# Patient Record
Sex: FEMALE | Race: BLACK OR AFRICAN AMERICAN | Employment: OTHER | URBAN - METROPOLITAN AREA
[De-identification: names, ages, dates, MRNs, and addresses within clinical notes are randomized per-mention and may not be internally consistent; named-entity substitution may affect disease eponyms.]

---

## 2017-01-03 ENCOUNTER — OFFICE VISIT (OUTPATIENT)
Dept: FAMILY MEDICINE CLINIC | Age: 57
End: 2017-01-03

## 2017-01-03 VITALS
TEMPERATURE: 96.7 F | OXYGEN SATURATION: 93 % | SYSTOLIC BLOOD PRESSURE: 96 MMHG | WEIGHT: 293 LBS | DIASTOLIC BLOOD PRESSURE: 64 MMHG | HEART RATE: 72 BPM | BODY MASS INDEX: 48.82 KG/M2 | HEIGHT: 65 IN | RESPIRATION RATE: 18 BRPM

## 2017-01-03 DIAGNOSIS — N18.6 KIDNEY DISEASE, CHRONIC, END STAGE ON DIALYSIS (HCC): ICD-10-CM

## 2017-01-03 DIAGNOSIS — N18.6 ESRD (END STAGE RENAL DISEASE) (HCC): Primary | ICD-10-CM

## 2017-01-03 DIAGNOSIS — Z99.2 KIDNEY DISEASE, CHRONIC, END STAGE ON DIALYSIS (HCC): ICD-10-CM

## 2017-01-03 DIAGNOSIS — G89.29 OTHER CHRONIC PAIN: ICD-10-CM

## 2017-01-03 RX ORDER — HYDROCODONE BITARTRATE AND ACETAMINOPHEN 7.5; 325 MG/1; MG/1
2 TABLET ORAL DAILY
Qty: 60 TAB | Refills: 0 | Status: SHIPPED | OUTPATIENT
Start: 2017-01-03 | End: 2017-02-28 | Stop reason: SDUPTHER

## 2017-01-03 RX ORDER — HYDROCODONE BITARTRATE AND ACETAMINOPHEN 7.5; 325 MG/1; MG/1
2 TABLET ORAL DAILY
Qty: 60 TAB | Refills: 0 | Status: SHIPPED | OUTPATIENT
Start: 2017-01-03 | End: 2017-01-03 | Stop reason: SDUPTHER

## 2017-01-03 RX ORDER — ALPHA-D-GALACTOSIDASE 400 UNIT
TABLET ORAL
Refills: 0 | COMMUNITY
Start: 2016-12-15 | End: 2019-05-30

## 2017-01-03 RX ORDER — LANOLIN ALCOHOL/MO/W.PET/CERES
CREAM (GRAM) TOPICAL
Qty: 30 TAB | Refills: 11 | Status: SHIPPED | OUTPATIENT
Start: 2017-01-03 | End: 2018-01-14 | Stop reason: SDUPTHER

## 2017-01-03 NOTE — PATIENT INSTRUCTIONS
TODAY, go to:   Check Out    Please schedule the following appointments:  BP follow up with Dr. Eneida Evans in  4 weeks    _____________________     Today you were seen for:  1. Low blood pressure  - take midodrine 10mg three times a day  - check your blood pressure daily      2. You can take up to THREE 300mg tablets every 8 hours  Remember to bring your pill bottles with norco to the next appointment  Try lidocaine on ankle    Call the home health agency to ask about the status of your prescription. _____________________     Review your health maintenance below. Make plans to return and address anything that is due or will be due soon.    Health Maintenance Due   Topic Date Due    Hepatitis C Test  1960    DTaP/Tdap/Td  (1 - Tdap) 05/10/1981    Breast Cancer Screening  02/19/2016

## 2017-01-03 NOTE — MR AVS SNAPSHOT
Visit Information Date & Time Provider Department Dept. Phone Encounter #  
 1/3/2017  4:45 PM [facility] Marcelle Barrera MD [place] [address] [phone] [id number] Upcoming Health Maintenance Date Due Hepatitis C Screening 1960 DTaP/Tdap/Td series (1 - Tdap) 5/10/1981 BREAST CANCER SCRN MAMMOGRAM 2/19/2016 PAP AKA CERVICAL CYTOLOGY 9/3/2017 COLONOSCOPY 8/18/2021 Pneumococcal 19-64 Highest Risk (3 of 3 - PPSV23) 1/3/2022 Allergies as of 1/3/2017  Review Complete On: 1/3/2017 By: Darian Crowell RN Severity Noted Reaction Type Reactions Iodine High 08/18/2011   Systemic Anaphylaxis Other Medication  09/03/2014   Systemic Other (comments) Metal causes numbness in hands,arms all over Shellfish Containing Products  08/18/2011   Systemic Anaphylaxis Sulfa (Sulfonamide Antibiotics)  03/10/2016    Itching Current Immunizations  Reviewed on 11/15/2016 Name Date Influenza Vaccine 11/19/2013 Influenza Vaccine Jaimie Buerger) 10/1/2015 Influenza Vaccine (Quad) PF 11/15/2016, 2/16/2015  2:30 PM  
 Pneumococcal Conjugate (PCV-13) 2/16/2015  2:31 PM  
  
 Not reviewed this visit You Were Diagnosed With   
  
 Codes Comments ESRD (end stage renal disease) ([facility]    -  Primary ICD-10-CM: N18.6 ICD-9-CM: 585.6 Other chronic pain     ICD-10-CM: G89.29 ICD-9-CM: 338.29 Kidney disease, chronic, end stage on dialysis ([facility]     ICD-10-CM: N18.6, Z99.2 ICD-9-CM: 585.6, V45.11 Vitals BP Pulse Temp Resp Height(growth percentile) Weight(growth percentile) 96/64 (BP 1 Location: Right arm, BP Patient Position: Sitting) 72 96.7 °F (35.9 °C) (Oral) 18 5' 5\" (1.651 m) (!) 351 lb 9.6 oz (159.5 kg) SpO2 BMI OB Status Smoking Status (!) 80% 58.51 kg/m2 Postmenopausal Never Smoker Vitals History BMI and BSA Data Body Mass Index Body Surface Area 58.51 kg/m 2 2.7 m 2 Preferred Pharmacy Pharmacy Name Phone [facility] [address] [phone] Your Updated Medication List  
  
   
This list is accurate as of: 1/3/17  6:16 PM.  Always use your most recent med list.  
  
  
  
  
 * albuterol 2.5 mg /3 mL (0.083 %) nebulizer solution Commonly known as:  PROVENTIL VENTOLIN  
3 mL by Nebulization route every four (4) hours as needed for Wheezing or Shortness of Breath. * albuterol 90 mcg/actuation inhaler Commonly known as:  PROVENTIL HFA, VENTOLIN HFA, PROAIR HFA Take 1-2 Puffs by inhalation every four (4) hours as needed for Wheezing. atorvastatin 10 mg tablet Commonly known as:  LIPITOR  
START WITH 1/2 TABLET DAILY. MAY INCREASE TO 1 TABLET DAILY IF TOLERATED (NO MUSCLE ACHES) buPROPion  mg tablet Commonly known as:  WELLBUTRIN XL  
take 1 tablet by mouth every morning  
  
 clotrimazole 1 % topical cream  
Commonly known as:  LOTRIMIN  
  
 ECOTRIN 325 mg tablet Generic drug:  aspirin delayed-release  
take 1 tablet by mouth once daily  
  
 ergocalciferol 50,000 unit capsule Commonly known as:  ERGOCALCIFEROL  
take 1 capsule by mouth every week  
  
 gabapentin 300 mg capsule Commonly known as:  NEURONTIN Take 1-2 Caps by mouth three (3) times daily as needed. Start with one capsule a day after dialysis. May increase to up to 3 daily for nerve pain. HYDROcodone-acetaminophen 7.5-325 mg per tablet Commonly known as:  Marcelino Bent Take 2 Tabs by mouth daily. Max Daily Amount: 2 Tabs. * HYDROPHOR 42 % Oint Generic drug:  white petrolatum * AQUAPHOR HEALING 41 % ointment Generic drug:  pantothenic ac-min oil-pet,hyd Apply  to affected area as needed for Dry Skin.  
  
 inhalational spacing device 1 Each by Does Not Apply route as needed. letrozole 2.5 mg tablet Commonly known as:  FEMARA  
take 1 tablet by mouth daily  
  
 lidocaine 4 % Gel 1 Inch by Apply Externally route two (2) times daily as needed. To ankle for pain  
  
 magnesium oxide 400 mg tablet Commonly known as:  MAG-OX  
take 1 tablet by mouth once daily  
  
 midodrine 5 mg tablet Commonly known as:  Genetta Sheer Take 10 mg by mouth three (3) times daily (with meals). mineral oil-hydrophil petrolat ointment Commonly known as:  AQUAPHOR Apply  to affected area as needed for Dry Skin or Itching. * miscellaneous medical supply Misc Please dispense 1 single point bariatric cane * miscellaneous medical supply Misc Please dispense 1 bariatric wheelchair  
  
 montelukast 10 mg tablet Commonly known as:  SINGULAIR Take 1 Tab by mouth daily. omeprazole 20 mg capsule Commonly known as:  PRILOSEC  
take 1 capsule by mouth twice a day RENVELA 800 mg Tab tab Generic drug:  sevelamer carbonate Take 800 mg by mouth three (3) times daily (with meals). SENSIPAR 30 mg tablet Generic drug:  cinacalcet Take 30 mg by mouth daily. * Notice: This list has 6 medication(s) that are the same as other medications prescribed for you. Read the directions carefully, and ask your doctor or other care provider to review them with you. Prescriptions Printed Refills HYDROcodone-acetaminophen (NORCO) 7.5-325 mg per tablet 0 Sig: Take 2 Tabs by mouth daily. Max Daily Amount: 2 Tabs. Class: Print Route: Oral  
  
Prescriptions Sent to Pharmacy Refills  
 magnesium oxide (MAG-OX) 400 mg tablet 11 Sig: take 1 tablet by mouth once daily Class: Normal  
 Pharmacy: [facility] [id number] [address] [place] [address],[address] #: [phone] Patient Instructions TODAY, go to: 
 Check Out Please schedule the following appointments: 
BP follow up with Dr. Tammie Douglas in  4 weeks 
 
_____________________ Today you were seen for: 
1. Low blood pressure - take midodrine 10mg three times a day - check your blood pressure daily 2. You can take up to THREE 300mg tablets every 8 hours Remember to bring your pill bottles with norco to the next appointment Try lidocaine on ankle Call the home health agency to ask about the status of your prescription. _____________________ Review your health maintenance below. Make plans to return and address anything that is due or will be due soon. Health Maintenance Due Topic Date Due  
 Hepatitis C Test  1960  
 DTaP/Tdap/Td  (1 - Tdap) 05/10/1981  Breast Cancer Screening  02/19/2016 Introducing [facility]! Evelin Ko introduces [employer] patient portal. Now you can access parts of your medical record, email your doctor's office, and request medication refills online. 1. In your internet browser, go to [email or web address] 2. Click on the First Time User? Click Here link in the Sign In box. You will see the New Member Sign Up page. 3. Enter your [employer] Access Code exactly as it appears below. You will not need to use this code after youve completed the sign-up process. If you do not sign up before the expiration date, you must request a new code. · [employer] Access Code: [email or web address] Expires: 2/13/2017 12:58 PM 
 
4. Enter the last four digits of your Social Security Number (xxxx) and Date of Birth (mm/dd/yyyy) as indicated and click Submit. You will be taken to the next sign-up page. 5. Create a [employer] ID. This will be your [employer] login ID and cannot be changed, so think of one that is secure and easy to remember. 6. Create a [employer] password. You can change your password at any time. 7. Enter your Password Reset Question and Answer. This can be used at a later time if you forget your password. 8. Enter your e-mail address. You will receive e-mail notification when new information is available in [address]. 9. Click Sign Up. You can now view and download portions of your medical record. 10. Click the Download Summary menu link to download a portable copy of your medical information. If you have questions, please visit the Frequently Asked Questions section of the [employer] website. Remember, [employer] is NOT to be used for urgent needs. For medical emergencies, dial 911. Now available from your iPhone and Android! Please provide this summary of care documentation to your next provider. Your primary care clinician is listed as Michelle Thomas. Randolph Gentile. If you have any questions after today's visit, please call [phone].

## 2017-01-03 NOTE — PROGRESS NOTES
[address] Visit   Patient ID:   Vance Lemon is a 64 y.o. female. Assessment/Plan:    Marty Stiles was seen today for medication refill and hospital follow up. Diagnoses and all orders for this visit:    ESRD (end stage renal disease) ([facility]  Advised her to take midrodrine 10mg TID as written on script, she is currently only taking BID. She is to notify and update her dialysis center tomorrow as well. Mg also refilled  -     magnesium oxide (MAG-OX) 400 mg tablet; take 1 tablet by mouth once daily    Other chronic pain  - HYDROcodone-acetaminophen (NORCO) 7.5-325 mg per tablet; Take 2 Tabs by mouth daily. Max Daily Amount: 2 Tabs. -     HYDROcodone-acetaminophen (NORCO) 7.5-325 mg per tablet; Take 2 Tabs by mouth daily. Max Daily Amount: 2 Tabs. Pain is adequately controlled with current plan     · Pain is due to:  ¨ ankle fracture, hardware and sequela  ¨ LBP with sciatica  ¨ Hand pain, possibly CTS  · Patient's plan currently includes:   ¨ norco 7.5/325mg  ¨ Gabapentin dose increased today, pt may titrate  ¨  lidocaine cream for ankle   · norco efills given for 2 months, dates: .1/3/2017 and 2/2/2017     · Functional improvements that justify chronic opioid medications: yes  · In the past patient has tried: PT, NSAIDs     · Treatment agreement was signed by pt and myself on: 1/3/2017   · Opioid Risk Tool? Date 11/15/2016 Low Risk  ·  appropriate and last checked on: 11/15/2016   · LABS: CANNOT MAKE URINE 2/2 ESRD    Of note, hard to check pulse ox today. The recorded 80 is spurious 2/2 poor tracing on pulse ox. She was in no distress and other vitals were acceptable. Attempted with multiple machines over a long time and still unable to get consistent tracing despite NAD and nl palpable radial pulse. Recommend hand warmers or checking on ears when equipment allows    Counselled pt on:  Patient health concerns.   Pt to call her [facility] group to check on status of equipment request    Patient was offered a choice/choices in the treatment plan today. Patient expresses understanding of the plan and agrees with recommendations. Patient Instructions     TODAY, go to:   Check Out    Please schedule the following appointments:  BP follow up with Dr. Leon Luevano in  4 weeks    _____________________     Today you were seen for:  1. Low blood pressure  - take midodrine 10mg three times a day  - check your blood pressure daily      2. You can take up to THREE 300mg tablets every 8 hours  Remember to bring your pill bottles with norco to the next appointment  Try lidocaine on ankle    Call the home health agency to ask about the status of your prescription. _____________________     Review your health maintenance below. Make plans to return and address anything that is due or will be due soon. Health Maintenance Due   Topic Date Due    Hepatitis C Test  1960    DTaP/Tdap/Td  (1 - Tdap) 05/10/1981    Breast Cancer Screening  02/19/2016         ? Subjective:   HPI:  Leora Jain is a 64 y.o. female being seen for:   Chief Complaint   Patient presents with   [facility] Follow Up     [facility] for nosebleed and BP-69/40. Nose bleed  X 2 at house  Checked BP and it was [address] and they recommended ED visit  Bernardino Malave to [facility] ED on 12/24. Gave some fluid  Has been low around dialysis  Bleed twice same night, about 10 min the first time then shorter. Stopped with ice on nose  Has not recurred    Chronic pain  · Tried lidocaine for graft, with some help, may use twice a day  · Gabapentin still helping hands, 300mg better than 100mg. Taking two in AM and may take one later in day  · C/b on sciatica pain at night.       Screening and Prevention Due:  Health Maintenance Due   Topic Date Due    Hepatitis C Screening  1960    DTaP/Tdap/Td series (1 - Tdap) 05/10/1981    BREAST CANCER SCRN MAMMOGRAM  02/19/2016        Review of Systems  Otherwise, per HPI  Active Problem List:  Patient Active Problem List   Diagnosis Code    Heart failure ([facility] I50.9    Asthma J45.909    Kidney disease, chronic, end stage on dialysis ([facility] N18.6, Z99.2    GERD (gastroesophageal reflux disease) K21.9    Unspecified sleep apnea G47.30    Depression F32.9    Vitamin D deficiency E55.9    Chronic ankle pain M25.579, G89.29    Obesity hypoventilation syndrome (HCC) E66.2    DCIS (ductal carcinoma in situ) of breast D05.10     ? Objective:     Visit Vitals    BP 96/64 (BP 1 Location: Right arm, BP Patient Position: Sitting)    Pulse 72    Temp 96.7 °F (35.9 °C) (Oral)    Resp 18    Ht 5' 5\" (1.651 m)    Wt (!) 351 lb 9.6 oz (159.5 kg)    SpO2 93%    BMI 58.51 kg/m2     No flowsheet data found. Physical Exam   Constitutional: She appears well-developed and well-nourished. Non-toxic appearance. She does not have a sickly appearance. She does not appear ill. No distress. Cardiovascular:   Pulses:       Radial pulses are 2+ on the right side, and 2+ on the left side. Nl rate and regular by palpation   Pulmonary/Chest: Effort normal. No respiratory distress. Neurological: She is alert. Psychiatric: She has a normal mood and affect. Her behavior is normal.     Allergies   Allergen Reactions    Iodine Anaphylaxis    Other Medication Other (comments)     Metal causes numbness in hands,arms all over    Shellfish Containing Products Anaphylaxis    Sulfa (Sulfonamide Antibiotics) Itching     Prior to Admission medications    Medication Sig Start Date End Date Taking? Authorizing Provider   ECOTRIN 325 mg tablet take 1 tablet by mouth once daily 12/15/16  Yes Historical Provider   magnesium oxide (MAG-OX) 400 mg tablet take 1 tablet by mouth once daily 1/3/17  Yes Erika Stands. Tammie Douglas MD   HYDROcodone-acetaminophen [facility]) 7.5-325 mg per tablet Take 2 Tabs by mouth daily. Max Daily Amount: 2 Tabs. 1/3/17  Yes Buster Stands.  Tammie Douglas MD   lidocaine 4 % gel 1 Inch by Apply Externally route two (2) times daily as needed. To ankle for pain 12/12/16  Yes Rubin Betters. Eneida Evans MD   albuterol (PROVENTIL VENTOLIN) 2.5 mg /3 mL (0.083 %) nebulizer solution 3 mL by Nebulization route every four (4) hours as needed for Wheezing or Shortness of Breath. 11/15/16  Yes Rubin Betters. Eneida Evans MD   ergocalciferol (ERGOCALCIFEROL) 50,000 unit capsule take 1 capsule by mouth every week 11/15/16  Yes Rubin Betters. Eneida Evans MD   inhalational spacing device 1 Each by Does Not Apply route as needed. 11/15/16  Yes Rubin Betters. Eneida Evans MD   albuterol (PROVENTIL HFA, VENTOLIN HFA, PROAIR HFA) 90 mcg/actuation inhaler Take 1-2 Puffs by inhalation every four (4) hours as needed for Wheezing. 11/15/16  Yes Rubin Betters. Eneida Evans MD   gabapentin (NEURONTIN) 300 mg capsule Take 1-2 Caps by mouth three (3) times daily as needed. Start with one capsule a day after dialysis. May increase to up to 3 daily for nerve pain. 11/15/16  Yes Rubin Betters. Eneida Evans MD   mineral oil-hydrophil petrolat (AQUAPHOR) ointment Apply  to affected area as needed for Dry Skin or Itching. 8/23/16  Yes Supriya Sands MD   midodrine (PROAMITINE) 5 mg tablet Take 10 mg by mouth three (3) times daily (with meals). 6/29/16  Yes Historical Provider   atorvastatin (LIPITOR) 10 mg tablet START WITH 1/2 TABLET DAILY. MAY INCREASE TO 1 TABLET DAILY IF TOLERATED (NO MUSCLE ACHES) 4/4/16  Yes Supriya Sands MD   SENSIPAR 30 mg tablet Take 30 mg by mouth daily. 2/12/16  Yes Historical Provider   RENVELA 800 mg tab tab Take 800 mg by mouth three (3) times daily (with meals).  2/10/16  Yes Historical Provider   omeprazole (PRILOSEC) 20 mg capsule take 1 capsule by mouth twice a day 3/6/16  Yes Supriya Sands MD   buPROPion XL (WELLBUTRIN XL) 150 mg tablet take 1 tablet by mouth every morning 3/6/16  Yes Supriya Sands MD   letrozole [facility]) 2.5 mg tablet take 1 tablet by mouth daily 2/29/16  Yes Supriya Sands MD   montelukast (SINGULAIR) 10 mg tablet Take 1 Tab by mouth daily. 1/12/16  Yes Femi Cancer, NP   miscellaneous medical supply misc Please dispense 1 single point bariatric cane 11/23/16   Samson Cartwright. Hood Pang MD   miscellaneous medical supply misc Please dispense 1 bariatric wheelchair 11/23/16   Samson Cartwright. Hood Pang MD   AQUAPHOR HEALING 41 % ointment Apply  to affected area as needed for Dry Skin.  8/23/16   Jesusita Doyle MD   clotrimazole (LOTRIMIN) 1 % topical cream  2/1/16   Historical Provider   HYDROPHOR 42 % oint  1/13/16   Historical Provider

## 2017-01-03 NOTE — PROGRESS NOTES
Chief Complaint   Patient presents with   [address] Follow up 12/24/16 to [facility]. She had a nose bleed and BP was low. 1. Have you been to the ER, urgent care clinic since your last visit? Hospitalized since your last visit? Yes When: 12/24/16 Where: [address] [place] [address] Reason for visit: Low BP and nosebleed. 2. Have you seen or consulted any other health care providers outside of the [address] [place] [address] since your last visit? Include any pap smears or colon screening. No     I have reviewed Health Maintenance with the patient and updated. Advance Care Planning information reviewed and given to the patient at a previous visit. The patient was counseled on the dangers of tobacco use, and Patient is a non smoker. Reviewed strategies to maximize success, including Continue not to smoke.

## 2017-02-01 DIAGNOSIS — G56.03 BILATERAL CARPAL TUNNEL SYNDROME: ICD-10-CM

## 2017-02-01 DIAGNOSIS — M54.31 RIGHT SIDED SCIATICA: ICD-10-CM

## 2017-02-01 RX ORDER — GABAPENTIN 300 MG/1
300-600 CAPSULE ORAL
Qty: 180 CAP | Refills: 3 | Status: SHIPPED | OUTPATIENT
Start: 2017-02-01 | End: 2017-05-25 | Stop reason: SDUPTHER

## 2017-02-28 ENCOUNTER — OFFICE VISIT (OUTPATIENT)
Dept: FAMILY MEDICINE CLINIC | Age: 57
End: 2017-02-28

## 2017-02-28 VITALS
OXYGEN SATURATION: 95 % | DIASTOLIC BLOOD PRESSURE: 73 MMHG | SYSTOLIC BLOOD PRESSURE: 115 MMHG | WEIGHT: 293 LBS | HEIGHT: 65 IN | TEMPERATURE: 98.9 F | HEART RATE: 92 BPM | BODY MASS INDEX: 48.82 KG/M2

## 2017-02-28 DIAGNOSIS — G89.29 OTHER CHRONIC PAIN: ICD-10-CM

## 2017-02-28 DIAGNOSIS — J01.90 ACUTE SINUSITIS, RECURRENCE NOT SPECIFIED, UNSPECIFIED LOCATION: ICD-10-CM

## 2017-02-28 DIAGNOSIS — J45.40 MODERATE PERSISTENT ASTHMA WITHOUT COMPLICATION: Primary | ICD-10-CM

## 2017-02-28 DIAGNOSIS — I95.9 HYPOTENSION, UNSPECIFIED HYPOTENSION TYPE: ICD-10-CM

## 2017-02-28 RX ORDER — SEVELAMER CARBONATE 800 MG/1
TABLET, FILM COATED ORAL
Refills: 0 | COMMUNITY
Start: 2017-01-26 | End: 2017-04-06

## 2017-02-28 RX ORDER — HYDROCODONE BITARTRATE AND ACETAMINOPHEN 7.5; 325 MG/1; MG/1
1 TABLET ORAL
Qty: 60 TAB | Refills: 0 | Status: SHIPPED | OUTPATIENT
Start: 2017-03-30 | End: 2017-05-25 | Stop reason: SDUPTHER

## 2017-02-28 RX ORDER — PREDNISONE 20 MG/1
60 TABLET ORAL DAILY
Qty: 15 TAB | Refills: 0 | Status: SHIPPED | OUTPATIENT
Start: 2017-02-28 | End: 2017-03-05

## 2017-02-28 RX ORDER — HYDROCODONE BITARTRATE AND ACETAMINOPHEN 7.5; 325 MG/1; MG/1
1 TABLET ORAL
Qty: 60 TAB | Refills: 0 | Status: SHIPPED | OUTPATIENT
Start: 2017-02-28 | End: 2017-05-25 | Stop reason: SDUPTHER

## 2017-02-28 RX ORDER — AMOXICILLIN AND CLAVULANATE POTASSIUM 875; 125 MG/1; MG/1
1 TABLET, FILM COATED ORAL EVERY 12 HOURS
Qty: 20 TAB | Refills: 0 | Status: SHIPPED | OUTPATIENT
Start: 2017-02-28 | End: 2017-03-10

## 2017-02-28 RX ORDER — HYDROCODONE BITARTRATE AND ACETAMINOPHEN 7.5; 325 MG/1; MG/1
1 TABLET ORAL
Qty: 60 TAB | Refills: 0 | Status: SHIPPED | OUTPATIENT
Start: 2017-04-29 | End: 2017-05-25 | Stop reason: SDUPTHER

## 2017-02-28 RX ORDER — IPRATROPIUM BROMIDE AND ALBUTEROL SULFATE 2.5; .5 MG/3ML; MG/3ML
3 SOLUTION RESPIRATORY (INHALATION) EVERY 6 HOURS
Qty: 168 ML | Refills: 1
Start: 2017-02-28 | End: 2017-04-06

## 2017-02-28 RX ORDER — SEVELAMER CARBONATE 2400 MG/1
POWDER, FOR SUSPENSION ORAL
Refills: 0 | COMMUNITY
Start: 2017-02-02 | End: 2017-04-06 | Stop reason: SDUPTHER

## 2017-02-28 NOTE — MR AVS SNAPSHOT
Visit Information Date & Time Provider Department Dept. Phone Encounter #  
 2/28/2017 12:00 PM Jaminwoody Marin MD [facility] [phone] [id number] Your Appointments 4/6/2017  9:00 AM  
Complete Physical with Pap with Rickey Fischer. [facility] ([address]) Appt Note: Complete Physical with PAP (40 min.) - lp  
 [address] 
[address] [facility] [address]  
[phone]  
  
   
 [address] [facility] [address] [facility] Upcoming Health Maintenance Date Due Hepatitis C Screening 1960 DTaP/Tdap/Td series (1 - Tdap) 5/10/1981 BREAST CANCER SCRN MAMMOGRAM 2/19/2016 PAP AKA CERVICAL CYTOLOGY 9/3/2017 COLONOSCOPY 8/18/2021 Pneumococcal 19-64 Highest Risk (3 of 3 - PPSV23) 1/3/2022 Allergies as of 2/28/2017  Review Complete On: 2/28/2017 By: Rickey Fischer. Maico Marin MD  
  
 Severity Noted Reaction Type Reactions Iodine High 08/18/2011   Systemic Anaphylaxis Other Medication  09/03/2014   Systemic Other (comments) Metal causes numbness in hands,arms all over Shellfish Containing Products  08/18/2011   Systemic Anaphylaxis Sulfa (Sulfonamide Antibiotics)  03/10/2016    Itching Current Immunizations  Reviewed on 11/15/2016 Name Date Influenza Vaccine 11/19/2013 Influenza Vaccine Corie Lanius) 10/1/2015 Influenza Vaccine (Quad) PF 11/15/2016, 2/16/2015  2:30 PM  
 Pneumococcal Conjugate (PCV-13) 2/16/2015  2:31 PM  
  
 Not reviewed this visit You Were Diagnosed With   
  
 Codes Comments Moderate persistent asthma without complication    -  Primary ICD-10-CM: J45.40 ICD-9-CM: 493.90 Acute sinusitis, recurrence not specified, unspecified location     ICD-10-CM: J01.90 ICD-9-CM: 461.9 Other chronic pain     ICD-10-CM: G89.29 ICD-9-CM: 338.29 Hypotension, unspecified hypotension type     ICD-10-CM: I95.9 ICD-9-CM: 042. 9 Vitals  BP  
  
  
  
  
  
 115/73 (BP 1 Location: Right arm, BP Patient Position: Sitting) BMI and BSA Data Body Mass Index Body Surface Area  
 58.36 kg/m 2 2.7 m 2 Preferred Pharmacy Pharmacy Name Phone [facility] [phone] [phone] Your Updated Medication List  
  
   
This list is accurate as of: 2/28/17  1:30 PM.  Always use your most recent med list.  
  
  
  
  
 * albuterol 2.5 mg /3 mL (0.083 %) nebulizer solution Commonly known as:  PROVENTIL VENTOLIN  
3 mL by Nebulization route every four (4) hours as needed for Wheezing or Shortness of Breath. * albuterol 90 mcg/actuation inhaler Commonly known as:  PROVENTIL HFA, VENTOLIN HFA, PROAIR HFA Take 1-2 Puffs by inhalation every four (4) hours as needed for Wheezing. albuterol-ipratropium 2.5 mg-0.5 mg/3 ml Nebu Commonly known as:  DUO-NEB  
3 mL by Nebulization route every six (6) hours. Until you follow up  
  
 amoxicillin-clavulanate 875-125 mg per tablet Commonly known as:  AUGMENTIN Take 1 Tab by mouth every twelve (12) hours for 10 days. AQUAPHOR HEALING 41 % ointment Generic drug:  pantothenic ac-min oil-pet,hyd Apply  to affected area as needed for Dry Skin. atorvastatin 10 mg tablet Commonly known as:  LIPITOR  
START WITH 1/2 TABLET DAILY. MAY INCREASE TO 1 TABLET DAILY IF TOLERATED (NO MUSCLE ACHES) buPROPion  mg tablet Commonly known as:  WELLBUTRIN XL  
take 1 tablet by mouth every morning  
  
 clotrimazole 1 % topical cream  
Commonly known as:  LOTRIMIN  
  
 ECOTRIN 325 mg tablet Generic drug:  aspirin delayed-release  
take 1 tablet by mouth once daily  
  
 ergocalciferol 50,000 unit capsule Commonly known as:  ERGOCALCIFEROL  
take 1 capsule by mouth every week  
  
 gabapentin 300 mg capsule Commonly known as:  NEURONTIN Take 1-2 Caps by mouth three (3) times daily as needed. * HYDROcodone-acetaminophen 7.5-325 mg per tablet Commonly known as:  Castillo Tori Take 1 Tab by mouth two (2) times daily as needed for Pain. Max Daily Amount: 2 Tabs. * HYDROcodone-acetaminophen 7.5-325 mg per tablet Commonly known as:  Castillo Tori Take 1 Tab by mouth two (2) times daily as needed for Pain. Max Daily Amount: 2 Tabs. Start taking on:  3/30/2017  
  
 * HYDROcodone-acetaminophen 7.5-325 mg per tablet Commonly known as:  Castillo Tori Take 1 Tab by mouth two (2) times daily as needed for Pain. Max Daily Amount: 2 Tabs. Start taking on:  4/29/2017  
  
 inhalational spacing device 1 Each by Does Not Apply route as needed. letrozole 2.5 mg tablet Commonly known as:  FEMARA  
take 1 tablet by mouth daily  
  
 lidocaine 4 % Gel 1 Inch by Apply Externally route two (2) times daily as needed. To ankle for pain  
  
 magnesium oxide 400 mg tablet Commonly known as:  MAG-OX  
take 1 tablet by mouth once daily  
  
 midodrine 5 mg tablet Commonly known as:  Simone Beecham Take 10 mg by mouth three (3) times daily (with meals). mineral oil-hydrophil petrolat ointment Commonly known as:  AQUAPHOR Apply  to affected area as needed for Dry Skin or Itching. * miscellaneous medical supply Misc Please dispense 1 single point bariatric cane * miscellaneous medical supply Misc Please dispense 1 bariatric wheelchair  
  
 montelukast 10 mg tablet Commonly known as:  SINGULAIR  
take 1 tablet by mouth once daily  
  
 omeprazole 20 mg capsule Commonly known as:  PRILOSEC  
take 1 capsule by mouth twice a day  
  
 predniSONE 20 mg tablet Commonly known as:  Shade Ok Take 3 Tabs by mouth daily for 5 days. * RENVELA 800 mg Tab tab Generic drug:  sevelamer carbonate * RENVELA 2.4 gram Pwpk oral powder Generic drug:  sevelamer carbonate SENSIPAR 30 mg tablet Generic drug:  cinacalcet Take 30 mg by mouth daily. * Notice: This list has 9 medication(s) that are the same as other medications prescribed for you. Read the directions carefully, and ask your doctor or other care provider to review them with you. Prescriptions Printed Refills HYDROcodone-acetaminophen (NORCO) 7.5-325 mg per tablet 0 Sig: Take 1 Tab by mouth two (2) times daily as needed for Pain. Max Daily Amount: 2 Tabs. Class: Print Route: Oral  
 HYDROcodone-acetaminophen (NORCO) 7.5-325 mg per tablet 0 Starting on: 3/30/2017 Sig: Take 1 Tab by mouth two (2) times daily as needed for Pain. Max Daily Amount: 2 Tabs. Class: Print Route: Oral  
 HYDROcodone-acetaminophen (NORCO) 7.5-325 mg per tablet 0 Starting on: 4/29/2017 Sig: Take 1 Tab by mouth two (2) times daily as needed for Pain. Max Daily Amount: 2 Tabs. Class: Print Route: Oral  
  
Prescriptions Sent to Pharmacy Refills  
 amoxicillin-clavulanate (AUGMENTIN) 875-125 mg per tablet 0 Sig: Take 1 Tab by mouth every twelve (12) hours for 10 days. Class: Normal  
 Pharmacy: [id number] [address] [place] [address] Ph #: [phone] Route: Oral  
 predniSONE (DELTASONE) 20 mg tablet 0 Sig: Take 3 Tabs by mouth daily for 5 days. Class: Normal  
 Pharmacy: [id number] [address] [place] [address] [address] Ph #: [phone] Route: Oral  
  
We Performed the Following REFERRAL TO CARDIOLOGY [JEN66 Custom] Comments:  
 Please evaluate patient for hypotension pre dialysis request  
  
Referral Information Referral ID Referred By Referred To [id number] Samantha Easley MD   
   [place] [address] [address] Phone: [phone] Fax: [phone] Visits Status Start Date End Date 1 New Request 2/28/17 2/28/18  If your referral has a status of pending review or denied, additional information will be sent to support the outcome of this decision. Patient Instructions TODAY, please go to: CHECK OUT Please schedule the following appointments at [facility] OUT: 
· Asthma and sinus follow up with Dr. Stoney Levy or any provider here on March 7th 
_____________________ Today's Plan: · Take augmentin · Take prednisone · Use breathing treatment four times a day · May use albuterol in between · We will get your [facility] records · If worsening, return for evaluation here or at [facility]. · See cardiology 
 
_____________________ Review your health maintenance below. Make plans to return and address anything that is due or will be due soon. Health Maintenance Due Topic Date Due  
 Hepatitis C Test  1960  
 DTaP/Tdap/Td  (1 - Tdap) 05/10/1981  Breast Cancer Screening  02/19/2016 Introducing [facility]! Risa Acevedo introduces AccessPay patient portal. Now you can access parts of your medical record, email your doctor's office, and request medication refills online. 1. In your internet browser, go to [email or web address] 2. Click on the First Time User? Click Here link in the Sign In box. You will see the New Member Sign Up page. 3. Enter your AccessPay Access Code exactly as it appears below. You will not need to use this code after youve completed the sign-up process. If you do not sign up before the expiration date, you must request a new code. · AccessPay Access Code: [email or web address] Expires: 5/29/2017  1:21 PM 
 
4. Enter the last four digits of your Social Security Number (xxxx) and Date of Birth (mm/dd/yyyy) as indicated and click Submit. You will be taken to the next sign-up page. 5. Create a MCE-[employer] Developmentt ID. This will be your MCE[employer] login ID and cannot be changed, so think of one that is secure and easy to remember. 6. Create a [employer] password. You can change your password at any time. 7. Enter your Password Reset Question and Answer. This can be used at a later time if you forget your password. 8. Enter your e-mail address. You will receive e-mail notification when new information is available in [address] 9. Click Sign Up. You can now view and download portions of your medical record. 10. Click the Download Summary menu link to download a portable copy of your medical information. If you have questions, please visit the Frequently Asked Questions section of the Cubikal website. Remember, Cubikal is NOT to be used for urgent needs. For medical emergencies, dial 911. Now available from your iPhone and Android! Please provide this summary of care documentation to your next provider. Your primary care clinician is listed as [facility]. J Luis Ewing. If you have any questions after today's visit, please call [phone].

## 2017-02-28 NOTE — PATIENT INSTRUCTIONS
TODAY, please go to:   CHECK OUT     Please schedule the following appointments at [facility] OUT:  · Asthma and sinus follow up with Dr. Gauri Marcus or any provider here on March 7th  _____________________     Today's Plan:  · Take augmentin  · Take prednisone  · Use breathing treatment four times a day  · May use albuterol in between    · We will get your [facility] records    · If worsening, return for evaluation here or at [facility]. · See cardiology    _____________________     Review your health maintenance below. Make plans to return and address anything that is due or will be due soon.    Health Maintenance Due   Topic Date Due    Hepatitis C Test  1960    DTaP/Tdap/Td  (1 - Tdap) 05/10/1981    Breast Cancer Screening  02/19/2016

## 2017-02-28 NOTE — PROGRESS NOTES
Chief Complaint   Patient presents with    Medication Evaluation     refill on pain meds per pt    Cough     x 2-3 weeks

## 2017-02-28 NOTE — PROGRESS NOTES
[address] Visit   Patient ID:   Johanny Palomo is a 64 y.o. female. Assessment/Plan:    Erika Carr was seen today for medication evaluation and cough. Diagnoses and all orders for this visit:    Moderate persistent asthma without complication  Acceptable improvement with duoneb X2 today. Completed third in office as well. Manage with steroid, scheduled duoneb, prn albuterol. tx sinusitis as below. -     predniSONE (DELTASONE) 20 mg tablet; Take 3 Tabs by mouth daily for 5 days. -     albuterol-ipratropium (DUO-NEB) 2.5 mg-0.5 mg/3 ml nebu; 3 mL by Nebulization route every six (6) hours. Until you follow up  -     ALBUTEROL, INHAL. WYS.()    Acute sinusitis, recurrence not specified, unspecified location  -     amoxicillin-clavulanate (AUGMENTIN) 875-125 mg per tablet; Take 1 Tab by mouth every twelve (12) hours for 10 days. Other chronic pain  Pill count done today. Treatment agree UTD.  reviewed. D/t need to address asthma flare did not alter medications, will continue with regimen an reevaluate on follow up.  -     HYDROcodone-acetaminophen (NORCO) 7.5-325 mg per tablet; Take 1 Tab by mouth two (2) times daily as needed for Pain. Max Daily Amount: 2 Tabs. -     HYDROcodone-acetaminophen (NORCO) 7.5-325 mg per tablet; Take 1 Tab by mouth two (2) times daily as needed for Pain. Max Daily Amount: 2 Tabs. -     HYDROcodone-acetaminophen (NORCO) 7.5-325 mg per tablet; Take 1 Tab by mouth two (2) times daily as needed for Pain. Max Daily Amount: 2 Tabs. Hypotension, unspecified hypotension type  Referral per pt request of dialysis. -     REFERRAL TO CARDIOLOGY      Counselled pt on:  Patient health concerns. Patient was offered a choice/choices in the treatment plan today.   Patient expresses understanding of the plan and agrees with recommendations    Patient Instructions     TODAY, please go to:   CHECK OUT     Please schedule the following appointments at [facility] OUT:  · Asthma and sinus follow up with Dr. Ralph Guzmán or any provider here on March 7th  _____________________     Today's Plan:  · Take augmentin  · Take prednisone  · Use breathing treatment four times a day  · May use albuterol in between    · We will get your [facility] records    · If worsening, return for evaluation here or at [facility]. · See cardiology    _____________________     Review your health maintenance below. Make plans to return and address anything that is due or will be due soon. Health Maintenance Due   Topic Date Due    Hepatitis C Test  1960    DTaP/Tdap/Td  (1 - Tdap) 05/10/1981    Breast Cancer Screening  02/19/2016         ? Subjective:   HPI:  Shani Wayne is a 64 y.o. female being seen for:   Chief Complaint   Patient presents with    Medication Evaluation     refill on pain meds per pt    Cough     x 2-3 weeks     Chronic pain  Sciatica irritating at night, numbness in thighs, improves with position change  Pain in ankle continues  Due for refill today  Pill count today  Brings two empty bottles of norco with correct dates and prescriber name. cough  · Started 2-3 weeks ago  · Wheeze, mucous  · Strangled in sleep  · Has been sleeping with bipap  · No ST  · Ears are stopped up  · Some nasal drainage  · Had a nose bleed on 2/25  · Some sinus pressure  · There is sinus congestion  · Sometimes SOB, has O2 in car  · Now has chest and side pain from cough  · Using albuterol twice a day with some relief  · And taking singulair     · Dialysis wants her to see cardiology 2/2 hypotension during dialysis    · Was in [facility] for 3-4 days 2/2 diarrhea X 1-2 weeks and reports having blood in stool. Had colonoscopy. From pt description ?mesenteic ischemia. No blood since then.        Review of Systems  Otherwise, per HPI  Active Problem List:  Patient Active Problem List   Diagnosis Code    [facility] I50.9    Asthma J45.909    Kidney disease, chronic, end stage on dialysis ([facility] N18.6, Z99.2    GERD (gastroesophageal reflux disease) K21.9    Unspecified sleep apnea G47.30    Depression F32.9    Vitamin D deficiency E55.9    Chronic ankle pain M25.579, G89.29    Obesity hypoventilation syndrome (HCC) E66.2    DCIS (ductal carcinoma in situ) of breast D05.10     Objective:     Visit Vitals    /73 (BP 1 Location: Right arm, BP Patient Position: Sitting)    Pulse 92    Temp 98.9 °F (37.2 °C) (Oral)    Ht 5' 5.01\" (1.651 m)    Wt (!) 350 lb 12.8 oz (159.1 kg)    SpO2 95%    BMI 58.36 kg/m2     Wt Readings from Last 3 Encounters:   02/28/17 (!) 350 lb 12.8 oz (159.1 kg)   01/03/17 (!) 351 lb 9.6 oz (159.5 kg)   11/15/16 (!) 351 lb 9.6 oz (159.5 kg)     No flowsheet data found. Physical Exam   Constitutional: She appears well-developed and well-nourished. Non-toxic appearance. Overweight female in wheelchair   HENT:   Right Ear: Tympanic membrane and ear canal normal.   Left Ear: Tympanic membrane and ear canal normal.   Nose: Right sinus exhibits maxillary sinus tenderness and frontal sinus tenderness. Left sinus exhibits maxillary sinus tenderness and frontal sinus tenderness. Mouth/Throat: Uvula is midline. No oropharyngeal exudate or posterior oropharyngeal edema. Cardiovascular: Normal rate, regular rhythm and normal heart sounds. Pulmonary/Chest: No accessory muscle usage. She is in respiratory distress. She has wheezes. She has no rhonchi. She has no rales. On initial evaluation, wheezing throughout. Tight airways  Coughing when talking    After 2 duonebs, improved breath sounds though still wheezing in all fields. Able to talk in full sentences comfortably   Neurological: She is alert. Psychiatric: She has a normal mood and affect.  Her behavior is normal.     Allergies   Allergen Reactions    Iodine Anaphylaxis    Other Medication Other (comments)     Metal causes numbness in hands,arms all over    Shellfish Containing Products Anaphylaxis    Sulfa (Sulfonamide Antibiotics) Itching     Prior to Admission medications    Medication Sig Start Date End Date Taking? Authorizing Provider   RENVELA 2.4 gram pwpk oral powder  2/2/17  Yes Historical Provider   amoxicillin-clavulanate (AUGMENTIN) 875-125 mg per tablet Take 1 Tab by mouth every twelve (12) hours for 10 days. 2/28/17 3/10/17 Yes Annie Sparks MD   predniSONE (DELTASONE) 20 mg tablet Take 3 Tabs by mouth daily for 5 days. 2/28/17 3/5/17 Yes Annie Sparks MD   albuterol-ipratropium (DUO-NEB) 2.5 mg-0.5 mg/3 ml nebu 3 mL by Nebulization route every six (6) hours. Until you follow up 2/28/17  Yes Ania Figures. Jayden Sparks MD   HYDROcodone-acetaminophen [facility]) 7.5-325 mg per tablet Take 1 Tab by mouth two (2) times daily as needed for Pain. Max Daily Amount: 2 Tabs. 2/28/17  Yes Annie Sparks MD   HYDROcodone-acetaminophen [facility]) 7.5-325 mg per tablet Take 1 Tab by mouth two (2) times daily as needed for Pain. Max Daily Amount: 2 Tabs. 3/30/17  Yes Ania Figures. Jayden Sparks MD   HYDROcodone-acetaminophen [facility]) 7.5-325 mg per tablet Take 1 Tab by mouth two (2) times daily as needed for Pain. Max Daily Amount: 2 Tabs. 4/29/17  Yes Annie Sparks MD   gabapentin (NEURONTIN) 300 mg capsule Take 1-2 Caps by mouth three (3) times daily as needed. 2/1/17  Yes Ania Figures. Jayden Sparks MD   montelukast (SINGULAIR) 10 mg tablet take 1 tablet by mouth once daily 1/31/17  Yes Ania Figures. Jayden Sparks MD   ECOTRIN 325 mg tablet take 1 tablet by mouth once daily 12/15/16  Yes Historical Provider   magnesium oxide (MAG-OX) 400 mg tablet take 1 tablet by mouth once daily 1/3/17  Yes [place] Figures. Jayden Sparks MD   lidocaine 4 % gel 1 Inch by Apply Externally route two (2) times daily as needed. To ankle for pain 12/12/16  Yes Ania Figures. Jayden Sparks MD   albuterol (PROVENTIL VENTOLIN) 2.5 mg /3 mL (0.083 %) nebulizer solution 3 mL by Nebulization route every four (4) hours as needed for Wheezing or Shortness of Breath. 11/15/16  Yes Ania Figures.  Jayden Sparks MD   ergocalciferol (ERGOCALCIFEROL) 50,000 unit capsule take 1 capsule by mouth every week 11/15/16  Yes Melquiades Lilly. Raimundo Mann MD   mineral oil-hydrophil petrolat (AQUAPHOR) ointment Apply  to affected area as needed for Dry Skin or Itching. 8/23/16  Yes Minerva Kaye MD   AQUAPHOR HEALING 41 % ointment Apply  to affected area as needed for Dry Skin. 8/23/16  Yes Minerva Kaye MD   midodrine (PROAMITINE) 5 mg tablet Take 10 mg by mouth three (3) times daily (with meals). 6/29/16  Yes Historical Provider   atorvastatin (LIPITOR) 10 mg tablet START WITH 1/2 TABLET DAILY. MAY INCREASE TO 1 TABLET DAILY IF TOLERATED (NO MUSCLE ACHES) 4/4/16  Yes Minerva Kaye MD   SENSIPAR 30 mg tablet Take 30 mg by mouth daily. 2/12/16  Yes Historical Provider   clotrimazole (LOTRIMIN) 1 % topical cream  2/1/16  Yes Historical Provider   omeprazole (PRILOSEC) 20 mg capsule take 1 capsule by mouth twice a day 3/6/16  Yes Minerva Kaye MD   buPROPion XL (WELLBUTRIN XL) 150 mg tablet take 1 tablet by mouth every morning 3/6/16  Yes Minerva Kaye MD   letrozole [facility]) 2.5 mg tablet take 1 tablet by mouth daily 2/29/16  Yes Minerva Kaye MD   RENVELA 800 mg tab tab  1/26/17   Historical Provider   miscellaneous medical supply misc Please dispense 1 single point bariatric cane 11/23/16   Melquiades Lilly. Raimundo Mann MD   miscellaneous medical supply misc Please dispense 1 bariatric wheelchair 11/23/16   Melquiades Lilly. Raimundo Mann MD   inhalational spacing device 1 Each by Does Not Apply route as needed. 11/15/16   Melquiades Lilly. Raimundo Mann MD   albuterol (PROVENTIL HFA, VENTOLIN HFA, PROAIR HFA) 90 mcg/actuation inhaler Take 1-2 Puffs by inhalation every four (4) hours as needed for Wheezing. 11/15/16   Melquiades Lilly.  Raimundo Mann MD

## 2017-03-07 ENCOUNTER — OFFICE VISIT (OUTPATIENT)
Dept: FAMILY MEDICINE CLINIC | Age: 57
End: 2017-03-07

## 2017-03-07 VITALS
BODY MASS INDEX: 48.82 KG/M2 | DIASTOLIC BLOOD PRESSURE: 37 MMHG | WEIGHT: 293 LBS | TEMPERATURE: 97 F | RESPIRATION RATE: 20 BRPM | HEIGHT: 65 IN | SYSTOLIC BLOOD PRESSURE: 95 MMHG | HEART RATE: 70 BPM | OXYGEN SATURATION: 96 %

## 2017-03-07 DIAGNOSIS — Z12.39 BREAST CANCER SCREENING: ICD-10-CM

## 2017-03-07 DIAGNOSIS — Z11.59 NEED FOR HEPATITIS C SCREENING TEST: ICD-10-CM

## 2017-03-07 DIAGNOSIS — J01.90 ACUTE SINUSITIS, RECURRENCE NOT SPECIFIED, UNSPECIFIED LOCATION: ICD-10-CM

## 2017-03-07 DIAGNOSIS — L85.3 DRY SKIN: ICD-10-CM

## 2017-03-07 DIAGNOSIS — J45.909 UNCOMPLICATED ASTHMA, UNSPECIFIED ASTHMA SEVERITY: Primary | ICD-10-CM

## 2017-03-07 RX ORDER — PETROLATUM 42 G/100G
OINTMENT TOPICAL AS NEEDED
Qty: 454 G | Refills: 4 | Status: SHIPPED | OUTPATIENT
Start: 2017-03-07 | End: 2017-11-14 | Stop reason: SDUPTHER

## 2017-03-07 NOTE — PROGRESS NOTES
Chief Complaint   Patient presents with    Breathing Problem     follow up regarding her breathing whether it has got better or not          1. Have you been to the ER, urgent care clinic since your last visit? Hospitalized since your last visit? No     2. Have you seen or consulted any other health care providers outside of the [address] [place] [address] since your last visit? Include any pap smears or colon screening. No     The patient was counseled on the dangers of tobacco use, and was advised never to start. Reviewed strategies to maximize success, including never to start. Health Maintenance Due   Topic Date Due    Hepatitis C Screening Discussed with patient today and advised to follow up.    1960    DTaP/Tdap/Td series (1 - Tdap) Discussed with patient today and advised to follow up. Stated that she is willing to receive. 05/10/1981    BREAST CANCER SCRN MAMMOGRAM Discussed with patient today and advised to follow up.    02/19/2016     ACP is not on file, advised to return.

## 2017-03-07 NOTE — PROGRESS NOTES
[address] Visit   Patient ID:   Ponce William is a 64 y.o. female. Assessment/Plan:    Joann Ramirez was seen today for breathing problem. Diagnoses and all orders for this visit:    Uncomplicated asthma, unspecified asthma severity  Acute sinusitis, recurrence not specified, unspecified location  Much improved today. Continue augmentin and prn albuterol. Lungs are clear again. RTC prn. Breast cancer screening  Ordered today  -     [facility] MAMMO BI SCREENING INCL CAD; Future    Need for hepatitis C screening test  Will get today  -     HEPATITIS C AB    Dry skin  Prn dry skin   -     mineral oil-hydrophil petrolat (AQUAPHOR) ointment; Apply  to affected area as needed for Dry Skin or Itching.  reviewed and appropriate 3/7/2017  Counselled pt on:  Patient health concernsplan as outlined in patient instructions and above. Patient was offered a choice/choices in the treatment plan today. Patient expresses understanding of the plan and agrees with recommendations. .   Patient Instructions     TODAY, please go to:   CHECK OUT    LAB    Please schedule the following appointments at CHECK OUT:  · Chronic pain follow up with Dr. Jeaneth Harrison in 2 and 1/2 months    _____________________     Today's Plan:  · Finish antibiotic. Continue to use albuterol when needed  · Return if symptoms worsening  · Have hep C lab today  · Call to schedule your mammogram if you do not receive a call  · Tetanus shot today  _____________________     Review your health maintenance below. Make plans to return and address anything that is due or will be due soon.    Health Maintenance Due   Topic Date Due    Hepatitis C Test  1960    DTaP/Tdap/Td  (1 - Tdap) 05/10/1981    Breast Cancer Screening  02/19/2016         Subjective:   HPI:  Ponce William is a 64 y.o. female being seen for:   Chief Complaint   Patient presents with    Breathing Problem     follow up regarding her breathing whether it has got better or not, also stated that she needs information for her to order her wheelchair and cane. Asthma and sinus follow up  · Breathing and cough improved  · Still some sinus tenderness, improved though  · Still has some augmentin   · Done with prednisone, finished about 3/4  · Using albuterol twice per day    Screening and Prevention Due:  Health Maintenance Due   Topic Date Due    Hepatitis C Screening  1960    DTaP/Tdap/Td series (1 - Tdap) 05/10/1981    BREAST CANCER SCRN MAMMOGRAM  02/19/2016   agreeable to all of the above today     Review of Systems  Otherwise, per HPI  Active Problem List:  Patient Active Problem List   Diagnosis Code    Heart failure ([facility] I50.9    Asthma J45.909    Kidney disease, chronic, end stage on dialysis ([facility] N18.6, Z99.2    GERD (gastroesophageal reflux disease) K21.9    Unspecified sleep apnea G47.30    Depression F32.9    Vitamin D deficiency E55.9    Chronic ankle pain M25.579, G89.29    Obesity hypoventilation syndrome (HCC) E66.2    DCIS (ductal carcinoma in situ) of breast D05.10     ? Objective:     Visit Vitals    BP (!) 95/37 (BP 1 Location: Right arm, BP Patient Position: Sitting)    Pulse 70    Temp 97 °F (36.1 °C) (Oral)    Resp 20    Ht 5' 5\" (1.651 m)    Wt 345 lb (156.5 kg)    SpO2 96%    BMI 57.41 kg/m2     Wt Readings from Last 3 Encounters:   03/07/17 345 lb (156.5 kg)   02/28/17 (!) 350 lb 12.8 oz (159.1 kg)   01/03/17 (!) 351 lb 9.6 oz (159.5 kg)     Physical Exam   Constitutional: She appears well-developed and well-nourished. No distress. Eyes: Conjunctivae are normal.   Cardiovascular: Normal rate, regular rhythm and normal heart sounds. Pulmonary/Chest: Effort normal and breath sounds normal. No respiratory distress. She has no wheezes. She has no rales. Neurological: She is alert. Skin: She is not diaphoretic. Psychiatric: She has a normal mood and affect.  Her behavior is normal.     Allergies   Allergen Reactions    Iodine Anaphylaxis    Other Medication Other (comments)     Metal causes numbness in hands,arms all over    Shellfish Containing Products Anaphylaxis    Sulfa (Sulfonamide Antibiotics) Itching     Prior to Admission medications    Medication Sig Start Date End Date Taking? Authorizing Provider   mineral oil-hydrophil petrolat (AQUAPHOR) ointment Apply  to affected area as needed for Dry Skin or Itching. 3/7/17  Yes Annie Sparks MD   RENVELA 2.4 gram pwpk oral powder  2/2/17  Yes Historical Provider   albuterol-ipratropium (DUO-NEB) 2.5 mg-0.5 mg/3 ml nebu 3 mL by Nebulization route every six (6) hours. Until you follow up 2/28/17  Yes Ania Figures. Jayden Sparks MD   HYDROcodone-acetaminophen [facility]) 7.5-325 mg per tablet Take 1 Tab by mouth two (2) times daily as needed for Pain. Max Daily Amount: 2 Tabs. 2/28/17  Yes Annie Sparks MD   HYDROcodone-acetaminophen [facility]) 7.5-325 mg per tablet Take 1 Tab by mouth two (2) times daily as needed for Pain. Max Daily Amount: 2 Tabs. 3/30/17  Yes Ania Figures. Jayden Sparks MD   HYDROcodone-acetaminophen [facility]) 7.5-325 mg per tablet Take 1 Tab by mouth two (2) times daily as needed for Pain. Max Daily Amount: 2 Tabs. 4/29/17  Yes Annie Sparks MD   gabapentin (NEURONTIN) 300 mg capsule Take 1-2 Caps by mouth three (3) times daily as needed. 2/1/17  Yes Ania Figures. Jayden Sparks MD   montelukast (SINGULAIR) 10 mg tablet take 1 tablet by mouth once daily 1/31/17  Yes Ania Figures. Jayden Sparks MD   ECOTRIN 325 mg tablet take 1 tablet by mouth once daily 12/15/16  Yes Historical Provider   magnesium oxide (MAG-OX) 400 mg tablet take 1 tablet by mouth once daily 1/3/17  Yes Ania Figures. Jayden Sparks MD   lidocaine 4 % gel 1 Inch by Apply Externally route two (2) times daily as needed. To ankle for pain 12/12/16  Yes Ania Figures. Jayden Sparks MD   miscellaneous medical supply misc Please dispense 1 single point bariatric cane 11/23/16  Yes Ania Figures.  Jayden Sparks MD   miscellaneous medical supply misc Please dispense 1 bariatric wheelchair 11/23/16  Yes Nonda Fearing. Yoly Degroot MD   albuterol (PROVENTIL VENTOLIN) 2.5 mg /3 mL (0.083 %) nebulizer solution 3 mL by Nebulization route every four (4) hours as needed for Wheezing or Shortness of Breath. 11/15/16  Yes Nonda Fearing. Yoly Degroot MD   ergocalciferol (ERGOCALCIFEROL) 50,000 unit capsule take 1 capsule by mouth every week 11/15/16  Yes Nonda Fearing. Yoly Degroot MD   inhalational spacing device 1 Each by Does Not Apply route as needed. 11/15/16  Yes Nonda Fearing. Yoly Degroot MD   albuterol (PROVENTIL HFA, VENTOLIN HFA, PROAIR HFA) 90 mcg/actuation inhaler Take 1-2 Puffs by inhalation every four (4) hours as needed for Wheezing. 11/15/16  Yes Nonda Fearing. Yoly Degroot MD   AQUAPHOR HEALING 41 % ointment Apply  to affected area as needed for Dry Skin. 8/23/16  Yes Mis Agrawal MD   midodrine (PROAMITINE) 5 mg tablet Take 10 mg by mouth three (3) times daily (with meals). 6/29/16  Yes Historical Provider   atorvastatin (LIPITOR) 10 mg tablet START WITH 1/2 TABLET DAILY. MAY INCREASE TO 1 TABLET DAILY IF TOLERATED (NO MUSCLE ACHES) 4/4/16  Yes Mis Agrawal MD   SENSIPAR 30 mg tablet Take 30 mg by mouth daily. 2/12/16  Yes Historical Provider   clotrimazole (LOTRIMIN) 1 % topical cream  2/1/16  Yes Historical Provider   omeprazole (PRILOSEC) 20 mg capsule take 1 capsule by mouth twice a day 3/6/16  Yes Mis Agrawal MD   buPROPion XL (WELLBUTRIN XL) 150 mg tablet take 1 tablet by mouth every morning 3/6/16  Yes Mis Agrawal MD   letrozole [facility]) 2.5 mg tablet take 1 tablet by mouth daily 2/29/16  Yes Mis Agrawal MD   RENVELA 800 mg tab tab  1/26/17   Historical Provider   amoxicillin-clavulanate (AUGMENTIN) 875-125 mg per tablet Take 1 Tab by mouth every twelve (12) hours for 10 days. 2/28/17 3/10/17  Nonda Fearing.  Yoly Degroot MD

## 2017-03-07 NOTE — MR AVS SNAPSHOT
Visit Information Date & Time Provider Department Dept. Phone Encounter #  
 3/7/2017 10:00 AM Editha Guadeloupe Lavinia Kehr, MD [facility] [phone] [id number] Your Appointments 4/6/2017  9:00 AM  
Complete Physical with Pap with Kingjj Bashir. Lavinia Kehr, [address] ([facility]) Appt Note: Complete Physical with PAP (40 min.) - lp  
 [address] 
[address] [facility] [address]  
[phone]  
  
   
 [address] [facility] [address] [place] [address] Upcoming Health Maintenance Date Due Hepatitis C Screening 1960 DTaP/Tdap/Td series (1 - Tdap) 5/10/1981 BREAST CANCER SCRN MAMMOGRAM 2/19/2016 PAP AKA CERVICAL CYTOLOGY 9/3/2017 COLONOSCOPY 8/18/2021 Pneumococcal 19-64 Highest Risk (3 of 3 - PPSV23) 1/3/2022 Allergies as of 3/7/2017  Review Complete On: 3/7/2017 By: Yue Velazquez LPN Severity Noted Reaction Type Reactions Iodine High 08/18/2011   Systemic Anaphylaxis Other Medication  09/03/2014   Systemic Other (comments) Metal causes numbness in hands,arms all over Shellfish Containing Products  08/18/2011   Systemic Anaphylaxis Sulfa (Sulfonamide Antibiotics)  03/10/2016    Itching Current Immunizations  Reviewed on 11/15/2016 Name Date Influenza Vaccine 11/19/2013 Influenza Vaccine Lyly Bible) 10/1/2015 Influenza Vaccine (Quad) PF 11/15/2016, 2/16/2015  2:30 PM  
 Pneumococcal Conjugate (PCV-13) 2/16/2015  2:31 PM  
  
 Not reviewed this visit You Were Diagnosed With   
  
 Codes Comments Breast cancer screening    -  Primary ICD-10-CM: Z12.39 
ICD-9-CM: V76.10 Dry skin     ICD-10-CM: L85.3 ICD-9-CM: 701.1 Need for hepatitis C screening test     ICD-10-CM: Z11.59 
ICD-9-CM: V73.89 Vitals BP Pulse Temp Resp Height(growth percentile) Weight(growth percentile)  (!) 95/37 (BP 1 Location: Right arm, BP Patient Position: Sitting) 70 97 °F (36.1 °C) (Oral) 20 5' 5\" (1.651 m) 345 lb (156.5 kg) SpO2 BMI OB Status Smoking Status 96% 57.41 kg/m2 Postmenopausal Never Smoker BMI and BSA Data Body Mass Index Body Surface Area  
 57.41 kg/m 2 2.68 m 2 Preferred Pharmacy Pharmacy Name Phone [phone] [phone] Your Updated Medication List  
  
   
This list is accurate as of: 3/7/17 12:00 PM.  Always use your most recent med list.  
  
  
  
  
 * albuterol 2.5 mg /3 mL (0.083 %) nebulizer solution Commonly known as:  PROVENTIL VENTOLIN  
3 mL by Nebulization route every four (4) hours as needed for Wheezing or Shortness of Breath. * albuterol 90 mcg/actuation inhaler Commonly known as:  PROVENTIL HFA, VENTOLIN HFA, PROAIR HFA Take 1-2 Puffs by inhalation every four (4) hours as needed for Wheezing. albuterol-ipratropium 2.5 mg-0.5 mg/3 ml Nebu Commonly known as:  DUO-NEB  
3 mL by Nebulization route every six (6) hours. Until you follow up  
  
 amoxicillin-clavulanate 875-125 mg per tablet Commonly known as:  AUGMENTIN Take 1 Tab by mouth every twelve (12) hours for 10 days. AQUAPHOR HEALING 41 % ointment Generic drug:  pantothenic ac-min oil-pet,hyd Apply  to affected area as needed for Dry Skin. atorvastatin 10 mg tablet Commonly known as:  LIPITOR  
START WITH 1/2 TABLET DAILY. MAY INCREASE TO 1 TABLET DAILY IF TOLERATED (NO MUSCLE ACHES) buPROPion  mg tablet Commonly known as:  WELLBUTRIN XL  
take 1 tablet by mouth every morning  
  
 clotrimazole 1 % topical cream  
Commonly known as:  LOTRIMIN  
  
 ECOTRIN 325 mg tablet Generic drug:  aspirin delayed-release  
take 1 tablet by mouth once daily  
  
 ergocalciferol 50,000 unit capsule Commonly known as:  ERGOCALCIFEROL  
take 1 capsule by mouth every week  
  
 gabapentin 300 mg capsule Commonly known as:  NEURONTIN  
 Take 1-2 Caps by mouth three (3) times daily as needed. * HYDROcodone-acetaminophen 7.5-325 mg per tablet Commonly known as:  Glendy Oyster Take 1 Tab by mouth two (2) times daily as needed for Pain. Max Daily Amount: 2 Tabs. * HYDROcodone-acetaminophen 7.5-325 mg per tablet Commonly known as:  Glendy Oyster Take 1 Tab by mouth two (2) times daily as needed for Pain. Max Daily Amount: 2 Tabs. Start taking on:  3/30/2017  
  
 * HYDROcodone-acetaminophen 7.5-325 mg per tablet Commonly known as:  Glendy Oyster Take 1 Tab by mouth two (2) times daily as needed for Pain. Max Daily Amount: 2 Tabs. Start taking on:  4/29/2017  
  
 inhalational spacing device 1 Each by Does Not Apply route as needed. letrozole 2.5 mg tablet Commonly known as:  FEMARA  
take 1 tablet by mouth daily  
  
 lidocaine 4 % Gel 1 Inch by Apply Externally route two (2) times daily as needed. To ankle for pain  
  
 magnesium oxide 400 mg tablet Commonly known as:  MAG-OX  
take 1 tablet by mouth once daily  
  
 midodrine 5 mg tablet Commonly known as:  Mandy Maxon Take 10 mg by mouth three (3) times daily (with meals). mineral oil-hydrophil petrolat ointment Commonly known as:  AQUAPHOR Apply  to affected area as needed for Dry Skin or Itching. * miscellaneous medical supply Misc Please dispense 1 single point bariatric cane * miscellaneous medical supply Misc Please dispense 1 bariatric wheelchair  
  
 montelukast 10 mg tablet Commonly known as:  SINGULAIR  
take 1 tablet by mouth once daily  
  
 omeprazole 20 mg capsule Commonly known as:  PRILOSEC  
take 1 capsule by mouth twice a day * RENVELA 800 mg Tab tab Generic drug:  sevelamer carbonate * RENVELA 2.4 gram Pwpk oral powder Generic drug:  sevelamer carbonate SENSIPAR 30 mg tablet Generic drug:  cinacalcet Take 30 mg by mouth daily. * Notice:   This list has 9 medication(s) that are the same as other medications prescribed for you. Read the directions carefully, and ask your doctor or other care provider to review them with you. Prescriptions Sent to Pharmacy Refills  
 mineral oil-hydrophil petrolat (AQUAPHOR) ointment 4 Sig: Apply  to affected area as needed for Dry Skin or Itching. Class: Normal  
 Pharmacy: [id number] [address] Ph #: [phone] Route: Topical  
  
We Performed the Following HEPATITIS C AB [65945 CPT(R)] To-Do List   
 03/07/2017 Imaging:  [facility] MAMMO BI SCREENING INCL CAD Patient Instructions TODAY, please go to: CHECK OUT  
 LAB Please schedule the following appointments at [facility] OUT: 
· Chronic pain follow up with Dr. Era Partida in 2 and 1/2 months 
 
_____________________ Today's Plan: · Finish antibiotic. Continue to use albuterol when needed · Return if symptoms worsening · Have hep C lab today · Call to schedule your mammogram if you do not receive a call · Tetanus shot today 
_____________________ Review your health maintenance below. Make plans to return and address anything that is due or will be due soon. Health Maintenance Due Topic Date Due  
 Hepatitis C Test  1960  
 DTaP/Tdap/Td  (1 - Tdap) 05/10/1981  Breast Cancer Screening  02/19/2016 Introducing [facility]! Matilda Rojas introduces [employer] patient portal. Now you can access parts of your medical record, email your doctor's office, and request medication refills online. 1. In your internet browser, go to [email or web address] 2. Click on the First Time User? Click Here link in the Sign In box. You will see the New Member Sign Up page. 3. Enter your [employer] Access Code exactly as it appears below. You will not need to use this code after youve completed the sign-up process. If you do not sign up before the expiration date, you must request a new code. · [employer] Access Code: [email or web address] Expires: 5/29/2017  1:21 PM 
 
4. Enter the last four digits of your Social Security Number (xxxx) and Date of Birth (mm/dd/yyyy) as indicated and click Submit. You will be taken to the next sign-up page. 5. Create a [employer] ID. This will be your [employer] login ID and cannot be changed, so think of one that is secure and easy to remember. 6. Create a [employer] password. You can change your password at any time. 7. Enter your Password Reset Question and Answer. This can be used at a later time if you forget your password. 8. Enter your e-mail address. You will receive e-mail notification when new information is available in [address] 9. Click Sign Up. You can now view and download portions of your medical record. 10. Click the Download Summary menu link to download a portable copy of your medical information. If you have questions, please visit the Frequently Asked Questions section of the [employer] website. Remember, [employer] is NOT to be used for urgent needs. For medical emergencies, dial 911. Now available from your iPhone and Android! Please provide this summary of care documentation to your next provider. Your primary care clinician is listed as Niraj Quinn. Sarah Guillen. If you have any questions after today's visit, please call [phone].

## 2017-03-07 NOTE — PATIENT INSTRUCTIONS
TODAY, please go to:   CHECK OUT    LAB    Please schedule the following appointments at CHECK OUT:  · Chronic pain follow up with Dr. Jayden Sparks in 2 and 1/2 months    _____________________     SLJMO'A Plan:  · Finish antibiotic. Continue to use albuterol when needed  · Return if symptoms worsening  · Have hep C lab today  · Call to schedule your mammogram if you do not receive a call  · Tetanus shot today  _____________________     Review your health maintenance below. Make plans to return and address anything that is due or will be due soon.    Health Maintenance Due   Topic Date Due    Hepatitis C Test  1960    DTaP/Tdap/Td  (1 - Tdap) 05/10/1981    Breast Cancer Screening  02/19/2016

## 2017-03-08 LAB — HCV AB S/CO SERPL IA: 0.1 S/CO RATIO (ref 0–0.9)

## 2017-03-17 RX ORDER — LETROZOLE 2.5 MG/1
TABLET, FILM COATED ORAL
Qty: 30 TAB | Refills: 0 | Status: SHIPPED | OUTPATIENT
Start: 2017-03-17 | End: 2018-01-12 | Stop reason: SDUPTHER

## 2017-04-06 ENCOUNTER — OFFICE VISIT (OUTPATIENT)
Dept: FAMILY MEDICINE CLINIC | Age: 57
End: 2017-04-06

## 2017-04-06 VITALS
DIASTOLIC BLOOD PRESSURE: 68 MMHG | SYSTOLIC BLOOD PRESSURE: 100 MMHG | WEIGHT: 293 LBS | OXYGEN SATURATION: 96 % | BODY MASS INDEX: 48.82 KG/M2 | RESPIRATION RATE: 18 BRPM | HEART RATE: 79 BPM | HEIGHT: 65 IN | TEMPERATURE: 98 F

## 2017-04-06 DIAGNOSIS — N18.6 KIDNEY DISEASE, CHRONIC, END STAGE ON DIALYSIS (HCC): ICD-10-CM

## 2017-04-06 DIAGNOSIS — E55.9 VITAMIN D DEFICIENCY: ICD-10-CM

## 2017-04-06 DIAGNOSIS — B96.89 BACTERIAL VAGINOSIS: ICD-10-CM

## 2017-04-06 DIAGNOSIS — N76.0 BACTERIAL VAGINOSIS: ICD-10-CM

## 2017-04-06 DIAGNOSIS — M54.40 LOW BACK PAIN WITH SCIATICA, SCIATICA LATERALITY UNSPECIFIED, UNSPECIFIED BACK PAIN LATERALITY, UNSPECIFIED CHRONICITY: ICD-10-CM

## 2017-04-06 DIAGNOSIS — D05.11 DUCTAL CARCINOMA IN SITU (DCIS) OF RIGHT BREAST: ICD-10-CM

## 2017-04-06 DIAGNOSIS — G89.29 CHRONIC PAIN OF RIGHT ANKLE: ICD-10-CM

## 2017-04-06 DIAGNOSIS — Z99.2 KIDNEY DISEASE, CHRONIC, END STAGE ON DIALYSIS (HCC): ICD-10-CM

## 2017-04-06 DIAGNOSIS — N18.5 CKD (CHRONIC KIDNEY DISEASE), STAGE 5: ICD-10-CM

## 2017-04-06 DIAGNOSIS — Z01.419 WELL WOMAN EXAM WITH ROUTINE GYNECOLOGICAL EXAM: Primary | ICD-10-CM

## 2017-04-06 DIAGNOSIS — N89.8 VAGINAL ODOR: ICD-10-CM

## 2017-04-06 DIAGNOSIS — M25.571 CHRONIC PAIN OF RIGHT ANKLE: ICD-10-CM

## 2017-04-06 LAB — CREATININE, EXTERNAL: 9.18

## 2017-04-06 RX ORDER — SEVELAMER CARBONATE 2400 MG/1
2.4 POWDER, FOR SUSPENSION ORAL
Qty: 90 PACKET | Refills: 11 | Status: SHIPPED | OUTPATIENT
Start: 2017-04-06 | End: 2017-12-28 | Stop reason: SDUPTHER

## 2017-04-06 RX ORDER — ATORVASTATIN CALCIUM 10 MG/1
10 TABLET, FILM COATED ORAL
COMMUNITY
End: 2017-05-03 | Stop reason: SDUPTHER

## 2017-04-06 NOTE — ACP (ADVANCE CARE PLANNING)
Advance Care Planning (ACP) Provider Conversation Snapshot    Date of ACP Conversation: 04/06/17  Persons included in Conversation:  son's boyfriend  Length of ACP Conversation in minutes:  <16 minutes (Non-Billable)    Authorized Decision Maker (if patient is incapable of making informed decisions): Renata Potts, son  This person is:   son, says she used to have AD. not sure if she can find any more          For Patients with Decision Making Capacity:   Values/Goals: Exploration of values, goals, and preferences. Recommended further discussion with son. Conversation Outcomes / Follow-Up Plan:   Recommended completion of Advance Directive form after review of ACP materials and conversation with prospective healthcare agent   Segun Whalen was referred to [employer] today and given information packet.

## 2017-04-06 NOTE — PROGRESS NOTES
Chief Complaint   Patient presents with    Complete Physical     with PAP stated by patient    Labs     fasting for labs         1. Have you been to the ER, urgent care clinic since your last visit? Hospitalized since your last visit? no    2. Have you seen or consulted any other health care providers outside of the [address] [place] [address] since your last visit? Include any pap smears or colon screening.   No      Mammogram scheduled for 04-

## 2017-04-06 NOTE — PROGRESS NOTES
[address] Visit   Patient ID:   Noris Haddad is a 64 y.o. female. Assessment/Plan:    Gaston Maravilla was seen today for complete physical and labs. Diagnoses and all orders for this visit:    Well woman exam with routine gynecological exam  #Healthcare maintenance/ Preventive:  - discussed diet and exercise  - recommended dental and vision screenings  - discussed STD risks and screening   - screened for domestic violence: neg  - screened for alcohol abuse: neg  - screened for tobacco use: neg  - smoking cessation counseling: na  - hm reviewed and updated as ordered. #Vaccines:  - defer tdap given h/o allergies    Advance Care planning  Noris Haddad was referred to [employer] today and given information packet. Chronic pain of right ankle  Low back pain with sciatica, sciatica laterality unspecified, unspecified back pain laterality, unspecified chronicity  CKD (chronic kidney disease), stage 5  Supplies reordered. Gave letter and paper Rx to pat to take to DME supplier.   -     miscellaneous medical supply misc; Please dispense 1 single point bariatric cane  -     miscellaneous medical supply misc; Please dispense 1 bariatric wheelchair    Vitamin D deficiency  Check lab    Ductal carcinoma in situ (DCIS) of right breast  Not being followed. Taking femara. Referred to onc for review of plan. Nl breast exam today  -     REFERRAL TO ONCOLOGY    Kidney disease, chronic, end stage on dialysis [facility])  Labs ordered. meds refilled. On dialysis. -     RENVELA 2.4 gram pwpk oral powder; Take 1 Packet by mouth three (3) times daily (with meals). -     CBC W/O DIFF  -     METABOLIC PANEL, COMPREHENSIVE  -     LIPID PANEL  -     HEMOGLOBIN A1C WITH EAG    Vaginal odor  Exam wnl. F/u swab  -     NUSWAB VAGINITIS PLUS    Counselled pt on:  Patient health concerns, plan as outlined in patient instructions and above.   Patient was offered a choice/choices in the treatment plan today. Patient expresses understanding of the plan and agrees with recommendations. Patient Instructions     TODAY, please go to:   CHECK OUT     Please schedule the following appointments at [facility] OUT:  · Lab visit, fasting, in the coming 1-2 weeks   · Back pain, med refill  and lab follow up with Dr. Dandre De Los Santos before medication runs out. Remember to bring bottles even if empty  · Honoring Choices appointment with the nurse navigator    _____________________     Today's Plan:  · We will review labs at your next appointment for medication refill  · Can discuss sciatica more then  · See Oncology about DCIS  · I'll let you know if you have any vaginal infection    Hepatitis C test is negative.    _____________________     Review your health maintenance below. Make plans to return and address anything that is due or will be due soon. Health Maintenance Due   Topic Date Due    DTaP/Tdap/Td  (1 - Tdap) 05/10/1981    Breast Cancer Screening  02/19/2016         Subjective:   HPI:  Rl Prasad is a 64 y.o. female being seen for:   Chief Complaint   Patient presents with    Complete Physical     with PAP stated by patient    Labs     fasting for labs       Establish Care  Past medical history, surgical history, social history, family history, medications, allergies reviewed and updated. See below for more detail      Using spray powder and wash for vaginal odor. Health Maintenance  · Home: lives alone  · Diet: \"i'm eating too much food, I'm fat\" would like to get more protein in diet  · Exercise: says she could do seating exercises, would like to increase. Would like to do water exercises  · Dental screening: needs to go, sometimes brushes twice a day  · Vision screening: wears glasses, UTD  · Domestic violence: denies, feels safe   reports that she has never smoked. She has never used smokeless tobacco. She reports that she drinks alcohol. She reports that she does not use illicit drugs.     OB Hx/Menstrual Hx/Sexualhx:  · Concerns: not active, would like to be. OB History     No data available        · LMP: No LMP recorded. Patient is postmenopausal.  History   Sexual Activity    Sexual activity: Not Currently       Screening  · If >50 CRC screening: had another colonoscopy recently at [facility] due to   · Aware that not due for pap today but request pelvic exam  · A1C:    No results found for: HBA1C, HGBE8, VZN5SAFZ, GZS8GUBC, AEZ4LTRH  · Lipids:  Lab Results   Component Value Date/Time    Cholesterol, total 195 10/01/2015 12:18 PM    HDL Cholesterol 69 10/01/2015 12:18 PM    LDL, calculated 111 10/01/2015 12:18 PM    VLDL, calculated 15 10/01/2015 12:18 PM    Triglyceride 73 10/01/2015 12:18 PM     · Depression:   No flowsheet data found. · Hepatitis C:   Lab Results   Component Value Date/Time    Hep C Virus Ab 0.1 03/07/2017 12:23 PM     =  Screening and Prevention Due:  Health Maintenance Due   Topic Date Due    DTaP/Tdap/Td series (1 - Tdap) 05/10/1981    BREAST CANCER SCRN MAMMOGRAM  02/19/2016      Review of Systems   Sciatica on right leg  Otherwise, per HPI  Active Problem List:  Patient Active Problem List   Diagnosis Code    Heart failure ([facility] I50.9    Asthma J45.909    Kidney disease, chronic, end stage on dialysis ([facility] N18.6, Z99.2    GERD (gastroesophageal reflux disease) K21.9    Unspecified sleep apnea G47.30    Depression F32.9    Vitamin D deficiency E55.9    Chronic ankle pain M25.579, G89.29    Obesity hypoventilation syndrome (HCC) E66.2    DCIS (ductal carcinoma in situ) of breast D05.10     ?   Objective:     Visit Vitals    /68 (BP 1 Location: Right arm, BP Patient Position: Sitting)    Pulse 79    Temp 98 °F (36.7 °C) (Oral)    Resp 18    Ht 5' 5\" (1.651 m)    Wt (!) 353 lb 12.8 oz (160.5 kg)    SpO2 96%    BMI 58.88 kg/m2     Wt Readings from Last 3 Encounters:   04/06/17 (!) 353 lb 12.8 oz (160.5 kg)   03/07/17 345 lb (156.5 kg)   02/28/17 (!) 350 lb 12.8 oz (159.1 kg)     Physical Exam   Constitutional: She appears well-developed and well-nourished. No distress. HENT:   Mouth/Throat: Oropharynx is clear and moist. No oropharyngeal exudate. Eyes: Conjunctivae and EOM are normal. Pupils are equal, round, and reactive to light. Right eye exhibits no discharge. Left eye exhibits no discharge. No scleral icterus. Cardiovascular: Normal rate, regular rhythm and normal heart sounds. Exam reveals no gallop and no friction rub. No murmur heard. Pulmonary/Chest: Effort normal and breath sounds normal. No respiratory distress. She has no wheezes. She has no rales. Right breast exhibits no inverted nipple, no mass, no nipple discharge, no skin change and no tenderness. Left breast exhibits no inverted nipple, no mass, no nipple discharge, no skin change and no tenderness. Abdominal: Soft. Bowel sounds are normal. She exhibits no distension. There is no tenderness. There is no rebound and no guarding. Bulge in mid stomach when sitting up c/w hernia vs diastasis recti. Nontender nl overlying skin    Genitourinary: There is no rash on the right labia. There is no rash on the left labia. Uterus is not tender. Cervix exhibits no motion tenderness, no discharge and no friability. Right adnexum displays no mass, no tenderness and no fullness. Left adnexum displays no mass, no tenderness and no fullness. Vaginal discharge (homogenous white, may be c/w physiologic d/c) found. Musculoskeletal: She exhibits edema. Neurological: She is alert. She has normal strength. No sensory deficit. GCS eye subscore is 4. GCS verbal subscore is 5. GCS motor subscore is 6. Reflex Scores:       Bicep reflexes are 2+ on the right side and 2+ on the left side. Patellar reflexes are 2+ on the right side and 2+ on the left side. Skin: Skin is dry. Psychiatric: She has a normal mood and affect.  Her behavior is normal.     Allergies   Allergen Reactions    Iodine Anaphylaxis    Other Medication Other (comments)     Metal causes numbness in hands,arms all over    Shellfish Containing Products Anaphylaxis    Sulfa (Sulfonamide Antibiotics) Itching     Prior to Admission medications    Medication Sig Start Date End Date Taking? Authorizing Provider   atorvastatin (LIPITOR) 10 mg tablet Take 10 mg by mouth nightly. Yes Historical Provider   RENVELA 2.4 gram pwpk oral powder Take 1 Packet by mouth three (3) times daily (with meals). 4/6/17  Yes Gisella Fish MD   miscellaneous medical supply misc Please dispense 1 single point bariatric cane 4/6/17  Yes Rachael Eduardo. Sahra Fish MD   miscellaneous medical supply misc Please dispense 1 bariatric wheelchair 4/6/17  Yes Rachael Eduardo. Sahra Fish MD   [facility]) 2.5 mg tablet take 1 tablet by mouth daily 3/17/17  Yes Leonor Bonilla,    mineral oil-hydrophil petrolat (AQUAPHOR) ointment Apply  to affected area as needed for Dry Skin or Itching. 3/7/17  Yes Gisella Fish MD   HYDROcodone-acetaminophen [facility]) 7.5-325 mg per tablet Take 1 Tab by mouth two (2) times daily as needed for Pain. Max Daily Amount: 2 Tabs. 2/28/17  Yes Gisella Fish MD   HYDROcodone-acetaminophen [facility]) 7.5-325 mg per tablet Take 1 Tab by mouth two (2) times daily as needed for Pain. Max Daily Amount: 2 Tabs. 3/30/17  Yes Rachael Eduardo. Sahra Fish MD   HYDROcodone-acetaminophen [facility]) 7.5-325 mg per tablet Take 1 Tab by mouth two (2) times daily as needed for Pain. Max Daily Amount: 2 Tabs. 4/29/17  Yes Gisella Fish MD   gabapentin (NEURONTIN) 300 mg capsule Take 1-2 Caps by mouth three (3) times daily as needed. 2/1/17  Yes Rachael Eduardo. Sahra Fish MD   montelukast (SINGULAIR) 10 mg tablet take 1 tablet by mouth once daily 1/31/17  Yes Rachael Eduardo. Sahra Fish MD   ECOTRIN 325 mg tablet take 1 tablet by mouth once daily 12/15/16  Yes Historical Provider   magnesium oxide (MAG-OX) 400 mg tablet take 1 tablet by mouth once daily 1/3/17  Yes Rachael Eduardo.  Sahra Fish MD   lidocaine 4 % gel 1 Inch by Apply Externally route two (2) times daily as needed. To ankle for pain 12/12/16  Yes Milton Dillon. Dakota Urbano MD   albuterol (PROVENTIL VENTOLIN) 2.5 mg /3 mL (0.083 %) nebulizer solution 3 mL by Nebulization route every four (4) hours as needed for Wheezing or Shortness of Breath. 11/15/16  Yes Milton Dillon. Dakota Urbano MD   ergocalciferol (ERGOCALCIFEROL) 50,000 unit capsule take 1 capsule by mouth every week 11/15/16  Yes Milton Dillon. Dakota Urbano MD   albuterol (PROVENTIL HFA, VENTOLIN HFA, PROAIR HFA) 90 mcg/actuation inhaler Take 1-2 Puffs by inhalation every four (4) hours as needed for Wheezing. 11/15/16  Yes Milton Dillon. Dakota Urbano MD   AQUAPHOR HEALING 41 % ointment Apply  to affected area as needed for Dry Skin. 8/23/16  Yes Ramesh Foley MD   midodrine (PROAMITINE) 5 mg tablet Take 10 mg by mouth three (3) times daily (with meals). 6/29/16  Yes Historical Provider   SENSIPAR 30 mg tablet Take 30 mg by mouth daily. 2/12/16  Yes Historical Provider   omeprazole (PRILOSEC) 20 mg capsule take 1 capsule by mouth twice a day 3/6/16  Yes Ramesh Foley MD   buPROPion XL (WELLBUTRIN XL) 150 mg tablet take 1 tablet by mouth every morning 3/6/16  Yes Ramesh Foley MD   inhalational spacing device 1 Each by Does Not Apply route as needed. 11/15/16   Milton Dillon.  Dakota Urbano MD

## 2017-04-06 NOTE — PATIENT INSTRUCTIONS
TODAY, please go to:   CHECK OUT     Please schedule the following appointments at [facility] OUT:  · Lab visit, fasting, in the coming 1-2 weeks   · Back pain, med refill  and lab follow up with Dr. Luisito Carpio before medication runs out. Remember to bring bottles even if empty  · Honoring Choices appointment with the nurse navigator    _____________________     Today's Plan:  · We will review labs at your next appointment for medication refill  · Can discuss sciatica more then  · See Oncology about DCIS  · I'll let you know if you have any vaginal infection    Hepatitis C test is negative.    _____________________     Review your health maintenance below. Make plans to return and address anything that is due or will be due soon.    Health Maintenance Due   Topic Date Due    DTaP/Tdap/Td  (1 - Tdap) 05/10/1981    Breast Cancer Screening  02/19/2016

## 2017-04-06 NOTE — MR AVS SNAPSHOT
Visit Information Date & Time Provider Department Dept. Phone Encounter #  
 4/6/2017  9:00 AM [id number] [facility] Adriana Abdi MD [facility] [phone] [id number] Your Appointments 5/25/2017 11:00 AM  
ROUTINE CARE with [address] ([facility]) Appt Note: 2 month follow up for chronic pain [address] [address]  
[phone]  
  
   
 [address] [facility] [address] [place] [address] Upcoming Health Maintenance Date Due DTaP/Tdap/Td series (1 - Tdap) 5/10/1981 BREAST CANCER SCRN MAMMOGRAM 2/19/2016 PAP AKA CERVICAL CYTOLOGY 9/3/2017 COLONOSCOPY 8/18/2021 Pneumococcal 19-64 Highest Risk (3 of 3 - PPSV23) 1/3/2022 Allergies as of 4/6/2017  Review Complete On: 4/6/2017 By: Natali Hamilton. Adriana Abdi MD  
  
 Severity Noted Reaction Type Reactions Iodine High 08/18/2011   Systemic Anaphylaxis Other Medication  09/03/2014   Systemic Other (comments) Metal causes numbness in hands,arms all over Shellfish Containing Products  08/18/2011   Systemic Anaphylaxis Sulfa (Sulfonamide Antibiotics)  03/10/2016    Itching Current Immunizations  Reviewed on 11/15/2016 Name Date Influenza Vaccine 11/19/2013 Influenza Vaccine Maribell Ree) 10/1/2015 Influenza Vaccine (Quad) PF 11/15/2016, 2/16/2015  2:30 PM  
 Pneumococcal Conjugate (PCV-13) 2/16/2015  2:31 PM  
  
 Not reviewed this visit You Were Diagnosed With   
  
 Codes Comments Well woman exam with routine gynecological exam    -  Primary ICD-10-CM: U66.343 ICD-9-CM: V72.31 Chronic pain of right ankle     ICD-10-CM: M25.571, G89.29 ICD-9-CM: 719.47, 338.29 Low back pain with sciatica, sciatica laterality unspecified, unspecified back pain laterality, unspecified chronicity     ICD-10-CM: M54.40 ICD-9-CM: 724.3 CKD (chronic kidney disease), stage 5     ICD-10-CM: N18.5 ICD-9-CM: 585.5 Vitamin D deficiency     ICD-10-CM: E55.9 ICD-9-CM: 268.9 Ductal carcinoma in situ (DCIS) of right breast     ICD-10-CM: D05.11 ICD-9-CM: 233.0 Kidney disease, chronic, end stage on dialysis ([facility]     ICD-10-CM: N18.6, Z99.2 ICD-9-CM: 585.6, V45.11 Vaginal odor     ICD-10-CM: N89.8 ICD-9-CM: 625.8 Vitals BP Pulse Temp Resp Height(growth percentile) Weight(growth percentile) 100/68 (BP 1 Location: Right arm, BP Patient Position: Sitting) 79 98 °F (36.7 °C) (Oral) 18 5' 5\" (1.651 m) (!) 353 lb 12.8 oz (160.5 kg) SpO2 BMI OB Status Smoking Status 96% 58.88 kg/m2 Postmenopausal Never Smoker Vitals History BMI and BSA Data Body Mass Index Body Surface Area  
 58.88 kg/m 2 2.71 m 2 Preferred Pharmacy Pharmacy Name Phone [facility] [phone] Your Updated Medication List  
  
   
This list is accurate as of: 4/6/17 11:04 AM.  Always use your most recent med list.  
  
  
  
  
 * albuterol 2.5 mg /3 mL (0.083 %) nebulizer solution Commonly known as:  PROVENTIL VENTOLIN  
3 mL by Nebulization route every four (4) hours as needed for Wheezing or Shortness of Breath. * albuterol 90 mcg/actuation inhaler Commonly known as:  PROVENTIL HFA, VENTOLIN HFA, PROAIR HFA Take 1-2 Puffs by inhalation every four (4) hours as needed for Wheezing. AQUAPHOR HEALING 41 % ointment Generic drug:  pantothenic ac-min oil-pet,hyd Apply  to affected area as needed for Dry Skin. buPROPion  mg tablet Commonly known as:  WELLBUTRIN XL  
take 1 tablet by mouth every morning ECOTRIN 325 mg tablet Generic drug:  aspirin delayed-release  
take 1 tablet by mouth once daily  
  
 ergocalciferol 50,000 unit capsule Commonly known as:  ERGOCALCIFEROL  
take 1 capsule by mouth every week  
  
 gabapentin 300 mg capsule Commonly known as:  NEURONTIN  
 Take 1-2 Caps by mouth three (3) times daily as needed. * HYDROcodone-acetaminophen 7.5-325 mg per tablet Commonly known as:  Maci Cruise Take 1 Tab by mouth two (2) times daily as needed for Pain. Max Daily Amount: 2 Tabs. * HYDROcodone-acetaminophen 7.5-325 mg per tablet Commonly known as:  Maci Cruise Take 1 Tab by mouth two (2) times daily as needed for Pain. Max Daily Amount: 2 Tabs. * HYDROcodone-acetaminophen 7.5-325 mg per tablet Commonly known as:  Maci Cruise Take 1 Tab by mouth two (2) times daily as needed for Pain. Max Daily Amount: 2 Tabs. Start taking on:  4/29/2017  
  
 inhalational spacing device 1 Each by Does Not Apply route as needed. letrozole 2.5 mg tablet Commonly known as:  FEMARA  
take 1 tablet by mouth daily  
  
 lidocaine 4 % Gel 1 Inch by Apply Externally route two (2) times daily as needed. To ankle for pain LIPITOR 10 mg tablet Generic drug:  atorvastatin Take 10 mg by mouth nightly.  
  
 magnesium oxide 400 mg tablet Commonly known as:  MAG-OX  
take 1 tablet by mouth once daily  
  
 midodrine 5 mg tablet Commonly known as:  Roland Dienes Take 10 mg by mouth three (3) times daily (with meals). mineral oil-hydrophil petrolat ointment Commonly known as:  AQUAPHOR Apply  to affected area as needed for Dry Skin or Itching. * miscellaneous medical supply Misc Please dispense 1 single point bariatric cane * miscellaneous medical supply Misc Please dispense 1 bariatric wheelchair  
  
 montelukast 10 mg tablet Commonly known as:  SINGULAIR  
take 1 tablet by mouth once daily  
  
 omeprazole 20 mg capsule Commonly known as:  PRILOSEC  
take 1 capsule by mouth twice a day RENVELA 2.4 gram Pwpk oral powder Generic drug:  sevelamer carbonate Take 1 Packet by mouth three (3) times daily (with meals). SENSIPAR 30 mg tablet Generic drug:  cinacalcet Take 30 mg by mouth daily. * Notice: This list has 7 medication(s) that are the same as other medications prescribed for you. Read the directions carefully, and ask your doctor or other care provider to review them with you. Prescriptions Sent to Pharmacy Refills  
 miscellaneous medical supply misc 0 Sig: Please dispense 1 single point bariatric cane Class: Normal  
 Pharmacy: [id number], [place] [facility] Ph #: [phone]  
 miscellaneous medical supply misc 1 Sig: Please dispense 1 bariatric wheelchair Class: Normal  
 Pharmacy: [id number] [address], [facility]  Ph #: [phone] RENVELA 2.4 gram pwpk oral powder 11 Sig: Take 1 Packet by mouth three (3) times daily (with meals). Class: Normal  
 Pharmacy: [id number] [address] [place] [address],[address] [place] [facility] Ph #: [phone] Route: Oral  
  
We Performed the Following CBC W/O DIFF [13687 CPT(R)] HEMOGLOBIN A1C WITH EAG [99837 CPT(R)] LIPID PANEL [51308 CPT(R)] METABOLIC PANEL, COMPREHENSIVE [41065 CPT(R)] [id number] Custom] REFERRAL TO ONCOLOGY [LCF32 Custom] Comments:  
 Please evaluate patient for DCIS. To-Do List   
 04/11/2017 11:00 AM  
  Appointment with [facility] 6 at [address] [place] [address] ([phone]) Shower or bathe using soap and water. Do not use deodorant, powder, perfumes, or lotion the day of your exam.  If your prior mammograms were not performed at [facility] please bring films with you or forward prior images 2 days before your procedure. Check in at registration 15min before your appointment time unless you were instructed to do otherwise. A script is not necessary, but if you have one, please bring it on the day of the mammogram or have it faxed to the department.   [facility] [phone] [facility]  [phone] [facility] [phone] NIYA  [phone] [facility] [phone] [facility] [phone] [facility] Referral Information Referral ID Referred By Referred To  
  
 [id number] Estephania Perez MD   
   [address] [facility] [address] Candy Gonzalez Phone: [phone] Fax: [phone] Visits Status Start Date End Date 1 New Request 4/6/17 4/6/18 If your referral has a status of pending review or denied, additional information will be sent to support the outcome of this decision. Patient Instructions TODAY, please go to: CHECK OUT Please schedule the following appointments at [facility] OUT: 
· Lab visit, fasting, in the coming 1-2 weeks · Back pain, med refill  and lab follow up with Dr. Candice Etienne before medication runs out. Remember to bring bottles even if empty · Honoring Choices appointment with the nurse navigator 
 
_____________________ Today's Plan: · We will review labs at your next appointment for medication refill · Can discuss sciatica more then · See Oncology about DCIS 
· I'll let you know if you have any vaginal infection Hepatitis C test is negative. 
 
_____________________ Review your health maintenance below. Make plans to return and address anything that is due or will be due soon. Health Maintenance Due Topic Date Due  
 DTaP/Tdap/Td  (1 - Tdap) 05/10/1981  Breast Cancer Screening  02/19/2016 Introducing [facility]! Alfred Nuñez introduces [email or web address] patient portal. Now you can access parts of your medical record, email your doctor's office, and request medication refills online. 1. In your internet browser, go to https://[employer]. [employer] - ASO/[employer] 2. Click on the First Time User? Click Here link in the Sign In box. You will see the New Member Sign Up page. 3. Enter your [email or web address] Access Code exactly as it appears below. You will not need to use this code after youve completed the sign-up process.  If you do not sign up before the expiration date, you must request a new code. · BigEvidence Access Code: [email or web address] Expires: 5/29/2017  2:21 PM 
 
4. Enter the last four digits of your Social Security Number (xxxx) and Date of Birth (mm/dd/yyyy) as indicated and click Submit. You will be taken to the next sign-up page. 5. Create a BigEvidence ID. This will be your BigEvidence login ID and cannot be changed, so think of one that is secure and easy to remember. 6. Create a [employer]Evidence password. You can change your password at any time. 7. Enter your Password Reset Question and Answer. This can be used at a later time if you forget your password. 8. Enter your e-mail address. You will receive e-mail notification when new information is available in [address] 9. Click Sign Up. You can now view and download portions of your medical record. 10. Click the Download Summary menu link to download a portable copy of your medical information. If you have questions, please visit the Frequently Asked Questions section of the [employer] website. Remember, BigEvidence is NOT to be used for urgent needs. For medical emergencies, dial 911. Now available from your iPhone and Android! Please provide this summary of care documentation to your next provider. Your primary care clinician is listed as Parviz Segura. If you have any questions after today's visit, please call [phone].

## 2017-04-10 LAB
A VAGINAE DNA VAG QL NAA+PROBE: ABNORMAL SCORE
BVAB2 DNA VAG QL NAA+PROBE: ABNORMAL SCORE
C ALBICANS DNA VAG QL NAA+PROBE: NEGATIVE
C GLABRATA DNA VAG QL NAA+PROBE: NEGATIVE
C TRACH RRNA SPEC QL NAA+PROBE: NEGATIVE
INTERPRETATION: NORMAL
Lab: NORMAL
MEGA1 DNA VAG QL NAA+PROBE: ABNORMAL SCORE
N GONORRHOEA RRNA SPEC QL NAA+PROBE: NEGATIVE
T VAGINALIS RRNA SPEC QL NAA+PROBE: NEGATIVE

## 2017-04-11 RX ORDER — METRONIDAZOLE 500 MG/1
500 TABLET ORAL 2 TIMES DAILY
Qty: 14 TAB | Refills: 0 | Status: SHIPPED | OUTPATIENT
Start: 2017-04-11 | End: 2017-04-18

## 2017-04-11 NOTE — PROGRESS NOTES
Please notify patient that vaginal swab shows bacterial vaginosis. This requires treatment with an antibiotic, called metronidazole. I am sending this to her pharmacy. She should complete all of it. Do not drink alcohol with this medication. Return if symptoms worsen or so not improve. Also test is negative for yeast infection, trichomonas, chlamydia, and gonorrhea.

## 2017-04-11 NOTE — PROGRESS NOTES
Spoke with patient, ID verified X 2. Informed patient of nuswab results and recommendations per Dr. Bc Stone. Patient verbalized understanding.

## 2017-04-18 ENCOUNTER — HOSPITAL ENCOUNTER (OUTPATIENT)
Dept: MAMMOGRAPHY | Age: 57
Discharge: HOME | End: 2017-04-18
Attending: FAMILY MEDICINE
Payer: MEDICAID

## 2017-04-18 DIAGNOSIS — Z12.39 BREAST CANCER SCREENING: ICD-10-CM

## 2017-04-18 DIAGNOSIS — Z85.3 HISTORY OF BREAST CANCER: ICD-10-CM

## 2017-04-18 PROCEDURE — 77066 DX MAMMO INCL CAD BI: CPT

## 2017-05-04 RX ORDER — ATORVASTATIN CALCIUM 10 MG/1
10 TABLET, FILM COATED ORAL
Qty: 30 TAB | Refills: 1 | Status: SHIPPED | OUTPATIENT
Start: 2017-05-04 | End: 2017-06-30 | Stop reason: SDUPTHER

## 2017-05-10 ENCOUNTER — PATIENT OUTREACH (OUTPATIENT)
Dept: FAMILY MEDICINE CLINIC | Age: 57
End: 2017-05-10

## 2017-05-10 NOTE — Clinical Note
Not sure pt really understood ACP 7 why folder given. Maybe you can discuss further @ f/u. Given pt's conditions would definitely benefit from ACP.

## 2017-05-10 NOTE — ACP (ADVANCE CARE PLANNING)
NN/Facilitator Note    - Received message from [facility] \"This patient needs an [facility] appointment, but on another day other than Wednesday. She has dialysisi on that day. I told patient, someone will call her to let her know. Steven Radhashariagueda you\". 12:25 PM- pt outreach (call). Pt ID verified with 2 identifiers, name and . NN/Facilitator introduction. Reason for call stated. - Pt reported has dialysis every --. Usually schedules appts on /Thurs.    - Inquired which day would be preferable fo [facility] appt. Pt responded \"I already have an appt there on 17\". Per EMR review pt's appt is scheduled for PCP f/u @ 11:00 AM. Inquired if pt drives self or someone transports to appt. Pt reported \"my son takes me\". Inquired if pt & son would be willing to have [facility] appt on same date after PCP f/u. Pt asked Shama Landeros is this about again\". Discussed ACP. Inquired if pt had received/reviewed [employer]. \"I did but haven't looked at it\". Encouraged pt review materials. Again inquired if pt & son could meet with a Facilitator on 17 after PCP f/u. Pt responded \"no I don't want to do this now\". Recommended pt discuss with PCP at f/u and contact office to schedule appt when ready to pursue ACP.

## 2017-05-25 ENCOUNTER — OFFICE VISIT (OUTPATIENT)
Dept: FAMILY MEDICINE CLINIC | Age: 57
End: 2017-05-25

## 2017-05-25 VITALS
TEMPERATURE: 97.7 F | RESPIRATION RATE: 20 BRPM | HEART RATE: 80 BPM | WEIGHT: 293 LBS | SYSTOLIC BLOOD PRESSURE: 92 MMHG | BODY MASS INDEX: 48.82 KG/M2 | HEIGHT: 65 IN | OXYGEN SATURATION: 90 % | DIASTOLIC BLOOD PRESSURE: 46 MMHG

## 2017-05-25 DIAGNOSIS — G89.29 CHRONIC PAIN OF RIGHT ANKLE: Primary | ICD-10-CM

## 2017-05-25 DIAGNOSIS — G56.03 BILATERAL CARPAL TUNNEL SYNDROME: ICD-10-CM

## 2017-05-25 DIAGNOSIS — M25.571 CHRONIC PAIN OF RIGHT ANKLE: Primary | ICD-10-CM

## 2017-05-25 DIAGNOSIS — E66.9 OBESITY, UNSPECIFIED OBESITY SEVERITY, UNSPECIFIED OBESITY TYPE: ICD-10-CM

## 2017-05-25 DIAGNOSIS — G89.29 OTHER CHRONIC PAIN: ICD-10-CM

## 2017-05-25 DIAGNOSIS — M54.31 RIGHT SIDED SCIATICA: ICD-10-CM

## 2017-05-25 RX ORDER — HYDROCODONE BITARTRATE AND ACETAMINOPHEN 7.5; 325 MG/1; MG/1
1 TABLET ORAL
Qty: 60 TAB | Refills: 0 | Status: SHIPPED | OUTPATIENT
Start: 2017-05-25 | End: 2017-08-31 | Stop reason: SDUPTHER

## 2017-05-25 RX ORDER — GABAPENTIN 300 MG/1
600-900 CAPSULE ORAL
Qty: 270 CAP | Refills: 11 | Status: SHIPPED | OUTPATIENT
Start: 2017-05-25 | End: 2018-05-30 | Stop reason: SDUPTHER

## 2017-05-25 RX ORDER — HYDROCODONE BITARTRATE AND ACETAMINOPHEN 7.5; 325 MG/1; MG/1
1 TABLET ORAL
Qty: 60 TAB | Refills: 0 | Status: SHIPPED | OUTPATIENT
Start: 2017-06-24 | End: 2017-08-31 | Stop reason: SDUPTHER

## 2017-05-25 RX ORDER — HYDROCODONE BITARTRATE AND ACETAMINOPHEN 7.5; 325 MG/1; MG/1
1 TABLET ORAL
Qty: 60 TAB | Refills: 0 | Status: SHIPPED | OUTPATIENT
Start: 2017-07-24 | End: 2017-08-31 | Stop reason: SDUPTHER

## 2017-05-25 NOTE — MR AVS SNAPSHOT
Visit Information Date & Time Provider Department Dept. Phone Encounter #  
 5/25/2017 11:00 AM [id number] Bj Beltran MD [facility] 74 [phone] [id number] Upcoming Health Maintenance Date Due DTaP/Tdap/Td series (1 - Tdap) 5/10/1981 PAP AKA CERVICAL CYTOLOGY 9/3/2017 INFLUENZA AGE 9 TO ADULT 8/1/2017 BREAST CANCER SCRN MAMMOGRAM 4/18/2019 COLONOSCOPY 8/18/2021 Pneumococcal 19-64 Highest Risk (3 of 3 - PPSV23) 1/3/2022 Allergies as of 5/25/2017  Review Complete On: 5/25/2017 By: Lev Amado LPN Severity Noted Reaction Type Reactions Iodine High 08/18/2011   Systemic Anaphylaxis Other Medication  09/03/2014   Systemic Other (comments) Metal causes numbness in hands,arms all over Shellfish Containing Products  08/18/2011   Systemic Anaphylaxis Sulfa (Sulfonamide Antibiotics)  03/10/2016    Itching Current Immunizations  Reviewed on 11/15/2016 Name Date Influenza Vaccine 11/19/2013 Influenza Vaccine Celia Garay) 10/1/2015 Influenza Vaccine (Quad) PF 11/15/2016, 2/16/2015  2:30 PM  
 Pneumococcal Conjugate (PCV-13) 2/16/2015  2:31 PM  
  
 Not reviewed this visit You Were Diagnosed With   
  
 Codes Comments Chronic pain of right ankle    -  Primary ICD-10-CM: M25.571, G89.29 ICD-9-CM: 719.47, 338.29 Right sided sciatica     ICD-10-CM: M54.31 
ICD-9-CM: 724.3 Bilateral carpal tunnel syndrome     ICD-10-CM: G56.03 
ICD-9-CM: 354.0 Other chronic pain     ICD-10-CM: G89.29 ICD-9-CM: 338.29 Vitals BP Pulse Temp Resp Height(growth percentile) Weight(growth percentile) 92/46 (BP 1 Location: Right arm, BP Patient Position: Sitting) 80 97.7 °F (36.5 °C) (Oral) 20 5' 5\" (1.651 m) (!) 359 lb (162.8 kg) SpO2 BMI OB Status Smoking Status 90% 59.74 kg/m2 Postmenopausal Never Smoker Vitals History BMI and BSA Data Body Mass Index Body Surface Area  59.74 kg/m 2 2.73 m 2  
  
  
 Preferred Pharmacy Pharmacy Name Phone [facility] [phone] Your Updated Medication List  
  
   
This list is accurate as of: 5/25/17 12:14 PM.  Always use your most recent med list.  
  
  
  
  
 * albuterol 2.5 mg /3 mL (0.083 %) nebulizer solution Commonly known as:  PROVENTIL VENTOLIN  
3 mL by Nebulization route every four (4) hours as needed for Wheezing or Shortness of Breath. * albuterol 90 mcg/actuation inhaler Commonly known as:  PROVENTIL HFA, VENTOLIN HFA, PROAIR HFA Take 1-2 Puffs by inhalation every four (4) hours as needed for Wheezing. AQUAPHOR HEALING 41 % ointment Generic drug:  pantothenic ac-min oil-pet,hyd Apply  to affected area as needed for Dry Skin. atorvastatin 10 mg tablet Commonly known as:  LIPITOR Take 1 Tab by mouth nightly. buPROPion  mg tablet Commonly known as:  WELLBUTRIN XL  
take 1 tablet by mouth every morning ECOTRIN 325 mg tablet Generic drug:  aspirin delayed-release  
take 1 tablet by mouth once daily  
  
 ergocalciferol 50,000 unit capsule Commonly known as:  ERGOCALCIFEROL  
take 1 capsule by mouth every week  
  
 gabapentin 300 mg capsule Commonly known as:  NEURONTIN Take 2-3 Caps by mouth three (3) times daily as needed. * HYDROcodone-acetaminophen 7.5-325 mg per tablet Commonly known as:  [place] Hurt Take 1 Tab by mouth two (2) times daily as needed for Pain. Max Daily Amount: 2 Tabs. * HYDROcodone-acetaminophen 7.5-325 mg per tablet Commonly known as:  [place] Hurt Take 1 Tab by mouth two (2) times daily as needed for Pain. Max Daily Amount: 2 Tabs. Start taking on:  6/24/2017  
  
 * HYDROcodone-acetaminophen 7.5-325 mg per tablet Commonly known as:  [place] Hurt Take 1 Tab by mouth two (2) times daily as needed for Pain. Max Daily Amount: 2 Tabs. Start taking on:  7/24/2017  
  
 inhalational spacing device 1 Each by Does Not Apply route as needed. letrozole 2.5 mg tablet Commonly known as:  FEMARA  
take 1 tablet by mouth daily  
  
 lidocaine 4 % Gel 1 Inch by Apply Externally route two (2) times daily as needed. To ankle for pain  
  
 magnesium oxide 400 mg tablet Commonly known as:  MAG-OX  
take 1 tablet by mouth once daily  
  
 midodrine 5 mg tablet Commonly known as:  Geena Jackson Take 10 mg by mouth three (3) times daily (with meals). mineral oil-hydrophil petrolat ointment Commonly known as:  AQUAPHOR Apply  to affected area as needed for Dry Skin or Itching. * miscellaneous medical supply Misc Please dispense 1 single point bariatric cane * miscellaneous medical supply Misc Please dispense 1 bariatric wheelchair  
  
 montelukast 10 mg tablet Commonly known as:  SINGULAIR  
take 1 tablet by mouth once daily  
  
 omeprazole 20 mg capsule Commonly known as:  PRILOSEC  
take 1 capsule by mouth twice a day RENVELA 2.4 gram Pwpk oral powder Generic drug:  sevelamer carbonate Take 1 Packet by mouth three (3) times daily (with meals). SENSIPAR 30 mg tablet Generic drug:  cinacalcet Take 30 mg by mouth daily. * Notice: This list has 7 medication(s) that are the same as other medications prescribed for you. Read the directions carefully, and ask your doctor or other care provider to review them with you. Prescriptions Printed Refills HYDROcodone-acetaminophen (NORCO) 7.5-325 mg per tablet 0 Sig: Take 1 Tab by mouth two (2) times daily as needed for Pain. Max Daily Amount: 2 Tabs. Class: Print Route: Oral  
 HYDROcodone-acetaminophen (NORCO) 7.5-325 mg per tablet 0 Starting on: 6/24/2017 Sig: Take 1 Tab by mouth two (2) times daily as needed for Pain. Max Daily Amount: 2 Tabs. Class: Print Route: Oral  
 HYDROcodone-acetaminophen (NORCO) 7.5-325 mg per tablet 0 Starting on: 7/24/2017 Sig: Take 1 Tab by mouth two (2) times daily as needed for Pain. Max Daily Amount: 2 Tabs. Class: Print Route: Oral  
  
Prescriptions Sent to Pharmacy Refills  
 gabapentin (NEURONTIN) 300 mg capsule 11 Sig: Take 2-3 Caps by mouth three (3) times daily as needed. Class: Normal  
 Pharmacy: [id number] [address] [facility] [address]  #: [phone] Route: Oral  
  
We Performed the Following [address] [place] [address] Comments:  
 PT/OT: Please evaluate patient for home safety eval. Please make recs for wheelchair accesibility. To-Do List   
 05/25/2017 Imaging:  XR ANKLE RT MIN 3 V   
  
 04/19/2018 9:15 AM  
  Appointment with [facility] MARY 5 at [address] [place] [address] ([phone]) Shower or bathe using soap and water. Do not use deodorant, powder, perfumes, or lotion the day of your exam.  If your prior mammograms were not performed at [facility] 6 please bring films with you or forward prior images 2 days before your procedure. If patient is not a callback diagnostic, the patient must have an order/script from the physician for the diagnostic. Please bring it on the day of the mammogram or have it faxed to the department. [facility]  [phone] [facility] [place]  [phone]  [phone] [facility] [address] [facility] [phone] [facility] [phone] Referral Information Referral ID Referred By Referred To  
  
 [id number] Haleigh Dozier Not Available Visits Status Start Date End Date 1 New Request 5/25/17 5/25/18 If your referral has a status of pending review or denied, additional information will be sent to support the outcome of this decision. Patient Instructions TODAY, please go to: CHECK OUT Please schedule the following appointments at [facility] OUT: 
Chronic pain follow up in 3 months 
_____________________ Today's Plan: 
· Increase gabapentin as directed · Pap in September 
 
[email or web address]-[email or web address]. pdf 
_____________________ Review your health maintenance below. Make plans to return and address anything that is due or will be due soon. Health Maintenance Due Topic Date Due  
 DTaP/Tdap/Td  (1 - Tdap) 05/10/1981  Cervical Cancer Screening  09/03/2017  
 
 
 
_____________________ Are you stressed out? Overwhelmed? In pain? Feeling disconnected? Consider MINDFULNESS. .. 
[email or web address] 
_____________________ If you need to get your labs done at [facility], visit this site to find a convenient location: [facility]. Introducing [facility]! Lynette Dawkins introduces Sqrrl patient portal. Now you can access parts of your medical record, email your doctor's office, and request medication refills online. 1. In your internet browser, go to [email or web address] 2. Click on the First Time User? Click Here link in the Sign In box. You will see the New Member Sign Up page. 3. Enter your Sqrrl Access Code exactly as it appears below. You will not need to use this code after youve completed the sign-up process. If you do not sign up before the expiration date, you must request a new code. · Sqrrl Access Code: [email or web address] Expires: 5/29/2017  2:21 PM 
 
4. Enter the last four digits of your Social Security Number (xxxx) and Date of Birth (mm/dd/yyyy) as indicated and click Submit. You will be taken to the next sign-up page. 5. Create a Poly[employer] Venturest ID. This will be your Sqrrl login ID and cannot be changed, so think of one that is secure and easy to remember. 6. Create a Sqrrl password. You can change your password at any time. 7. Enter your Password Reset Question and Answer. This can be used at a later time if you forget your password. 8. Enter your e-mail address. You will receive e-mail notification when new information is available in [address] 9. Click Sign Up. You can now view and download portions of your medical record. 10. Click the Download Summary menu link to download a portable copy of your medical information. If you have questions, please visit the Frequently Asked Questions section of the GLOBALDRUM website. Remember, GLOBALDRUM is NOT to be used for urgent needs. For medical emergencies, dial 911. Now available from your iPhone and Android! Please provide this summary of care documentation to your next provider. Your primary care clinician is listed as Natali Hamilton. Adriaan Abdi. If you have any questions after today's visit, please call [phone].

## 2017-05-25 NOTE — PROGRESS NOTES
Chief Complaint   Patient presents with    Pain (Chronic)     follow up     Medication Problem     Stated that people at dialysis said that her Wellbutrin should be increased because they feel as if she has been stressing alot more. PILL COUNT  Today's Date: 2017  Medication name: Hydrocodone  Pill strength: 7.5-325 mg  Date prescription filled: 2017  # of pills prescribed:60  Last dose of medication taken, per patient: 17  # of pills remainin  Counted in front of patient. Medication returned to patient. PILL COUNT  Today's Date: 2017  Medication name: Hydrocodone  Pill strength: 7.5-325 mg  Date prescription filled: 2017  # of pills prescribed: 60  Last dose of medication taken, per patient: 2017  # of pills remainin  Counted in front of patient. Medication returned to patient. PILL COUNT  Today's Date: 2017  Medication name: Hydrocodone  Pill strength: 7.5-325 mg   Date prescription filled: 2017  # of pills prescribed: 60  Last dose of medication taken, per patient: 2017  # of pills remainin  Counted in front of patient. Medication returned to patient. 1. Have you been to the ER, urgent care clinic since your last visit? Hospitalized since your last visit? No    2. Have you seen or consulted any other health care providers outside of the [address] [place] [address] since your last visit? Include any pap smears or colon screening. No     The patient was counseled on the dangers of tobacco use, and was advised never to start. Reviewed strategies to maximize success, including never to start. Health Maintenance Due   Topic Date Due    DTaP/Tdap/Td series (1 - Tdap) Discussed with patient today and advised to follow up.    05/10/1981    PAP AKA CERVICAL CYTOLOGY Discussed with patient today and advised to follow up.    2017       ACP is not on file, advised to return.

## 2017-05-25 NOTE — PATIENT INSTRUCTIONS
TODAY, please go to:   CHECK OUT     Please schedule the following appointments at [facility] OUT:  Chronic pain follow up in 3 months  _____________________     [id number] Plan:  · Increase gabapentin as directed  · Pap in September    [email or web address] pdf  _____________________     Review your health maintenance below. Make plans to return and address anything that is due or will be due soon. Health Maintenance Due   Topic Date Due    DTaP/Tdap/Td  (1 - Tdap) 05/10/1981    Cervical Cancer Screening  09/03/2017         _____________________     Are you stressed out? Overwhelmed? In pain? Feeling disconnected? Consider MINDFULNESS. ..  [email or web address]  _____________________     If you need to get your labs done at [facility], visit this site to find a convenient location: [email or web address]

## 2017-05-25 NOTE — PROGRESS NOTES
[address] Visit   Patient ID:   Horacio Amato is a 62 y.o. female. Assessment/Plan:    Muna Peguero was seen today for pain (chronic) and medication problem. Diagnoses and all orders for this visit:    Chronic pain of right ankle  XR to eval hardware  -     XR ANKLE RT MIN 3 V; Future  -     REFERRAL TO HOME HEALTH    Right sided sciatica  Increase gabapentin. If not improved consider savella   -     gabapentin (NEURONTIN) 300 mg capsule; Take 2-3 Caps by mouth three (3) times daily as needed. Bilateral carpal tunnel syndrome  Increase gabapentin. If not improved consider savella   -     gabapentin (NEURONTIN) 300 mg capsule; Take 2-3 Caps by mouth three (3) times daily as needed. Other chronic pain  Pain is reasonably controlled with current plan    · Pain is due to --see HPI  · Patient's plan currently includes: norco, lidocaine, gabapentin, PT  · refills given for 3 months  · Functional improvements that justify chronic opioid medications: yes  · Pill count done today. · Treatment agree UTD. ·  reviewed. Needs to provide urine tox or serux tox before next refill  -     HYDROcodone-acetaminophen (NORCO) 7.5-325 mg per tablet; Take 1 Tab by mouth two (2) times daily as needed for Pain. Max Daily Amount: 2 Tabs. -     HYDROcodone-acetaminophen (NORCO) 7.5-325 mg per tablet; Take 1 Tab by mouth two (2) times daily as needed for Pain. Max Daily Amount: 2 Tabs. -     HYDROcodone-acetaminophen (NORCO) 7.5-325 mg per tablet; Take 1 Tab by mouth two (2) times daily as needed for Pain. Max Daily Amount: 2 Tabs. Obesity, unspecified obesity severity, unspecified obesity type  Recommend lifestyle modification. Given info on cooking at home using limited budget    Other orders  -     Cancel: REFERRAL TO PHYSICAL THERAPY         reviewed and appropriate 5/25/2017    Counselled pt on:  Patient health concerns, plan as outlined in patient instructions and above.   Patient was offered a choice/choices in the treatment plan today. Patient expresses understanding of the plan and agrees with recommendations. Patient Instructions       TODAY, please go to:   CHECK OUT     Please schedule the following appointments at [facility] OUT:  Chronic pain follow up in 3 months  _____________________     Today's Plan:  · Increase gabapentin as directed  · Pap in September    [email or web address]  _____________________     Review your health maintenance below. Make plans to return and address anything that is due or will be due soon. Health Maintenance Due   Topic Date Due    DTaP/Tdap/Td  (1 - Tdap) 05/10/1981    Cervical Cancer Screening  09/03/2017         _____________________     Are you stressed out? Overwhelmed? In pain? Feeling disconnected? Consider MINDFULNESS. ..  [email or web address]  _____________________     If you need to get your labs done at [address] [place] [address], visit this site to find a convenient location: [email or web address]        Subjective:   HPI:  Brionna Yo is a 62 y.o. female being seen for:   Chief Complaint   Patient presents with    Pain (Chronic)     follow up     Medication Problem     Stated that people at dialysis said that her Wellbutrin should be increased because they feel as if she has been stressing alot more. Stress/Weight  ·  pt notes more stressed  · Crying, easily upset. · More family problems  · Her health problems  · No SI  · More stressed than down. · She feels like improving weight would decrease her level of stress. · States she needs to change her eating habits. Needs to work on portion control. · Planning to walk more with a friend. · Worried about cost related to healthier foods.      Lab review  · Labs reviewed in detail     Chronic pain  - Feels like ankle and back are not well controlled  · Right ankle- h/o of fracture then surgery with hardware  ¨ Started in [address] has sharp pain intermittent  ¨ Concerned that hardware has shifted, would like to have XR  ¨ Lidocaine gel helps intermittently. · Paresthesia and pain in hands  ¨ Gabapentin helps  · Back, into leg   ¨ Lower back  ¨ Radiates down both legs  ¨ At least 2015  ¨ Gabapentin helps  ¨ Heat helps      Medications that patient is currently taking:   · Gabapentin 600-900mg sTID  · norco 7.5-325mg, two tabs once a day. Takes scheduled     In the past recalls trying  · Tylenol  · Excedrin, advil  · Does not recall cymbalta     Side effects? No n/v, ab pain, constipation, itching, confused     Physical function: Is this medication improving pt's function? How? Allows her to do more daily household tasks such as laundry       Screening and Prevention Due:  Health Maintenance Due   Topic Date Due    DTaP/Tdap/Td series (1 - Tdap) 05/10/1981    PAP AKA CERVICAL CYTOLOGY  09/03/2017       Past Surgical History:   Procedure Laterality Date    ABDOMEN SURGERY PROC UNLISTED  2009    choleystectomy    HX BREAST BIOPSY Right 4/7/14    DCIS    HX COLONOSCOPY  8/18/11    Dr. Gary Puckett HX GYN      tuabl ligation    HX ORTHOPAEDIC  1989    r ankle and leg plate and screw    HX OTHER SURGICAL  3/10/16    Graft in left arm     HX VASCULAR ACCESS      DIALYSIS CATHETER    VASCULAR SURGERY PROCEDURE UNLIST      4 surgeries on left upper arm grafts stopping up. 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   Objective:     Visit Vitals  BP 92/46 (BP 1 Location: Right arm, BP Patient Position: Sitting)    Pulse 80    Temp 97.7 °F (36.5 °C) (Oral)    Resp 20    Ht 5' 5\" (1.651 m)    Wt (!) 359 lb (162.8 kg)    SpO2 90%    BMI 59.74 kg/m2     Wt Readings from Last 3 Encounters:   05/25/17 (!) 359 lb (162.8 kg)   04/06/17 (!) 353 lb 12.8 oz (160.5 kg)   03/07/17 345 lb (156.5 kg)     No flowsheet data found. Physical Exam   Constitutional: She appears well-developed and well-nourished. No distress. In new wheelchair   Pulmonary/Chest: Effort normal. No respiratory distress. Musculoskeletal:   Well healed surgical scar on right ankle  No deformity to right 5th toe. Nl coloration. Neurological: She is alert. Psychiatric: She has a normal mood and affect. Her behavior is normal.     Allergies   Allergen Reactions    Iodine Anaphylaxis    Other Medication Other (comments)     Metal causes numbness in hands,arms all over    Shellfish Containing Products Anaphylaxis    Sulfa (Sulfonamide Antibiotics) Itching     Prior to Admission medications    Medication Sig Start Date End Date Taking? Authorizing Provider   gabapentin (NEURONTIN) 300 mg capsule Take 2-3 Caps by mouth three (3) times daily as needed. 5/25/17  Yes Vivi Garcia. Luisito Carpio MD   HYDROcodone-acetaminophen [facility]) 7.5-325 mg per tablet Take 1 Tab by mouth two (2) times daily as needed for Pain. Max Daily Amount: 2 Tabs. 5/25/17  Yes Vivi Garcia. Luisito Carpio MD   HYDROcodone-acetaminophen [facility]) 7.5-325 mg per tablet Take 1 Tab by mouth two (2) times daily as needed for Pain. Max Daily Amount: 2 Tabs. 6/24/17  Yes Cesar Carpio MD   HYDROcodone-acetaminophen [facility]) 7.5-325 mg per tablet Take 1 Tab by mouth two (2) times daily as needed for Pain. Max Daily Amount: 2 Tabs. 7/24/17  Yes Cesar Carpio MD   atorvastatin (LIPITOR) 10 mg tablet Take 1 Tab by mouth nightly. 5/4/17  Yes [place]  MD [place] 2.4 gram pwpk oral powder Take 1 Packet by mouth three (3) times daily (with meals). 4/6/17  Yes Jade Brown MD   [facility] Please dispense 1 single point bariatric cane 4/6/17  Yes Calin Horns. Debra Brown MD   [facility] Please dispense 1 bariatric wheelchair 4/6/17  Yes Calin Horns. Debra Brown MD   [facility]) 2.5 mg tablet take 1 tablet by mouth daily 3/17/17  Yes Leonor Bonilla,    mineral oil-hydrophil petrolat (AQUAPHOR) ointment Apply  to affected area as needed for Dry Skin or Itching. 3/7/17  Yes Jade Brown MD   montelukast (SINGULAIR) 10 mg tablet take 1 tablet by mouth once daily 1/31/17  Yes Calin Horns. Debra Brown MD   ECOTRIN 325 mg tablet take 1 tablet by mouth once daily 12/15/16  Yes Historical Provider   magnesium oxide (MAG-OX) 400 mg tablet take 1 tablet by mouth once daily 1/3/17  Yes Calin Horns. Debra Brown MD   lidocaine 4 % gel 1 Inch by Apply Externally route two (2) times daily as needed. To ankle for pain 12/12/16  Yes Calin Horns. Debra Brown MD   albuterol (PROVENTIL VENTOLIN) 2.5 mg /3 mL (0.083 %) nebulizer solution 3 mL by Nebulization route every four (4) hours as needed for Wheezing or Shortness of Breath. 11/15/16  Yes Calin Horns. Debra Brown MD   ergocalciferol (ERGOCALCIFEROL) 50,000 unit capsule take 1 capsule by mouth every week 11/15/16  Yes Calin Horns. Debra Brown MD   inhalational spacing device 1 Each by Does Not Apply route as needed. 11/15/16  Yes Calin Horns. Debra Brown MD   albuterol (PROVENTIL HFA, VENTOLIN HFA, PROAIR HFA) 90 mcg/actuation inhaler Take 1-2 Puffs by inhalation every four (4) hours as needed for Wheezing. 11/15/16  Yes Calin Horns. Debra Brown MD   AQUAPHOR HEALING 41 % ointment Apply  to affected area as needed for Dry Skin. 8/23/16  Yes Malka Cruz MD   midodrine (PROAMITINE) 5 mg tablet Take 10 mg by mouth three (3) times daily (with meals). 6/29/16  Yes Historical Provider   SENSIPAR 30 mg tablet Take 30 mg by mouth daily.  2/12/16  Yes Historical Provider   omeprazole (PRILOSEC) 20 mg capsule take 1 capsule by mouth twice a day 3/6/16  Yes Lawrence Morrison MD   buPROPion XL (WELLBUTRIN XL) 150 mg tablet take 1 tablet by mouth every morning 3/6/16  Yes Lawrence Morrison MD

## 2017-07-18 RX ORDER — BUPROPION HYDROCHLORIDE 150 MG/1
TABLET ORAL
Qty: 90 TAB | Refills: 3 | Status: SHIPPED | OUTPATIENT
Start: 2017-07-18 | End: 2018-07-30 | Stop reason: SDUPTHER

## 2017-08-31 ENCOUNTER — OFFICE VISIT (OUTPATIENT)
Dept: FAMILY MEDICINE CLINIC | Age: 57
End: 2017-08-31

## 2017-08-31 VITALS
SYSTOLIC BLOOD PRESSURE: 99 MMHG | TEMPERATURE: 98.3 F | HEART RATE: 71 BPM | BODY MASS INDEX: 48.82 KG/M2 | HEIGHT: 65 IN | WEIGHT: 293 LBS | RESPIRATION RATE: 20 BRPM | OXYGEN SATURATION: 92 % | DIASTOLIC BLOOD PRESSURE: 55 MMHG

## 2017-08-31 DIAGNOSIS — Z99.2 KIDNEY DISEASE, CHRONIC, END STAGE ON DIALYSIS (HCC): ICD-10-CM

## 2017-08-31 DIAGNOSIS — N18.6 KIDNEY DISEASE, CHRONIC, END STAGE ON DIALYSIS (HCC): ICD-10-CM

## 2017-08-31 DIAGNOSIS — G89.29 OTHER CHRONIC PAIN: Primary | ICD-10-CM

## 2017-08-31 RX ORDER — HYDROCODONE BITARTRATE AND ACETAMINOPHEN 7.5; 325 MG/1; MG/1
1 TABLET ORAL
Qty: 60 TAB | Refills: 0 | Status: SHIPPED | OUTPATIENT
Start: 2017-08-31 | End: 2017-10-24 | Stop reason: SDUPTHER

## 2017-08-31 RX ORDER — LIDOCAINE 40 MG/G
CREAM TOPICAL
COMMUNITY
Start: 2017-06-05 | End: 2019-10-10 | Stop reason: ALTCHOICE

## 2017-08-31 RX ORDER — HYDROCODONE BITARTRATE AND ACETAMINOPHEN 7.5; 325 MG/1; MG/1
1 TABLET ORAL
Qty: 60 TAB | Refills: 0 | Status: SHIPPED | OUTPATIENT
Start: 2017-10-30 | End: 2017-10-24 | Stop reason: SDUPTHER

## 2017-08-31 RX ORDER — MIDODRINE HYDROCHLORIDE 10 MG/1
10 TABLET ORAL EVERY EVENING
COMMUNITY
Start: 2017-06-09 | End: 2019-09-09

## 2017-08-31 RX ORDER — HYDROCODONE BITARTRATE AND ACETAMINOPHEN 7.5; 325 MG/1; MG/1
1 TABLET ORAL
Qty: 60 TAB | Refills: 0 | Status: SHIPPED | OUTPATIENT
Start: 2017-09-30 | End: 2017-10-24 | Stop reason: SDUPTHER

## 2017-08-31 NOTE — PATIENT INSTRUCTIONS
Desire Cast TODAY, please go to:   CHECK OUT    LAB   Release of records- labs from nephrology in 2 months     Please schedule the following appointments at [address] [place] [address]:  · Medication follow up and DM review in 3 months     Today's Plan:  - Flu season is coming! Remember to get your flu vaccine! Desire Cast Remember to bring your pill bottles and remaining pills for any controlled substances to your appointments with Dr. Joselito stephen. If you do not bring the bottle, you may not have your medication refilled. This is the clinic policy. Narcotics: Potential side effects of narcotic medication include but are not limited to suppression of respiratory drive, constipation, sleep apnea, and endocrine dysfunction, light-headedness, sedation, GI upset, euphoria, dysphoria, constipation, urinary retention, hepatotoxicity, impacts operating certain machinery or engaging in certain activities or employment, and potential to develop tolerance, dependence, addiction and death. MED/day between 100mg and 120 mg has 9 fold increases in overdose risk with 12% being fatal.  At this dose you will be given Naloxone which is a medication that can be used in preventing overdose deaths. This medication is to be used as directed. Increasing dose of opioid medications may not improve function and response to medical treatment due to possible induced abnormal pain sensitivity including hyperalgesia and allodynia.

## 2017-08-31 NOTE — MR AVS SNAPSHOT
Visit Information Date & Time Provider Department Dept. Phone Encounter #  
 8/31/2017 12:00 PM Kya Juarez. Prince Pedro MD [place] [facility] [phone] [id number] Upcoming Health Maintenance Date Due DTaP/Tdap/Td series (1 - Tdap) 5/10/1981 INFLUENZA AGE 9 TO ADULT 8/1/2017 PAP AKA CERVICAL CYTOLOGY 9/3/2017 BREAST CANCER SCRN MAMMOGRAM 4/18/2019 COLONOSCOPY 8/18/2021 Pneumococcal 19-64 Highest Risk (3 of 3 - PPSV23) 1/3/2022 Allergies as of 8/31/2017  Review Complete On: 8/31/2017 By: Oneil Cutler LPN Severity Noted Reaction Type Reactions Iodine High 08/18/2011   Systemic Anaphylaxis Other Medication  09/03/2014   Systemic Other (comments) Metal causes numbness in hands,arms all over Shellfish Containing Products  08/18/2011   Systemic Anaphylaxis Sulfa (Sulfonamide Antibiotics)  03/10/2016    Itching Current Immunizations  Reviewed on 11/15/2016 Name Date Influenza Vaccine 11/19/2013 Influenza Vaccine Gricelda Jazmín) 10/1/2015 Influenza Vaccine (Quad) PF 11/15/2016, 2/16/2015  2:30 PM  
 Pneumococcal Conjugate (PCV-13) 2/16/2015  2:31 PM  
  
 Not reviewed this visit You Were Diagnosed With   
  
 Codes Comments Other chronic pain     ICD-10-CM: G89.29 ICD-9-CM: 338.29 Vitals BP Pulse Temp Resp Height(growth percentile) Weight(growth percentile) 99/55 (BP 1 Location: Right arm, BP Patient Position: Sitting) 71 98.3 °F (36.8 °C) (Oral) 20 5' 5\" (1.651 m) (!) 359 lb (162.8 kg) SpO2 BMI OB Status Smoking Status 92% 59.74 kg/m2 Postmenopausal Never Smoker BMI and BSA Data Body Mass Index Body Surface Area 59.74 kg/m 2 2.73 m 2 Preferred Pharmacy Pharmacy Name Phone [phone] [phone] Your Updated Medication List  
  
   
This list is accurate as of: 8/31/17  1:15 PM.  Always use your most recent med list.  
  
  
  
  
 * albuterol 2.5 mg /3 mL (0.083 %) nebulizer solution Commonly known as:  PROVENTIL VENTOLIN  
3 mL by Nebulization route every four (4) hours as needed for Wheezing or Shortness of Breath. * albuterol 90 mcg/actuation inhaler Commonly known as:  PROVENTIL HFA, VENTOLIN HFA, PROAIR HFA Take 1-2 Puffs by inhalation every four (4) hours as needed for Wheezing. AQUAPHOR HEALING 41 % ointment Generic drug:  pantothenic ac-min oil-pet,hyd Apply  to affected area as needed for Dry Skin. atorvastatin 10 mg tablet Commonly known as:  LIPITOR  
take 1 tablet by mouth NIGHTLY. buPROPion  mg tablet Commonly known as:  WELLBUTRIN XL  
take 1 tablet by mouth every morning ECOTRIN 325 mg tablet Generic drug:  aspirin delayed-release  
take 1 tablet by mouth once daily  
  
 ergocalciferol 50,000 unit capsule Commonly known as:  ERGOCALCIFEROL  
take 1 capsule by mouth every week  
  
 gabapentin 300 mg capsule Commonly known as:  NEURONTIN Take 2-3 Caps by mouth three (3) times daily as needed. * HYDROcodone-acetaminophen 7.5-325 mg per tablet Commonly known as:  [place] Take 1 Tab by mouth two (2) times daily as needed for Pain. Max Daily Amount: 2 Tabs. * HYDROcodone-acetaminophen 7.5-325 mg per tablet Commonly known as:  [place] Take 1 Tab by mouth two (2) times daily as needed for Pain. Max Daily Amount: 2 Tabs. Start taking on:  9/30/2017  
  
 * HYDROcodone-acetaminophen 7.5-325 mg per tablet Commonly known as:  [place] Take 1 Tab by mouth two (2) times daily as needed for Pain. Max Daily Amount: 2 Tabs. Start taking on:  10/30/2017  
  
 inhalational spacing device 1 Each by Does Not Apply route as needed. letrozole 2.5 mg tablet Commonly known as:  FEMARA  
take 1 tablet by mouth daily * lidocaine 4 % Gel 1 Inch by Apply Externally route two (2) times daily as needed. To ankle for pain * lidocaine 4 % topical cream  
Commonly known as:  XYLOCAINE  
  
 magnesium oxide 400 mg tablet Commonly known as:  MAG-OX  
take 1 tablet by mouth once daily * midodrine 5 mg tablet Commonly known as:  Leon Dose Take 10 mg by mouth three (3) times daily (with meals). * midodrine 10 mg tablet Commonly known as:  PROAMITINE  
  
 mineral oil-hydrophil petrolat ointment Commonly known as:  AQUAPHOR Apply  to affected area as needed for Dry Skin or Itching. * miscellaneous medical supply Misc Please dispense 1 single point bariatric cane * miscellaneous medical supply Misc Please dispense 1 bariatric wheelchair  
  
 montelukast 10 mg tablet Commonly known as:  SINGULAIR  
take 1 tablet by mouth once daily  
  
 omeprazole 20 mg capsule Commonly known as:  PRILOSEC  
take 1 capsule by mouth twice a day [place]ENV[place]A 2.4 gram Pwpk oral powder Generic drug:  sevelamer carbonate Take 1 Packet by mouth three (3) times daily (with meals). SENSIPAR 30 mg tablet Generic drug:  cinacalcet Take 30 mg by mouth daily. * Notice: This list has 11 medication(s) that are the same as other medications prescribed for you. Read the directions carefully, and ask your doctor or other care provider to review them with you. Prescriptions Printed Refills HYDROcodone-acetaminophen (NORCO) 7.5-325 mg per tablet 0 Sig: Take 1 Tab by mouth two (2) times daily as needed for Pain. Max Daily Amount: 2 Tabs. Class: Print Route: Oral  
 HYDROcodone-acetaminophen (NORCO) 7.5-325 mg per tablet 0 Starting on: 9/30/2017 Sig: Take 1 Tab by mouth two (2) times daily as needed for Pain. Max Daily Amount: 2 Tabs. Class: Print Route: Oral  
 HYDROcodone-acetaminophen (NORCO) 7.5-325 mg per tablet 0 Starting on: 10/30/2017 Sig: Take 1 Tab by mouth two (2) times daily as needed for Pain. Max Daily Amount: 2 Tabs. Class: Print  Route: Oral  
 To-Do List   
 04/19/2018 9:15 AM  
  Appointment with [facility] MARY 5 at [address] ([phone]) Shower or bathe using soap and water. Do not use deodorant, powder, perfumes, or lotion the day of your exam.  If your prior mammograms were not performed at [facility] 6 please bring films with you or forward prior images 2 days before your procedure. If patient is not a callback diagnostic, the patient must have an order/script from the physician for the diagnostic. Please bring it on the day of the mammogram or have it faxed to the department. [facility]  [phone] [facility]  [phone] [facility] [phone] [facility] [address] [facility] [phone] [facility] [phone] Patient Instructions Chay Valdez TODAY, please go to: CHECK OUT  
 LAB Release of records- labs from nephrology in 2 months Please schedule the following appointments at CHECK OUT: 
· Medication follow up and DM review in 3 months Today's Plan: - Flu season is coming! Remember to get your flu vaccine! Chay Valdez Remember to bring your pill bottles and remaining pills for any controlled substances to your appointments with Dr. Chloe Raya. If you do not bring the bottle, you may not have your medication refilled. This is the clinic policy. Narcotics: Potential side effects of narcotic medication include but are not limited to suppression of respiratory drive, constipation, sleep apnea, and endocrine dysfunction, light-headedness, sedation, GI upset, euphoria, dysphoria, constipation, urinary retention, hepatotoxicity, impacts operating certain machinery or engaging in certain activities or employment, and potential to develop tolerance, dependence, addiction and death. MED/day between 100mg and 120 mg has 9 fold increases in overdose risk with 12% being fatal.  At this dose you will be given Naloxone which is a medication that can be used in preventing overdose deaths.   This medication is to be used as directed. Increasing dose of opioid medications may not improve function and response to medical treatment due to possible induced abnormal pain sensitivity including hyperalgesia and allodynia. Introducing [facility]! [facility] introduces [employer]. patient portal. Now you can access parts of your medical record, email your doctor's office, and request medication refills online. 1. In your internet browser, go to [email or web address] 2. Click on the First Time User? Click Here link in the Sign In box. You will see the New Member Sign Up page. 3. Enter your Imagi[employer]. Access Code exactly as it appears below. You will not need to use this code after youve completed the sign-up process. If you do not sign up before the expiration date, you must request a new code. · Imagi[employer] Access Code: [email or web address] Expires: 11/29/2017  1:11 PM 
 
4. Enter the last four digits of your Social Security Number (xxxx) and Date of Birth (mm/dd/yyyy) as indicated and click Submit. You will be taken to the next sign-up page. 5. Create a Imagiin. ID. This will be your Imagi[employer]. login ID and cannot be changed, so think of one that is secure and easy to remember. 6. Create a Imagi[employer]. password. You can change your password at any time. 7. Enter your Password Reset Question and Answer. This can be used at a later time if you forget your password. 8. Enter your e-mail address. You will receive e-mail notification when new information is available in [address]. 9. Click Sign Up. You can now view and download portions of your medical record. 10. Click the Download Summary menu link to download a portable copy of your medical information. If you have questions, please visit the Frequently Asked Questions section of the Imagi[employer]. website. Remember, Imagi[employer]. is NOT to be used for urgent needs. For medical emergencies, dial 911. Now available from your iPhone and Android! Please provide this summary of care documentation to your next provider. Your primary care clinician is listed as Malka Jacinto. If you have any questions after today's visit, please call [phone].

## 2017-08-31 NOTE — PROGRESS NOTES
Chief Complaint   Patient presents with    Diabetes     follow up     Pain (Chronic)     follow up     Medication Refill     [facility]  Nurse Note for Controlled Substance Refill  Chief Complaint   Patient presents with    Diabetes     follow up     Pain (Chronic)     follow up     Medication Refill      Patient requests refill of: Hydrocodone     Visit Vitals    BP 99/55 (BP 1 Location: Right arm, BP Patient Position: Sitting)    Pulse 71    Temp 98.3 °F (36.8 °C) (Oral)    Resp 20    Ht 5' 5\" (1.651 m)    Wt (!) 359 lb (162.8 kg)    SpO2 92%    BMI 59.74 kg/m2       · Diagnosis: Chronic pain   · Last drug screen date: Not available    · Urine provided today: Will need to be provided   · Treatment agreement/Informed Consent date: 2017  ·  reviewed by provider: 2017   · MME per day:   · Provider for today's encounter : Dr. Hayde Dumont     · Zeke Samaniego Date: 2017  Medication name: Hydrocodone   Pill strength: 7.5-325 mg   How medication is supposed to be taken: Take 1 tablet by mouth twice a day if needed for pain max daily amount: 2 tablets   How medication is being taken: BID   Date prescription filled: 2017  Prescribing Provider: Dr. Hayde Dumont   # of pills prescribed: 60  # of pills remainin   # pills taking per day: 2   Last dose of medication taken, per patient: 2017  Pain scale and location of pain: 7, back   Counted in front of patient. Bottle with contents returned to patient. [facility]  reviewed by provider. Discussed pill count with provider. Per provider, refill medication granted. Follow up as advised. Pt was made aware of provider's decision, and to return in 3 months for an office visit, if one is not already scheduled at this time. Controlled Substance Refill sheet signed by patient and provider.   Provided with naloxone prescription, if taking benzodiazepine, prior overdose, substance abuse, or >= 120 MME per day.  Continuation of treatment with controlled substance is justified by patient's functional improvements. Patient will benefit from continued prescribing. Future Appointments  Date Time Provider Vincenzo Licea   4/19/2018 9:15 AM [facility]             1. Have you been to the ER, urgent care clinic since your last visit? Hospitalized since your last visit? No     2. Have you seen or consulted any other health care providers outside of the [address] [place] [address] since your last visit? Include any pap smears or colon screening. No     The patient was counseled on the dangers of tobacco use, and was advised never to start. Reviewed strategies to maximize success, including never to start. Health Maintenance Due   Topic Date Due    DTaP/Tdap/Td series (1 - Tdap) Discussed with patient today and advised to follow up.    05/10/1981    INFLUENZA AGE 9 TO ADULT Discussed with patient today and advised to follow up.    08/01/2017    PAP AKA CERVICAL CYTOLOGY Discussed with patient today and advised to follow up.    09/03/2017     ACP is not on file, advised to return.

## 2017-08-31 NOTE — PROGRESS NOTES
.. [address] [place] [address] Visit   08/31/2017  Patient ID: Petrona Mcdonald is a 62 y.o. female. Assessment/Plan:    Diagnoses and all orders for this visit:    1. Other chronic pain  Pain is reasonably controlled with current plan     · Pain is in- right ankle, hands, back into legs  · Patient's plan currently includes: norco, lidocaine, gabapentin, PT  · refills given for 3 months  · Functional improvements that justify chronic opioid medications: yes  · Pill count done today. · Treatment agreement UTD. ·  reviewed. · Next, consider savella   -     HYDROcodone-acetaminophen (NORCO) 7.5-325 mg per tablet; Take 1 Tab by mouth two (2) times daily as needed for Pain. Max Daily Amount: 2 Tabs. -     HYDROcodone-acetaminophen (NORCO) 7.5-325 mg per tablet; Take 1 Tab by mouth two (2) times daily as needed for Pain. Max Daily Amount: 2 Tabs. -     HYDROcodone-acetaminophen (NORCO) 7.5-325 mg per tablet; Take 1 Tab by mouth two (2) times daily as needed for Pain. Max Daily Amount: 2 Tabs. -     13-DRUG SCREEN W/CONFIRM, WHOLE BLOOD    2. Kidney disease, chronic, end stage on dialysis [facility])  mx by renal      Counselled pt on Patient health concerns and plans. Patient was offered a choice/choices in the treatment plan today. Reviewed return precautions as appropriate. Patient expresses understanding of the plan and agrees with recommendations. See patient instructions for more. Next visit: review labs    Patient Instructions   . TODAY, please go to:   CHECK OUT    LAB   Release of records- labs from nephrology in 2 months     Please schedule the following appointments at [address] [place] [address]:  · Medication follow up and DM review in 3 months     Today's Plan:  - Flu season is coming! Remember to get your flu vaccine! Annamary Ripa Annamary Ripa Remember to bring your pill bottles and remaining pills for any controlled substances to your appointments with Dr. Dereck Dodson.  If you do not bring the bottle, you may not have your medication refilled. This is the clinic policy. Narcotics: Potential side effects of narcotic medication include but are not limited to suppression of respiratory drive, constipation, sleep apnea, and endocrine dysfunction, light-headedness, sedation, GI upset, euphoria, dysphoria, constipation, urinary retention, hepatotoxicity, impacts operating certain machinery or engaging in certain activities or employment, and potential to develop tolerance, dependence, addiction and death. MED/day between 100mg and 120 mg has 9 fold increases in overdose risk with 12% being fatal.  At this dose you will be given Naloxone which is a medication that can be used in preventing overdose deaths. This medication is to be used as directed. Increasing dose of opioid medications may not improve function and response to medical treatment due to possible induced abnormal pain sensitivity including hyperalgesia and allodynia. Subjective:   HPI:  Matias Grant is a 62 y.o. female being seen for:   Chief Complaint   Patient presents with    Diabetes     follow up     Pain (Chronic)     follow up     Medication Refill     Prior labs appear not done. Pt says lab could not get her blood so she had the labs done at dialysis    Chronic pain medication working well for patient with no complaints  Denies other medication change  She missed a day of dialysis this week, so she went today. Review of Systems  Otherwise as noted in HPI  ?   Objective:     Visit Vitals    BP 99/55 (BP 1 Location: Right arm, BP Patient Position: Sitting)    Pulse 71    Temp 98.3 °F (36.8 °C) (Oral)    Resp 20    Ht 5' 5\" (1.651 m)    Wt (!) 359 lb (162.8 kg)    SpO2 92%    BMI 59.74 kg/m2     Wt Readings from Last 3 Encounters:   08/31/17 (!) 359 lb (162.8 kg)   05/25/17 (!) 359 lb (162.8 kg)   04/06/17 (!) 353 lb 12.8 oz (160.5 kg)     BP Readings from Last 3 Encounters:   08/31/17 99/55   05/25/17 92/46   04/06/17 100/68     No flowsheet data found. Physical Exam   Constitutional: She appears well-developed and well-nourished. No distress. In wheelchair   Pulmonary/Chest: Effort normal. No respiratory distress. Neurological: She is alert. Psychiatric: She has a normal mood and affect. Her behavior is normal.       Allergies   Allergen Reactions    Iodine Anaphylaxis    Other Medication Other (comments)     Metal causes numbness in hands,arms all over    Shellfish Containing Products Anaphylaxis    Sulfa (Sulfonamide Antibiotics) Itching     Prior to Admission medications    Medication Sig Start Date End Date Taking? Authorizing Provider   midodrine (PROAMITINE) 10 mg tablet  6/9/17  Yes Historical Provider   HYDROcodone-acetaminophen (NORCO) 7.5-325 mg per tablet Take 1 Tab by mouth two (2) times daily as needed for Pain. Max Daily Amount: 2 Tabs. 8/31/17  Yes Maria Esther Fontanez MD   HYDROcodone-acetaminophen [facility]) 7.5-325 mg per tablet Take 1 Tab by mouth two (2) times daily as needed for Pain. Max Daily Amount: 2 Tabs. 9/30/17  Yes Skip [place]. Mauri Fontanez MD   HYDROcodone-acetaminophen [facility]) 7.5-325 mg per tablet Take 1 Tab by mouth two (2) times daily as needed for Pain. Max Daily Amount: 2 Tabs. 10/30/17  Yes Skip [place]. Mauri Fontanez MD   buPROPion XL (WELLBUTRIN XL) 150 mg tablet take 1 tablet by mouth every morning 7/18/17  Yes Skip [place]. Mauri Fontanez MD   atorvastatin (LIPITOR) 10 mg tablet take 1 tablet by mouth NIGHTLY. 7/5/17  Yes Skip [place]. Mauri Fontanez MD   gabapentin (NEURONTIN) 300 mg capsule Take 2-3 Caps by mouth three (3) times daily as needed. 5/25/17  Yes Skip [place]. Mauri Fontanez MD   RENVELA 2.4 gram pwpk oral powder Take 1 Packet by mouth three (3) times daily (with meals). 4/6/17  Yes Maria Esther Fontanez MD   miscellaneous medical supply misc Please dispense 1 single point bariatric cane 4/6/17  Yes Skip [place]. Mauri Fontanez MD   miscellaneous medical supply misc Please dispense 1 bariatric wheelchair 4/6/17  Yes Skip Joselin.  Mauri Fontanez MD   letrozole [facility]) 2.5 mg tablet take 1 tablet by mouth daily 3/17/17  Yes Leonor Bonilla DO   montelukast (SINGULAIR) 10 mg tablet take 1 tablet by mouth once daily 1/31/17  Yes Berle Nares. Azalea Pak MD   ECOTRIN 325 mg tablet take 1 tablet by mouth once daily 12/15/16  Yes Historical Provider   magnesium oxide (MAG-OX) 400 mg tablet take 1 tablet by mouth once daily 1/3/17  Yes Berle Nares. Azalea Pak MD   lidocaine 4 % gel 1 Inch by Apply Externally route two (2) times daily as needed. To ankle for pain 12/12/16  Yes Berle Nares. Azalea Pak MD   albuterol (PROVENTIL VENTOLIN) 2.5 mg /3 mL (0.083 %) nebulizer solution 3 mL by Nebulization route every four (4) hours as needed for Wheezing or Shortness of Breath. 11/15/16  Yes Berle Nares. Azalea Pak MD   ergocalciferol (ERGOCALCIFEROL) 50,000 unit capsule take 1 capsule by mouth every week 11/15/16  Yes Berle Nares. Azalea Pak MD   inhalational spacing device 1 Each by Does Not Apply route as needed. 11/15/16  Yes Berle Nares. Azalea Pak MD   albuterol (PROVENTIL HFA, VENTOLIN HFA, PROAIR HFA) 90 mcg/actuation inhaler Take 1-2 Puffs by inhalation every four (4) hours as needed for Wheezing. 11/15/16  Yes Berle Nares. Azalea Pak MD   AQUAPHOR HEALING 41 % ointment Apply  to affected area as needed for Dry Skin. 8/23/16  Yes Soila Fang MD   SENSIPAR 30 mg tablet Take 30 mg by mouth daily. 2/12/16  Yes Historical Provider   omeprazole (PRILOSEC) 20 mg capsule take 1 capsule by mouth twice a day 3/6/16  Yes Soila Fang MD   lidocaine (XYLOCAINE) 4 % topical cream  6/5/17   Historical Provider   mineral oil-hydrophil petrolat (AQUAPHOR) ointment Apply  to affected area as needed for Dry Skin or Itching. 3/7/17   Viviana Pak MD   midodrine (PROAMITINE) 5 mg tablet Take 10 mg by mouth three (3) times daily (with meals).  6/29/16   Historical Provider     Patient Active Problem List   Diagnosis Code    Heart failure ([facility] I50.9    Asthma J45.909    Kidney disease, chronic, end stage on dialysis ([facility] N18.6, Z99.2    GERD (gastroesophageal reflux disease) K21.9    Unspecified sleep apnea G47.30    Depression F32.9    Vitamin D deficiency E55.9    Chronic ankle pain M25.579, G89.29    Obesity hypoventilation syndrome (HCC) E66.2    DCIS (ductal carcinoma in situ) of breast D05.10

## 2017-09-28 RX ORDER — OMEPRAZOLE 20 MG/1
CAPSULE, DELAYED RELEASE ORAL
Qty: 60 CAP | Refills: 11 | Status: SHIPPED | OUTPATIENT
Start: 2017-09-28 | End: 2018-10-07 | Stop reason: SDUPTHER

## 2017-10-03 ENCOUNTER — TELEPHONE (OUTPATIENT)
Dept: FAMILY MEDICINE CLINIC | Age: 57
End: 2017-10-03

## 2017-10-03 NOTE — TELEPHONE ENCOUNTER
Spoke with patient using 2 identifiers. Patient was informed at pharmacy her insurance would not pay for prescription. Patient has had order filled successfully in the past.  Patient encouraged to phone her insurance company to find out reason for declined coverage and if unable to get satisfaction to return call to writer for further assistance.   Patient verbalized understanding

## 2017-10-03 NOTE — TELEPHONE ENCOUNTER
Patient requesting a return call. Says she is having a hard time getting her refill of her norco from the pharmacy. Her contact # is [phone].

## 2017-10-24 ENCOUNTER — OFFICE VISIT (OUTPATIENT)
Dept: FAMILY MEDICINE CLINIC | Age: 57
End: 2017-10-24

## 2017-10-24 ENCOUNTER — TELEPHONE (OUTPATIENT)
Dept: FAMILY MEDICINE CLINIC | Age: 57
End: 2017-10-24

## 2017-10-24 VITALS
SYSTOLIC BLOOD PRESSURE: 99 MMHG | TEMPERATURE: 97 F | HEART RATE: 77 BPM | HEIGHT: 65 IN | DIASTOLIC BLOOD PRESSURE: 47 MMHG | OXYGEN SATURATION: 95 % | RESPIRATION RATE: 18 BRPM

## 2017-10-24 DIAGNOSIS — G89.29 OTHER CHRONIC PAIN: Primary | ICD-10-CM

## 2017-10-24 DIAGNOSIS — G89.29 OTHER CHRONIC PAIN: ICD-10-CM

## 2017-10-24 RX ORDER — HYDROCODONE BITARTRATE AND ACETAMINOPHEN 7.5; 325 MG/1; MG/1
1 TABLET ORAL
Qty: 14 TAB | Refills: 0 | Status: SHIPPED | OUTPATIENT
Start: 2017-10-31 | End: 2017-11-14 | Stop reason: SDUPTHER

## 2017-10-24 RX ORDER — HYDROCODONE BITARTRATE AND ACETAMINOPHEN 7.5; 325 MG/1; MG/1
1 TABLET ORAL
Qty: 14 TAB | Refills: 0 | Status: SHIPPED | OUTPATIENT
Start: 2017-11-07 | End: 2017-11-14 | Stop reason: SDUPTHER

## 2017-10-24 RX ORDER — HYDROCODONE BITARTRATE AND ACETAMINOPHEN 7.5; 325 MG/1; MG/1
1 TABLET ORAL
Qty: 14 TAB | Refills: 0 | Status: SHIPPED | OUTPATIENT
Start: 2017-10-24 | End: 2017-11-14 | Stop reason: SDUPTHER

## 2017-10-24 NOTE — PROGRESS NOTES
.. [address] [place] [address] Visit   10/24/2017  Patient ID: Harlan Klinefelter is a 62 y.o. female. Assessment/Plan:    Diagnoses and all orders for this visit:    1. Other chronic pain      · Pain is in- right ankle, hands, back into legs  · Patient's plan currently includes: norco, lidocaine, gabapentin, PT  · refills given for 3 weeks, pending PA  · Functional improvements that justify chronic opioid medications: yes  · Pill count done today. · Treatment agreement UTD. ·  reviewed. ·  consider savella     -     13-DRUG SCREEN W/CONFIRM, WHOLE BLOOD; Future  -     HYDROcodone-acetaminophen (NORCO) 7.5-325 mg per tablet; Take 1 Tab by mouth two (2) times daily as needed for Pain. Max Daily Amount: 2 Tabs. -     HYDROcodone-acetaminophen (NORCO) 7.5-325 mg per tablet; Take 1 Tab by mouth two (2) times daily as needed for Pain. Max Daily Amount: 2 Tabs. -     HYDROcodone-acetaminophen (NORCO) 7.5-325 mg per tablet; Take 1 Tab by mouth two (2) times daily as needed for Pain. Max Daily Amount: 2 Tabs. Counselled pt on Patient health concerns and plans. Patient was offered a choice/choices in the treatment plan today. Reviewed return precautions as appropriate. Patient expresses understanding of the plan and agrees with recommendations. See patient instructions for more. Patient Instructions   TODAY, please go to:   CHECK OUT        Please schedule the following appointments at CHECK OUT:  Pain medication refill with Dr. Salina Burroughs within 3 weeks    Today's Plan:    - to lab [facility] for blood sample. Subjective:   HPI:  Harlan Klinefelter is a 62 y.o. female being seen for:   Chief Complaint   Patient presents with    Medication Evaluation    Diabetes       · Insurance is requiring ? 14 tablets at a time, but ultimately needs an authorization for one month at a time. · Last seen on 8/31. Given three Rx for #60 each.  Filled the first. Only able to receive partial fill of the 2nd (receivied 14 of the 60 tablets). Has not yet filled the 3rd, too soon. Per pt she had to forfeit the remainder of the 2nd script. · Last time could not get blood here  · At dialysis they could not do blood work either  · Reports normal glucose      Review of Systems  Otherwise as noted in HPI  ? Objective:     Visit Vitals    BP 99/47    Pulse 77    Temp 97 °F (36.1 °C) (Oral)    Resp 18    Ht 5' 5\" (1.651 m)    SpO2 95%     Wt Readings from Last 3 Encounters:   08/31/17 (!) 359 lb (162.8 kg)   05/25/17 (!) 359 lb (162.8 kg)   04/06/17 (!) 353 lb 12.8 oz (160.5 kg)     BP Readings from Last 3 Encounters:   10/24/17 99/47   08/31/17 99/55   05/25/17 92/46     No flowsheet data found. Physical Exam   Constitutional: She appears well-developed and well-nourished. No distress. Obese female in wheelchair   Pulmonary/Chest: Effort normal. No respiratory distress. Neurological: She is alert. Psychiatric: She has a normal mood and affect. Her behavior is normal.       Allergies   Allergen Reactions    Iodine Anaphylaxis    Other Medication Other (comments)     Metal causes numbness in hands,arms all over    Shellfish Containing Products Anaphylaxis    Sulfa (Sulfonamide Antibiotics) Itching     Prior to Admission medications    Medication Sig Start Date End Date Taking? Authorizing Provider   HYDROcodone-acetaminophen (NORCO) 7.5-325 mg per tablet Take 1 Tab by mouth two (2) times daily as needed for Pain. Max Daily Amount: 2 Tabs. 10/24/17  Yes Mahendra Evans MD   HYDROcodone-acetaminophen [facility]) 7.5-325 mg per tablet Take 1 Tab by mouth two (2) times daily as needed for Pain. Max Daily Amount: 2 Tabs. 10/31/17  Yes Rubin Michaels. Eneida Evans MD   HYDROcodone-acetaminophen [facility]) 7.5-325 mg per tablet Take 1 Tab by mouth two (2) times daily as needed for Pain. Max Daily Amount: 2 Tabs. 11/7/17  Yes Mahendra Evans MD   omeprazole (PRILOSEC) 20 mg capsule take 1 capsule by mouth twice a day 9/28/17  Yes Amanda Mcdonald Latrice Regan MD   lidocaine (XYLOCAINE) 4 % topical cream  6/5/17  Yes Historical Provider   midodrine (PROAMITINE) 10 mg tablet  6/9/17  Yes Historical Provider   buPROPion XL (WELLBUTRIN XL) 150 mg tablet take 1 tablet by mouth every morning 7/18/17  Yes Bogdan Stain. Triston Mccurdy MD   atorvastatin (LIPITOR) 10 mg tablet take 1 tablet by mouth NIGHTLY. 7/5/17  Yes Bogdan Stain. Triston Mccurdy MD   gabapentin (NEURONTIN) 300 mg capsule Take 2-3 Caps by mouth three (3) times daily as needed. 5/25/17  Yes Bogdan Stain. Triston Mccurdy MD   RENVELA 2.4 gram pwpk oral powder Take 1 Packet by mouth three (3) times daily (with meals). 4/6/17  Yes Elissa Mccurdy MD   [facility] Please dispense 1 single point bariatric cane 4/6/17  Yes Bogdan Stain. Triston Mccurdy MD   [facility] Please dispense 1 bariatric wheelchair 4/6/17  Yes Bogdan Stain. Triston Mccurdy MD   letrozole [facility]) 2.5 mg tablet take 1 tablet by mouth daily 3/17/17  Yes Leonor Bonilla,    mineral oil-hydrophil petrolat (AQUAPHOR) ointment Apply  to affected area as needed for Dry Skin or Itching. 3/7/17  Yes Elissa Mccurdy MD   montelukast (SINGULAIR) 10 mg tablet take 1 tablet by mouth once daily 1/31/17  Yes Bogdan Stain. Triston Mccurdy MD   ECOTRIN 325 mg tablet take 1 tablet by mouth once daily 12/15/16  Yes Historical Provider   magnesium oxide (MAG-OX) 400 mg tablet take 1 tablet by mouth once daily 1/3/17  Yes Bogdan Stain. Triston Mccurdy MD   lidocaine 4 % gel 1 Inch by Apply Externally route two (2) times daily as needed. To ankle for pain 12/12/16  Yes Bogdan Stain. Triston Mccurdy MD   albuterol (PROVENTIL VENTOLIN) 2.5 mg /3 mL (0.083 %) nebulizer solution 3 mL by Nebulization route every four (4) hours as needed for Wheezing or Shortness of Breath. 11/15/16  Yes Bogdan Stain. Triston Mccurdy MD   ergocalciferol (ERGOCALCIFEROL) 50,000 unit capsule take 1 capsule by mouth every week 11/15/16  Yes Bogdan Stain.  Triston Mccurdy MD   inhalational spacing device 1 Each by Does Not Apply route as needed. 11/15/16  Yes Nonda Fearing. Yoly Degroot MD   albuterol (PROVENTIL HFA, VENTOLIN HFA, PROAIR HFA) 90 mcg/actuation inhaler Take 1-2 Puffs by inhalation every four (4) hours as needed for Wheezing. 11/15/16  Yes Nonda Fearing. Yoly Degroot MD   AQUAPHOR HEALING 41 % ointment Apply  to affected area as needed for Dry Skin. 8/23/16  Yes Mis Agrawal MD   midodrine (PROAMITINE) 5 mg tablet Take 10 mg by mouth three (3) times daily (with meals). 6/29/16  Yes Historical Provider   SENSIPAR 30 mg tablet Take 30 mg by mouth daily.  2/12/16  Yes Historical Provider     Patient Active Problem List   Diagnosis Code    Heart failure ([facility] I50.9    Asthma J45.909    [facility], chronic, [facility] N18.6, Z99.2    GERD (gastroesophageal reflux disease) K21.9    Unspecified sleep apnea G47.30    Depression F32.9    Vitamin D deficiency E55.9    Chronic ankle pain M25.579, G89.29    Obesity hypoventilation syndrome (HCC) E66.2    DCIS (ductal carcinoma in situ) of breast D05.10

## 2017-10-24 NOTE — PROGRESS NOTES
Chief Complaint   Patient presents with    Medication Evaluation    Diabetes     1. Have you been to the ER, urgent care clinic since your last visit? Hospitalized since your last visit? No    2. Have you seen or consulted any other health care providers outside of the [address] [place] [address] since your last visit? Include any pap smears or colon screening. No      [employer]  Nurse Note for Controlled Substance Refill  Chief Complaint   Patient presents with    Medication Evaluation    Diabetes      Patient requests refill of: Rafa Damian    Visit Vitals    BP 99/47    Pulse 77    Temp 97 °F (36.1 °C) (Oral)    Resp 18    Ht 5' 5\" (1.651 m)    SpO2 95%       · Diagnosis: pain  · Last drug screen date: 17  · Urine provided today: none  · Treatment agreement/Informed Consent date: 17  ·  reviewed by provider: 10/24/2017   · MME per day: 15.0  · Provider for today's encounter :     · PILL COUNT  Today's Date: 10/24/2017  Medication name: hydrocodone-acetaminoph  Pill strength: 7.5-325mg  How medication is supposed to be taken: oral  How medication is being taken: oral  Date prescription filled: 10/06/17  Prescribing Provider: Ciara Henao  # of pills prescribed: 14  # of pills remainin   # pills taking per day: twice a day  Last dose of medication taken, per patient: 10/16/17  Pain scale and location of pain: generalize pain/6  Counted in front of patient. Bottle with contents returned to patient. [facility]  reviewed by provider. Discussed pill count with provider. Per provider, refill medication granted. Follow up as advised. Pt was made aware of provider's decision, and to return in 3 months for an office visit, if one is not already scheduled at this time. Controlled Substance Refill sheet signed by patient and provider. Provided with naloxone prescription, if taking benzodiazepine, prior overdose, substance abuse, or >= 120 MME per day.   Continuation of treatment with controlled substance is justified by patient's functional improvements. Patient will benefit from continued prescribing. [employer]  Nurse Note for Controlled Substance Refill  Chief Complaint   Patient presents with    Medication Evaluation    Diabetes      Patient requests refill of: norco    Visit Vitals    BP 99/47    Pulse 77    Temp 97 °F (36.1 °C) (Oral)    Resp 18    Ht 5' 5\" (1.651 m)    SpO2 95%       · Diagnosis: pain  · Last drug screen date: 17  · Urine provided today: none  · Treatment agreement/Informed Consent date: 17  ·  reviewed by provider: 10/24/2017   · MME per day: 15.0  · Provider for today's encounter :     · PILL COUNT  Today's Date: 10/24/2017  Medication name: HYDROCODONE-ACETAMINOPH  Pill strength: 7.5-325MG  How medication is supposed to be taken: ORAL  How medication is being taken: MOUTH  Date prescription filled: 17  Prescribing Provider: Ciara Henao  # of pills prescribed: 60  # of pills remainin   # pills taking per day: 2  Last dose of medication taken, per patient: 10/16/17  Pain scale and location of pain: generalize pain/6  Counted in front of patient. Bottle with contents returned to patient. [facility]  reviewed by provider. Discussed pill count with provider. Per provider, refill medication granted. Follow up as advised. Pt was made aware of provider's decision, and to return in 3 months for an office visit, if one is not already scheduled at this time. Controlled Substance Refill sheet signed by patient and provider. Provided with naloxone prescription, if taking benzodiazepine, prior overdose, substance abuse, or >= 120 MME per day. Continuation of treatment with controlled substance is justified by patient's functional improvements. Patient will benefit from continued prescribing.

## 2017-10-24 NOTE — PATIENT INSTRUCTIONS
TODAY, please go to:   CHECK OUT        Please schedule the following appointments at CHECK OUT:  Pain medication refill with Dr. Ky Lemon within 3 weeks    Today's Plan:    - to lab [facility] for blood sample.

## 2017-10-24 NOTE — TELEPHONE ENCOUNTER
I initiated a prior auth for med Norco 7.5/325mg due to qty limit to [facility] through covermymeds. I couldn't fax the prior auth through covermymeds since a signature is required. I faxed the prior auth to [facility] practice to obtain a signature from Dr. Forrest Wilburn.  Once signed, the office is to fax the forms back to me to complete the prior auth.

## 2017-10-24 NOTE — MR AVS SNAPSHOT
Visit Information Date & Time Provider Department Dept. Phone Encounter #  
 10/24/2017  2:00 PM [address] [facility] [phone] [id number] Your Appointments 12/28/2017 11:00 AM  
ROUTINE CARE with [address] Brenda Solomon 28 [facility] Appt Note: 3 mth f/u for med f/u dm review [address] [facility] [place] [address]  
[phone]  
  
   
 [address] [facility] [address] [facility] Upcoming Health Maintenance Date Due DTaP/Tdap/Td series (1 - Tdap) 5/10/1981 PAP AKA CERVICAL CYTOLOGY 9/3/2017 BREAST CANCER SCRN MAMMOGRAM 4/18/2019 COLONOSCOPY 8/18/2021 Pneumococcal 19-64 Highest Risk (3 of 3 - PPSV23) 1/3/2022 Allergies as of 10/24/2017  Review Complete On: 10/24/2017 By: Andi Olszewski Severity Noted Reaction Type Reactions Iodine High 08/18/2011   Systemic Anaphylaxis Other Medication  09/03/2014   Systemic Other (comments) Metal causes numbness in hands,arms all over Shellfish Containing Products  08/18/2011   Systemic Anaphylaxis Sulfa (Sulfonamide Antibiotics)  03/10/2016    Itching Current Immunizations  Reviewed on 11/15/2016 Name Date Influenza Vaccine 11/19/2013 Influenza Vaccine Dany Leech) 10/1/2015 Influenza Vaccine (Quad) PF 11/15/2016, 2/16/2015  2:30 PM  
 Pneumococcal Conjugate (PCV-13) 2/16/2015  2:31 PM  
  
 Not reviewed this visit You Were Diagnosed With   
  
 Codes Comments Other chronic pain    -  Primary ICD-10-CM: G89.29 ICD-9-CM: 338.29 Vitals BP Pulse Temp Resp Height(growth percentile) SpO2  
 99/47 77 97 °F (36.1 °C) (Oral) 18 5' 5\" (1.651 m) 95% OB Status Smoking Status Postmenopausal Never Smoker Vitals History Preferred Pharmacy Pharmacy Name Phone [phone] Your Updated Medication List  
  
   
This list is accurate as of: 10/24/17  3:24 PM.  Always use your most recent med list.  
  
  
  
  
 * albuterol 2.5 mg /3 mL (0.083 %) nebulizer solution Commonly known as:  PROVENTIL VENTOLIN  
3 mL by Nebulization route every four (4) hours as needed for Wheezing or Shortness of Breath. * albuterol 90 mcg/actuation inhaler Commonly known as:  PROVENTIL HFA, VENTOLIN HFA, PROAIR HFA Take 1-2 Puffs by inhalation every four (4) hours as needed for Wheezing. AQUAPHOR HEALING 41 % ointment Generic drug:  pantothenic ac-min oil-pet,hyd Apply  to affected area as needed for Dry Skin. atorvastatin 10 mg tablet Commonly known as:  LIPITOR  
take 1 tablet by mouth NIGHTLY. buPROPion  mg tablet Commonly known as:  WELLBUTRIN XL  
take 1 tablet by mouth every morning ECOTRIN 325 mg tablet Generic drug:  aspirin delayed-release  
take 1 tablet by mouth once daily  
  
 ergocalciferol 50,000 unit capsule Commonly known as:  ERGOCALCIFEROL  
take 1 capsule by mouth every week  
  
 gabapentin 300 mg capsule Commonly known as:  NEURONTIN Take 2-3 Caps by mouth three (3) times daily as needed. * HYDROcodone-acetaminophen 7.5-325 mg per tablet Commonly known as:  Nelma Alexa Take 1 Tab by mouth two (2) times daily as needed for Pain. Max Daily Amount: 2 Tabs. * HYDROcodone-acetaminophen 7.5-325 mg per tablet Commonly known as:  Nelma Alexa Take 1 Tab by mouth two (2) times daily as needed for Pain. Max Daily Amount: 2 Tabs. Start taking on:  10/31/2017  
  
 * HYDROcodone-acetaminophen 7.5-325 mg per tablet Commonly known as:  Nelma Alexa Take 1 Tab by mouth two (2) times daily as needed for Pain. Max Daily Amount: 2 Tabs. Start taking on:  11/7/2017  
  
 inhalational spacing device 1 Each by Does Not Apply route as needed. letrozole 2.5 mg tablet Commonly known as:  FEMARA  
take 1 tablet by mouth daily * lidocaine 4 % Gel 1 Inch by Apply Externally route two (2) times daily as needed. To ankle for pain * lidocaine 4 % topical cream  
Commonly known as:  XYLOCAINE  
  
 magnesium oxide 400 mg tablet Commonly known as:  MAG-OX  
take 1 tablet by mouth once daily * midodrine 5 mg tablet Commonly known as:  Keila Pin Take 10 mg by mouth three (3) times daily (with meals). * midodrine 10 mg tablet Commonly known as:  PROAMITINE  
  
 mineral oil-hydrophil petrolat ointment Commonly known as:  AQUAPHOR Apply  to affected area as needed for Dry Skin or Itching. * miscellaneous medical supply Misc Please dispense 1 single point bariatric cane * miscellaneous medical supply Misc Please dispense 1 bariatric wheelchair  
  
 montelukast 10 mg tablet Commonly known as:  SINGULAIR  
take 1 tablet by mouth once daily  
  
 omeprazole 20 mg capsule Commonly known as:  PRILOSEC  
take 1 capsule by mouth twice a day RENVELA 2.4 gram Pwpk oral powder Generic drug:  sevelamer carbonate Take 1 Packet by mouth three (3) times daily (with meals). SENSIPAR 30 mg tablet Generic drug:  cinacalcet Take 30 mg by mouth daily. * Notice: This list has 11 medication(s) that are the same as other medications prescribed for you. Read the directions carefully, and ask your doctor or other care provider to review them with you. Prescriptions Printed Refills HYDROcodone-acetaminophen (NORCO) 7.5-325 mg per tablet 0 Sig: Take 1 Tab by mouth two (2) times daily as needed for Pain. Max Daily Amount: 2 Tabs. Class: Print Route: Oral  
 HYDROcodone-acetaminophen (NORCO) 7.5-325 mg per tablet 0 Starting on: 10/31/2017 Sig: Take 1 Tab by mouth two (2) times daily as needed for Pain. Max Daily Amount: 2 Tabs. Class: Print Route: Oral  
 HYDROcodone-acetaminophen (NORCO) 7.5-325 mg per tablet 0 Starting on: 11/7/2017 Sig: Take 1 Tab by mouth two (2) times daily as needed for Pain. Max Daily Amount: 2 Tabs. Class: Print Route: Oral  
  
To-Do List   
 10/24/2017 Lab:  13-DRUG SCREEN W/CONFIRM, WHOLE BLOOD   
  
 04/19/2018 9:15 AM  
  Appointment with [facility] MARY 5 at [address] [place] [address] ([phone]) Shower or bathe using soap and water. Do not use deodorant, powder, perfumes, or lotion the day of your exam.  If your prior mammograms were not performed at [facility] 6 please bring films with you or forward prior images 2 days before your procedure. If patient is not a callback diagnostic, the patient must have an order/script from the physician for the diagnostic. Please bring it on the day of the mammogram or have it faxed to the department. [facility]  [phone] [address]  [phone] [facility] [phone] [place] [address] [facility] [phone] [facility] [phone] Patient Instructions TODAY, please go to: CHECK OUT Please schedule the following appointments at [facility] OUT: 
Pain medication refill with Dr. Marcelle Barrera within 3 weeks Today's Plan: 
 
- to lab [facility] for blood sample. Introducing [facility] SERVICES! Fransisco Verduzco introduces [employer] patient portal. Now you can access parts of your medical record, email your doctor's office, and request medication refills online. 1. In your internet browser, go to [email or web address] 2. Click on the First Time User? Click Here link in the Sign In box. You will see the New Member Sign Up page. 3. Enter your [employer] Access Code exactly as it appears below. You will not need to use this code after youve completed the sign-up process. If you do not sign up before the expiration date, you must request a new code. · [employer] Access Code: [email or web address] Expires: 11/29/2017  1:11 PM 
 
4.  Enter the last four digits of your Social Security Number (xxxx) and Date of Birth (mm/dd/yyyy) as indicated and click Submit. You will be taken to the next sign-up page. 5. Create a Via6 ID. This will be your [employer] login ID and cannot be changed, so think of one that is secure and easy to remember. 6. Create a Via6 password. You can change your password at any time. 7. Enter your Password Reset Question and Answer. This can be used at a later time if you forget your password. 8. Enter your e-mail address. You will receive e-mail notification when new information is available in [address] 9. Click Sign Up. You can now view and download portions of your medical record. 10. Click the Download Summary menu link to download a portable copy of your medical information. If you have questions, please visit the Frequently Asked Questions section of the [employer] website. Remember, [employer] is NOT to be used for urgent needs. For medical emergencies, dial 911. Now available from your iPhone and Android! Please provide this summary of care documentation to your next provider. Your primary care clinician is listed as Evaristo Fuentes. If you have any questions after today's visit, please call [phone].

## 2017-11-14 ENCOUNTER — OFFICE VISIT (OUTPATIENT)
Dept: FAMILY MEDICINE CLINIC | Age: 57
End: 2017-11-14

## 2017-11-14 VITALS
TEMPERATURE: 96.1 F | HEIGHT: 65 IN | HEART RATE: 70 BPM | RESPIRATION RATE: 20 BRPM | BODY MASS INDEX: 48.82 KG/M2 | WEIGHT: 293 LBS | SYSTOLIC BLOOD PRESSURE: 108 MMHG | DIASTOLIC BLOOD PRESSURE: 53 MMHG | OXYGEN SATURATION: 92 %

## 2017-11-14 DIAGNOSIS — N18.6 KIDNEY DISEASE, CHRONIC, END STAGE ON DIALYSIS (HCC): ICD-10-CM

## 2017-11-14 DIAGNOSIS — L85.3 DRY SKIN: ICD-10-CM

## 2017-11-14 DIAGNOSIS — G89.29 OTHER CHRONIC PAIN: Primary | ICD-10-CM

## 2017-11-14 DIAGNOSIS — Z99.2 KIDNEY DISEASE, CHRONIC, END STAGE ON DIALYSIS (HCC): ICD-10-CM

## 2017-11-14 RX ORDER — PETROLATUM 42 G/100G
OINTMENT TOPICAL AS NEEDED
Qty: 452 G | Refills: 11 | Status: SHIPPED | OUTPATIENT
Start: 2017-11-14 | End: 2019-10-10 | Stop reason: SDUPTHER

## 2017-11-14 RX ORDER — HYDROCODONE BITARTRATE AND ACETAMINOPHEN 7.5; 325 MG/1; MG/1
1 TABLET ORAL
Qty: 14 TAB | Refills: 0 | Status: SHIPPED | OUTPATIENT
Start: 2017-12-05 | End: 2017-12-28 | Stop reason: SDUPTHER

## 2017-11-14 RX ORDER — HYDROCODONE BITARTRATE AND ACETAMINOPHEN 7.5; 325 MG/1; MG/1
1 TABLET ORAL
Qty: 14 TAB | Refills: 0 | Status: SHIPPED | OUTPATIENT
Start: 2017-11-28 | End: 2017-11-14 | Stop reason: SDUPTHER

## 2017-11-14 RX ORDER — HYDROCODONE BITARTRATE AND ACETAMINOPHEN 7.5; 325 MG/1; MG/1
1 TABLET ORAL
Qty: 14 TAB | Refills: 0 | Status: SHIPPED | OUTPATIENT
Start: 2018-01-02 | End: 2017-11-14

## 2017-11-14 RX ORDER — HYDROCODONE BITARTRATE AND ACETAMINOPHEN 7.5; 325 MG/1; MG/1
1 TABLET ORAL
Qty: 14 TAB | Refills: 0 | Status: SHIPPED | OUTPATIENT
Start: 2018-01-02 | End: 2017-12-28 | Stop reason: SDUPTHER

## 2017-11-14 RX ORDER — HYDROCODONE BITARTRATE AND ACETAMINOPHEN 7.5; 325 MG/1; MG/1
1 TABLET ORAL
Qty: 14 TAB | Refills: 0 | Status: SHIPPED | OUTPATIENT
Start: 2017-12-12 | End: 2017-12-28 | Stop reason: SDUPTHER

## 2017-11-14 RX ORDER — HYDROCODONE BITARTRATE AND ACETAMINOPHEN 7.5; 325 MG/1; MG/1
1 TABLET ORAL
Qty: 14 TAB | Refills: 0 | Status: SHIPPED | OUTPATIENT
Start: 2017-12-19 | End: 2017-11-14

## 2017-11-14 RX ORDER — HYDROCODONE BITARTRATE AND ACETAMINOPHEN 7.5; 325 MG/1; MG/1
1 TABLET ORAL
Qty: 14 TAB | Refills: 0 | Status: SHIPPED | OUTPATIENT
Start: 2017-12-12 | End: 2017-11-14 | Stop reason: SDUPTHER

## 2017-11-14 RX ORDER — HYDROCODONE BITARTRATE AND ACETAMINOPHEN 7.5; 325 MG/1; MG/1
1 TABLET ORAL
Qty: 14 TAB | Refills: 0 | Status: SHIPPED | OUTPATIENT
Start: 2017-11-14 | End: 2017-11-14 | Stop reason: SDUPTHER

## 2017-11-14 RX ORDER — HYDROCODONE BITARTRATE AND ACETAMINOPHEN 7.5; 325 MG/1; MG/1
1 TABLET ORAL
Qty: 14 TAB | Refills: 0 | Status: SHIPPED | OUTPATIENT
Start: 2017-11-14 | End: 2017-12-28 | Stop reason: SDUPTHER

## 2017-11-14 RX ORDER — HYDROCODONE BITARTRATE AND ACETAMINOPHEN 7.5; 325 MG/1; MG/1
1 TABLET ORAL
Qty: 14 TAB | Refills: 0 | Status: SHIPPED | OUTPATIENT
Start: 2017-11-21 | End: 2017-12-28 | Stop reason: SDUPTHER

## 2017-11-14 RX ORDER — HYDROCODONE BITARTRATE AND ACETAMINOPHEN 7.5; 325 MG/1; MG/1
1 TABLET ORAL
Qty: 14 TAB | Refills: 0 | Status: SHIPPED | OUTPATIENT
Start: 2017-12-19 | End: 2017-12-28 | Stop reason: SDUPTHER

## 2017-11-14 RX ORDER — HYDROCODONE BITARTRATE AND ACETAMINOPHEN 7.5; 325 MG/1; MG/1
1 TABLET ORAL
Qty: 14 TAB | Refills: 0 | Status: SHIPPED | OUTPATIENT
Start: 2017-11-21 | End: 2017-11-14 | Stop reason: SDUPTHER

## 2017-11-14 RX ORDER — HYDROCODONE BITARTRATE AND ACETAMINOPHEN 7.5; 325 MG/1; MG/1
1 TABLET ORAL
Qty: 14 TAB | Refills: 0 | Status: SHIPPED | OUTPATIENT
Start: 2017-12-26 | End: 2017-11-14

## 2017-11-14 RX ORDER — HYDROCODONE BITARTRATE AND ACETAMINOPHEN 7.5; 325 MG/1; MG/1
1 TABLET ORAL
Qty: 14 TAB | Refills: 0 | Status: SHIPPED | OUTPATIENT
Start: 2017-12-05 | End: 2017-11-14 | Stop reason: SDUPTHER

## 2017-11-14 RX ORDER — HYDROCODONE BITARTRATE AND ACETAMINOPHEN 7.5; 325 MG/1; MG/1
1 TABLET ORAL
Qty: 14 TAB | Refills: 0 | Status: SHIPPED | OUTPATIENT
Start: 2017-11-28 | End: 2017-12-28 | Stop reason: SDUPTHER

## 2017-11-14 RX ORDER — HYDROCODONE BITARTRATE AND ACETAMINOPHEN 7.5; 325 MG/1; MG/1
1 TABLET ORAL
Qty: 14 TAB | Refills: 0 | Status: SHIPPED | OUTPATIENT
Start: 2017-12-26 | End: 2017-12-28 | Stop reason: SDUPTHER

## 2017-11-14 NOTE — PATIENT INSTRUCTIONS
Archana Zarate TODAY, please go to:   CHECK OUT        Please schedule the following appointments at CHECK OUT:  Pain medication refill with Dr. Jayden Sparks within 7-8 weeks    Today's Plan:    - to lab [facility] for blood sample.

## 2017-11-14 NOTE — PROGRESS NOTES
.. [address] [place] [address] Visit   11/14/2017  Patient ID: Lea Cosme is a 62 y.o. female. Assessment/Plan:    Diagnoses and all orders for this visit:    1. Other chronic pain    nursing retrieved PA 11/14/2017. Will complete this week (appx 11/22/2017). · Pain is in- right ankle, hands, back into legs  · Patient's plan currently includes: norco, lidocaine, gabapentin, PT  · refills given for 8 weeks pending PA  · Functional improvements that justify chronic opioid medications: yes  · Pill count done today. · Treatment agreement UTD. ·  reviewed 11/14/2017  ·  consider savella       -     HYDROcodone-acetaminophen (NORCO) 7.5-325 mg per tablet; Take 1 Tab by mouth two (2) times daily as needed for Pain. Max Daily Amount: 2 Tabs. -     HYDROcodone-acetaminophen (NORCO) 7.5-325 mg per tablet; Take 1 Tab by mouth two (2) times daily as needed for Pain. Max Daily Amount: 2 Tabs. -     HYDROcodone-acetaminophen (NORCO) 7.5-325 mg per tablet; Take 1 Tab by mouth two (2) times daily as needed for Pain. Max Daily Amount: 2 Tabs. -     HYDROcodone-acetaminophen (NORCO) 7.5-325 mg per tablet; Take 1 Tab by mouth two (2) times daily as needed for Pain. Max Daily Amount: 2 Tabs. -     HYDROcodone-acetaminophen (NORCO) 7.5-325 mg per tablet; Take 1 Tab by mouth two (2) times daily as needed for Pain. Max Daily Amount: 2 Tabs. -     HYDROcodone-acetaminophen (NORCO) 7.5-325 mg per tablet; Take 1 Tab by mouth two (2) times daily as needed for Pain. Max Daily Amount: 2 Tabs. -     HYDROcodone-acetaminophen (NORCO) 7.5-325 mg per tablet; Take 1 Tab by mouth two (2) times daily as needed for Pain. Max Daily Amount: 2 Tabs. -     HYDROcodone-acetaminophen (NORCO) 7.5-325 mg per tablet; Take 1 Tab by mouth two (2) times daily as needed for Pain. Max Daily Amount: 2 Tabs. 2. Dry skin  -     mineral oil-hydrophil petrolat (AQUAPHOR) ointment;  Apply  to affected area as needed for Dry Skin or Itching. 3. BMI 60.0-69.9, adult [facility])  Will discuss if MSWL is an option. In the interim recommended lifestyle management    4. Kidney disease, chronic, end stage on dialysis [facility])  Continue ESRD      Counselled pt on Patient health concerns and plans. Patient was offered a choice/choices in the treatment plan today. Reviewed return precautions as appropriate. Patient expresses understanding of the plan and agrees with recommendations. See patient instructions for more. Patient Instructions   . TODAY, please go to:   CHECK OUT        Please schedule the following appointments at CHECK OUT:  Pain medication refill with Dr. Akhil Steele within 7-8 weeks    Today's Plan:    - to lab gay[facility] for blood sample. Subjective:   HPI:  Leander Estrella is a 62 y.o. female being seen for:   Chief Complaint   Patient presents with    Medication Refill       obesity  ·  would like to loose weight  · Frustrated by limitations from ESRD  · Asks casually about bariatric surgery and liposuction. Chronic pain  ·  denies sfx from [employer]. Managed constipation with certain food. No itching   · She has not heard from insurance company either. Screening and Prevention Due:  Health Maintenance Due   Topic Date Due    DTaP/Tdap/Td series (1 - Tdap) 05/10/1981    PAP AKA CERVICAL CYTOLOGY  09/03/2017        Review of Systems  Otherwise as noted in HPI  ? Objective:     Visit Vitals    /53 (BP 1 Location: Right arm, BP Patient Position: Sitting)    Pulse 70    Temp 96.1 °F (35.6 °C) (Oral)    Resp 20    Ht 5' 5\" (1.651 m)    Wt (!) 370 lb (167.8 kg)    SpO2 92%    BMI 61.57 kg/m2     Wt Readings from Last 3 Encounters:   11/14/17 (!) 370 lb (167.8 kg)   08/31/17 (!) 359 lb (162.8 kg)   05/25/17 (!) 359 lb (162.8 kg)     BP Readings from Last 3 Encounters:   11/14/17 108/53   10/24/17 99/47   08/31/17 99/55     No flowsheet data found.     Physical Exam   Constitutional: She appears well-developed and well-nourished. No distress. Obese female in wheelchair   Pulmonary/Chest: Effort normal. No respiratory distress. Neurological: She is alert. Psychiatric: She has a normal mood and affect. Her behavior is normal.       Allergies   Allergen Reactions    Iodine Anaphylaxis    Other Medication Other (comments)     Metal causes numbness in hands,arms all over    Shellfish Containing Products Anaphylaxis    Sulfa (Sulfonamide Antibiotics) Itching     Prior to Admission medications    Medication Sig Start Date End Date Taking? Authorizing Provider   mineral oil-hydrophil petrolat (AQUAPHOR) ointment Apply  to affected area as needed for Dry Skin or Itching. 11/14/17  Yes Editha Guadeloupe Lavinia Kehr, MD   HYDROcodone-acetaminophen [facility]) 7.5-325 mg per tablet Take 1 Tab by mouth two (2) times daily as needed for Pain. Max Daily Amount: 2 Tabs. 12/19/17  Yes Editha Guadeloupe Lavinia Kehr, MD   HYDROcodone-acetaminophen [facility]) 7.5-325 mg per tablet Take 1 Tab by mouth two (2) times daily as needed for Pain. Max Daily Amount: 2 Tabs. 11/14/17  Yes Editha Guadeloupe Lavinia Kehr, MD   HYDROcodone-acetaminophen [facility]) 7.5-325 mg per tablet Take 1 Tab by mouth two (2) times daily as needed for Pain. Max Daily Amount: 2 Tabs. 11/21/17  Yes Hamida Bashir. Lavinia Kehr, MD   HYDROcodone-acetaminophen [facility]) 7.5-325 mg per tablet Take 1 Tab by mouth two (2) times daily as needed for Pain. Max Daily Amount: 2 Tabs. 11/28/17  Yes Shelagh Salon. Lavinia Kehr, MD   HYDROcodone-acetaminophen [facility]) 7.5-325 mg per tablet Take 1 Tab by mouth two (2) times daily as needed for Pain. Max Daily Amount: 2 Tabs. 12/26/17  Yes Editha Guadeloupe Lavinia Kehr, MD   HYDROcodone-acetaminophen [facility]) 7.5-325 mg per tablet Take 1 Tab by mouth two (2) times daily as needed for Pain. Max Daily Amount: 2 Tabs. 1/2/18  Yes Hamida Bashir. Lavinia Kehr, MD   HYDROcodone-acetaminophen [facility]) 7.5-325 mg per tablet Take 1 Tab by mouth two (2) times daily as needed for Pain. Max Daily Amount: 2 Tabs. 12/5/17  Yes Hamida Bashir. Graham Oconnell MD   HYDROcodone-acetaminophen [facility]) 7.5-325 mg per tablet Take 1 Tab by mouth two (2) times daily as needed for Pain. Max Daily Amount: 2 Tabs. 12/12/17  Yes Ayesha Oconnell MD   omeprazole (PRILOSEC) 20 mg capsule take 1 capsule by mouth twice a day 9/28/17  Yes Halle Sins. Graham Oconnell MD   lidocaine (XYLOCAINE) 4 % topical cream  6/5/17  Yes Historical Provider   midodrine (PROAMITINE) 10 mg tablet  6/9/17  Yes Historical Provider   buPROPion XL (WELLBUTRIN XL) 150 mg tablet take 1 tablet by mouth every morning 7/18/17  Yes Halle Sins. Graham Oconnell MD   atorvastatin (LIPITOR) 10 mg tablet take 1 tablet by mouth NIGHTLY. 7/5/17  Yes Halle Sins. Graham Oconnell MD   gabapentin (NEURONTIN) 300 mg capsule Take 2-3 Caps by mouth three (3) times daily as needed. 5/25/17  Yes Halle Sins. Graham Oconnell MD   RENVELA 2.4 gram pwpk oral powder Take 1 Packet by mouth three (3) times daily (with meals). 4/6/17  Yes Ayesha Oconnell MD   miscellaneous medical supply misc Please dispense 1 single point bariatric cane 4/6/17  Yes Halle Herrmann. Graham Oconnell MD   miscellaneous medical supply misc Please dispense 1 bariatric wheelchair 4/6/17  Yes Halle Sins. Graham Oconnell MD   letrozole [facility]) 2.5 mg tablet take 1 tablet by mouth daily 3/17/17  Yes Leonor Bonilla DO   montelukast (SINGULAIR) 10 mg tablet take 1 tablet by mouth once daily 1/31/17  Yes Halle Sins. Graham Oconnell MD   ECOTRIN 325 mg tablet take 1 tablet by mouth once daily 12/15/16  Yes Historical Provider   magnesium oxide (MAG-OX) 400 mg tablet take 1 tablet by mouth once daily 1/3/17  Yes Halle Sins. Graham Oconnell MD   lidocaine 4 % gel 1 Inch by Apply Externally route two (2) times daily as needed. To ankle for pain 12/12/16  Yes Halleglenna Herrmann. Graham Oconnell MD   albuterol (PROVENTIL VENTOLIN) 2.5 mg /3 mL (0.083 %) nebulizer solution 3 mL by Nebulization route every four (4) hours as needed for Wheezing or Shortness of Breath. 11/15/16  Yes Halle Herrmann.  Graham Oconnell MD   ergocalciferol (ERGOCALCIFEROL) 50,000 unit capsule take 1 capsule by mouth every week 11/15/16  Yes Angela Wilburn MD   inhalational spacing device 1 Each by Does Not Apply route as needed. 11/15/16  Yes Angela Wilburn MD   albuterol (PROVENTIL HFA, VENTOLIN HFA, PROAIR HFA) 90 mcg/actuation inhaler Take 1-2 Puffs by inhalation every four (4) hours as needed for Wheezing. 11/15/16  Yes Angela Wilburn MD   AQUAPHOR HEALING 41 % ointment Apply  to affected area as needed for Dry Skin. 8/23/16  Yes Lucy Owens MD   midodrine (PROAMITINE) 5 mg tablet Take 10 mg by mouth three (3) times daily (with meals). 6/29/16  Yes Historical Provider   SENSIPAR 30 mg tablet Take 30 mg by mouth daily.  2/12/16  Yes Historical Provider     Patient Active Problem List   Diagnosis Code    Heart failure ([facility] I50.9    Asthma J45.909    Kidney disease, chronic, end stage on dialysis ([facility] N18.6, Z99.2    GERD (gastroesophageal reflux disease) K21.9    Unspecified sleep apnea G47.30    Depression F32.9    Vitamin D deficiency E55.9    Chronic ankle pain M25.579, G89.29    Obesity hypoventilation syndrome (HCC) E66.2    DCIS (ductal carcinoma in situ) of breast D05.10

## 2017-11-14 NOTE — PROGRESS NOTES
Chief Complaint   Patient presents with    Medication Refill       [facility]  Nurse Note for Controlled Substance Refill  Chief Complaint   Patient presents with    Medication Refill      Patient requests refill of: Hydrocodone     There were no vitals taken for this visit. · Diagnosis: Chronic pain   · Last drug screen date: 2017  · Urine provided today: N/A  · Treatment agreement/Informed Consent date: 2017  ·  reviewed by provider: 2017   · MME per day:   · Provider for today's encounter : Luci Gutierrez     · PILL COUNT  Today's Date: 2017  Medication name: Hydrocodone   Pill strength: 7.5-325 mg   How medication is supposed to be taken: Take 1 tablet by mouth twice a day if needed for pain max daily dose of 2   How medication is being taken: PRN   Date prescription filled: 10/24/2017  Prescribing Provider: Dr. Dennis Sheehan   # of pills prescribed: 14  # of pills remainin   # pills taking per day: PRN   Last dose of medication taken, per patient: 2017  Pain scale and location of pain: 7, generalized   Counted in front of patient. Bottle with contents returned to patient. [facility]  reviewed by provider. Discussed pill count with provider. Per provider, refill medication granted. Follow up as advised. Pt was made aware of provider's decision, and to return as directed for an office visit, if one is not already scheduled at this time. Controlled Substance Refill sheet signed by patient and provider. Provided with naloxone prescription, if taking benzodiazepine, prior overdose, substance abuse, or >= 120 MME per day. Continuation of treatment with controlled substance is justified by patient's functional improvements. Patient will benefit from continued prescribing. Future Appointments  Date Time Provider Vincenzo Licea   2017 11:00 AM Binh Riggins MD [facility] [address]   2018 9:15 AM [address]  [facility] 1. Have you been to the ER, urgent care clinic since your last visit? Hospitalized since your last visit? No     2. Have you seen or consulted any other health care providers outside of the [address] [place] [address] since your last visit? Include any pap smears or colon screening. No     The patient was counseled on the dangers of tobacco use, and was advised never to start. Reviewed strategies to maximize success, including never to start. Health Maintenance Due   Topic Date Due    DTaP/Tdap/Td series (1 - Tdap) Discussed with patient today and advised to follow up.    05/10/1981    PAP AKA CERVICAL CYTOLOGY Discussed with patient today and advised to follow up.    09/03/2017       ACP is not on file, advised to return.

## 2017-11-14 NOTE — MR AVS SNAPSHOT
Visit Information Date & Time Provider Department Dept. Phone Encounter #  
 11/14/2017 10:40 AM Lilo Posadas. Rylie Granados MD [facility] 74 [phone] [id number] Your Appointments 12/28/2017 11:00 AM  
ROUTINE CARE with Myrtle Velasquez [facility] ([address]) Appt Note: 3 mth f/u for med f/u dm review [facility] [address]  
[phone]  
  
   
 [address] [facility] [address] [facility] [address] [place] [address] Upcoming Health Maintenance Date Due DTaP/Tdap/Td series (1 - Tdap) 5/10/1981 PAP AKA CERVICAL CYTOLOGY 9/3/2017 BREAST CANCER SCRN MAMMOGRAM 4/18/2019 COLONOSCOPY 8/18/2021 Pneumococcal 19-64 Highest Risk (3 of 3 - PPSV23) 1/3/2022 Allergies as of 11/14/2017  Review Complete On: 11/14/2017 By: Yocasta Garcia LPN Severity Noted Reaction Type Reactions Iodine High 08/18/2011   Systemic Anaphylaxis Other Medication  09/03/2014   Systemic Other (comments) Metal causes numbness in hands,arms all over Shellfish Containing Products  08/18/2011   Systemic Anaphylaxis Sulfa (Sulfonamide Antibiotics)  03/10/2016    Itching Current Immunizations  Reviewed on 11/15/2016 Name Date Influenza Vaccine 11/19/2013 Influenza Vaccine Manuelita Forge) 10/1/2015 Influenza Vaccine (Quad) PF 11/15/2016, 2/16/2015  2:30 PM  
 Pneumococcal Conjugate (PCV-13) 2/16/2015  2:31 PM  
  
 Not reviewed this visit You Were Diagnosed With   
  
 Codes Comments Other chronic pain     ICD-10-CM: G89.29 ICD-9-CM: 338.29 Dry skin     ICD-10-CM: L85.3 ICD-9-CM: 701.1 Vitals BP Pulse Temp Resp Height(growth percentile) Weight(growth percentile) 108/53 (BP 1 Location: Right arm, BP Patient Position: Sitting) 70 96.1 °F (35.6 °C) (Oral) 20 5' 5\" (1.651 m) (!) 370 lb (167.8 kg) SpO2 BMI OB Status Smoking Status 92% 61.57 kg/m2 Postmenopausal Never Smoker BMI and BSA Data Body Mass Index Body Surface Area 61.57 kg/m 2 2.77 m 2 Preferred Pharmacy Pharmacy Name Phone [facility], [phone] [phone] Your Updated Medication List  
  
   
This list is accurate as of: 11/14/17 11:49 AM.  Always use your most recent med list.  
  
  
  
  
 * albuterol 2.5 mg /3 mL (0.083 %) nebulizer solution Commonly known as:  PROVENTIL VENTOLIN  
3 mL by Nebulization route every four (4) hours as needed for Wheezing or Shortness of Breath. * albuterol 90 mcg/actuation inhaler Commonly known as:  PROVENTIL HFA, VENTOLIN HFA, PROAIR HFA Take 1-2 Puffs by inhalation every four (4) hours as needed for Wheezing. AQUAPHOR HEALING 41 % ointment Generic drug:  pantothenic ac-min oil-pet,hyd Apply  to affected area as needed for Dry Skin. atorvastatin 10 mg tablet Commonly known as:  LIPITOR  
take 1 tablet by mouth NIGHTLY. buPROPion  mg tablet Commonly known as:  WELLBUTRIN XL  
take 1 tablet by mouth every morning ECOTRIN 325 mg tablet Generic drug:  aspirin delayed-release  
take 1 tablet by mouth once daily  
  
 ergocalciferol 50,000 unit capsule Commonly known as:  ERGOCALCIFEROL  
take 1 capsule by mouth every week  
  
 gabapentin 300 mg capsule Commonly known as:  NEURONTIN Take 2-3 Caps by mouth three (3) times daily as needed. * HYDROcodone-acetaminophen 7.5-325 mg per tablet Commonly known as:  Figueroa Minor Take 1 Tab by mouth two (2) times daily as needed for Pain. Max Daily Amount: 2 Tabs. * HYDROcodone-acetaminophen 7.5-325 mg per tablet Commonly known as:  Figueroa Minor Take 1 Tab by mouth two (2) times daily as needed for Pain. Max Daily Amount: 2 Tabs. Start taking on:  11/21/2017  
  
 * HYDROcodone-acetaminophen 7.5-325 mg per tablet Commonly known as:  Figueroa Minor  
 Take 1 Tab by mouth two (2) times daily as needed for Pain. Max Daily Amount: 2 Tabs. Start taking on:  11/28/2017  
  
 * HYDROcodone-acetaminophen 7.5-325 mg per tablet Commonly known as:  Rolinda Doles Take 1 Tab by mouth two (2) times daily as needed for Pain. Max Daily Amount: 2 Tabs. Start taking on:  12/5/2017  
  
 * HYDROcodone-acetaminophen 7.5-325 mg per tablet Commonly known as:  Rolinda Doles Take 1 Tab by mouth two (2) times daily as needed for Pain. Max Daily Amount: 2 Tabs. Start taking on:  12/12/2017  
  
 * HYDROcodone-acetaminophen 7.5-325 mg per tablet Commonly known as:  Rolinda Doles Take 1 Tab by mouth two (2) times daily as needed for Pain. Max Daily Amount: 2 Tabs. Start taking on:  12/19/2017  
  
 * HYDROcodone-acetaminophen 7.5-325 mg per tablet Commonly known as:  Rolinda Doles Take 1 Tab by mouth two (2) times daily as needed for Pain. Max Daily Amount: 2 Tabs. Start taking on:  12/26/2017  
  
 * HYDROcodone-acetaminophen 7.5-325 mg per tablet Commonly known as:  Rolinda Doles Take 1 Tab by mouth two (2) times daily as needed for Pain. Max Daily Amount: 2 Tabs. Start taking on:  1/2/2018  
  
 inhalational spacing device 1 Each by Does Not Apply route as needed. letrozole 2.5 mg tablet Commonly known as:  FEMARA  
take 1 tablet by mouth daily * lidocaine 4 % Gel 1 Inch by Apply Externally route two (2) times daily as needed. To ankle for pain * lidocaine 4 % topical cream  
Commonly known as:  XYLOCAINE  
  
 magnesium oxide 400 mg tablet Commonly known as:  MAG-OX  
take 1 tablet by mouth once daily * midodrine 5 mg tablet Commonly known as:  Griselda Habermann Take 10 mg by mouth three (3) times daily (with meals). * midodrine 10 mg tablet Commonly known as:  PROAMITINE  
  
 mineral oil-hydrophil petrolat ointment Commonly known as:  AQUAPHOR Apply  to affected area as needed for Dry Skin or Itching. * miscellaneous medical supply Misc Please dispense 1 single point bariatric cane * miscellaneous medical supply Misc Please dispense 1 bariatric wheelchair  
  
 montelukast 10 mg tablet Commonly known as:  SINGULAIR  
take 1 tablet by mouth once daily  
  
 omeprazole 20 mg capsule Commonly known as:  PRILOSEC  
take 1 capsule by mouth twice a day RENVELA 2.4 gram Pwpk oral powder Generic drug:  sevelamer carbonate Take 1 Packet by mouth three (3) times daily (with meals). SENSIPAR 30 mg tablet Generic drug:  cinacalcet Take 30 mg by mouth daily. * Notice: This list has 16 medication(s) that are the same as other medications prescribed for you. Read the directions carefully, and ask your doctor or other care provider to review them with you. Prescriptions Printed Refills HYDROcodone-acetaminophen (NORCO) 7.5-325 mg per tablet 0 Starting on: 12/19/2017 Sig: Take 1 Tab by mouth two (2) times daily as needed for Pain. Max Daily Amount: 2 Tabs. Class: Print Route: Oral  
 HYDROcodone-acetaminophen (NORCO) 7.5-325 mg per tablet 0 Sig: Take 1 Tab by mouth two (2) times daily as needed for Pain. Max Daily Amount: 2 Tabs. Class: Print Route: Oral  
 HYDROcodone-acetaminophen (NORCO) 7.5-325 mg per tablet 0 Starting on: 11/21/2017 Sig: Take 1 Tab by mouth two (2) times daily as needed for Pain. Max Daily Amount: 2 Tabs. Class: Print Route: Oral  
 HYDROcodone-acetaminophen (NORCO) 7.5-325 mg per tablet 0 Starting on: 11/28/2017 Sig: Take 1 Tab by mouth two (2) times daily as needed for Pain. Max Daily Amount: 2 Tabs. Class: Print Route: Oral  
 HYDROcodone-acetaminophen (NORCO) 7.5-325 mg per tablet 0 Starting on: 12/26/2017 Sig: Take 1 Tab by mouth two (2) times daily as needed for Pain. Max Daily Amount: 2 Tabs. Class: Print Route: Oral  
 HYDROcodone-acetaminophen (NORCO) 7.5-325 mg per tablet 0 Starting on: 1/2/2018 Sig: Take 1 Tab by mouth two (2) times daily as needed for Pain. Max Daily Amount: 2 Tabs. Class: Print Route: Oral  
 HYDROcodone-acetaminophen (NORCO) 7.5-325 mg per tablet 0 Starting on: 12/5/2017 Sig: Take 1 Tab by mouth two (2) times daily as needed for Pain. Max Daily Amount: 2 Tabs. Class: Print Route: Oral  
 HYDROcodone-acetaminophen (NORCO) 7.5-325 mg per tablet 0 Starting on: 12/12/2017 Sig: Take 1 Tab by mouth two (2) times daily as needed for Pain. Max Daily Amount: 2 Tabs. Class: Print Route: Oral  
  
Prescriptions Sent to Pharmacy Refills  
 mineral oil-hydrophil petrolat (AQUAPHOR) ointment 11 Sig: Apply  to affected area as needed for Dry Skin or Itching. Class: Normal  
 Pharmacy: [id number] [place] [address],[address], [address] #: [phone] Route: Topical  
  
To-Do List   
 04/19/2018 9:15 AM  
  Appointment with [facility] MARY 5 at [address] [place] [address] ([phone]) Shower or bathe using soap and water. Do not use deodorant, powder, perfumes, or lotion the day of your exam.  If your prior mammograms were not performed at [facility] please bring films with you or forward prior images 2 days before your procedure. If patient is not a callback diagnostic, the patient must have an order/script from the physician for the diagnostic. Please bring it on the day of the mammogram or have it faxed to the department. [facility]  [phone] [facility] [address] [place]  [phone] [facility] [phone] [facility] [address] [facility] [phone] [facility] [phone] Patient Instructions Melissa Cruz TODAY, please go to: CHECK OUT Please schedule the following appointments at [facility] OUT: 
Pain medication refill with Dr. Michael Vela within 7-8 weeks Today's Plan: 
 
- to lab gayle for blood sample. Introducing [facility] SERVICES!    
 Mauro Morales introduces [employer] patient portal. Now you can access parts of your medical record, email your doctor's office, and request medication refills online. 1. In your internet browser, go to [email or web address] 2. Click on the First Time User? Click Here link in the Sign In box. You will see the New Member Sign Up page. 3. Enter your [employer] Access Code exactly as it appears below. You will not need to use this code after youve completed the sign-up process. If you do not sign up before the expiration date, you must request a new code. · [employer] Access Code: [email or web address] Expires: 11/29/2017 12:11 PM 
 
4. Enter the last four digits of your Social Security Number (xxxx) and Date of Birth (mm/dd/yyyy) as indicated and click Submit. You will be taken to the next sign-up page. 5. Create a Med[employer] ID. This will be your [employer] login ID and cannot be changed, so think of one that is secure and easy to remember. 6. Create a [employer] password. You can change your password at any time. 7. Enter your Password Reset Question and Answer. This can be used at a later time if you forget your password. 8. Enter your e-mail address. You will receive e-mail notification when new information is available in [address] 9. Click Sign Up. You can now view and download portions of your medical record. 10. Click the Download Summary menu link to download a portable copy of your medical information. If you have questions, please visit the Frequently Asked Questions section of the [employer] website. Remember, [employer] is NOT to be used for urgent needs. For medical emergencies, dial 911. Now available from your iPhone and Android! Please provide this summary of care documentation to your next provider. Your primary care clinician is listed as Percy Portillo. If you have any questions after today's visit, please call [phone].

## 2017-11-24 DIAGNOSIS — E55.9 VITAMIN D DEFICIENCY: ICD-10-CM

## 2017-11-24 NOTE — TELEPHONE ENCOUNTER
PCP: Adryan Liang. Price Figueroa MD    Last appt: 11/14/2017  Future Appointments  Date Time Provider Vincenzo Licea   12/28/2017 11:00 AM [address] Price Figueroa MD [facility] BRIAN [place]   4/19/2018 9:15 AM [address] DAMARIS'PAULINO NGUYEN       Requested Prescriptions     Pending Prescriptions Disp Refills    VITAMIN D2 50,000 unit capsule [Pharmacy Med Name: VIT D2 1.25 MG (50,000 UNIT)] 12 Cap 3     Sig: take 1 capsule by mouth every week       Request for a 30 or 90 day supply?  30    Pharmacy: Pharmacy listed in chart     Other Comments:

## 2017-11-27 RX ORDER — ERGOCALCIFEROL 1.25 MG/1
CAPSULE ORAL
Qty: 12 CAP | Refills: 3 | OUTPATIENT
Start: 2017-11-27

## 2017-11-27 RX ORDER — MELATONIN
2000 DAILY
Qty: 90 TAB | Refills: 11 | Status: SHIPPED | OUTPATIENT
Start: 2017-11-27 | End: 2019-10-10 | Stop reason: SDUPTHER

## 2017-12-16 LAB
AMPHETAMINES BLD QL SCN: NEGATIVE NG/ML
BARBITURATES SERPLBLD QL: NEGATIVE UG/ML
BENZODIAZ BLD QL: NEGATIVE NG/ML
CANNABINOIDS BLD QL SCN: NEGATIVE NG/ML
COCAINE+BZE SERPLBLD QL SCN: NEGATIVE NG/ML
FENTANYL BLD QL SCN: NEGATIVE NG/ML
MEPERIDINE, DR296L: NEGATIVE NG/ML
METHADONE BLD QL SCN: NEGATIVE NG/ML
OPIATES BLD QL SCN: NEGATIVE NG/ML
OXYCODONES, 738315: NEGATIVE NG/ML
PCP BLD QL SCN: NEGATIVE NG/ML
PROPOXYPH BLD QL SCN: NEGATIVE NG/ML
TRAMADOL, DR297L: NEGATIVE NG/ML

## 2017-12-28 ENCOUNTER — OFFICE VISIT (OUTPATIENT)
Dept: FAMILY MEDICINE CLINIC | Age: 57
End: 2017-12-28

## 2017-12-28 VITALS
BODY MASS INDEX: 48.82 KG/M2 | HEIGHT: 65 IN | SYSTOLIC BLOOD PRESSURE: 100 MMHG | WEIGHT: 293 LBS | OXYGEN SATURATION: 97 % | HEART RATE: 69 BPM | RESPIRATION RATE: 20 BRPM | TEMPERATURE: 96.6 F | DIASTOLIC BLOOD PRESSURE: 57 MMHG

## 2017-12-28 DIAGNOSIS — Z99.2 KIDNEY DISEASE, CHRONIC, END STAGE ON DIALYSIS (HCC): ICD-10-CM

## 2017-12-28 DIAGNOSIS — L85.3 DRY SKIN: ICD-10-CM

## 2017-12-28 DIAGNOSIS — G89.29 OTHER CHRONIC PAIN: Primary | ICD-10-CM

## 2017-12-28 DIAGNOSIS — N18.6 KIDNEY DISEASE, CHRONIC, END STAGE ON DIALYSIS (HCC): ICD-10-CM

## 2017-12-28 PROBLEM — F33.9 RECURRENT DEPRESSION (HCC): Status: ACTIVE | Noted: 2017-12-28

## 2017-12-28 RX ORDER — SEVELAMER CARBONATE 2400 MG/1
2.4 POWDER, FOR SUSPENSION ORAL
Qty: 90 PACKET | Refills: 3 | Status: SHIPPED | OUTPATIENT
Start: 2017-12-28 | End: 2019-02-07 | Stop reason: SDUPTHER

## 2017-12-28 RX ORDER — PETROLATUM 42 G/100G
OINTMENT TOPICAL AS NEEDED
Qty: 452 G | Refills: 11 | Status: CANCELLED | OUTPATIENT
Start: 2017-12-28

## 2017-12-28 RX ORDER — HYDROCODONE BITARTRATE AND ACETAMINOPHEN 7.5; 325 MG/1; MG/1
1 TABLET ORAL
Qty: 60 TAB | Refills: 0 | Status: SHIPPED | OUTPATIENT
Start: 2018-01-04 | End: 2018-05-03 | Stop reason: SDUPTHER

## 2017-12-28 RX ORDER — HYDROCODONE BITARTRATE AND ACETAMINOPHEN 7.5; 325 MG/1; MG/1
1 TABLET ORAL
Qty: 60 TAB | Refills: 0 | Status: SHIPPED | OUTPATIENT
Start: 2018-02-03 | End: 2018-03-29 | Stop reason: SDUPTHER

## 2017-12-28 RX ORDER — HYDROCODONE BITARTRATE AND ACETAMINOPHEN 7.5; 325 MG/1; MG/1
1 TABLET ORAL
Qty: 60 TAB | Refills: 0 | Status: SHIPPED | OUTPATIENT
Start: 2018-03-05 | End: 2018-05-03 | Stop reason: SDUPTHER

## 2017-12-28 RX ORDER — CINACALCET HYDROCHLORIDE 60 MG/1
TABLET, COATED ORAL
Refills: 0 | COMMUNITY
Start: 2017-12-19 | End: 2018-10-11 | Stop reason: SDUPTHER

## 2017-12-28 NOTE — PROGRESS NOTES
Chief Complaint   Patient presents with    Medication Refill     [facility]  Nurse Note for Controlled Substance Refill  Chief Complaint   Patient presents with    Medication Refill      Patient requests refill of: Hydrocodone     Visit Vitals    /57 (BP 1 Location: Left arm, BP Patient Position: Sitting)    Pulse 69    Temp 96.6 °F (35.9 °C) (Oral)    Resp 20    Ht 5' 5\" (1.651 m)    Wt (!) 372 lb (168.7 kg)    SpO2 97%    BMI 61.9 kg/m2       · Diagnosis: Chronic pain   · Last drug screen date: 2017  · Urine provided today: N/A   · Treatment agreement/Informed Consent date: 2017  ·  reviewed by provider: 2017   · MME per day:   · Provider for today's encounter : Dr. Chin Limon     · Radha Seals Date: 2017  Medication name: Hydrocodone   Pill strength: 7.5-325 mg   How medication is supposed to be taken: Take 1 tab po bid if needed for pain MAX daily amount: 2 tabs   How medication is being taken: as directed   Date prescription filled: 2018  Prescribing Provider: Dr. Chin Limon   # of pills prescribed: 14  # of pills remainin   # pills taking per day: takes as directed   Last dose of medication taken, per patient: 2017  Pain scale and location of pain: 8, ankle   Counted in front of patient. Bottle with contents returned to patient. [facility]  reviewed by provider. Discussed pill count with provider. Per provider, refill medication granted. Follow up as advised. Pt was made aware of provider's decision, and to return AS DIRECTED for an office visit, if one is not already scheduled at this time. Controlled Substance Refill sheet signed by patient and provider. Provided with naloxone prescription, if taking benzodiazepine, prior overdose, substance abuse, or >= 120 MME per day. Continuation of treatment with controlled substance is justified by patient's functional improvements.  Patient will benefit from continued prescribing. Diagnoses and all orders for this visit:    1. Dry skin  -     mineral oil-hydrophil petrolat (AQUAPHOR) ointment; Apply  to affected area as needed for Dry Skin or Itching. 2. Kidney disease, chronic, end stage on dialysis (HCC)  -     RENVELA 2.4 gram pwpk oral powder; Take 1 Packet by mouth three (3) times daily (with meals). Future Appointments  Date Time Provider Vincenzo Licea   4/19/2018 9:15 AM [address]             1. Have you been to the ER, urgent care clinic since your last visit? Hospitalized since your last visit? No     2. Have you seen or consulted any other health care providers outside of the [address] [place] [address] since your last visit? Include any pap smears or colon screening. No     The patient was counseled on the dangers of tobacco use, and was advised never to start. Reviewed strategies to maximize success, including never to start. Health Maintenance Due   Topic Date Due    DTaP/Tdap/Td series (1 - Tdap) Discussed with patient today and advised to follow up.    05/10/1981    PAP AKA CERVICAL CYTOLOGY Discussed with patient today and advised to follow up.    09/03/2017     ACP is not on file, advised to return.

## 2017-12-28 NOTE — PROGRESS NOTES
Char Means [address] [place] [address] Visit   12/28/2017  Patient ID: Shani Wayne is a 62 y.o. female. Assessment/Plan:    Diagnoses and all orders for this visit:    1. Other chronic pain  -     HYDROcodone-acetaminophen (NORCO) 7.5-325 mg per tablet; Take 1 Tab by mouth two (2) times daily as needed for Pain. Max Daily Amount: 2 Tabs. -     HYDROcodone-acetaminophen (NORCO) 7.5-325 mg per tablet; Take 1 Tab by mouth two (2) times daily as needed for Pain. Max Daily Amount: 2 Tabs. -     HYDROcodone-acetaminophen (NORCO) 7.5-325 mg per tablet; Take 1 Tab by mouth two (2) times daily as needed for Pain. Max Daily Amount: 2 Tabs. · Pain is in- right ankle, hands, back into legs  · Patient's plan currently includes: norco, lidocaine, gabapentin, PT  · refills given for3 months  · Functional improvements that justify chronic opioid medications: yes  · Pill count done today. · Treatment agreement UTD. ·  reviewed 11/14/2017       2. Dry skin  Continue emollient, no refill needed    3. Kidney disease, chronic, end stage on dialysis (HCC)  -     RENVELA 2.4 gram pwpk oral powder; Take 1 Packet by mouth three (3) times daily (with meals). Advised to f/u with insurance company re question    Counselled pt on Patient health concerns and plans. Patient was offered a choice/choices in the treatment plan today. Reviewed return precautions as appropriate. Patient expresses understanding of the plan and agrees with recommendations. See patient instructions for more. There are no Patient Instructions on file for this visit. Subjective:   HPI:  Shani Wayne is a 62 y.o. female being seen for:   Chief Complaint   Patient presents with    Medication Refill       · Justice López is her facilitator for public partnership- caretaker. She evaluates her home q 3 months. She has done paperwork to make sure pt is approved for health care assistance. Her son helps with some of those.     · Has filled 5 of 8 Ky Lemon MD   HYDROcodone-acetaminophen [facility]) 7.5-325 mg per tablet Take 1 Tab by mouth two (2) times daily as needed for Pain. Max Daily Amount: 2 Tabs. 12/19/17  Yes Isacc Lemon MD   HYDROcodone-acetaminophen [facility]) 7.5-325 mg per tablet Take 1 Tab by mouth two (2) times daily as needed for Pain. Max Daily Amount: 2 Tabs. 11/14/17  Yes Isacc Lemon MD   HYDROcodone-acetaminophen [facility]) 7.5-325 mg per tablet Take 1 Tab by mouth two (2) times daily as needed for Pain. Max Daily Amount: 2 Tabs. 11/21/17  Yes Poonam Lemon MD   HYDROcodone-acetaminophen [facility]) 7.5-325 mg per tablet Take 1 Tab by mouth two (2) times daily as needed for Pain. Max Daily Amount: 2 Tabs. 11/28/17  Yes Poonam Lemon MD   HYDROcodone-acetaminophen [facility]) 7.5-325 mg per tablet Take 1 Tab by mouth two (2) times daily as needed for Pain. Max Daily Amount: 2 Tabs. 12/26/17  Yes Isacc Lemon MD   HYDROcodone-acetaminophen [facility]) 7.5-325 mg per tablet Take 1 Tab by mouth two (2) times daily as needed for Pain. Max Daily Amount: 2 Tabs. 1/2/18  Yes Poonam Lemon MD   HYDROcodone-acetaminophen [facility]) 7.5-325 mg per tablet Take 1 Tab by mouth two (2) times daily as needed for Pain. Max Daily Amount: 2 Tabs. 12/5/17  Yes Poonam Lemon MD   HYDROcodone-acetaminophen [facility]) 7.5-325 mg per tablet Take 1 Tab by mouth two (2) times daily as needed for Pain. Max Daily Amount: 2 Tabs. 12/12/17  Yes Isacc Lemon MD   omeprazole (PRILOSEC) 20 mg capsule take 1 capsule by mouth twice a day 9/28/17  Yes Poonam Tavarez. Ky Lemon MD   midodrine (PROAMITINE) 10 mg tablet  6/9/17  Yes Historical Provider   buPROPion XL (WELLBUTRIN XL) 150 mg tablet take 1 tablet by mouth every morning 7/18/17  Yes Poonam Tavarez. Ky Lemon MD   atorvastatin (LIPITOR) 10 mg tablet take 1 tablet by mouth NIGHTLY. 7/5/17  Yes Poonam Tavarez. Ky Lemon MD   gabapentin (NEURONTIN) 300 mg capsule Take 2-3 Caps by mouth three (3) times daily as needed.  5/25/17  Yes MD DIPESH De AndaVELA 2.4 gram pwpk oral powder Take 1 Packet by mouth three (3) times daily (with meals). 4/6/17  Yes Tiny Levy MD   miscellaneous medical supply misc Please dispense 1 single point bariatric cane 4/6/17  Yes Jojo Levy MD   miscellaneous medical supply misc Please dispense 1 bariatric wheelchair 4/6/17  Yes Jojo Levy MD   letrozole [facility]) 2.5 mg tablet take 1 tablet by mouth daily 3/17/17  Yes Leonor Bonilla DO   montelukast (SINGULAIR) 10 mg tablet take 1 tablet by mouth once daily 1/31/17  Yes Jojo Levy MD   ECOTRIN 325 mg tablet take 1 tablet by mouth once daily 12/15/16  Yes Historical Provider   magnesium oxide (MAG-OX) 400 mg tablet take 1 tablet by mouth once daily 1/3/17  Yes Jojo Levy MD   lidocaine 4 % gel 1 Inch by Apply Externally route two (2) times daily as needed. To ankle for pain 12/12/16  Yes Jojo Levy MD   albuterol (PROVENTIL VENTOLIN) 2.5 mg /3 mL (0.083 %) nebulizer solution 3 mL by Nebulization route every four (4) hours as needed for Wheezing or Shortness of Breath. 11/15/16  Yes Jojo Levy MD   inhalational spacing device 1 Each by Does Not Apply route as needed. 11/15/16  Yes Jojo Levy MD   albuterol (PROVENTIL HFA, VENTOLIN HFA, PROAIR HFA) 90 mcg/actuation inhaler Take 1-2 Puffs by inhalation every four (4) hours as needed for Wheezing. 11/15/16  Yes Jojo Levy MD   AQUAPHOR HEALING 41 % ointment Apply  to affected area as needed for Dry Skin. 8/23/16  Yes Filemon Landon MD   lidocaine (XYLOCAINE) 4 % topical cream  6/5/17   Historical Provider   midodrine (PROAMITINE) 5 mg tablet Take 10 mg by mouth three (3) times daily (with meals). 6/29/16   Historical Provider   SENSIPAR 30 mg tablet Take 30 mg by mouth daily.  2/12/16   Historical Provider     Patient Active Problem List   Diagnosis Code    [facility] I50.9    Asthma J45.909    Kidney disease, chronic, end stage on dialysis ([facility] N18.6, Z99.2    GERD (gastroesophageal reflux disease) K21.9    Unspecified sleep apnea G47.30    Depression F32.9    Vitamin D deficiency E55.9    Chronic ankle pain M25.579, G89.29    Obesity hypoventilation syndrome (HCC) E66.2    DCIS (ductal carcinoma in situ) of breast D05.10    Recurrent depression (HCC) F33.9

## 2017-12-28 NOTE — PATIENT INSTRUCTIONS
.. TODAY, please go to:   CHECK OUT     If you received a referral, Show the        Please schedule the following appointments at [facility] OUT:  Medication refill with Dr. Oleksandr Galindo in March 2018    Today's Plan:  Medications refilled for the next 3 months

## 2018-01-14 DIAGNOSIS — N18.6 ESRD (END STAGE RENAL DISEASE) (HCC): ICD-10-CM

## 2018-01-16 NOTE — TELEPHONE ENCOUNTER
PCP: Bev Spencer. Elspeth Saint, MD    Last appt: 12/28/2017  Future Appointments  Date Time Provider Vincenzo Licea   3/29/2018 11:00 AM Dewane Longest Elspeth Saint, MD [facility] [address]   4/19/2018 9:15 AM [facility] [address]  LASHONDA NGUYEN       Requested Prescriptions     Pending Prescriptions Disp Refills    letrozole (FEMARA) 2.5 mg tablet [Pharmacy Med Name: LETROZOLE 2.5 MG TABLET] 30 Tab 0     Sig: take 1 tablet by mouth once daily     Lab Results   Component Value Date/Time    Sodium 144 10/01/2015 12:18 PM    Potassium 5.0 10/01/2015 12:18 PM    Chloride 103 10/01/2015 12:18 PM    CO2 25 10/01/2015 12:18 PM    Glucose 79 10/01/2015 12:18 PM    BUN 38 10/01/2015 12:18 PM    Creatinine 3.94 10/01/2015 12:18 PM    BUN/Creatinine ratio 10 10/01/2015 12:18 PM    GFR est AA 14 10/01/2015 12:18 PM    GFR est non-AA 12 10/01/2015 12:18 PM    Calcium 8.8 10/01/2015 12:18 PM     No results found for: HBA1C, HGBE8, BZE8DGYR, EUC3YJAR  Lab Results   Component Value Date/Time    Cholesterol, total 195 10/01/2015 12:18 PM    HDL Cholesterol 69 10/01/2015 12:18 PM    LDL, calculated 111 10/01/2015 12:18 PM    VLDL, calculated 15 10/01/2015 12:18 PM    Triglyceride 73 10/01/2015 12:18 PM     Lab Results   Component Value Date/Time    TSH 2.390 10/01/2015 12:18 PM

## 2018-01-17 RX ORDER — LANOLIN ALCOHOL/MO/W.PET/CERES
CREAM (GRAM) TOPICAL
Qty: 30 TAB | Refills: 11 | Status: SHIPPED | OUTPATIENT
Start: 2018-01-17 | End: 2019-02-07 | Stop reason: SDUPTHER

## 2018-01-19 RX ORDER — LETROZOLE 2.5 MG/1
TABLET, FILM COATED ORAL
Qty: 30 TAB | Refills: 5 | Status: SHIPPED | OUTPATIENT
Start: 2018-01-19 | End: 2021-06-03

## 2018-01-30 DIAGNOSIS — J45.40 MODERATE PERSISTENT ASTHMA WITHOUT COMPLICATION: ICD-10-CM

## 2018-02-02 NOTE — TELEPHONE ENCOUNTER
PCP: Surekha Oshea. Hilda Siegel MD    Last appt: 12/28/2017  Future Appointments  Date Time Provider Vincenzo Licea   3/29/2018 11:00 AM Stacey Anne-Marie Siegel MD [facility]   4/19/2018 9:15 AM [facility] PSYCHIATRIC [facility] [address]  LASHONDA NGUYEN       Requested Prescriptions     Pending Prescriptions Disp Refills    montelukast (SINGULAIR) 10 mg tablet [Pharmacy Med Name: MONTELUKAST SOD 10 MG TABLET] 30 Tab 11     Sig: take 1 tablet by mouth daily     Lab Results   Component Value Date/Time    Sodium 144 10/01/2015 12:18 PM    Potassium 5.0 10/01/2015 12:18 PM    Chloride 103 10/01/2015 12:18 PM    CO2 25 10/01/2015 12:18 PM    Glucose 79 10/01/2015 12:18 PM    BUN 38 10/01/2015 12:18 PM    Creatinine 3.94 10/01/2015 12:18 PM    BUN/Creatinine ratio 10 10/01/2015 12:18 PM    GFR est AA 14 10/01/2015 12:18 PM    GFR est non-AA 12 10/01/2015 12:18 PM    Calcium 8.8 10/01/2015 12:18 PM     No results found for: HBA1C, HGBE8, RJL3QGJV, WVK3AJGX  Lab Results   Component Value Date/Time    Cholesterol, total 195 10/01/2015 12:18 PM    HDL Cholesterol 69 10/01/2015 12:18 PM    LDL, calculated 111 10/01/2015 12:18 PM    VLDL, calculated 15 10/01/2015 12:18 PM    Triglyceride 73 10/01/2015 12:18 PM     Lab Results   Component Value Date/Time    TSH 2.390 10/01/2015 12:18 PM

## 2018-02-03 RX ORDER — MONTELUKAST SODIUM 10 MG/1
TABLET ORAL
Qty: 30 TAB | Refills: 11 | Status: SHIPPED | OUTPATIENT
Start: 2018-02-03 | End: 2019-04-09 | Stop reason: SDUPTHER

## 2018-03-14 ENCOUNTER — TELEPHONE (OUTPATIENT)
Dept: FAMILY MEDICINE CLINIC | Age: 58
End: 2018-03-14

## 2018-03-15 NOTE — TELEPHONE ENCOUNTER
Spoke with Ada Drew from [employer] to see what clarification was needed. Ada Drew stated that the RX for Renvela 2.4 grams requires a PA. She was advised that a PA would be completed at my earliest convenience. She verbalized understanding.

## 2018-03-29 ENCOUNTER — HOME HEALTH ADMISSION (OUTPATIENT)
Dept: HOME HEALTH SERVICES | Facility: HOME HEALTH | Age: 58
End: 2018-03-29
Payer: MEDICAID

## 2018-03-29 ENCOUNTER — OFFICE VISIT (OUTPATIENT)
Dept: FAMILY MEDICINE CLINIC | Age: 58
End: 2018-03-29

## 2018-03-29 VITALS
HEIGHT: 65 IN | OXYGEN SATURATION: 95 % | HEART RATE: 76 BPM | RESPIRATION RATE: 18 BRPM | BODY MASS INDEX: 48.82 KG/M2 | WEIGHT: 293 LBS | DIASTOLIC BLOOD PRESSURE: 43 MMHG | SYSTOLIC BLOOD PRESSURE: 91 MMHG | TEMPERATURE: 97.8 F

## 2018-03-29 DIAGNOSIS — Z79.891 LONG TERM PRESCRIPTION OPIATE USE: ICD-10-CM

## 2018-03-29 DIAGNOSIS — G89.29 CHRONIC PAIN OF RIGHT ANKLE: ICD-10-CM

## 2018-03-29 DIAGNOSIS — M25.571 CHRONIC PAIN OF RIGHT ANKLE: ICD-10-CM

## 2018-03-29 DIAGNOSIS — R53.81 PHYSICAL DECONDITIONING: ICD-10-CM

## 2018-03-29 DIAGNOSIS — G89.29 OTHER CHRONIC PAIN: Primary | ICD-10-CM

## 2018-03-29 DIAGNOSIS — M79.674 PAIN OF TOE OF RIGHT FOOT: ICD-10-CM

## 2018-03-29 RX ORDER — HYDROCODONE BITARTRATE AND ACETAMINOPHEN 7.5; 325 MG/1; MG/1
1 TABLET ORAL
Qty: 60 TAB | Refills: 0 | Status: SHIPPED | OUTPATIENT
Start: 2018-03-29 | End: 2018-07-06 | Stop reason: SDUPTHER

## 2018-03-29 NOTE — MR AVS SNAPSHOT
Rosalio Diop 
 
 
 [address] 
[address] [facility] [address] 
[phone] Patient: Rene Oconnell MRN: [id number] Visit Information Date & Time Provider Department Dept. Phone Encounter #  
 3/29/2018 11:00 AM [facility] Fani Wolfe MD [facility] [phone] [id number] Upcoming Health Maintenance Date Due DTaP/Tdap/Td series (1 - Tdap) 5/10/1981 PAP AKA CERVICAL CYTOLOGY 9/3/2017 BREAST CANCER SCRN MAMMOGRAM 4/18/2019 COLONOSCOPY 8/18/2021 Pneumococcal 19-64 Highest Risk (3 of 3 - PPSV23) 1/3/2022 Allergies as of 3/29/2018  Review Complete On: 3/29/2018 By: Riri Kaminski LPN Severity Noted Reaction Type Reactions Iodine High 08/18/2011   Systemic Anaphylaxis Other Medication  09/03/2014   Systemic Other (comments) Metal causes numbness in hands,arms all over Shellfish Containing Products  08/18/2011   Systemic Anaphylaxis Sulfa (Sulfonamide Antibiotics)  03/10/2016    Itching Current Immunizations  Reviewed on 11/15/2016 Name Date Influenza Vaccine 11/19/2013 Influenza Vaccine Judye Binet) 10/1/2015 Influenza Vaccine (Quad) PF 11/15/2016, 2/16/2015  2:30 PM  
 Pneumococcal Conjugate (PCV-13) 2/16/2015  2:31 PM  
  
 Not reviewed this visit You Were Diagnosed With   
  
 Codes Comments Other chronic pain    -  Primary ICD-10-CM: G89.29 ICD-9-CM: 338.29 Pain of toe of right foot     ICD-10-CM: Y56.208 ICD-9-CM: 729.5 Chronic pain of right ankle     ICD-10-CM: M25.571, G89.29 ICD-9-CM: 719.47, 338.29 Long term prescription opiate use     ICD-10-CM: I59.196 ICD-9-CM: V58.69 Physical deconditioning     ICD-10-CM: R53.81 ICD-9-CM: 799.3 Vitals BP Pulse Temp Resp Height(growth percentile) Weight(growth percentile) 91/43 (BP 1 Location: Left arm, BP Patient Position: Sitting) 76 97.8 °F (36.6 °C) (Oral) 18 5' 5\" (1.651 m) (!) 370 lb (167.8 kg) SpO2 BMI OB Status Smoking Status 95% 61.57 kg/m2 Postmenopausal Never Smoker BMI and BSA Data Body Mass Index Body Surface Area 61.57 kg/m 2 2.77 m 2 Preferred Pharmacy Pharmacy Name [facility] [phone] Your Updated Medication List  
  
   
This list is accurate as of 3/29/18 12:12 PM.  Always use your most recent med list.  
  
  
  
  
 * albuterol 2.5 mg /3 mL (0.083 %) nebulizer solution Commonly known as:  PROVENTIL VENTOLIN  
3 mL by Nebulization route every four (4) hours as needed for Wheezing or Shortness of Breath. * albuterol 90 mcg/actuation inhaler Commonly known as:  PROVENTIL HFA, VENTOLIN HFA, PROAIR HFA Take 1-2 Puffs by inhalation every four (4) hours as needed for Wheezing. AQUAPHOR HEALING 41 % ointment Generic drug:  pantothenic ac-min oil-pet,hyd Apply  to affected area as needed for Dry Skin. atorvastatin 10 mg tablet Commonly known as:  LIPITOR  
take 1 tablet by mouth NIGHTLY. buPROPion  mg tablet Commonly known as:  WELLBUTRIN XL  
take 1 tablet by mouth every morning  
  
 cholecalciferol 1,000 unit tablet Commonly known as:  VITAMIN D3 Take 2 Tabs by mouth daily. ECOTRIN 325 mg tablet Generic drug:  aspirin delayed-release  
take 1 tablet by mouth once daily  
  
 gabapentin 300 mg capsule Commonly known as:  NEURONTIN Take 2-3 Caps by mouth three (3) times daily as needed. * HYDROcodone-acetaminophen 7.5-325 mg per tablet Commonly known as:  Sally Pinto Take 1 Tab by mouth two (2) times daily as needed for Pain. Max Daily Amount: 2 Tabs. * HYDROcodone-acetaminophen 7.5-325 mg per tablet Commonly known as:  Sally Pinto Take 1 Tab by mouth two (2) times daily as needed for Pain. Max Daily Amount: 2 Tabs. * HYDROcodone-acetaminophen 7.5-325 mg per tablet Commonly known as:  Sally Pinto  
 Take 1 Tab by mouth two (2) times daily as needed for Pain. Max Daily Amount: 2 Tabs. inhalational spacing device 1 Each by Does Not Apply route as needed. letrozole 2.5 mg tablet Commonly known as:  FEMARA  
take 1 tablet by mouth once daily * lidocaine 4 % Gel 1 Inch by Apply Externally route two (2) times daily as needed. To ankle for pain * lidocaine 4 % topical cream  
Commonly known as:  XYLOCAINE  
  
 magnesium oxide 400 mg tablet Commonly known as:  MAG-OX  
take 1 tablet by mouth daily * midodrine 5 mg tablet Commonly known as:  [place] Goodie Take 10 mg by mouth three (3) times daily (with meals). * midodrine 10 mg tablet Commonly known as:  PROAMITINE  
  
 mineral oil-hydrophil petrolat ointment Commonly known as:  AQUAPHOR Apply  to affected area as needed for Dry Skin or Itching. * miscellaneous medical supply Misc Please dispense 1 single point bariatric cane * miscellaneous medical supply Misc Please dispense 1 bariatric wheelchair  
  
 montelukast 10 mg tablet Commonly known as:  SINGULAIR  
take 1 tablet by mouth daily  
  
 omeprazole 20 mg capsule Commonly known as:  PRILOSEC  
take 1 capsule by mouth twice a day RENVELA 2.4 gram Pwpk oral powder Generic drug:  sevelamer carbonate Take 1 Packet by mouth three (3) times daily (with meals). * SENSIPAR 30 mg tablet Generic drug:  cinacalcet Take 30 mg by mouth daily. * SENSIPAR 60 mg Tab Generic drug:  cinacalcet  
take 1 tablet by mouth once daily * Notice: This list has 13 medication(s) that are the same as other medications prescribed for you. Read the directions carefully, and ask your doctor or other care provider to review them with you. We Performed the Following HYDROCODONE CONFIRMATION [[id number] Custom] [address] Comments: Gait training, use of 4 point cane, low back pain, chronic ankle pain.  PT/OT eval  
 REFERRAL TO PODIATRY [REF90 Custom] Comments:  
 Toe pain, hammer toe To-Do List   
 04/19/2018 9:15 AM  
  Appointment with [facility] MARY 5 at [address] [place] [address] ([phone]) Shower or bathe using soap and water. Do not use deodorant, powder, perfumes, or lotion the day of your exam.  If your prior mammograms were not performed at [facility] 6 please bring films with you or forward prior images 2 days before your procedure. If patient is not a callback diagnostic, the patient must have an order/script from the physician for the diagnostic. Please bring it on the day of the mammogram or have it faxed to the department. [facility]  [phone] [address]  [phone] [facility] [phone] [facility] [address] [facility] [phone] [facility] [phone] Referral Information Referral ID Referred By Referred To  
  
 [id number] Ami HOLLEY Jachin [address]   
   [address]   
   [place], [address] Phone: [phone] Fax: [phone] Visits Status Start Date End Date 1 New Request 3/29/18 3/29/19 If your referral has a status of pending review or denied, additional information will be sent to support the outcome of this decision. Referral ID Referred By Referred To  
 [id number] LEYDI 225 [facility].  
   [address] [place], [address] Visits Status Start Date End Date 1 New Request 3/29/18 3/29/19 If your referral has a status of pending review or denied, additional information will be sent to support the outcome of this decision. Patient Instructions Yuan Ron TODAY, please go to: CHECK OUT If you received a referral, Show the  LAB Please schedule the following appointments at [facility] OUT: 
Chronic pain follow up with Dr. Rolly Giang in 1 month Today's Plan: 
- Start PT 
 - have blood test today 
- use medications for constipation 
- have Xray of ankle before our next appointment (walk in) 
- your blood in December was not appropriate. We will see each other monthly with more frequently blood tests now. - See podiatry - No alcohol For constipation Try the following medications in the order outlined. Step 1) miralax (MUSH) 1 capful daily. Can increase to two capfuls a day If no improvement, add... Step 2) colace/docusate (MUSH) 200 mg once a day If no improvement, add... Step 3) senna (PUSH) (senokot) 8.5mg tablets. Take 1-2 every night. GOAL: one soft bowel movement daily Introducing [facility]! Peyman Jeff introduces boarding pass patient portal. Now you can access parts of your medical record, email your doctor's office, and request medication refills online. 1. In your internet browser, go to [email or web address] 2. Click on the First Time User? Click Here link in the Sign In box. You will see the New Member Sign Up page. 3. Enter your boarding pass Access Code exactly as it appears below. You will not need to use this code after youve completed the sign-up process. If you do not sign up before the expiration date, you must request a new code. · boarding pass Access Code: [email or web address] Expires: 6/27/2018 12:12 PM 
 
4. Enter the last four digits of your Social Security Number (xxxx) and Date of Birth (mm/dd/yyyy) as indicated and click Submit. You will be taken to the next sign-up page. 5. Create a boarding pass ID. This will be your boarding pass login ID and cannot be changed, so think of one that is secure and easy to remember. 6. Create a boarding pass password. You can change your password at any time. 7. Enter your Password Reset Question and Answer. This can be used at a later time if you forget your password. 8. Enter your e-mail address. You will receive e-mail notification when new information is available in [address] 9. Click Sign Up. You can now view and download portions of your medical record. 10. Click the Download Summary menu link to download a portable copy of your medical information. If you have questions, please visit the Frequently Asked Questions section of the [employer] website. Remember, [employer] is NOT to be used for urgent needs. For medical emergencies, dial 911. Now available from your iPhone and Android! Please provide this summary of care documentation to your next provider. Your primary care clinician is listed as Nolen Hamman. If you have any questions after today's visit, please call [phone].

## 2018-03-29 NOTE — PROGRESS NOTES
Chief Complaint   Patient presents with    Toe Pain     right pinky toe has been hurting along with a burning sensation.  Medication Refill     1. Have you been to the ER, urgent care clinic since your last visit? Hospitalized since your last visit? No       2. Have you seen or consulted any other health care providers outside of the [address] [place] [address] since your last visit? Include any pap smears or colon screening. No     The patient was counseled on the dangers of tobacco use, and was advised never to start . Reviewed strategies to maximize success, including never to start. Radha Ross  Identified pt with two pt identifiers(name and ). Chief Complaint   Patient presents with    Toe Pain     right pinky toe has been hurting along with a burning sensation.  Medication Refill       1. Have you been to the ER, urgent care clinic since your last visit? Hospitalized since your last visit? NO    2. Have you seen or consulted any other health care providers outside of the [address] [place] [address] since your last visit? Include any pap smears or colon screening. NO    Today's provider has been notified of reason for visit, vitals and flowsheets obtained on patients. Patient received paperwork for advance directive during previous visit but has not completed at this time     Reviewed record In preparation for visit, huddled with provider and have obtained necessary documentation      Health Maintenance Due   Topic    DTaP/Tdap/Td series (1 - Tdap) Discussed with patient today and advised to follow up.  PAP AKA CERVICAL CYTOLOGY Discussed with patient today and advised to follow up.           Wt Readings from Last 3 Encounters:   17 (!) 372 lb (168.7 kg)   17 (!) 370 lb (167.8 kg)   17 (!) 359 lb (162.8 kg)     Temp Readings from Last 3 Encounters:   17 96.6 °F (35.9 °C) (Oral)   17 96.1 °F (35.6 °C) (Oral)   10/24/17 97 °F (36.1 °C) (Oral)     BP Readings from Last 3 Encounters:   17 100/57   17 108/53   10/24/17 99/47     Pulse Readings from Last 3 Encounters:   17 69   17 70   10/24/17 77     There were no vitals filed for this visit. Learning Assessment:  :     Learning Assessment 9/3/2014 2014   PRIMARY LEARNER Patient Patient   BARRIERS PRIMARY LEARNER - NONE   CO-LEARNER CAREGIVER - No   PRIMARY LANGUAGE ENGLISH ENGLISH   LEARNER PREFERENCE PRIMARY READING LISTENING     - READING     - DEMONSTRATION   ANSWERED BY patient Aminata Burgess   RELATIONSHIP SELF SELF       Depression Screening:  :     No flowsheet data found. Fall Risk Assessment:  :     No flowsheet data found. Abuse Screening:  :     No flowsheet data found. ADL Screening:  :     No flowsheet data found. ACP is not on file, advised to return. Medication reconciliation up to date and corrected with patient at this time. [employer]  Nurse Note for Controlled Substance Refill  Chief Complaint   Patient presents with    Toe Pain     right pinky toe has been hurting along with a burning sensation.  Medication Refill      Patient requests refill of: Norco     There were no vitals taken for this visit.     · Diagnosis: Chronic pain   · Last drug screen date: 2017  · Urine provided today: No, blood  · Treatment agreement/Informed Consent date: 2017  ·  reviewed by provider: 3/29/2018   · MME per day: 45  · Provider for today's encounter : Dr. Yen Paris     · [facility] Date: 3/29/2018  Medication name: Paul Gaines   Pill strength: 7.5-325 mg   How medication is supposed to be taken: Take 1 tablet PO BID if needed for pain max daily amount 2 tablets   How medication is being taken: Takes as needed   Date prescription filled: 2018  Prescribing Provider: Dr. Yen Paris   # of pills prescribed: 60  # of pills remainin   # pills taking per day: Takes as directed   Last dose of medication taken, per patient: 03/29/2018  Pain scale and location of pain: 8, toe and ankle   Counted in front of patient. Bottle with contents returned to patient. [facility]  reviewed by provider. Discussed pill count with provider. Per provider, refill medication granted. Follow up as advised. Pt was made aware of provider's decision, and to return AS DIRECTED  for an office visit, if one is not already scheduled at this time. Controlled Substance Refill sheet signed by patient and provider. Provided with naloxone prescription, if taking benzodiazepine, prior overdose, substance abuse, or >= 120 MME per day. Continuation of treatment with controlled substance is justified by patient's functional improvements. Patient will benefit from continued prescribing. Future Appointments  Date Time Provider Vincenzo Licea   4/19/2018 9:15 AM [facility] PSYCHIATRIC [place] MARY 5 [facility]

## 2018-03-29 NOTE — PATIENT INSTRUCTIONS
Hira Camacho TODAY, please go to:   CHECK OUT     If you received a referral, Show the    LAB    Please schedule the following appointments at CHECK OUT:  Chronic pain follow up with Dr. Christie Song in 1 month    Today's Plan:  - Start PT  - have blood test today  - use medications for constipation  - have Xray of ankle before our next appointment (walk in)  - your blood in December was not appropriate. We will see each other monthly with more frequently blood tests now. - See podiatry  - No alcohol      For constipation  Try the following medications in the order outlined. Step 1) miralax (MUSH) 1 capful daily. Can increase to two capfuls a day    If no improvement, add... Step 2) colace/docusate (MUSH) 200 mg once a day    If no improvement, add... Step 3) senna (PUSH) (senokot) 8.5mg tablets. Take 1-2 every night.      GOAL: one soft bowel movement daily

## 2018-03-29 NOTE — PROGRESS NOTES
[address] [place] [address] Visit   03/29/2018  Patient ID: Deborah Benson is a 62 y.o. female. Assessment/Plan:    Diagnoses and all orders for this visit:    1. Other chronic pain  -     REFERRAL TO HOME HEALTH  -     13-DRUG SCREEN W/CONFIRM, WHOLE BLOOD  -     HYDROcodone-acetaminophen (NORCO) 7.5-325 mg per tablet; Take 1 Tab by mouth two (2) times daily as needed for Pain. Max Daily Amount: 2 Tabs. -     HYDROCODONE CONFIRMATION  -     HYDROCODONE AND METABOLITE    2. Pain of toe of right foot  -     REFERRAL TO PODIATRY    3. Chronic pain of right ankle  -     REFERRAL TO HOME HEALTH    4. Long term prescription opiate use  -     13-DRUG SCREEN W/CONFIRM, WHOLE BLOOD  -     HYDROCODONE CONFIRMATION  -     HYDROCODONE AND METABOLITE    5. Physical deconditioning  -     REFERRAL TO HOME HEALTH      Pain is adequately controlled with current plan  · Pain is located in and due to:  · right ankle, hands, back into legs  · toe  · Patient's plan currently includes:  · norco, lidocaine, gabapentin, restart PT  · MME/day: 45  · Referral to podiatry  · Functional improvements that justify chronic opioid medications: housework, limited ambulation   · Psychiatric effects when pain is poorly controlled for this patient: ---      · Treatment agreement on file: yes  ·  appropriate and last checked on: 03/29/2018  · Urine Drug Screen: inappropriately negative in December, repeat today complete more frequently only given 1 month of medication  · Counseled against drinking alcohol  · Aberrant behaviors: see above  · Pill count is consistent with her prescription: yes  · Concomitant use of a benzodiazepine: no      Counselled pt on Patient health concerns and plans. Patient was offered a choice/choices in the treatment plan today. Reviewed return precautions as appropriate. Patient expresses understanding of the plan and agrees with recommendations. See patient instructions for more.       Patient Instructions Leela Varela TODAY, please go to:   [facility]     If you received a referral, Show the    LAB    Please schedule the following appointments at [facility]:  Chronic pain follow up with Dr. Fani Wolfe in 1 month    Today's Plan:  - Start PT  - have blood test today  - use medications for constipation  - have Xray of ankle before our next appointment (walk in)  - your blood in December was not appropriate. We will see each other monthly with more frequently blood tests now. - See podiatry  - No alcohol      For constipation  Try the following medications in the order outlined. Step 1) miralax (MUSH) 1 capful daily. Can increase to two capfuls a day    If no improvement, add... Step 2) colace/docusate (MUSH) 200 mg once a day    If no improvement, add... Step 3) senna (PUSH) (senokot) 8.5mg tablets. Take 1-2 every night. GOAL: one soft bowel movement daily      Subjective:   HPI:  Rene Oconnell is a 62 y.o. female being seen for:   Chief Complaint   Patient presents with    Toe Pain     right pinky toe has been hurting along with a burning sensation.  Medication Refill       · Dec Utox was unexpectedly negative for opiates  · Pain in right 5th digit, describes hammer toe abnormality, pain on top of toe  · Pain in ankle, desires XR, ordered in 5/2017, not yet one  · Needs to learn how to walk with 4 pronged cane  · Tired of going to dialysis   · Taking gabapentin- helps with paresthesia in hands and feet. Works well for this. · Taking norco 7.5-325- helps with back pain, ankle pain, toe pain, headaches  · Last dose this AM (HD on MWR)  · Not in PT right now, agreeable to trying again. · Still using lidocaine for arm, knee, ankle prn  · Back pain  ¨ Lower back  ¨ Radiates down both legs  ¨ At least 2015  ¨ Gabapentin helps  ¨ Heat helps  ¨ norco helps  ¨ With norco able to move around more. Able to move around more when at home. Not able to do walk much when out.  Last week was able to mop floor in her wheelchair. ¨ some constipation, saw blood when wiping, once, while constipation. Did not recur. ¨ Some itching. aquaphor helps a lot  ¨ No nausea, vomiting, abdominal pain    · Discussed  abnl screen    ADL Assessment 3/29/2018   Feeding yourself No Help Needed   Getting from bed to chair No Help Needed   Getting dressed No Help Needed   Bathing or showering No Help Needed   Walk across the room (includes cane/walker) Help Needed   Using the telphone No Help Needed   Taking your medications No Help Needed   Preparing meals No Help Needed   Managing money (expenses/bills) No Help Needed   Moderately strenuous housework (laundry) Help Needed   Shopping for personal items (toiletries/medicines) Help Needed   Shopping for groceries Help Needed   Driving Help Needed   Climbing a flight of stairs Help Needed   Getting to places beyond walking distances Help Needed                Review of Systems  Otherwise as noted in HPI  ? Objective:     Visit Vitals    BP 91/43 (BP 1 Location: Left arm, BP Patient Position: Sitting)    Pulse 76    Temp 97.8 °F (36.6 °C) (Oral)    Resp 18    Ht 5' 5\" (1.651 m)    Wt (!) 370 lb (167.8 kg)    SpO2 95%    BMI 61.57 kg/m2     . BP Readings from Last 3 Encounters:   03/29/18 91/43   12/28/17 100/57   11/14/17 108/53     . Wt Readings from Last 3 Encounters:   03/29/18 (!) 370 lb (167.8 kg)   12/28/17 (!) 372 lb (168.7 kg)   11/14/17 (!) 370 lb (167.8 kg)         Physical Exam   Constitutional: She appears well-developed and well-nourished. No distress. Pulmonary/Chest: Effort normal. No respiratory distress. Musculoskeletal:   Right 5th toe with mild hammer deformity. Ttp. Dry skin   Neurological: She is alert. Psychiatric: She has a normal mood and affect.  Her behavior is normal.       Allergies   Allergen Reactions    Iodine Anaphylaxis    Other Medication Other (comments)     Metal causes numbness in hands,arms all over   Local Labs Anaphylaxis    Sulfa (Sulfonamide Antibiotics) Itching     Prior to Admission medications    Medication Sig Start Date End Date Taking? Authorizing Provider   HYDROcodone-acetaminophen (NORCO) 7.5-325 mg per tablet Take 1 Tab by mouth two (2) times daily as needed for Pain. Max Daily Amount: 2 Tabs. 3/29/18  Yes Judie Heimlich Particia Has, MD   montelukast (SINGULAIR) 10 mg tablet take 1 tablet by mouth daily 2/3/18  Yes Gordon Mccord. Cheyanne Bonilla MD   letrozole [facility]) 2.5 mg tablet take 1 tablet by mouth once daily 1/19/18  Yes Gordon Mccord. Cheyanne Bonilla MD   magnesium oxide (MAG-OX) 400 mg tablet take 1 tablet by mouth daily 1/17/18  Yes Gordon Mccord. Cheyanne Bonilla MD   SENSIPAR 60 mg tab take 1 tablet by mouth once daily 12/19/17  Yes Historical Provider   RENVELA 2.4 gram pwpk oral powder Take 1 Packet by mouth three (3) times daily (with meals). 12/28/17  Yes Gordon Mccord. Cheyanne Bonilla MD   HYDROcodone-acetaminophen [facility]) 7.5-325 mg per tablet Take 1 Tab by mouth two (2) times daily as needed for Pain. Max Daily Amount: 2 Tabs. 1/4/18  Yes Gordon Mccodr. Cheyanne Bonilla MD   HYDROcodone-acetaminophen [facility]) 7.5-325 mg per tablet Take 1 Tab by mouth two (2) times daily as needed for Pain. Max Daily Amount: 2 Tabs. 3/5/18  Yes Judie Heimlich Particia Has, MD   cholecalciferol (VITAMIN D3) 1,000 unit tablet Take 2 Tabs by mouth daily. 11/27/17  Yes Judie Heimlich Particia Has, MD   mineral oil-hydrophil petrolat (AQUAPHOR) ointment Apply  to affected area as needed for Dry Skin or Itching. 11/14/17  Yes Judie Heimlich Particia Has, MD   omeprazole (PRILOSEC) 20 mg capsule take 1 capsule by mouth twice a day 9/28/17  Yes Gordon Mccord. Cheyanne Bonilla MD   lidocaine (XYLOCAINE) 4 % topical cream  6/5/17  Yes Historical Provider   midodrine (PROAMITINE) 10 mg tablet  6/9/17  Yes Historical Provider   buPROPion XL (WELLBUTRIN XL) 150 mg tablet take 1 tablet by mouth every morning 7/18/17  Yes Gordon Mccord. Particadela Bonilla MD   atorvastatin (LIPITOR) 10 mg tablet take 1 tablet by mouth NIGHTLY. 7/5/17  Yes Gordon Mccord.  Particadela Bonilla MD gabapentin (NEURONTIN) 300 mg capsule Take 2-3 Caps by mouth three (3) times daily as needed. 5/25/17  Yes Missy Pena. Maria G Buckner MD   miscellaneous medical supply misc Please dispense 1 single point bariatric cane 4/6/17  Yes Missy Pena. Maria G Buckner MD   miscellaneous medical supply misc Please dispense 1 bariatric wheelchair 4/6/17  Yes Missy Pena. Maria G Buckner MD   ECOTRIN 325 mg tablet take 1 tablet by mouth once daily 12/15/16  Yes Historical Provider   lidocaine 4 % gel 1 Inch by Apply Externally route two (2) times daily as needed. To ankle for pain 12/12/16  Yes Missy Pena. Maria G Buckner MD   inhalational spacing device 1 Each by Does Not Apply route as needed. 11/15/16  Yes Missy Pena. Maria G Buckner MD   AQUAPHOR HEALING 41 % ointment Apply  to affected area as needed for Dry Skin. 8/23/16  Yes Floyd Calderón MD   midodrine (PROAMITINE) 5 mg tablet Take 10 mg by mouth three (3) times daily (with meals). 6/29/16  Yes Historical Provider   albuterol (PROVENTIL VENTOLIN) 2.5 mg /3 mL (0.083 %) nebulizer solution 3 mL by Nebulization route every four (4) hours as needed for Wheezing or Shortness of Breath. 11/15/16   Missy . Maria G Buckner MD   albuterol (PROVENTIL HFA, VENTOLIN HFA, PROAIR HFA) 90 mcg/actuation inhaler Take 1-2 Puffs by inhalation every four (4) hours as needed for Wheezing. 11/15/16   Missy . Maria G Buckner MD   SENSIPAR 30 mg tablet Take 30 mg by mouth daily.  2/12/16   Historical Provider     Patient Active Problem List   Diagnosis Code    Heart failure ([facility] I50.9    Asthma J45.909    Kidney disease, chronic, end stage on dialysis ([facility] N18.6, Z99.2    GERD (gastroesophageal reflux disease) K21.9    Unspecified sleep apnea G47.30    Depression F32.9    Vitamin D deficiency E55.9    Chronic ankle pain M25.579, G89.29    Obesity hypoventilation syndrome (HCC) E66.2    DCIS (ductal carcinoma in situ) of breast D05.10    Recurrent depression ([facility] F33.9

## 2018-04-01 LAB
HYDROCODONE SERPL-MCNC: 29 NG/ML (ref 10–100)
HYDROMORPHONE SERPL-MCNC: NORMAL NG/ML (ref 1–30)

## 2018-04-03 ENCOUNTER — HOME CARE VISIT (OUTPATIENT)
Dept: SCHEDULING | Facility: HOME HEALTH | Age: 58
End: 2018-04-03
Payer: MEDICAID

## 2018-04-03 VITALS
SYSTOLIC BLOOD PRESSURE: 121 MMHG | TEMPERATURE: 98 F | OXYGEN SATURATION: 96 % | DIASTOLIC BLOOD PRESSURE: 70 MMHG | HEART RATE: 76 BPM | RESPIRATION RATE: 17 BRPM

## 2018-04-03 VITALS — TEMPERATURE: 99.1 F | OXYGEN SATURATION: 93 % | RESPIRATION RATE: 18 BRPM | HEART RATE: 77 BPM

## 2018-04-03 PROCEDURE — G0151 HHCP-SERV OF PT,EA 15 MIN: HCPCS

## 2018-04-03 PROCEDURE — G0152 HHCP-SERV OF OT,EA 15 MIN: HCPCS

## 2018-04-03 PROCEDURE — 400013 HH SOC

## 2018-04-05 ENCOUNTER — HOME CARE VISIT (OUTPATIENT)
Dept: SCHEDULING | Facility: HOME HEALTH | Age: 58
End: 2018-04-05
Payer: MEDICAID

## 2018-04-05 VITALS
SYSTOLIC BLOOD PRESSURE: 132 MMHG | HEART RATE: 70 BPM | DIASTOLIC BLOOD PRESSURE: 80 MMHG | OXYGEN SATURATION: 97 % | TEMPERATURE: 98 F | RESPIRATION RATE: 17 BRPM

## 2018-04-05 PROCEDURE — G0151 HHCP-SERV OF PT,EA 15 MIN: HCPCS

## 2018-04-06 ENCOUNTER — HOME CARE VISIT (OUTPATIENT)
Dept: SCHEDULING | Facility: HOME HEALTH | Age: 58
End: 2018-04-06
Payer: MEDICAID

## 2018-04-09 ENCOUNTER — HOME CARE VISIT (OUTPATIENT)
Dept: SCHEDULING | Facility: HOME HEALTH | Age: 58
End: 2018-04-09
Payer: MEDICAID

## 2018-04-09 VITALS — RESPIRATION RATE: 17 BRPM | OXYGEN SATURATION: 98 % | TEMPERATURE: 98 F | HEART RATE: 76 BPM

## 2018-04-09 PROCEDURE — G0151 HHCP-SERV OF PT,EA 15 MIN: HCPCS

## 2018-04-10 ENCOUNTER — HOME CARE VISIT (OUTPATIENT)
Dept: SCHEDULING | Facility: HOME HEALTH | Age: 58
End: 2018-04-10
Payer: MEDICAID

## 2018-04-10 PROCEDURE — G0152 HHCP-SERV OF OT,EA 15 MIN: HCPCS

## 2018-04-11 VITALS — HEART RATE: 75 BPM | TEMPERATURE: 98.4 F | RESPIRATION RATE: 18 BRPM | OXYGEN SATURATION: 94 %

## 2018-04-11 LAB
6-ACETYLMORPHINE: NEGATIVE
AMPHETAMINES BLD QL SCN: NEGATIVE NG/ML
BARBITURATES SERPLBLD QL: NEGATIVE UG/ML
BENZODIAZ BLD QL: NEGATIVE NG/ML
CANNABINOIDS BLD QL SCN: NEGATIVE NG/ML
COCAINE+BZE SERPLBLD QL SCN: NEGATIVE NG/ML
CODEINE, 737848: NEGATIVE NG/ML
DIHYDROCODEINE, 764159: 1 NG/ML
FENTANYL BLD QL SCN: NEGATIVE NG/ML
HYDROCODONE, 737854: 26.8 NG/ML
HYDROMORPHONE, 737852: NEGATIVE NG/ML
MEPERIDINE, DR296L: NEGATIVE NG/ML
METHADONE BLD QL SCN: NEGATIVE NG/ML
MORPHINE, 737850: NEGATIVE NG/ML
OPIATES BLD QL SCN: ABNORMAL NG/ML
OPIATES SPEC QL: POSITIVE
OXYCOCONE: NEGATIVE NG/ML
OXYCODONES CONFIRMATION, 738393: NEGATIVE
OXYCODONES, 738315: NEGATIVE NG/ML
OXYMORPHONE, 763902: NEGATIVE NG/ML
PCP BLD QL SCN: NEGATIVE NG/ML
PROPOXYPH BLD QL SCN: NEGATIVE NG/ML
TRAMADOL, DR297L: NEGATIVE NG/ML

## 2018-04-12 ENCOUNTER — HOME CARE VISIT (OUTPATIENT)
Dept: SCHEDULING | Facility: HOME HEALTH | Age: 58
End: 2018-04-12
Payer: MEDICAID

## 2018-04-12 ENCOUNTER — TELEPHONE (OUTPATIENT)
Dept: FAMILY MEDICINE CLINIC | Age: 58
End: 2018-04-12

## 2018-04-12 VITALS
OXYGEN SATURATION: 96 % | TEMPERATURE: 98 F | SYSTOLIC BLOOD PRESSURE: 110 MMHG | HEART RATE: 81 BPM | DIASTOLIC BLOOD PRESSURE: 78 MMHG | RESPIRATION RATE: 17 BRPM

## 2018-04-12 VITALS
HEART RATE: 80 BPM | RESPIRATION RATE: 16 BRPM | DIASTOLIC BLOOD PRESSURE: 80 MMHG | TEMPERATURE: 98.6 F | SYSTOLIC BLOOD PRESSURE: 110 MMHG | OXYGEN SATURATION: 95 %

## 2018-04-12 DIAGNOSIS — N18.6 KIDNEY DISEASE, CHRONIC, END STAGE ON DIALYSIS (HCC): Primary | ICD-10-CM

## 2018-04-12 DIAGNOSIS — Z74.09 POOR MOBILITY: ICD-10-CM

## 2018-04-12 DIAGNOSIS — Z99.2 KIDNEY DISEASE, CHRONIC, END STAGE ON DIALYSIS (HCC): Primary | ICD-10-CM

## 2018-04-12 DIAGNOSIS — G89.29 CHRONIC PAIN OF RIGHT ANKLE: ICD-10-CM

## 2018-04-12 DIAGNOSIS — J45.909 UNCOMPLICATED ASTHMA, UNSPECIFIED ASTHMA SEVERITY, UNSPECIFIED WHETHER PERSISTENT: ICD-10-CM

## 2018-04-12 DIAGNOSIS — E66.2 OBESITY HYPOVENTILATION SYNDROME (HCC): ICD-10-CM

## 2018-04-12 DIAGNOSIS — M25.571 CHRONIC PAIN OF RIGHT ANKLE: ICD-10-CM

## 2018-04-12 PROCEDURE — G0152 HHCP-SERV OF OT,EA 15 MIN: HCPCS

## 2018-04-12 PROCEDURE — G0151 HHCP-SERV OF PT,EA 15 MIN: HCPCS

## 2018-04-12 NOTE — TELEPHONE ENCOUNTER
----- Message from Kasandra Fofana sent at 4/12/2018  1:54 PM EDT -----  Regarding: Dr Braxton Gonzales with [address]. requesting call back to [phone].   Requesting a social work evaluation through  [facility]

## 2018-04-12 NOTE — TELEPHONE ENCOUNTER
Spoke with [facility] with [facility] [place] - INPATIENT I.D. X 2 to see what her concerns were regarding the patient. She stated that she needs a verbal order for the patient to have a social work evaluation completed. She also stated that the patient needs a RX for a tub transfer bariatric bench along with a barriatic 3 in 1 commode. She was advised that Dr. Kathrin Osborn was out of the office, so the on call provider would be made aware. She verbalized understanding.

## 2018-04-16 NOTE — TELEPHONE ENCOUNTER
Writer called Jennifer back from [address] to give V. O for SW eval, bariatric tube transfer bench and bariatric 3-n-1 commode VORB from Sonam Ordonez NP, provider on-call while Dr. Layne Yanez was out of the office. Writer spoke with Jeffery Keenan. Jeffery Keenan was notified of V. O's given, Jennifer verbalized appreciation and stated that a hard rx was needed for the transfer bench and commode; and if able, to place it in [address] [place] [address] so she could print it off instead of us having to fax it. Writer stated that Dr. Layne Yanez would be made aware since she is back in the office, and can place the order for the pt. Jennifer verbalized understanding and appreciation.

## 2018-04-17 ENCOUNTER — HOME CARE VISIT (OUTPATIENT)
Dept: SCHEDULING | Facility: HOME HEALTH | Age: 58
End: 2018-04-17
Payer: MEDICAID

## 2018-04-17 ENCOUNTER — HOME CARE VISIT (OUTPATIENT)
Dept: HOME HEALTH SERVICES | Facility: HOME HEALTH | Age: 58
End: 2018-04-17
Payer: MEDICAID

## 2018-04-17 VITALS — TEMPERATURE: 97.5 F | HEART RATE: 81 BPM | RESPIRATION RATE: 16 BRPM | OXYGEN SATURATION: 95 %

## 2018-04-17 PROCEDURE — G0152 HHCP-SERV OF OT,EA 15 MIN: HCPCS

## 2018-04-18 NOTE — TELEPHONE ENCOUNTER
Orders printed. Please send in. Please call pt's [facility] and cancel the electromic order for 3 in 1 commode from 4/18/2018 that was sent in error.      Orders Placed This Encounter    DISCONTD: miscellaneous medical supply misc     Sig: bariatric 3-n-1 commode     Dispense:  1 Each     Refill:  0    miscellaneous medical supply misc     Sig: bariatric tube transfer bench     Dispense:  1 Each     Refill:  0    miscellaneous medical supply misc     Sig: bariatric 3-n-1 commode     Dispense:  1 Each     Refill:  0

## 2018-04-19 ENCOUNTER — HOME CARE VISIT (OUTPATIENT)
Dept: SCHEDULING | Facility: HOME HEALTH | Age: 58
End: 2018-04-19
Payer: MEDICAID

## 2018-04-19 VITALS — RESPIRATION RATE: 18 BRPM | TEMPERATURE: 98.3 F | OXYGEN SATURATION: 97 % | HEART RATE: 66 BPM

## 2018-04-19 PROCEDURE — G0152 HHCP-SERV OF OT,EA 15 MIN: HCPCS

## 2018-04-19 PROCEDURE — G0151 HHCP-SERV OF PT,EA 15 MIN: HCPCS

## 2018-04-19 NOTE — TELEPHONE ENCOUNTER
Writer called Jennifer with [facility] back in regards to orders. Writer spoke with [employer]. Writer notified [employer] that orders requested for pt had been ordered, signed, and printed off as hard rx's. Jennifer verbalized appreciation and requested that rx's be sent to [phone]. Writer verbalized understanding and stated that they would be faxed now. Jennifer verbalized understanding and appreciation. Writer faxed rx's with coversheet to number requested. Confirmation sheet printed that fax went through and was attached to rx's and placed in scan in office box.

## 2018-04-20 VITALS
RESPIRATION RATE: 17 BRPM | HEART RATE: 76 BPM | DIASTOLIC BLOOD PRESSURE: 76 MMHG | SYSTOLIC BLOOD PRESSURE: 124 MMHG | TEMPERATURE: 98 F | OXYGEN SATURATION: 98 %

## 2018-04-24 ENCOUNTER — HOSPITAL ENCOUNTER (OUTPATIENT)
Dept: MAMMOGRAPHY | Age: 58
Discharge: HOME | End: 2018-04-24
Attending: FAMILY MEDICINE
Payer: MEDICAID

## 2018-04-24 ENCOUNTER — HOME CARE VISIT (OUTPATIENT)
Dept: HOME HEALTH SERVICES | Facility: HOME HEALTH | Age: 58
End: 2018-04-24
Payer: MEDICAID

## 2018-04-24 ENCOUNTER — HOME CARE VISIT (OUTPATIENT)
Dept: SCHEDULING | Facility: HOME HEALTH | Age: 58
End: 2018-04-24
Payer: MEDICAID

## 2018-04-24 VITALS — OXYGEN SATURATION: 90 % | HEART RATE: 80 BPM | TEMPERATURE: 98 F | RESPIRATION RATE: 16 BRPM

## 2018-04-24 DIAGNOSIS — R92.8 ABNORMAL MAMMOGRAM: ICD-10-CM

## 2018-04-24 DIAGNOSIS — Z85.3 HX OF BREAST CANCER: ICD-10-CM

## 2018-04-24 PROCEDURE — 77066 DX MAMMO INCL CAD BI: CPT

## 2018-04-24 PROCEDURE — G0152 HHCP-SERV OF OT,EA 15 MIN: HCPCS

## 2018-04-26 ENCOUNTER — HOME CARE VISIT (OUTPATIENT)
Dept: HOME HEALTH SERVICES | Facility: HOME HEALTH | Age: 58
End: 2018-04-26
Payer: MEDICAID

## 2018-04-26 ENCOUNTER — HOME CARE VISIT (OUTPATIENT)
Dept: SCHEDULING | Facility: HOME HEALTH | Age: 58
End: 2018-04-26
Payer: MEDICAID

## 2018-04-26 VITALS
RESPIRATION RATE: 17 BRPM | DIASTOLIC BLOOD PRESSURE: 70 MMHG | TEMPERATURE: 98 F | HEART RATE: 70 BPM | OXYGEN SATURATION: 97 % | SYSTOLIC BLOOD PRESSURE: 125 MMHG

## 2018-04-26 VITALS — TEMPERATURE: 99.8 F | OXYGEN SATURATION: 94 % | HEART RATE: 80 BPM | RESPIRATION RATE: 16 BRPM

## 2018-04-26 PROCEDURE — G0152 HHCP-SERV OF OT,EA 15 MIN: HCPCS

## 2018-04-26 PROCEDURE — G0151 HHCP-SERV OF PT,EA 15 MIN: HCPCS

## 2018-05-01 ENCOUNTER — HOME CARE VISIT (OUTPATIENT)
Dept: SCHEDULING | Facility: HOME HEALTH | Age: 58
End: 2018-05-01
Payer: MEDICAID

## 2018-05-01 PROCEDURE — G0155 HHCP-SVS OF CSW,EA 15 MIN: HCPCS

## 2018-05-02 ENCOUNTER — HOME CARE VISIT (OUTPATIENT)
Dept: SCHEDULING | Facility: HOME HEALTH | Age: 58
End: 2018-05-02
Payer: MEDICAID

## 2018-05-03 ENCOUNTER — HOME CARE VISIT (OUTPATIENT)
Dept: HOME HEALTH SERVICES | Facility: HOME HEALTH | Age: 58
End: 2018-05-03
Payer: MEDICAID

## 2018-05-03 ENCOUNTER — OFFICE VISIT (OUTPATIENT)
Dept: FAMILY MEDICINE CLINIC | Age: 58
End: 2018-05-03

## 2018-05-03 VITALS
OXYGEN SATURATION: 90 % | HEART RATE: 88 BPM | WEIGHT: 293 LBS | RESPIRATION RATE: 18 BRPM | TEMPERATURE: 97.5 F | HEIGHT: 65 IN | BODY MASS INDEX: 48.82 KG/M2 | SYSTOLIC BLOOD PRESSURE: 122 MMHG | DIASTOLIC BLOOD PRESSURE: 86 MMHG

## 2018-05-03 DIAGNOSIS — Z99.2 KIDNEY DISEASE, CHRONIC, END STAGE ON DIALYSIS (HCC): ICD-10-CM

## 2018-05-03 DIAGNOSIS — G89.29 CHRONIC PAIN OF RIGHT ANKLE: ICD-10-CM

## 2018-05-03 DIAGNOSIS — N18.6 KIDNEY DISEASE, CHRONIC, END STAGE ON DIALYSIS (HCC): ICD-10-CM

## 2018-05-03 DIAGNOSIS — M25.571 CHRONIC PAIN OF RIGHT ANKLE: ICD-10-CM

## 2018-05-03 DIAGNOSIS — G89.29 OTHER CHRONIC PAIN: Primary | ICD-10-CM

## 2018-05-03 DIAGNOSIS — Z74.09 LIMITED MOBILITY: ICD-10-CM

## 2018-05-03 RX ORDER — HYDROCODONE BITARTRATE AND ACETAMINOPHEN 7.5; 325 MG/1; MG/1
1 TABLET ORAL
Qty: 60 TAB | Refills: 0 | Status: SHIPPED | OUTPATIENT
Start: 2018-06-02 | End: 2018-07-06 | Stop reason: SDUPTHER

## 2018-05-03 RX ORDER — HYDROCODONE BITARTRATE AND ACETAMINOPHEN 7.5; 325 MG/1; MG/1
1 TABLET ORAL
Qty: 60 TAB | Refills: 0 | Status: SHIPPED | OUTPATIENT
Start: 2018-05-03 | End: 2018-07-06 | Stop reason: SDUPTHER

## 2018-05-03 NOTE — PROGRESS NOTES
Renée Edwards is a 62 y.o. female  Room 8    Chief Complaint   Patient presents with    Follow-up     chronic pain     Pt fell about 3 weeks ago. Face forward. No injuiries. 1. Have you been to the ER, urgent care clinic since your last visit? Hospitalized since your last visit? No    2. Have you seen or consulted any other health care providers outside of the [address] [place] [address] since your last visit? Include any pap smears or colon screening. No    Health Maintenance Due   Topic Date Due    DTaP/Tdap/Td series (1 - Tdap) 05/10/1981    PAP AKA CERVICAL CYTOLOGY  2017       [facility]  Nurse Note for Controlled Substance Refill  Chief Complaint   Patient presents with    Follow-up     chronic pain    Medication Refill      Patient requests refill of: Hydrocodone-Acetamin 7.5-325mg  How medication is being taken: oral  Last dose of medication taken, per patient: 3 days ago   Pain scale and location of pain: 8 for toe and ankle on Right side    Visit Vitals    Ht 5' 5\" (1.651 m)    Wt (!) 366 lb 6.4 oz (166.2 kg)    BMI 60.97 kg/m2       · Reason for medication: steady pain  · Provider for today's encounter : Ihsan Appiah. Jose Adams MD   ·  reviewed by provider: 2018  · MME per day: 15.0  · Treatment agreement/Informed Consent date: 1/3/17 (enter this date and provider in [id number] [facility])  · Last drug screen date: 3/29/18  · Urine provided today: N/A    · PILL COUNT  Today's Date: 2018  PER BOTTLE  Medication name: hydrocodone-acetamin  Pill strength : 7.5-325mg  Instructions on bottle: take 1 tab by mouth twice a day. If needed for pain. maximum 2 tablets per day  Date prescription filled: 2018  Prescribing Provider: Musa Srivastava  # of pills prescribed: 60               # of pills remainin   # pills taking per day: 2   Counted in front of patient. Bottle with contents returned to patient. [facility]  reviewed by provider. Discussed pill count with provider. Per provider, refill medication granted as noted. Follow up as advised. Pt was made aware of provider's decision, and to return in 2 months for an office visit, if one is not already scheduled at this time. Controlled Substance Refill sheet signed by patient and provider. Provided with naloxone prescription, if taking benzodiazepine, prior overdose, substance abuse, or >= 120 MME per day. Continuation of treatment with controlled substance is justified by patient's functional improvements. Patient will benefit from continued prescribing.      Future Appointments  Date Time Provider Vincenzo Licea   5/3/2018 4:00 PM Serene Quintero   5/7/2018 To Be Determined Jazmine Amin PT [address]   5/8/2018 To Be Determined Fransisca Zarco OT [address] [facility]   5/10/2018 To Be Determined Jazmine Amin PT [facility]   5/15/2018 To Be Determined Fransisca Zarco OT [facility]   5/15/2018 To Be Determined Jazmine Amin PT [facility]   5/17/2018 To Be Determined Jazmine Amin PT [address]

## 2018-05-03 NOTE — PROGRESS NOTES
[address] [place] [address] Visit   05/03/2018  Patient ID: Gerhardt Clunes is a 62 y.o. female. Assessment/Plan:    Diagnoses and all orders for this visit:    1. Other chronic pain  -     HYDROcodone-acetaminophen (NORCO) 7.5-325 mg per tablet; Take 1 Tab by mouth two (2) times daily as needed for Pain. Max Daily Amount: 2 Tabs. -     HYDROcodone-acetaminophen (NORCO) 7.5-325 mg per tablet; Take 1 Tab by mouth two (2) times daily as needed for Pain. Max Daily Amount: 2 Tabs. -     miscellaneous medical supply misc; Please provide power wheelchair. See face to face note from 5/3/2018 for more    2. Chronic pain of right ankle  -     miscellaneous medical supply misc; Please provide power wheelchair. See face to face note from 5/3/2018 for more    3. Kidney disease, chronic, end stage on dialysis [facility])  -     miscellaneous medical supply misc; Please provide power wheelchair. See face to face note from 5/3/2018 for more    4. BMI 60.0-69.9, adult ([facility]  -     miscellaneous medical supply misc; Please provide power wheelchair. See face to face note from 5/3/2018 for more    5. Limited mobility  -     miscellaneous medical supply misc; Please provide power wheelchair. See face to face note from 5/3/2018 for more        Pain is adequately controlled with current plan  · Pain is located in and due to:  ¨ right ankle, hands, back into legs  ¨ toe  · Patient's plan currently includes:  ¨ norco, lidocaine, gabapentin, restart PT  ¨ MME/day: 39  ¨ Referral to podiatry  · Functional improvements that justify chronic opioid medications: housework, limited ambulation   · Psychiatric effects when pain is poorly controlled for this patient: ---     · Treatment agreement on file: yes  ·  appropriate and last checked on: 03/29/2018  · Drug Screen: inappropriately negative in December, repeat in March was appropriate. Repeat serum screen again in July.  2 months of medication given  · Counseled against drinking alcohol  · Aberrant behaviors: see above  · Pill count is consistent with her prescription: yes  · Concomitant use of a benzodiazepine: no    See face to face for more    See patient instructions for more. Counselled pt on Patient health concerns and plans. Patient was offered a choice/choices in the treatment plan today. Reviewed return precautions as appropriate. Patient expresses understanding of the plan and agrees with recommendations. Patient Instructions   . TODAY, please go to:     CHECK OUT - If you received a referral, Show the     Please schedule the following appointments at [address] [place] [address]:  Chronic pain follow up with Dr. Grayson Early in July 2018    Today's Plan:  See podiatry  Have ankle xray  I have ordered your power wheelchair          Subjective:   HPI:  Sheila Betancourt is a 62 y.o. female being seen for:   Chief Complaint   Patient presents with    Follow-up     chronic pain    Medication Refill     · Very tired today. No sleep last night. Cares for her mother. She fell. Had to help her up. This was her mother's 2nd or 3rd fall. Has been staying at her mother's house, rather than her own house for the last month. · Drug screen  appropriate last visit  · Ankle XR: did not have done yet  · Referred to podiatry for toe pain. Will see them on May 17th  · Doing HHPT  · At therapy, this week,  felt out of it and dizzy. Lasted less than a minute when walking during therapy. No CP or SOB. No palpitations. Resolved spontaneously. Needs a power wheelchair  · She is unable to use a manual wheelchair well or safely on her own, limited use of left arm d/t fistula. Use of manual wheelchair leads to increased fatigue and worsens chronic pain. Also harder to use manual chair in certain terrains and for someone else to push her. · Unable to use a walker for routine ambulation because she has fallen with one in the past.  Unable to use crutches or cane for the same reasons.    · This would improve mobility, safety, independence. Review of Systems  Otherwise as noted in HPI    Social History     Social History Narrative     History   Smoking Status    Never Smoker   Smokeless Tobacco    Never Used     ? Objective:     Visit Vitals    Pulse 88    Temp 97.5 °F (36.4 °C) (Oral)    Resp 18    Ht 5' 5\" (1.651 m)    Wt (!) 366 lb 6.4 oz (166.2 kg)    SpO2 90%    BMI 60.97 kg/m2   BP reviewed. Wt Readings from Last 3 Encounters:   05/03/18 (!) 366 lb 6.4 oz (166.2 kg)   03/29/18 (!) 370 lb (167.8 kg)   12/28/17 (!) 372 lb (168.7 kg)       Physical Exam   Constitutional: She appears well-developed and well-nourished. No distress. Pulmonary/Chest: Effort normal. No respiratory distress. Neurological: She is alert. Psychiatric: She has a normal mood and affect. Her behavior is normal.       Allergies   Allergen Reactions    Iodine Anaphylaxis    Other Medication Other (comments)     Metal causes numbness in hands,arms all over    Shellfish Containing Products Anaphylaxis    Sulfa (Sulfonamide Antibiotics) Itching     Prior to Admission medications    Medication Sig Start Date End Date Taking? Authorizing Provider   HYDROcodone-acetaminophen (NORCO) 7.5-325 mg per tablet Take 1 Tab by mouth two (2) times daily as needed for Pain. Max Daily Amount: 2 Tabs. 6/2/18  Yes Preethi Enciso MD   HYDROcodone-acetaminophen [facility]) 7.5-325 mg per tablet Take 1 Tab by mouth two (2) times daily as needed for Pain. Max Daily Amount: 2 Tabs. 5/3/18  Yes Hayde Lynn. Radha Enciso MD   [facility] Please provide power wheelchair. See face to face note from 5/3/2018 for more 5/3/18  Yes Hayde Lynn. Radha Enciso MD   [facility] bariatric tube transfer bench 4/18/18  Yes Hayde Lynn. Radha Enciso MD   [facility] bariatric 3-n-1 commode 4/18/18  Yes Hayde Lynn. Radha Enciso MD   montelukast (SINGULAIR) 10 mg tablet take 1 tablet by mouth daily 2/3/18  Yes Hayde Lynn.  Radha Enciso, MD   letrozole [facility]) 2.5 mg tablet take 1 tablet by mouth once daily 1/19/18  Yes Layne Ghosh. Sindi Melissa MD   magnesium oxide (MAG-OX) 400 mg tablet take 1 tablet by mouth daily 1/17/18  Yes Layne Ghosh. Sindi Melissa MD   SENSIPAR 60 mg tab take 1 tablet by mouth once daily 12/19/17  Yes Historical Provider   RENVELA 2.4 gram pwpk oral powder Take 1 Packet by mouth three (3) times daily (with meals). 12/28/17  Yes Layne Ghosh. Sindi Melissa MD   cholecalciferol (VITAMIN D3) 1,000 unit tablet Take 2 Tabs by mouth daily. 11/27/17  Yes [address] Sindi Melissa MD   mineral oil-hydrophil petrolat (AQUAPHOR) ointment Apply  to affected area as needed for Dry Skin or Itching. 11/14/17  Yes [address] Sindi Melissa MD   omeprazole (PRILOSEC) 20 mg capsule take 1 capsule by mouth twice a day 9/28/17  Yes Layne Ghosh. Sindi Melissa MD   lidocaine (XYLOCAINE) 4 % topical cream  6/5/17  Yes Historical Provider   midodrine (PROAMITINE) 10 mg tablet  6/9/17  Yes Historical Provider   buPROPion XL (WELLBUTRIN XL) 150 mg tablet take 1 tablet by mouth every morning 7/18/17  Yes Layne Ghosh. Sindi Melissa MD   atorvastatin (LIPITOR) 10 mg tablet take 1 tablet by mouth NIGHTLY. 7/5/17  Yes Layne Ghosh. Sindi Melissa MD   gabapentin (NEURONTIN) 300 mg capsule Take 2-3 Caps by mouth three (3) times daily as needed. 5/25/17  Yes Layne Ghosh. MD yamila rConin medical supply misc Please dispense 1 single point bariatric cane 4/6/17  Yes Layne Ghosh. MD yamila Cronin medical supply misc Please dispense 1 bariatric wheelchair 4/6/17  Yes Layne Ghosh. Sindi Melissa MD   ECOTRIN 325 mg tablet take 1 tablet by mouth once daily 12/15/16  Yes Historical Provider   lidocaine 4 % gel 1 Inch by Apply Externally route two (2) times daily as needed. To ankle for pain 12/12/16  Yes Layne Ghosh. Sindi Melissa MD   albuterol (PROVENTIL VENTOLIN) 2.5 mg /3 mL (0.083 %) nebulizer solution 3 mL by Nebulization route every four (4) hours as needed for Wheezing or Shortness of Breath. 11/15/16  Yes Layne Ghosh.  Sindi Melissa MD inhalational spacing device 1 Each by Does Not Apply route as needed. 11/15/16  Yes Brandt Aspinwall. Allyson Nagy MD   albuterol (PROVENTIL HFA, VENTOLIN HFA, PROAIR HFA) 90 mcg/actuation inhaler Take 1-2 Puffs by inhalation every four (4) hours as needed for Wheezing. 11/15/16  Yes Brandt Aspinwall. Allyson Nagy MD   AQUAPHOR HEALING 41 % ointment Apply  to affected area as needed for Dry Skin. 8/23/16  Yes Orion William MD   HYDROcodone-acetaminophen [facility]) 7.5-325 mg per tablet Take 1 Tab by mouth two (2) times daily as needed for Pain. Max Daily Amount: 2 Tabs. 3/29/18   Harmeet Nagy MD   midodrine (PROAMITINE) 5 mg tablet Take 10 mg by mouth three (3) times daily (with meals). 6/29/16   Historical Provider   SENSIPAR 30 mg tablet Take 30 mg by mouth daily.  2/12/16   Historical Provider     Patient Active Problem List   Diagnosis Code    [facility] I50.9    Asthma J45.909    Kidney disease, chronic, end stage on dialysis [facility] N18.6, Z99.2    GERD (gastroesophageal reflux disease) K21.9    Unspecified sleep apnea G47.30    Depression F32.9    Vitamin D deficiency E55.9    Chronic ankle pain M25.579, G89.29    Obesity hypoventilation syndrome (HCC) E66.2    DCIS (ductal carcinoma in situ) of breast D05.10    Recurrent depression ([facility] F33.9    BMI 60.0-69.9, adult ([facility] U36.83

## 2018-05-03 NOTE — PATIENT INSTRUCTIONS
Jackson Mcdonald TODAY, please go to:     CHECK OUT - If you received a referral, Show the     Please schedule the following appointments at [facility] OUT:  Chronic pain follow up with Dr. Jodee Romo in July 2018    Today's Plan:  See podiatry  Have ankle xray  I have ordered your power wheelchair

## 2018-05-03 NOTE — PROGRESS NOTES
[address] [facility]  Face to Face Encounter    Patients Name: Konrad Beltran    YOB: 1960    Primary Diagnosis: ESRD    Date of Face to Face:   5/3/2018                                     Face to Face Encounter findings are related to primary reason for home care:   yes    1. I certify that the patient needs intermittent skilled nursing care, physical therapy and/or speech therapy. I will be following this patient in the Community and will be responsible for reviewing and signing the [address] 2. Initial Orders for Care: Must be completed only if Face to Face MD will not be signing the [address] Power wheelchair. 3. I certify that this patient is homebound for the following reason(s): Requires considerable and taxing effort to leave the home  and Requires the assistance of 1 or more persons to leave the home     4. I certify that this patient is under my care and that I, or a nurse practitioner or [phone] working with me, had a Face-to-Face Encounter that meets the physician Face-to-Face Encounter requirements. Document the physical findings from the Face-to-Face Encounter that support the need for skilled services:     Patient is being seen for a face to face to obtain a power wheelchair  Ms. Rod Lopez needs the power wheelchair because of unsteady gait, poor endurance, limited mobility related to chronic pain, ESRD on dialysis, and obesity. · She is unable to use a manual wheelchair well or safely on her own. She has limited use of left arm d/t fistula. Her poor endurance prevents her from being able to use a manual wheelchair well. Attempting to use a manual wheelchair also worsens chronic pain. She is unable to use a manual wheelchair in some terrains also. · Unable to use a walker for routine ambulation because of her poor stability and balance. Unable to use crutches or cane for the same reasons.    · The use of a power wheelchair would improve mobility, safety, independence. Tejinder Russell MD  5/3/2018

## 2018-05-03 NOTE — MR AVS SNAPSHOT
[address] [facility] 
 
 
 [address] [facility] 
[address] [place] [address] 
[phone] Patient: Renée Edwards MRN: [id number] Visit Information Date & Time Provider Department Dept. Phone Encounter #  
 5/3/2018 11:00 AM [facility] [phone] [id number] Upcoming Health Maintenance Date Due DTaP/Tdap/Td series (1 - Tdap) 5/10/1981 PAP AKA CERVICAL CYTOLOGY 9/3/2017 Influenza Age 5 to Adult 8/1/2018 BREAST CANCER SCRN MAMMOGRAM 4/24/2020 COLONOSCOPY 8/18/2021 Pneumococcal 19-64 Highest Risk (3 of 3 - PPSV23) 1/3/2022 Allergies as of 5/3/2018  Review Complete On: 5/3/2018 By: Luna Frias LPN Severity Noted Reaction Type Reactions Iodine High 08/18/2011   Systemic Anaphylaxis Other Medication  09/03/2014   Systemic Other (comments) Metal causes numbness in hands,arms all over Shellfish Containing Products  08/18/2011   Systemic Anaphylaxis Sulfa (Sulfonamide Antibiotics)  03/10/2016    Itching Current Immunizations  Reviewed on 11/15/2016 Name Date Influenza Vaccine 11/19/2013 Influenza Vaccine Glenis Sandy) 10/1/2015 Influenza Vaccine (Quad) PF 11/15/2016, 2/16/2015  2:30 PM  
 Pneumococcal Conjugate (PCV-13) 2/16/2015  2:31 PM  
  
 Not reviewed this visit You Were Diagnosed With   
  
 Codes Comments Other chronic pain    -  Primary ICD-10-CM: G89.29 ICD-9-CM: 338.29 Chronic pain of right ankle     ICD-10-CM: M25.571, G89.29 ICD-9-CM: 719.47, 338.29 Kidney disease, chronic, end stage on dialysis ([facility]     ICD-10-CM: N18.6, Z99.2 ICD-9-CM: 585.6, V45.11 BMI 60.0-69.9, adult [facility])     ICD-10-CM: Z68.44 
ICD-9-CM: V85.44 Limited mobility     ICD-10-CM: Z74.09 
ICD-9-CM: V49.89 Vitals  Pulse Temp Resp Height(growth percentile) Weight(growth percentile) SpO2  
 88 97.5 °F (36.4 °C) (Oral) 18 5' 5\" (1.651 m) (!) 366 lb 6.4 oz (166.2 kg) 90% BMI OB Status Smoking Status 60.97 kg/m2 Postmenopausal Never Smoker Vitals History BMI and BSA Data Body Mass Index Body Surface Area 60.97 kg/m 2 2.76 m 2 Preferred Pharmacy Pharmacy Name [facility] [phone] Your Updated Medication List  
  
   
This list is accurate as of 5/3/18 12:20 PM.  Always use your most recent med list.  
  
  
  
  
 * albuterol 2.5 mg /3 mL (0.083 %) nebulizer solution Commonly known as:  PROVENTIL VENTOLIN  
3 mL by Nebulization route every four (4) hours as needed for Wheezing or Shortness of Breath. * albuterol 90 mcg/actuation inhaler Commonly known as:  PROVENTIL HFA, VENTOLIN HFA, PROAIR HFA Take 1-2 Puffs by inhalation every four (4) hours as needed for Wheezing. AQUAPHOR HEALING 41 % ointment Generic drug:  pantothenic ac-min oil-pet,hyd Apply  to affected area as needed for Dry Skin. atorvastatin 10 mg tablet Commonly known as:  LIPITOR  
take 1 tablet by mouth NIGHTLY. buPROPion  mg tablet Commonly known as:  WELLBUTRIN XL  
take 1 tablet by mouth every morning  
  
 cholecalciferol 1,000 unit tablet Commonly known as:  VITAMIN D3 Take 2 Tabs by mouth daily. ECOTRIN 325 mg tablet Generic drug:  aspirin delayed-release  
take 1 tablet by mouth once daily  
  
 gabapentin 300 mg capsule Commonly known as:  NEURONTIN Take 2-3 Caps by mouth three (3) times daily as needed. * HYDROcodone-acetaminophen 7.5-325 mg per tablet Commonly known as:  Bernadette Figueroa Take 1 Tab by mouth two (2) times daily as needed for Pain. Max Daily Amount: 2 Tabs. * HYDROcodone-acetaminophen 7.5-325 mg per tablet Commonly known as:  Bernadette Figueroa Take 1 Tab by mouth two (2) times daily as needed for Pain. Max Daily Amount: 2 Tabs. * HYDROcodone-acetaminophen 7.5-325 mg per tablet Commonly known as:  Parish Jacinto Take 1 Tab by mouth two (2) times daily as needed for Pain. Max Daily Amount: 2 Tabs. Start taking on:  6/2/2018  
  
 inhalational spacing device 1 Each by Does Not Apply route as needed. letrozole 2.5 mg tablet Commonly known as:  FEMARA  
take 1 tablet by mouth once daily * lidocaine 4 % Gel 1 Inch by Apply Externally route two (2) times daily as needed. To ankle for pain * lidocaine 4 % topical cream  
Commonly known as:  XYLOCAINE  
  
 magnesium oxide 400 mg tablet Commonly known as:  MAG-OX  
take 1 tablet by mouth daily * midodrine 5 mg tablet Commonly known as:  Abdirahman Bounds Take 10 mg by mouth three (3) times daily (with meals). * midodrine 10 mg tablet Commonly known as:  PROAMITINE  
  
 mineral oil-hydrophil petrolat ointment Commonly known as:  AQUAPHOR Apply  to affected area as needed for Dry Skin or Itching. * miscellaneous medical supply Misc Please dispense 1 single point bariatric cane * miscellaneous medical supply Misc Please dispense 1 bariatric wheelchair * miscellaneous medical supply [facility]  
bariatric tube transfer bench * [facility]  
bariatric 3-n-1 commode * miscellaneous medical supply Misc Please provide power wheelchair. See face to face note from 5/3/2018 for more  
  
 montelukast 10 mg tablet Commonly known as:  SINGULAIR  
take 1 tablet by mouth daily  
  
 omeprazole 20 mg capsule Commonly known as:  PRILOSEC  
take 1 capsule by mouth twice a day RENVELA 2.4 gram Pwpk oral powder Generic drug:  sevelamer carbonate Take 1 Packet by mouth three (3) times daily (with meals). * SENSIPAR 30 mg tablet Generic drug:  cinacalcet Take 30 mg by mouth daily. * SENSIPAR 60 mg Tab Generic drug:  cinacalcet  
take 1 tablet by mouth once daily * Notice:   This list has 16 medication(s) that are the same as other medications prescribed for you. Read the directions carefully, and ask your doctor or other care provider to review them with you. Prescriptions Printed Refills HYDROcodone-acetaminophen (NORCO) 7.5-325 mg per tablet 0 Starting on: 6/2/2018 Sig: Take 1 Tab by mouth two (2) times daily as needed for Pain. Max Daily Amount: 2 Tabs. Class: Print Route: Oral  
 HYDROcodone-acetaminophen (NORCO) 7.5-325 mg per tablet 0 Sig: Take 1 Tab by mouth two (2) times daily as needed for Pain. Max Daily Amount: 2 Tabs. Class: Print Route: Oral  
 miscellaneous medical supply misc 0 Sig: Please provide power wheelchair. See face to face note from 5/3/2018 for more Class: Print To-Do List   
 05/03/2018 4:00 PM  
  Appointment with Srinath Stewart PT at [place] [address]  
  
 05/07/2018 To Be Determined Appointment with Srinath Stewart PT at [place] [address]  
  
 05/08/2018 To Be Determined Appointment with Sangeetha Wells OT at [place] [address]  
  
 05/10/2018 To Be Determined Appointment with Srinath Stewart PT at [facility]   
  
 05/15/2018 To Be Determined Appointment with Sangeetha Wells OT at [place] [address]  
  
 05/15/2018 To Be Determined Appointment with Srinath Stewart PT at [place] [address]  
  
 05/17/2018 To Be Determined Appointment with Srinath Stewart PT at [place] [address] Patient Instructions Zoe Blackman TODAY, please go to: CHECK OUT - If you received a referral, Show the  Please schedule the following appointments at [facility] OUT: 
Chronic pain follow up with Dr. Geovanny Vora in July 2018 Today's Plan: 
See podiatry Have ankle xray I have ordered your power wheelchair Introducing [facility]! [employer] introduces [employer] patient portal. Now you can access parts of your medical record, email your doctor's office, and request medication refills online. 1. In your internet browser, go to [email or web address] 2. Click on the First Time User? Click Here link in the Sign In box. You will see the New Member Sign Up page. 3. Enter your [employer] Access Code exactly as it appears below. You will not need to use this code after youve completed the sign-up process. If you do not sign up before the expiration date, you must request a new code. · [employer] Access Code: [email or web address] Expires: 6/27/2018 12:12 PM 
 
4. Enter the last four digits of your Social Security Number (xxxx) and Date of Birth (mm/dd/yyyy) as indicated and click Submit. You will be taken to the next sign-up page. 5. Create a Mochila ID. This will be your [employer]ila login ID and cannot be changed, so think of one that is secure and easy to remember. 6. Create a M[employer]ila password. You can change your password at any time. 7. Enter your Password Reset Question and Answer. This can be used at a later time if you forget your password. 8. Enter your e-mail address. You will receive e-mail notification when new information is available in [address] 9. Click Sign Up. You can now view and download portions of your medical record. 10. Click the Download Summary menu link to download a portable copy of your medical information. If you have questions, please visit the Frequently Asked Questions section of the M[employer] website. Remember, [employer]ila is NOT to be used for urgent needs. For medical emergencies, dial 911. Now available from your iPhone and Android! Please provide this summary of care documentation to your next provider. Your primary care clinician is listed as Uche Del Valle. If you have any questions after today's visit, please call [phone].

## 2018-05-08 ENCOUNTER — HOME CARE VISIT (OUTPATIENT)
Dept: SCHEDULING | Facility: HOME HEALTH | Age: 58
End: 2018-05-08
Payer: MEDICAID

## 2018-05-08 VITALS
HEART RATE: 70 BPM | SYSTOLIC BLOOD PRESSURE: 124 MMHG | RESPIRATION RATE: 17 BRPM | OXYGEN SATURATION: 98 % | DIASTOLIC BLOOD PRESSURE: 70 MMHG | TEMPERATURE: 98 F

## 2018-05-08 PROCEDURE — G0151 HHCP-SERV OF PT,EA 15 MIN: HCPCS

## 2018-05-08 PROCEDURE — G0152 HHCP-SERV OF OT,EA 15 MIN: HCPCS

## 2018-05-09 VITALS — TEMPERATURE: 98.3 F | HEART RATE: 80 BPM | RESPIRATION RATE: 16 BRPM | OXYGEN SATURATION: 92 %

## 2018-05-10 ENCOUNTER — HOME CARE VISIT (OUTPATIENT)
Dept: HOME HEALTH SERVICES | Facility: HOME HEALTH | Age: 58
End: 2018-05-10
Payer: MEDICAID

## 2018-05-15 ENCOUNTER — HOME CARE VISIT (OUTPATIENT)
Dept: SCHEDULING | Facility: HOME HEALTH | Age: 58
End: 2018-05-15
Payer: MEDICAID

## 2018-05-15 VITALS
OXYGEN SATURATION: 98 % | TEMPERATURE: 98 F | SYSTOLIC BLOOD PRESSURE: 126 MMHG | DIASTOLIC BLOOD PRESSURE: 80 MMHG | HEART RATE: 70 BPM | RESPIRATION RATE: 17 BRPM

## 2018-05-15 PROCEDURE — G0151 HHCP-SERV OF PT,EA 15 MIN: HCPCS

## 2018-05-18 ENCOUNTER — HOME CARE VISIT (OUTPATIENT)
Dept: SCHEDULING | Facility: HOME HEALTH | Age: 58
End: 2018-05-18
Payer: MEDICAID

## 2018-05-22 ENCOUNTER — HOME CARE VISIT (OUTPATIENT)
Dept: SCHEDULING | Facility: HOME HEALTH | Age: 58
End: 2018-05-22
Payer: MEDICAID

## 2018-05-22 PROCEDURE — G0152 HHCP-SERV OF OT,EA 15 MIN: HCPCS

## 2018-05-30 DIAGNOSIS — M54.31 RIGHT SIDED SCIATICA: ICD-10-CM

## 2018-05-30 DIAGNOSIS — G56.03 BILATERAL CARPAL TUNNEL SYNDROME: ICD-10-CM

## 2018-06-01 RX ORDER — GABAPENTIN 300 MG/1
CAPSULE ORAL
Qty: 270 CAP | Refills: 11 | Status: ON HOLD | OUTPATIENT
Start: 2018-06-01 | End: 2020-10-22

## 2018-06-01 NOTE — TELEPHONE ENCOUNTER
PCP: Godfrey Haley. Carlos Nuñez MD    Last appt: 5/3/2018  Future Appointments  Date Time Provider Vincenzo Licea   7/5/2018 11:00 AM Ace Nuñze MD [facility]       Requested Prescriptions     Pending Prescriptions Disp Refills    gabapentin (NEURONTIN) 300 mg capsule [Pharmacy Med Name: GABAPENTIN 300 MG CAPSULE] 270 Cap 11     Sig: take 2 to 3 capsules by mouth three times a day if needed       Prior labs and Blood pressures:  BP Readings from Last 3 Encounters:   05/15/18 126/80   05/08/18 124/70   05/03/18 122/86     Lab Results   Component Value Date/Time    Sodium 144 10/01/2015 12:18 PM    Potassium 5.0 10/01/2015 12:18 PM    Chloride 103 10/01/2015 12:18 PM    CO2 25 10/01/2015 12:18 PM    Glucose 79 10/01/2015 12:18 PM    BUN 38 (H) 10/01/2015 12:18 PM    Creatinine 3.94 (H) 10/01/2015 12:18 PM    BUN/Creatinine ratio 10 10/01/2015 12:18 PM    GFR est AA 14 (L) 10/01/2015 12:18 PM    GFR est non-AA 12 (L) 10/01/2015 12:18 PM    Calcium 8.8 10/01/2015 12:18 PM     No results found for: HBA1C, HGBE8, RWK5XXRX, QTK8QGEQ  Lab Results   Component Value Date/Time    Cholesterol, total 195 10/01/2015 12:18 PM    HDL Cholesterol 69 10/01/2015 12:18 PM    LDL, calculated 111 (H) 10/01/2015 12:18 PM    VLDL, calculated 15 10/01/2015 12:18 PM    Triglyceride 73 10/01/2015 12:18 PM     Lab Results   Component Value Date/Time    VITAMIN D, 25-HYDROXY 18.7 (L) 10/01/2015 12:18 PM       Lab Results   Component Value Date/Time    TSH 2.390 10/01/2015 12:18 PM

## 2018-06-22 ENCOUNTER — TELEPHONE (OUTPATIENT)
Dept: FAMILY MEDICINE CLINIC | Age: 58
End: 2018-06-22

## 2018-06-22 NOTE — TELEPHONE ENCOUNTER
----- Message from Noble Carlson sent at 6/22/2018  2:31 PM EDT -----  Regarding: Dr. Shaw Lie: [phone]  OXRNEVELYNE from [facility] called in ref to an order for pt power wheelchair. [employer] faxed over request a few weeks ago and has not received a response. Cait Perez stated she will get them to refax but still needs a nurse to call her. Contact number [phone].

## 2018-06-29 NOTE — TELEPHONE ENCOUNTER
Please call cathy. This was completed. If they do not have copy, please ask them to re-fax documents that need signature and I will complete again. Chandrika Flowers

## 2018-07-06 ENCOUNTER — OFFICE VISIT (OUTPATIENT)
Dept: FAMILY MEDICINE CLINIC | Age: 58
End: 2018-07-06

## 2018-07-06 ENCOUNTER — TELEPHONE (OUTPATIENT)
Dept: FAMILY MEDICINE CLINIC | Age: 58
End: 2018-07-06

## 2018-07-06 VITALS
DIASTOLIC BLOOD PRESSURE: 52 MMHG | WEIGHT: 293 LBS | HEIGHT: 65 IN | RESPIRATION RATE: 18 BRPM | SYSTOLIC BLOOD PRESSURE: 102 MMHG | TEMPERATURE: 98.6 F | BODY MASS INDEX: 48.82 KG/M2 | HEART RATE: 95 BPM

## 2018-07-06 DIAGNOSIS — N18.6 KIDNEY DISEASE, CHRONIC, END STAGE ON DIALYSIS (HCC): ICD-10-CM

## 2018-07-06 DIAGNOSIS — Z99.2 KIDNEY DISEASE, CHRONIC, END STAGE ON DIALYSIS (HCC): ICD-10-CM

## 2018-07-06 DIAGNOSIS — G89.29 OTHER CHRONIC PAIN: Primary | ICD-10-CM

## 2018-07-06 DIAGNOSIS — M25.571 CHRONIC PAIN OF RIGHT ANKLE: ICD-10-CM

## 2018-07-06 DIAGNOSIS — Z79.891 LONG TERM PRESCRIPTION OPIATE USE: ICD-10-CM

## 2018-07-06 DIAGNOSIS — G89.29 CHRONIC PAIN OF RIGHT ANKLE: ICD-10-CM

## 2018-07-06 RX ORDER — HYDROCODONE BITARTRATE AND ACETAMINOPHEN 7.5; 325 MG/1; MG/1
1 TABLET ORAL
Qty: 60 TAB | Refills: 0 | Status: SHIPPED | OUTPATIENT
Start: 2018-09-04 | End: 2018-10-11 | Stop reason: SDUPTHER

## 2018-07-06 RX ORDER — HYDROCODONE BITARTRATE AND ACETAMINOPHEN 7.5; 325 MG/1; MG/1
1 TABLET ORAL
Qty: 60 TAB | Refills: 0 | Status: SHIPPED | OUTPATIENT
Start: 2018-07-06 | End: 2018-10-11 | Stop reason: SDUPTHER

## 2018-07-06 RX ORDER — HYDROCODONE BITARTRATE AND ACETAMINOPHEN 7.5; 325 MG/1; MG/1
1 TABLET ORAL
Qty: 60 TAB | Refills: 0 | Status: SHIPPED | OUTPATIENT
Start: 2018-08-05 | End: 2018-10-11 | Stop reason: SDUPTHER

## 2018-07-06 NOTE — PROGRESS NOTES
[address] [place] [address] Visit   07/06/2018  Patient ID: Yogesh Myers is a 62 y.o. female. Assessment/Plan:    Diagnoses and all orders for this visit:    1. Other chronic pain  -     HYDROcodone-acetaminophen (NORCO) 7.5-325 mg per tablet; Take 1 Tab by mouth two (2) times daily as needed for Pain. Max Daily Amount: 2 Tabs. -     HYDROcodone-acetaminophen (NORCO) 7.5-325 mg per tablet; Take 1 Tab by mouth two (2) times daily as needed for Pain. Max Daily Amount: 2 Tabs. -     HYDROcodone-acetaminophen (NORCO) 7.5-325 mg per tablet; Take 1 Tab by mouth two (2) times daily as needed for Pain. Max Daily Amount: 2 Tabs. -     13-DRUG SCREEN W/CONFIRM, WHOLE BLOOD    2. Kidney disease, chronic, end stage on [facility] ([facility]  -     13-DRUG SCREEN W/CONFIRM, WHOLE BLOOD    3. Chronic pain of right ankle  -     13-DRUG SCREEN W/CONFIRM, WHOLE BLOOD    4. Long term prescription opiate use  -     13-DRUG SCREEN W/CONFIRM, WHOLE BLOOD      Pain is adequately controlled with current plan  · Pain is located in and due to:  ¨ right ankle, hands, back into legs  ¨ Toe- prior fracture  · Patient's plan currently includes:  ¨ norco, lidocaine, gabapentin  ¨ MME/day: 39  ¨ Referral to podiatry  · Functional improvements that justify chronic opioid medications: housework, limited ambulation   · Psychiatric effects when pain is poorly controlled for this patient: ---      · Treatment agreement on file: yes  ·  appropriate and last checked on: 03/29/2018  · Drug Screen: 7/6/2018   · Counseled against drinking alcohol  · Aberrant behaviors: see above  · Pill count is consistent with her prescription: yes  · Concomitant use of a benzodiazepine: no    See patient instructions for more. Counselled pt on Patient health concerns and plans. Patient was offered a choice/choices in the treatment plan today. Reviewed return precautions as appropriate.    Patient expresses understanding of the plan and agrees with recommendations. Patient Instructions   TODAY, please go to:   CHECK OUT - If you received a referral, Show the    LAB    [facility] the [facility]  · Chronic pain follow up with Dr. Homer Cobb in October 2018      Today's Plan:    Continue follow up with podiatry    Subjective:   HPI:  Rip Chacko is a 62 y.o. female being seen for:   Chief Complaint   Patient presents with    Medication Refill     Hydrocodone/acetaminophen 7.5/325      toe pain: found out she had a remote fracture that healed incorrectly. She is working with podiatrist to explore management. May need surgery. Rip Chacko RTC today to follow up on chronic pain diagnosis. We discussed her ankle plain, hands, legs, toe pain that is affecting her . Significant changes since last visit. She is able to do her normal daily activities. She reports the following adverse side effects: no    Least pain over the last week has been 6/10. Worst pain over the last week has been 8/10. Aberrant behaviors: none this encounter    Urine Drug Screen: due for blood specimen today    Controlled substance agreement on file: yes 7/6/2018      reviewed: yes 7/6/2018 and appropriate     Pill count is consistent with her prescription:  Yes     Concomitant use of a benzodiazepine: no        Screening and Prevention Due:  Health Maintenance Due   Topic Date Due    DTaP/Tdap/Td series (1 - Tdap) 05/10/1981    PAP AKA CERVICAL CYTOLOGY  09/03/2017         Review of Systems  Otherwise as noted in HPI    Social History     Social History Narrative     History   Smoking Status    Never Smoker   Smokeless Tobacco    Never Used     ?   Objective:     Visit Vitals    /52 (BP 1 Location: Right arm, BP Patient Position: Sitting)    Pulse 95    Temp 98.6 °F (37 °C) (Oral)    Resp 18    Ht 5' 5\" (1.651 m)    Wt (!) 370 lb 9.6 oz (168.1 kg)    BMI 61.67 kg/m2       BP Readings from Last 3 Encounters:   07/06/18 102/52   05/15/18 126/80   05/08/18 124/70       Wt Readings from Last 3 Encounters:   07/06/18 (!) 370 lb 9.6 oz (168.1 kg)   05/03/18 (!) 366 lb 6.4 oz (166.2 kg)   03/29/18 (!) 370 lb (167.8 kg)       Physical Exam   Constitutional: She appears well-developed and well-nourished. No distress. Pulmonary/Chest: Effort normal. No respiratory distress. Neurological: She is alert. Psychiatric: She has a normal mood and affect. Her behavior is normal.       Allergies   Allergen Reactions    Iodine Anaphylaxis    Other Medication Other (comments)     Metal causes numbness in hands,arms all over    Shellfish Containing Products Anaphylaxis    Sulfa (Sulfonamide Antibiotics) Itching     Prior to Admission medications    Medication Sig Start Date End Date Taking? Authorizing Provider   HYDROcodone-acetaminophen (NORCO) 7.5-325 mg per tablet Take 1 Tab by mouth two (2) times daily as needed for Pain. Max Daily Amount: 2 Tabs. 7/6/18  Yes Ludwig Degroot MD   HYDROcodone-acetaminophen [facility]) 7.5-325 mg per tablet Take 1 Tab by mouth two (2) times daily as needed for Pain. Max Daily Amount: 2 Tabs. 8/5/18  Yes Ludwig Degroot MD   HYDROcodone-acetaminophen [facility]) 7.5-325 mg per tablet Take 1 Tab by mouth two (2) times daily as needed for Pain. Max Daily Amount: 2 Tabs. 9/4/18  Yes Ludwig Degroot MD   gabapentin (NEURONTIN) 300 mg capsule take 2 to 3 capsules by mouth three times a day if needed 6/1/18  Yes Nonda Fearing. Yoly Degroot MD   miscellaneous medical supply [facility] bariatric tube transfer bench 4/18/18  Yes Nonda Fearing. Yoly Degroot MD   montelukast (SINGULAIR) 10 mg tablet take 1 tablet by mouth daily 2/3/18  Yes Nonda Fearing. Yoly Degroot MD   letrozole [facility]) 2.5 mg tablet take 1 tablet by mouth once daily 1/19/18  Yes Nonda Fearing. Yoly Degroot MD   magnesium oxide (MAG-OX) 400 mg tablet take 1 tablet by mouth daily 1/17/18  Yes Nonda Fearing.  Yoly Degroot MD   SENSIPAR 60 mg tab take 1 tablet by mouth once daily 12/19/17  Yes Historical Provider   RENVELA 2.4 gram pwpk oral powder Take 1 Packet by mouth three (3) times daily (with meals). 12/28/17  Yes Melquiades Lilly. Raimundo Mann MD   cholecalciferol (VITAMIN D3) 1,000 unit tablet Take 2 Tabs by mouth daily. 11/27/17  Yes Levon Mann MD   mineral oil-hydrophil petrolat (AQUAPHOR) ointment Apply  to affected area as needed for Dry Skin or Itching. 11/14/17  Yes Levon Mann MD   omeprazole (PRILOSEC) 20 mg capsule take 1 capsule by mouth twice a day 9/28/17  Yes Melquiades Lilly. Raimundo Mann MD   lidocaine (XYLOCAINE) 4 % topical cream  6/5/17  Yes Historical Provider   midodrine (PROAMITINE) 10 mg tablet Take 10 mg by mouth daily. 6/9/17  Yes Historical Provider   buPROPion XL (WELLBUTRIN XL) 150 mg tablet take 1 tablet by mouth every morning 7/18/17  Yes Melquiades Lilly. Raimundo Mann MD   atorvastatin (LIPITOR) 10 mg tablet take 1 tablet by mouth NIGHTLY. 7/5/17  Yes Melquiades Lilly. Raimundo Mann MD   [facility] misc Please dispense 1 bariatric wheelchair 4/6/17  Yes Melquiades Lilly. Raimundo Mann MD   ECOTRIN 325 mg tablet take 1 tablet by mouth once daily 12/15/16  Yes Historical Provider   lidocaine 4 % gel 1 Inch by Apply Externally route two (2) times daily as needed. To ankle for pain 12/12/16  Yes Melquiades Lilly. Raimundo Mann MD   albuterol (PROVENTIL VENTOLIN) 2.5 mg /3 mL (0.083 %) nebulizer solution 3 mL by Nebulization route every four (4) hours as needed for Wheezing or Shortness of Breath. 11/15/16  Yes Melquiades Mann MD   albuterol (PROVENTIL HFA, VENTOLIN HFA, PROAIR HFA) 90 mcg/actuation inhaler Take 1-2 Puffs by inhalation every four (4) hours as needed for Wheezing. 11/15/16  Yes Melquiades Eubanks Fergusson, MD   miscellaneous medical supply misc Please provide power wheelchair. See face to face note from 5/3/2018 for more 5/3/18   Melquiades Lilly. Raimundo Mann MD   miscellaneous medical supply [facility] bariatric 3-n-1 commode 4/18/18   Melquiades Lilly.  Raimundo Mann MD   miscellaneous medical supply misc Please dispense 1 single point bariatric cane 4/6/17   Delories Left. Db Beverly MD   inhalational spacing device 1 Each by Does Not Apply route as needed. 11/15/16   Delories Left. Db Beverly MD   AQUAPHOR HEALING 41 % ointment Apply  to affected area as needed for Dry Skin.  8/23/16   Martin Cintron MD     Patient Active Problem List   Diagnosis Code    Heart failure ([facility] I50.9    Asthma J45.909    Kidney disease, chronic, end stage on dialysis ([facility] N18.6, Z99.2    GERD (gastroesophageal reflux disease) K21.9    Unspecified sleep apnea G47.30    Depression F32.9    Vitamin D deficiency E55.9    Chronic ankle pain M25.579, G89.29    Obesity hypoventilation syndrome (HCC) E66.2    DCIS (ductal carcinoma in situ) of breast D05.10    Recurrent depression ([facility] F33.9    BMI 60.0-69.9, adult ([facility] Z68.44

## 2018-07-06 NOTE — MR AVS SNAPSHOT
[address] [place] 
[address] [facility] [address] 
[phone] Patient: Nichol Caruso MRN: [id number] OMS:[birth year] Visit Information Date & Time Provider Department Dept. Phone Encounter #  
 7/6/2018 12:00 PM Tanika Austin MD [facility] [phone] [id number] Upcoming Health Maintenance Date Due DTaP/Tdap/Td series (1 - Tdap) 5/10/1981 PAP AKA CERVICAL CYTOLOGY 9/3/2017 Influenza Age 5 to Adult 8/1/2018 BREAST CANCER SCRN MAMMOGRAM 4/24/2020 COLONOSCOPY 8/18/2021 Pneumococcal 19-64 Highest Risk (3 of 3 - PPSV23) 1/3/2022 Allergies as of 7/6/2018  Review Complete On: 7/6/2018 By: Mayela Miller LPN Severity Noted Reaction Type Reactions Iodine High 08/18/2011   Systemic Anaphylaxis Other Medication  09/03/2014   Systemic Other (comments) Metal causes numbness in hands,arms all over Shellfish Containing Products  08/18/2011   Systemic Anaphylaxis Sulfa (Sulfonamide Antibiotics)  03/10/2016    Itching Current Immunizations  Reviewed on 11/15/2016 Name Date Influenza Vaccine 11/19/2013 Influenza Vaccine Nanda Goody) 10/1/2015 Influenza Vaccine (Quad) PF 11/15/2016, 2/16/2015  2:30 PM  
 Pneumococcal Conjugate (PCV-13) 2/16/2015  2:31 PM  
  
 Not reviewed this visit You Were Diagnosed With   
  
 Codes Comments Other chronic pain     ICD-10-CM: G89.29 ICD-9-CM: 338.29 Vitals BP Pulse Temp Resp Height(growth percentile) Weight(growth percentile) 102/52 (BP 1 Location: Right arm, BP Patient Position: Sitting) 95 98.6 °F (37 °C) (Oral) 18 5' 5\" (1.651 m) (!) 370 lb 9.6 oz (168.1 kg) BMI OB Status Smoking Status 61.67 kg/m2 Postmenopausal Never Smoker BMI and BSA Data Body Mass Index Body Surface Area  
 61.67 kg/m 2 2.78 m 2 Preferred Pharmacy Pharmacy Name Phone [facility] [phone] Your Updated Medication List  
  
   
This list is accurate as of 7/6/18  1:49 PM.  Always use your most recent med list.  
  
  
  
  
 * albuterol 2.5 mg /3 mL (0.083 %) nebulizer solution Commonly known as:  PROVENTIL VENTOLIN  
3 mL by Nebulization route every four (4) hours as needed for Wheezing or Shortness of Breath. * albuterol 90 mcg/actuation inhaler Commonly known as:  PROVENTIL HFA, VENTOLIN HFA, PROAIR HFA Take 1-2 Puffs by inhalation every four (4) hours as needed for Wheezing. AQUAPHOR HEALING 41 % ointment Generic drug:  pantothenic ac-min oil-pet,hyd Apply  to affected area as needed for Dry Skin. atorvastatin 10 mg tablet Commonly known as:  LIPITOR  
take 1 tablet by mouth NIGHTLY. buPROPion  mg tablet Commonly known as:  WELLBUTRIN XL  
take 1 tablet by mouth every morning  
  
 cholecalciferol 1,000 unit tablet Commonly known as:  VITAMIN D3 Take 2 Tabs by mouth daily. ECOTRIN 325 mg tablet Generic drug:  aspirin delayed-release  
take 1 tablet by mouth once daily  
  
 gabapentin 300 mg capsule Commonly known as:  NEURONTIN  
take 2 to 3 capsules by mouth three times a day if needed * HYDROcodone-acetaminophen 7.5-325 mg per tablet Commonly known as:  Shirley President Take 1 Tab by mouth two (2) times daily as needed for Pain. Max Daily Amount: 2 Tabs. * HYDROcodone-acetaminophen 7.5-325 mg per tablet Commonly known as:  Shirley President Take 1 Tab by mouth two (2) times daily as needed for Pain. Max Daily Amount: 2 Tabs. Start taking on:  8/5/2018  
  
 * HYDROcodone-acetaminophen 7.5-325 mg per tablet Commonly known as:  Shirley President Take 1 Tab by mouth two (2) times daily as needed for Pain. Max Daily Amount: 2 Tabs. Start taking on:  9/4/2018  
  
 inhalational spacing device 1 Each by Does Not Apply route as needed. letrozole 2.5 mg tablet Commonly known as:  FEMARA  
take 1 tablet by mouth once daily * lidocaine 4 % Gel 1 Inch by Apply Externally route two (2) times daily as needed. To ankle for pain * lidocaine 4 % topical cream  
Commonly known as:  XYLOCAINE  
  
 magnesium oxide 400 mg tablet Commonly known as:  MAG-OX  
take 1 tablet by mouth daily  
  
 midodrine 10 mg tablet Commonly known as:  Rudolpho Klinefelter Take 10 mg by mouth daily. mineral oil-hydrophil petrolat ointment Commonly known as:  AQUAPHOR Apply  to affected area as needed for Dry Skin or Itching. * miscellaneous medical supply Misc Please dispense 1 single point bariatric cane * miscellaneous medical supply Misc Please dispense 1 bariatric wheelchair * miscellaneous medical supply [facility]  
bariatric tube transfer bench * miscellaneous medical supply Misc  
bariatric 3-n-1 commode * miscellaneous medical supply Misc Please provide power wheelchair. See face to face note from 5/3/2018 for more  
  
 montelukast 10 mg tablet Commonly known as:  SINGULAIR  
take 1 tablet by mouth daily  
  
 omeprazole 20 mg capsule Commonly known as:  PRILOSEC  
take 1 capsule by mouth twice a day RENVELA 2.4 gram Pwpk oral powder Generic drug:  sevelamer carbonate Take 1 Packet by mouth three (3) times daily (with meals). SENSIPAR 60 mg Tab Generic drug:  cinacalcet  
take 1 tablet by mouth once daily * Notice: This list has 12 medication(s) that are the same as other medications prescribed for you. Read the directions carefully, and ask your doctor or other care provider to review them with you. Prescriptions Printed Refills HYDROcodone-acetaminophen (NORCO) 7.5-325 mg per tablet 0 Sig: Take 1 Tab by mouth two (2) times daily as needed for Pain. Max Daily Amount: 2 Tabs. Class: Print  Route: Oral  
 HYDROcodone-acetaminophen (NORCO) 7.5-325 mg per tablet 0 Starting on: 8/5/2018 Sig: Take 1 Tab by mouth two (2) times daily as needed for Pain. Max Daily Amount: 2 Tabs. Class: Print Route: Oral  
 HYDROcodone-acetaminophen (NORCO) 7.5-325 mg per tablet 0 Starting on: 9/4/2018 Sig: Take 1 Tab by mouth two (2) times daily as needed for Pain. Max Daily Amount: 2 Tabs. Class: Print Route: Oral  
  
Patient Instructions TODAY, please go to: CHECK OUT - If you received a referral, Show the  LAB Please schedule the following appointments at [facility] OUT: 
· Chronic pain follow up with Dr. Nahid Lackey in October 2018 Today's Plan: 
 
Continue follow up with podiatry Introducing [facility] SERVICES! [employer] introduces [employer] patient portal. Now you can access parts of your medical record, email your doctor's office, and request medication refills online. 1. In your internet browser, go to [email or web address] 2. Click on the First Time User? Click Here link in the Sign In box. You will see the New Member Sign Up page. 3. Enter your [employer] Access Code exactly as it appears below. You will not need to use this code after youve completed the sign-up process. If you do not sign up before the expiration date, you must request a new code. · [employer] Access Code: [email or web address] Expires: 10/4/2018  1:49 PM 
 
4. Enter the last four digits of your Social Security Number (xxxx) and Date of Birth (mm/dd/yyyy) as indicated and click Submit. You will be taken to the next sign-up page. 5. Create a [employer] ID. This will be your [employer] login ID and cannot be changed, so think of one that is secure and easy to remember. 6. Create a [employer] password. You can change your password at any time. 7. Enter your Password Reset Question and Answer. This can be used at a later time if you forget your password. 8. Enter your e-mail address. You will receive e-mail notification when new information is available in [address] 9. Click Sign Up. You can now view and download portions of your medical record. 10. Click the Download Summary menu link to download a portable copy of your medical information. If you have questions, please visit the Frequently Asked Questions section of the [employer] website. Remember, [employer] is NOT to be used for urgent needs. For medical emergencies, dial 911. Now available from your iPhone and Android! Please provide this summary of care documentation to your next provider. Your primary care clinician is listed as Rosalba Garber. If you have any questions after today's visit, please call [phone].

## 2018-07-06 NOTE — PROGRESS NOTES
Chief Complaint   Patient presents with    Medication Refill     Hydrocodone/acetaminophen 7.5/325       Leena Res  Identified pt with two pt identifiers(name and ). Chief Complaint   Patient presents with    Medication Refill     Hydrocodone/acetaminophen 7.5/325       1. Have you been to the ER, urgent care clinic since your last visit? N Hospitalized since your last visit? No    2. Have you seen or consulted any other health care providers outside of the [address] [place] [address] since your last visit? N Include any pap smears or colon screening. No    Today's provider has been notified of reason for visit, vitals and flowsheets obtained on patients.        Reviewed record In preparation for visit, huddled with provider and have obtained necessary documentation      Health Maintenance Due   Topic    DTaP/Tdap/Td series (1 - Tdap)    PAP AKA CERVICAL CYTOLOGY        Wt Readings from Last 3 Encounters:   18 (!) 370 lb 9.6 oz (168.1 kg)   18 (!) 366 lb 6.4 oz (166.2 kg)   18 (!) 370 lb (167.8 kg)     Temp Readings from Last 3 Encounters:   18 98.6 °F (37 °C) (Oral)   05/15/18 98 °F (36.7 °C)   18 98 °F (36.7 °C)     BP Readings from Last 3 Encounters:   18 102/52   05/15/18 126/80   18 124/70     Pulse Readings from Last 3 Encounters:   18 95   05/15/18 70   18 70     Vitals:    18 1229   BP: 102/52   Pulse: 95   Resp: 18   Temp: 98.6 °F (37 °C)   TempSrc: Oral   Weight: (!) 370 lb 9.6 oz (168.1 kg)   Height: 5' 5\" (1.651 m)   PainSc:   6         Learning Assessment:  :     Learning Assessment 2018 9/3/2014 2014   PRIMARY LEARNER Patient Patient Patient   HIGHEST LEVEL OF EDUCATION - PRIMARY LEARNER  SOME COLLEGE - -   BARRIERS PRIMARY LEARNER NONE - NONE   CO-LEARNER CAREGIVER No - No   PRIMARY LANGUAGE ENGLISH ENGLISH ENGLISH   LEARNER PREFERENCE PRIMARY DEMONSTRATION READING LISTENING     - - READING     - - DEMONSTRATION   ANSWERED BY patient patient Forestine Sox   RELATIONSHIP SELF SELF SELF       Depression Screening:  :     No flowsheet data found. Fall Risk Assessment:  :     No flowsheet data found. Abuse Screening:  :     No flowsheet data found. ADL Screening:  :     ADL Assessment 7/6/2018   Feeding yourself No Help Needed   Getting from bed to chair No Help Needed   Getting dressed No Help Needed   Bathing or showering No Help Needed   Walk across the room (includes cane/walker) No Help Needed   Using the telphone No Help Needed   Taking your medications No Help Needed   Preparing meals No Help Needed   Managing money (expenses/bills) No Help Needed   Moderately strenuous housework (laundry) Help Needed   Shopping for personal items (toiletries/medicines) No Help Needed   Shopping for groceries Help Needed   Driving No Help Needed   Climbing a flight of stairs Help Needed   Getting to places beyond walking distances Help Needed           Dose: 1 Tab Route: Oral Frequency: 2 TIMES DAILY AS NEEDED for Pain   Dispense Quantity:  60 Tab Refills:  0 Fills Remaining:  --           Sig: Take 1 Tab by mouth two (2) times daily as needed for Pain. Max Daily Amount: 2 Tabs.          Written Date:  05/03/18 Expiration Date:  --     Start Date:  06/02/18 End Date:  --            Ordering Provider:  -- OSWALD #:  -- NPI:  --    Authorizing Provider:  Sabrina Cruz MD OSWALD #:  [id number] NPI:  [id number]    Ordering User:  Stefani Cruz MD            Diagnosis Association: Other chronic pain (G89.29)      Original Order:  HYDROcodone-acetaminophen (NORCO) 7.5-325 mg per tablet [[id number]]             Bottle matches last prescriptionY. 0 pills remaining in bottle. Patient reports last dose taken at 1500 on 06/30/2018   printed. Y  Urine collected. N  Controlled Substance Agreement letter printed. Freeman Banks

## 2018-07-06 NOTE — PATIENT INSTRUCTIONS
TODAY, please go to:   CHECK OUT - If you received a referral, Show the    LAB    Please schedule the following appointments at [facility] OUT:  · Chronic pain follow up with Dr. Elzbieta Grady in October 2018      Today's Plan:    Continue follow up with podiatry

## 2018-07-06 NOTE — LETTER
Name:.[employer]attie [employer] PUD:[birth year] MR #:[id number] Provider Name:Gina Gentile MD  
*BKES-345* BSMG-491 (5/16) Page 1 of 5 Initial [facility] CONTROLLED SUBSTANCE AGREEMENT I may be prescribed medications that are controlled substances as part  of my treatment plan for management of my medical condition(s). The goal of my treatment plan is to maintain and/or improve my health and wellbeing. Because controlled substances have an increased risk of abuse or harm, continual re-evaluation is needed determine if the goals of my treatment plan are being met for my safety and the safety of others. I, Araceli Bell  am entering into this Controlled Substance Agreement with my provider, Lea Gentile MD at [facility] . I understand that successful treatment requires mutual trust and honesty between me and my provider. I understand that there are state and federal laws and regulations which apply to the medications that my provider may prescribe that must be followed. I understand there are risks and benefits ts of taking the medicines that my provider may prescribe. I understand and agree that following this Agreement is necessary in continuing my provider-patient relationship and success of my treatment plan. As a part of my treatment plan, I agree to the following: COMMUNICATION: 
 
1. I will communicate fully with my provider about my medical condition(s), including the effect on my daily life and how well my medications are helping. I will tell my provider all of the medications that I take for any reason, including medications I receive from another health care provider, and will notify my provider about all issues, problems or concerns, including any side effects, which may be related to my medications. I understand that this information allows my provider to adjust my treatment plan to help manage my medical condition.  I understand that this information will become part of my permanent medical record. 2. I will notify my provider if I have a history of alcohol/drug misuse/addiction or if I have had treatment for alcohol/drug addiction in the past, or if I have a new problem with or concern about alcohol/drug use/addiction, because this increases the likelihood of high risk behaviors and may lead to serious medical conditions. 3. Females Only: I will notify my provider if I am or become pregnant, or if I intend to become pregnant, or if I intend to breastfeed. I understand that communication of these issues with my provider is important, due to possible effects my medication could have on an unborn fetus or breastfeeding child. Name:Carolina Stern Res OPQ:[birth year] MR #:[id number] Provider Name:Gina High MD  
*PYHF-734* BSMG-491 (5/16) Page 2 of 5 Initial [employer] MISUSE OF MEDICATIONS / DRUGS: 
 
1. I agree to take all controlled substances as prescribed, and will not misuse or abuse any controlled substances prescribed by my provider. For my safety, I will not increase the amount of medicine I take without first talking with and getting permission from my provider. 2. If I have a medical emergency, another health care provider may prescribe me medication. If I seek emergency treatment, I will notify my provider within seventy-two (72) hours. 3. I understand that my provider may discuss my use and/or possible misuse/abuse of controlled substances and alcohol, as appropriate, with any health care provider involved in my care, pharmacist or legal authority. ILLEGAL DRUGS: 
 
1. I will not use illegal drugs of any kind, including but not limited to marijuana, heroin, cocaine, or any prescription drug which is not prescribed to me. DRUG DIVERSION / PRESCRIPTION FRAUD: 
 
1. I will not share, sell, trade, give away, or otherwise misuse my prescriptions or medications. 2. I will not alter any prescriptions provided to me by my provider. SINGLE PROVIDER: 
 
1. I agree that all controlled substances that I take will be prescribed only by my provider (or his/her covering provider) under this Agreement. This agreement does not prevent me from seeking emergency medical treatment or receiving pain management related to a surgery. PROTECTING MEDICATIONS: 
 
1. I am responsible for keeping my prescriptions and medications in a safe and secure place including safeguarding them from loss or theft. I understand that lost, stolen or damaged/destroyed prescriptions or medications will not be replaced. Name:Carolina Stern Res WDU:[birth year] MR #:[id number] Provider Name:Gina High MD  
*ZTLM-532* BSMG-491 (5/16) Page 3 of 5 Initial Manifest PRESCRIPTION RENEWALS/REFILLS: 
 
1. I will follow my controlled substance medication schedule as prescribed by my provider. 2. I understand and agree that I will make any requests for renewals or refills of my prescriptions only at the time of an office visit or during my providers regular office hours subject to the prescription refill requirements of the individual practice. 3. I understand that my provider may not call in prescriptions for controlled substances to my pharmacy. 4. I understand that my provider may adjust or discontinue these medications as deemed appropriate for my medical treatment plan. This Agreement does not guarantee the prescription of controlled medications. 5. I agree that if my medications are adjusted or discontinued, I will properly dispose of any remaining medications. I understand that I will be required to dispose of any remaining controlled medications prior to being provided with any prescriptions for other controlled medications.  
 
 
1. I authorize my provider and my pharmacy to cooperate fully with any local, state, or federal law enforcement agency in the investigation of any possible misuse, sale, or other diversion of my controlled substance prescriptions or medications. RISKS: 
 
 
1. I understand that if I do not adhere to this Agreement in any way, my provider may change my prescriptions, stop prescribing controlled substances or end our provider-patient relationship. 2. If my provider decides to stop prescribing medication, or decides to end our provider-patient relationship,my provider may require that I taper my medications slowly. If necessary, my provider may also provide a prescription for other medications to treat my withdrawal symptoms. UNDERSTANDING THIS AGREEMENT: 
 
I understand that my provider may adjust or stop my prescriptions for controlled substances based on my medical condition and my treatment plan. I understand that this Agreement does not guarantee that I will be prescribed medications or controlled substances. I understand that controlled substances may be just one part 
of my treatment plan.  
 
My initial on each page and my signature below shows that I have read each page of this Agreement, I have had an opportunity to ask questions, and all of my questions have been answered to my satisfaction by my provider. By signing below, I agree to comply with this Agreement, and I understand that if I do not follow the Agreements listed above, my provider may stop 
 
 
 
_________________________________________  Date/Time 7/6/2018 1:17 PM   
             (Patient Signature)

## 2018-07-30 NOTE — TELEPHONE ENCOUNTER
PCP: Rickey Fischer.  Maico Marin MD    Last appt: 7/6/2018  Future Appointments  Date Time Provider Vincenzo Licea   10/4/2018 11:20 AM Griselda Medina MD [facility]       Requested Prescriptions     Pending Prescriptions Disp Refills    buPROPion XL (WELLBUTRIN XL) 150 mg tablet [Pharmacy Med Name: BUPROPION HCL  MG TABLET] 90 Tab 3     Sig: take 1 tablet by mouth every morning       Prior labs and Blood pressures:  BP Readings from Last 3 Encounters:   07/06/18 102/52   05/15/18 126/80   05/08/18 124/70     Lab Results   Component Value Date/Time    Sodium 144 10/01/2015 12:18 PM    Potassium 5.0 10/01/2015 12:18 PM    Chloride 103 10/01/2015 12:18 PM    CO2 25 10/01/2015 12:18 PM    Glucose 79 10/01/2015 12:18 PM    BUN 38 (H) 10/01/2015 12:18 PM    Creatinine 3.94 (H) 10/01/2015 12:18 PM    BUN/Creatinine ratio 10 10/01/2015 12:18 PM    GFR est AA 14 (L) 10/01/2015 12:18 PM    GFR est non-AA 12 (L) 10/01/2015 12:18 PM    Calcium 8.8 10/01/2015 12:18 PM     No results found for: HBA1C, HGBE8, QFL0TAFY, GKI2RMSN  Lab Results   Component Value Date/Time    Cholesterol, total 195 10/01/2015 12:18 PM    HDL Cholesterol 69 10/01/2015 12:18 PM    LDL, calculated 111 (H) 10/01/2015 12:18 PM    VLDL, calculated 15 10/01/2015 12:18 PM    Triglyceride 73 10/01/2015 12:18 PM     Lab Results   Component Value Date/Time    VITAMIN D, 25-HYDROXY 18.7 (L) 10/01/2015 12:18 PM       Lab Results   Component Value Date/Time    TSH 2.390 10/01/2015 12:18 PM

## 2018-08-06 RX ORDER — BUPROPION HYDROCHLORIDE 150 MG/1
TABLET ORAL
Qty: 90 TAB | Refills: 3 | Status: SHIPPED | OUTPATIENT
Start: 2018-08-06 | End: 2019-09-12 | Stop reason: SDUPTHER

## 2018-08-10 LAB — CREATININE, EXTERNAL: 8.15

## 2018-09-18 RX ORDER — ATORVASTATIN CALCIUM 10 MG/1
TABLET, FILM COATED ORAL
Qty: 30 TAB | Refills: 1 | Status: SHIPPED | OUTPATIENT
Start: 2018-09-18 | End: 2018-10-11 | Stop reason: SDUPTHER

## 2018-10-11 ENCOUNTER — TELEPHONE (OUTPATIENT)
Dept: FAMILY MEDICINE CLINIC | Age: 58
End: 2018-10-11

## 2018-10-11 ENCOUNTER — OFFICE VISIT (OUTPATIENT)
Dept: FAMILY MEDICINE CLINIC | Age: 58
End: 2018-10-11

## 2018-10-11 VITALS
BODY MASS INDEX: 48.82 KG/M2 | SYSTOLIC BLOOD PRESSURE: 126 MMHG | OXYGEN SATURATION: 99 % | WEIGHT: 293 LBS | RESPIRATION RATE: 19 BRPM | TEMPERATURE: 97.6 F | DIASTOLIC BLOOD PRESSURE: 80 MMHG | HEART RATE: 69 BPM | HEIGHT: 65 IN

## 2018-10-11 DIAGNOSIS — L03.119 RECURRENT CELLULITIS OF THIGH: ICD-10-CM

## 2018-10-11 DIAGNOSIS — Z99.2 KIDNEY DISEASE, CHRONIC, END STAGE ON DIALYSIS (HCC): ICD-10-CM

## 2018-10-11 DIAGNOSIS — M54.31 BILATERAL SCIATICA: ICD-10-CM

## 2018-10-11 DIAGNOSIS — G89.29 OTHER CHRONIC PAIN: ICD-10-CM

## 2018-10-11 DIAGNOSIS — Z79.891 CHRONIC PRESCRIPTION OPIATE USE: Primary | ICD-10-CM

## 2018-10-11 DIAGNOSIS — M25.571 CHRONIC PAIN OF RIGHT ANKLE: ICD-10-CM

## 2018-10-11 DIAGNOSIS — G89.29 CHRONIC PAIN OF RIGHT ANKLE: ICD-10-CM

## 2018-10-11 DIAGNOSIS — M54.32 BILATERAL SCIATICA: ICD-10-CM

## 2018-10-11 DIAGNOSIS — N18.6 KIDNEY DISEASE, CHRONIC, END STAGE ON DIALYSIS (HCC): ICD-10-CM

## 2018-10-11 DIAGNOSIS — F33.9 RECURRENT DEPRESSION (HCC): ICD-10-CM

## 2018-10-11 DIAGNOSIS — E66.2 OBESITY HYPOVENTILATION SYNDROME (HCC): ICD-10-CM

## 2018-10-11 RX ORDER — HYDROCODONE BITARTRATE AND ACETAMINOPHEN 7.5; 325 MG/1; MG/1
1 TABLET ORAL
Qty: 60 TAB | Refills: 0 | Status: SHIPPED | OUTPATIENT
Start: 2018-11-11 | End: 2019-01-28

## 2018-10-11 RX ORDER — HYDROCODONE BITARTRATE AND ACETAMINOPHEN 7.5; 325 MG/1; MG/1
1 TABLET ORAL
Qty: 60 TAB | Refills: 0 | Status: SHIPPED | OUTPATIENT
Start: 2018-12-11 | End: 2019-02-28 | Stop reason: SDUPTHER

## 2018-10-11 RX ORDER — CEPHALEXIN 500 MG/1
500 CAPSULE ORAL 3 TIMES DAILY
Qty: 21 CAP | Refills: 0 | Status: SHIPPED | OUTPATIENT
Start: 2018-10-11 | End: 2018-11-01 | Stop reason: SDUPTHER

## 2018-10-11 RX ORDER — HYDROCODONE BITARTRATE AND ACETAMINOPHEN 7.5; 325 MG/1; MG/1
1 TABLET ORAL
Qty: 60 TAB | Refills: 0 | Status: SHIPPED | OUTPATIENT
Start: 2018-10-11 | End: 2019-01-28

## 2018-10-11 RX ORDER — CINACALCET HYDROCHLORIDE 90 MG/1
30 TABLET, COATED ORAL DAILY
Refills: 0 | COMMUNITY
Start: 2018-07-10 | End: 2018-12-04 | Stop reason: ALTCHOICE

## 2018-10-11 NOTE — LETTER
Name:Cindi Andrade WOF:[birth year] MR #:[id number] Provider Flori Brody MD  
*MJWE-019* BSMG-491 (5/16) Page 1 of 5 Initial [facility] CONTROLLED SUBSTANCE AGREEMENT I may be prescribed medications that are controlled substances as part  of my treatment plan for management of my medical condition(s). The goal of my treatment plan is to maintain and/or improve my health and wellbeing. Because controlled substances have an increased risk of abuse or harm, continual re-evaluation is needed determine if the goals of my treatment plan are being met for my safety and the safety of others. Shreya Jonesrosalio  am entering into this Controlled Substance Agreement with my provider, Héctor Gomez MD at [facility] . I understand that successful treatment requires mutual trust and honesty between me and my provider. I understand that there are state and federal laws and regulations which apply to the medications that my provider may prescribe that must be followed. I understand there are risks and benefits ts of taking the medicines that my provider may prescribe. I understand and agree that following this Agreement is necessary in continuing my provider-patient relationship and success of my treatment plan. As a part of my treatment plan, I agree to the following: COMMUNICATION: 
 
1. I will communicate fully with my provider about my medical condition(s), including the effect on my daily life and how well my medications are helping. I will tell my provider all of the medications that I take for any reason, including medications I receive from another health care provider, and will notify my provider about all issues, problems or concerns, including any side effects, which may be related to my medications. I understand that this information allows my provider to adjust my treatment plan to help manage my medical condition.  I understand that this information will become part of my permanent medical record. 2. I will notify my provider if I have a history of alcohol/drug misuse/addiction or if I have had treatment for alcohol/drug addiction in the past, or if I have a new problem with or concern about alcohol/drug use/addiction, because this increases the likelihood of high risk behaviors and may lead to serious medical conditions. 3. Females Only: I will notify my provider if I am or become pregnant, or if I intend to become pregnant, or if I intend to breastfeed. I understand that communication of these issues with my provider is important, due to possible effects my medication could have on an unborn fetus or breastfeeding child. Name:Carolina HARDING:[birth year] MR #:[id number] Provider Ralph Grant MD  
*PXAZ-267* BSMG-491 (5/16) Page 2 of 5 Initial SMARTworks MISUSE OF MEDICATIONS / DRUGS: 
 
1. I agree to take all controlled substances as prescribed, and will not misuse or abuse any controlled substances prescribed by my provider. For my safety, I will not increase the amount of medicine I take without first talking with and getting permission from my provider. 2. If I have a medical emergency, another health care provider may prescribe me medication. If I seek emergency treatment, I will notify my provider within seventy-two (72) hours. 3. I understand that my provider may discuss my use and/or possible misuse/abuse of controlled substances and alcohol, as appropriate, with any health care provider involved in my care, pharmacist or legal authority. ILLEGAL DRUGS: 
 
1. I will not use illegal drugs of any kind, including but not limited to marijuana, heroin, cocaine, or any prescription drug which is not prescribed to me. DRUG DIVERSION / PRESCRIPTION FRAUD: 
 
1. I will not share, sell, trade, give away, or otherwise misuse my prescriptions or medications. 2. I will not alter any prescriptions provided to me by my provider. SINGLE PROVIDER: 
 
1. I agree that all controlled substances that I take will be prescribed only by my provider (or his/her covering provider) under this Agreement. This agreement does not prevent me from seeking emergency medical treatment or receiving pain management related to a surgery. PROTECTING MEDICATIONS: 
 
1. I am responsible for keeping my prescriptions and medications in a safe and secure place including safeguarding them from loss or theft. I understand that lost, stolen or damaged/destroyed prescriptions or medications will not be replaced. Name:. Jennifer Thomas LEXII:[birth year] MR #:[id number] Provider Alan Porter MD  
*HAIU-563* BSMG-491 (5/16) Page 3 of 5 Initial [employer] PRESCRIPTION RENEWALS/REFILLS: 
 
1. I will follow my controlled substance medication schedule as prescribed by my provider. 2. I understand and agree that I will make any requests for renewals or refills of my prescriptions only at the time of an office visit or during my providers regular office hours subject to the prescription refill requirements of the individual practice. 3. I understand that my provider may not call in prescriptions for controlled substances to my pharmacy. 4. I understand that my provider may adjust or discontinue these medications as deemed appropriate for my medical treatment plan. This Agreement does not guarantee the prescription of controlled medications. 5. I agree that if my medications are adjusted or discontinued, I will properly dispose of any remaining medications. I understand that I will be required to dispose of any remaining controlled medications prior to being provided with any prescriptions for other controlled medications.  
 
 
1. I authorize my provider and my pharmacy to cooperate fully with any local, state, or federal law enforcement agency in the investigation of any possible misuse, sale, or other diversion of my controlled substance prescriptions or medications. RISKS: 
 
 
1. I understand that if I do not adhere to this Agreement in any way, my provider may change my prescriptions, stop prescribing controlled substances or end our provider-patient relationship. 2. If my provider decides to stop prescribing medication, or decides to end our provider-patient relationship,my provider may require that I taper my medications slowly. If necessary, my provider may also provide a prescription for other medications to treat my withdrawal symptoms. UNDERSTANDING THIS AGREEMENT: 
 
I understand that my provider may adjust or stop my prescriptions for controlled substances based on my medical condition and my treatment plan. I understand that this Agreement does not guarantee that I will be prescribed medications or controlled substances. I understand that controlled substances may be just one part 
of my treatment plan.  
 
My initial on each page and my signature below shows that I have read each page of this Agreement, I have had an opportunity to ask questions, and all of my questions have been answered to my satisfaction by my provider. By signing below, I agree to comply with this Agreement, and I understand that if I do not follow the Agreements listed above, my provider may stop 
 
 
 
_________________________________________  Date/Time 10/11/2018 4:25 PM   
             (Patient Signature)

## 2018-10-11 NOTE — PROGRESS NOTES
Patient 02 72 r/a, patient stated she was not having shortness of breath but her oxygent tank at home is not working. Verbal order from Dr Milli Vega, place on  2l via nc. Patient 02 sats reassessed now 99% 2lvia nc. Robbie Gomez  Identified pt with two pt identifiers(name and ). No chief complaint on file. 1. Have you been to the ER, urgent care clinic since your last visit? no Hospitalized since your last visit? no 
 
2. Have you seen or consulted any other health care providers outside of the [facility] since your last visit? Include any pap smears or colon screening. no 
 
 
Advance Care Planning In the event something were to happen to you and you were unable to speak on your behalf, do you have an Advance Directive/ Living Will in place stating your wishes?no If yes, do we have a copy on file? no 
 
If no, would you like information? yes Medication reconciliation up to date and corrected with patient at this time. Today's provider has been notified of reason for visit, vitals and flowsheets obtained on patients. Reviewed record in preparation for visit, huddled with provider and have obtained necessary documentation. Health Maintenance Due Topic  DTaP/Tdap/Td series (1 - Tdap)  Shingrix Vaccine Age 50> (1 of 2)  PAP AKA CERVICAL CYTOLOGY  Influenza Age 5 to Adult Wt Readings from Last 3 Encounters:  
10/11/18 (!) 362 lb 3.2 oz (164.3 kg) 18 (!) 370 lb 9.6 oz (168.1 kg) 18 (!) 366 lb 6.4 oz (166.2 kg) Temp Readings from Last 3 Encounters:  
10/11/18 97.6 °F (36.4 °C) (Oral) 18 98.6 °F (37 °C) (Oral) 05/15/18 98 °F (36.7 °C) BP Readings from Last 3 Encounters:  
10/11/18 126/80  
18 102/52  
05/15/18 126/80 Pulse Readings from Last 3 Encounters:  
10/11/18 69  
18 95  
05/15/18 70 Vitals:  
 10/11/18 1550 10/11/18 1559 BP: 126/80 Pulse: 69 Resp: 19 Temp: 97.6 °F (36.4 °C) TempSrc: Oral   
SpO2: (!) 72% 99% Weight: (!) 362 lb 3.2 oz (164.3 kg) Height: 5' 5\" (1.651 m) PainSc:   8 PainLoc: Back Learning Assessment: 
:  
 
Learning Assessment 7/6/2018 9/3/2014 6/16/2014 PRIMARY LEARNER Patient Patient Patient HIGHEST LEVEL OF EDUCATION - PRIMARY LEARNER  SOME COLLEGE - -  
BARRIERS PRIMARY LEARNER NONE - NONE  
CO-LEARNER CAREGIVER No - No  
PRIMARY LANGUAGE ENGLISH ENGLISH ENGLISH  
LEARNER PREFERENCE PRIMARY DEMONSTRATION READING LISTENING  
  - - READING  
  - - DEMONSTRATION  
ANSWERED BY patient patient Geri Fisher RELATIONSHIP SELF SELF SELF Depression Screening: 
:  
 
PHQ over the last two weeks 10/11/2018 Little interest or pleasure in doing things Several days Feeling down, depressed, irritable, or hopeless Not at all Total Score PHQ 2 1 Fall Risk Assessment: 
:  
 
Fall Risk Assessment, last 12 mths 10/11/2018 Able to walk? Yes Fall in past 12 months? No  
 
 
Abuse Screening: 
:  
 
Abuse Screening Questionnaire 10/11/2018 Do you ever feel afraid of your partner? Lacinda Flurry Are you in a relationship with someone who physically or mentally threatens you? Lacinda Flurry Is it safe for you to go home? Y  
 
 
ADL Screening: 
:  
 
ADL Assessment 10/11/2018 Feeding yourself No Help Needed Getting from bed to chair No Help Needed Getting dressed No Help Needed Bathing or showering No Help Needed Walk across the room (includes cane/walker) Help Needed Using the telphone No Help Needed Taking your medications No Help Needed Preparing meals Help Needed Managing money (expenses/bills) No Help Needed Moderately strenuous housework (laundry) Help Needed Shopping for personal items (toiletries/medicines) Help Needed Shopping for groceries Help Needed Driving Help Needed Climbing a flight of stairs Help Needed Getting to places beyond walking distances Help Needed Medication reconciliation up to date and corrected with patient at this time. Patient presents for Controlled Substance Prescription follow up. Last prescription: 
Hydrocodone-acetamin 7.5-325 Take 1 tablet by mouth twice a day if needed for pain maximum 2tablets per day Bottle matches last prescription. 0 pills remaining in bottle. Patient reports last dose taken at 9:00 on 10/5/2018  printed. Urine collected. Controlled Substance Agreement letter printed.

## 2018-10-11 NOTE — PROGRESS NOTES
Here with son. Came to office off O2 with low sats. Put on O2 in office. Portable O2 not working over a year. Needs replacement. Hosp in August for cellulitis. Hosp at [address] [place] [address] for 1-2 weeks. Needs hydrocodone refill. Dialysis 3 x weekly. Saw neph when in hosp. Hardness back of distal thighs noted past week. Visit Vitals  /80 (BP 1 Location: Right arm, BP Patient Position: Sitting)  Pulse 69  Temp 97.6 °F (36.4 °C) (Oral)  Resp 19  
 Ht 5' 5\" (1.651 m)  Wt (!) 362 lb 3.2 oz (164.3 kg)  SpO2 99%  BMI 60.27 kg/m2 Patient alert and cooperative. Bilateral posterior thighs with area of firmness and tenderness. No specific increased warmth appreciated. Assessment: 1. Possible early recurrent cellulitis. Plan: 1. Keflex 500 t.i.d. for seven days. 2. Recheck in a week. 3. Patient to return to  chronic opiate scripts and get drug testing done. 4. Working with medical supply to get her portable oxygen canister fixed. 5. Follow otherwise here prn. [facility] Serrano [facility] today to follow up on chronic pain diagnosis. We discussed her sciatica down both legs and screws and plates that is affecting her right ankle and back. Significant changes since last visit: none. She is  able to do her normal daily activities most of the time. She reports the following adverse side effects: constipation. Least pain over the last week has been 8/10. Worst pain over the last week has been 10/10. Opioid Risk Tool Reviewed: NO: Pending Aberrant behaviors: None. Urine Drug Screen: Need blood testing. Controlled substance agreement on file: YES.  reviewed:yes Pill count is consistent with her prescription: yes Concomitant use of a benzodiazepine: no 
 
NA 
 
NA Also,  abstinence syndrome was reviewed and discussed with her today N/A

## 2018-10-11 NOTE — ASSESSMENT & PLAN NOTE
Stable, based on history, physical exam and review of pertinent labs, studies and medications; meds reconciled; continue current treatment plan. Key Obesity Meds Patient is on no anti-obesity meds.   
  
 
No results found for: LEPTN, INSUL, HBA1C, GLU, CHOL, CHOLPOCT, HDL, LDLC, LDL, LDLCEXT, LDLCPOC, TRIGL, TGLPOCT, TSH, NA, NAPOC, K, KPOCT, GPT, ALTPOC, ALT, SGOT, ASTPOC, VITD3, CRP, QAK2RRAB, TSHEXT

## 2018-10-11 NOTE — MR AVS SNAPSHOT
[address] [place] [address] 
[address] [place] [address] 
[phone] Patient: Shireen Resendez MRN: [id number] DPF:8/80/0401 Visit Information Date & Time Provider Department Dept. Phone Encounter #  
 10/11/2018  3:40 PM Devika Leon MD [facility] [phone] [id number] Follow-up Instructions Return in about 7 days (around 10/18/2018) for Recheck legs. Upcoming Health Maintenance Date Due DTaP/Tdap/Td series (1 - Tdap) 5/10/1981 Shingrix Vaccine Age 50> (1 of 2) 5/10/2010 PAP AKA CERVICAL CYTOLOGY 9/3/2017 Influenza Age 5 to Adult 8/1/2018 BREAST CANCER SCRN MAMMOGRAM 4/24/2020 COLONOSCOPY 8/18/2021 Pneumococcal 19-64 Highest Risk (3 of 3 - PPSV23) 1/3/2022 Allergies as of 10/11/2018  Review Complete On: 10/11/2018 By: Paulette Ferrer Severity Noted Reaction Type Reactions Iodine High 08/18/2011   Systemic Anaphylaxis Other Medication  09/03/2014   Systemic Other (comments) Metal causes numbness in hands,arms all over Shellfish Containing Products  08/18/2011   Systemic Anaphylaxis Sulfa (Sulfonamide Antibiotics)  03/10/2016    Itching Current Immunizations  Reviewed on 10/11/2018 Name Date Influenza Vaccine 11/19/2013 Influenza Vaccine Domo Low) 10/1/2015 Influenza Vaccine (Quad) PF 11/15/2016, 2/16/2015  2:30 PM  
 Pneumococcal Conjugate (PCV-13) 2/16/2015  2:31 PM  
  
 Reviewed by Paulette Ferrer on 10/11/2018 at  4:01 PM  
 Reviewed by Paulette Ferrer on 10/11/2018 at  4:01 PM  
You Were Diagnosed With   
  
 Codes Comments Kidney disease, chronic, end stage on dialysis ([facility] [address]    -  Primary ICD-10-CM: N18.6, Z99.2 ICD-9-CM: 585.6, V45.11 Chronic pain of right ankle     ICD-10-CM: M25.571, G89.29 ICD-9-CM: 719.47, 338.29 Bilateral sciatica     ICD-10-CM: M54.31, M54.32 
ICD-9-CM: 724.3 Recurrent cellulitis of thigh     ICD-10-CM: L03.455 ICD-9-CM: 682.6 Obesity hypoventilation syndrome (HCC)     ICD-10-CM: C49.4 ICD-9-CM: 278.03 Vitals BP Pulse Temp Resp Height(growth percentile) Weight(growth percentile) 126/80 (BP 1 Location: Right arm, BP Patient Position: Sitting) 69 97.6 °F (36.4 °C) (Oral) 19 5' 5\" (1.651 m) (!) 362 lb 3.2 oz (164.3 kg) SpO2 BMI OB Status Smoking Status 99% 60.27 kg/m2 Postmenopausal Never Smoker Vitals History BMI and BSA Data Body Mass Index Body Surface Area  
 60.27 kg/m 2 2.74 m 2 Preferred Pharmacy Pharmacy Name Phone [facility] [address] [phone] Your Updated Medication List  
  
   
This list is accurate as of 10/11/18  4:33 PM.  Always use your most recent med list.  
  
  
  
  
 * albuterol 2.5 mg /3 mL (0.083 %) nebulizer solution Commonly known as:  PROVENTIL VENTOLIN  
3 mL by Nebulization route every four (4) hours as needed for Wheezing or Shortness of Breath. * albuterol 90 mcg/actuation inhaler Commonly known as:  PROVENTIL HFA, VENTOLIN HFA, PROAIR HFA Take 1-2 Puffs by inhalation every four (4) hours as needed for Wheezing. AQUAPHOR HEALING 41 % ointment Generic drug:  pantothenic ac-min oil-pet,hyd Apply  to affected area as needed for Dry Skin. atorvastatin 10 mg tablet Commonly known as:  LIPITOR  
take 1 tablet by mouth NIGHTLY. buPROPion  mg tablet Commonly known as:  WELLBUTRIN XL  
take 1 tablet by mouth every morning  
  
 cephALEXin 500 mg capsule Commonly known as:  Colen Penning Take 1 Cap by mouth three (3) times daily for 7 days. cholecalciferol 1,000 unit tablet Commonly known as:  VITAMIN D3 Take 2 Tabs by mouth daily. ECOTRIN 325 mg tablet Generic drug:  aspirin delayed-release  
take 1 tablet by mouth once daily  
  
 gabapentin 300 mg capsule Commonly known as:  NEURONTIN  
 take 2 to 3 capsules by mouth three times a day if needed * HYDROcodone-acetaminophen 7.5-325 mg per tablet Commonly known as:  Genita Evert Take 1 Tab by mouth two (2) times daily as needed for Pain. Max Daily Amount: 2 Tabs. * HYDROcodone-acetaminophen 7.5-325 mg per tablet Commonly known as:  Genita Evert Take 1 Tab by mouth two (2) times daily as needed for Pain. Max Daily Amount: 2 Tabs. * HYDROcodone-acetaminophen 7.5-325 mg per tablet Commonly known as:  Genita Evert Take 1 Tab by mouth two (2) times daily as needed for Pain. Max Daily Amount: 2 Tabs. inhalational spacing device 1 Each by Does Not Apply route as needed. letrozole 2.5 mg tablet Commonly known as:  FEMARA  
take 1 tablet by mouth once daily * lidocaine 4 % Gel 1 Inch by Apply Externally route two (2) times daily as needed. To ankle for pain * lidocaine 4 % topical cream  
Commonly known as:  XYLOCAINE  
  
 magnesium oxide 400 mg tablet Commonly known as:  MAG-OX  
take 1 tablet by mouth daily  
  
 midodrine 10 mg tablet Commonly known as:  Darnelle Cecy Take 10 mg by mouth daily. mineral oil-hydrophil petrolat ointment Commonly known as:  AQUAPHOR Apply  to affected area as needed for Dry Skin or Itching. * miscellaneous medical supply Misc Please dispense 1 single point bariatric cane * miscellaneous medical supply Misc Please dispense 1 bariatric wheelchair * miscellaneous medical supply [facility]  
bariatric tube transfer bench * miscellaneous medical supply Misc  
bariatric 3-n-1 commode * miscellaneous medical supply Misc Please provide power wheelchair. See face to face note from 5/3/2018 for more  
  
 montelukast 10 mg tablet Commonly known as:  SINGULAIR  
take 1 tablet by mouth daily  
  
 omeprazole 20 mg capsule Commonly known as:  PRILOSEC  
take 1 capsule by mouth twice a day RENVELA 2.4 gram Pwpk oral powder Generic drug:  sevelamer carbonate Take 1 Packet by mouth three (3) times daily (with meals). SENSIPAR 90 mg Tab Generic drug:  cinacalcet Take 30 mg by mouth daily. varicella-zoster recombinant (PF) 50 mcg/0.5 mL Susr injection Commonly known as:  SHINGRIX (PF)  
0.5 mL by IntraMUSCular route once for 1 dose. * Notice: This list has 12 medication(s) that are the same as other medications prescribed for you. Read the directions carefully, and ask your doctor or other care provider to review them with you. Prescriptions Printed Refills  
 varicella-zoster recombinant, PF, (SHINGRIX, PF,) 50 mcg/0.5 mL susr injection 1 Si.5 mL by IntraMUSCular route once for 1 dose. Class: Print Route: IntraMUSCular Prescriptions Sent to Pharmacy Refills  
 cephALEXin (KEFLEX) 500 mg capsule 0 Sig: Take 1 Cap by mouth three (3) times daily for 7 days. Class: Normal  
 [id number] [address] [place] [address],[address] Ph #: [phone] Route: Oral  
  
Follow-up Instructions Return in about 7 days (around 10/18/2018) for Recheck legs. Introducing [facility]! Yvette Surekha introduces [employer] patient portal. Now you can access parts of your medical record, email your doctor's office, and request medication refills online. 1. In your internet browser, go to [email or web address] 2. Click on the First Time User? Click Here link in the Sign In box. You will see the New Member Sign Up page. 3. Enter your [employer] Access Code exactly as it appears below. You will not need to use this code after youve completed the sign-up process. If you do not sign up before the expiration date, you must request a new code. · [employer] Access Code: [email or web address] Expires: 2019  9:49 AM 
 
4.  Enter the last four digits of your Social Security Number (xxxx) and Date of Birth (mm/dd/yyyy) as indicated and click Submit. You will be taken to the next sign-up page. 5. Create a kWhOURS ID. This will be your k[employer]OURS login ID and cannot be changed, so think of one that is secure and easy to remember. 6. Create a kWhOURS password. You can change your password at any time. 7. Enter your Password Reset Question and Answer. This can be used at a later time if you forget your password. 8. Enter your e-mail address. You will receive e-mail notification when new information is available in [address] 9. Click Sign Up. You can now view and download portions of your medical record. 10. Click the Download Summary menu link to download a portable copy of your medical information. If you have questions, please visit the Frequently Asked Questions section of the kWhOURS website. Remember, kWhOURS is NOT to be used for urgent needs. For medical emergencies, dial 911. Now available from your iPhone and Android! Please provide this summary of care documentation to your next provider. Your primary care clinician is listed as [id number] If you have any questions after today's visit, please call [phone].

## 2018-10-11 NOTE — LETTER
Name:[facility] TJR:3/93/[birth year]12 MR #:[id number] Provider Amy Reina MD  
*ZDKH-155* BSMG-491 (5/16) Page 1 of 5 Initial [employer] CONTROLLED SUBSTANCE AGREEMENT I may be prescribed medications that are controlled substances as part  of my treatment plan for management of my medical condition(s). The goal of my treatment plan is to maintain and/or improve my health and wellbeing. Because controlled substances have an increased risk of abuse or harm, continual re-evaluation is needed determine if the goals of my treatment plan are being met for my safety and the safety of others. Jeny Aguayo  am entering into this Controlled Substance Agreement with my provider, Kulwinder Panda MD at [facility] . I understand that successful treatment requires mutual trust and honesty between me and my provider. I understand that there are state and federal laws and regulations which apply to the medications that my provider may prescribe that must be followed. I understand there are risks and benefits ts of taking the medicines that my provider may prescribe. I understand and agree that following this Agreement is necessary in continuing my provider-patient relationship and success of my treatment plan. As a part of my treatment plan, I agree to the following: COMMUNICATION: 
 
1. I will communicate fully with my provider about my medical condition(s), including the effect on my daily life and how well my medications are helping. I will tell my provider all of the medications that I take for any reason, including medications I receive from another health care provider, and will notify my provider about all issues, problems or concerns, including any side effects, which may be related to my medications. I understand that this information allows my provider to adjust my treatment plan to help manage my medical condition.  I understand that this information will become part of my permanent medical record. 2. I will notify my provider if I have a history of alcohol/drug misuse/addiction or if I have had treatment for alcohol/drug addiction in the past, or if I have a new problem with or concern about alcohol/drug use/addiction, because this increases the likelihood of high risk behaviors and may lead to serious medical conditions. 3. Females Only: I will notify my provider if I am or become pregnant, or if I intend to become pregnant, or if I intend to breastfeed. I understand that communication of these issues with my provider is important, due to possible effects my medication could have on an unborn fetus or breastfeeding child. Name:Carolina Martinez LTO:6/21/3004 MR #:[id number] Provider Toño Mccollum MD  
*ERPB-059* BSMG-491 (5/16) Page 2 of 5 Initial [employer] MISUSE OF MEDICATIONS / DRUGS: 
 
1. I agree to take all controlled substances as prescribed, and will not misuse or abuse any controlled substances prescribed by my provider. For my safety, I will not increase the amount of medicine I take without first talking with and getting permission from my provider. 2. If I have a medical emergency, another health care provider may prescribe me medication. If I seek emergency treatment, I will notify my provider within seventy-two (72) hours. 3. I understand that my provider may discuss my use and/or possible misuse/abuse of controlled substances and alcohol, as appropriate, with any health care provider involved in my care, pharmacist or legal authority. ILLEGAL DRUGS: 
 
1. I will not use illegal drugs of any kind, including but not limited to marijuana, heroin, cocaine, or any prescription drug which is not prescribed to me. DRUG DIVERSION / PRESCRIPTION FRAUD: 
 
1. I will not share, sell, trade, give away, or otherwise misuse my prescriptions or medications. 2. I will not alter any prescriptions provided to me by my provider. SINGLE PROVIDER: 
 
1. I agree that all controlled substances that I take will be prescribed only by my provider (or his/her covering provider) under this Agreement. This agreement does not prevent me from seeking emergency medical treatment or receiving pain management related to a surgery. PROTECTING MEDICATIONS: 
 
1. I am responsible for keeping my prescriptions and medications in a safe and secure place including safeguarding them from loss or theft. I understand that lost, stolen or damaged/destroyed prescriptions or medications will not be replaced. Name:. Aniyah Jeong ZDX:[birth year] MR #:[id number] Provider Guy Khan MD  
*AQPY-225* BSMG-491 (5/16) Page 3 of 5 Initial [employer] PRESCRIPTION RENEWALS/REFILLS: 
 
1. I will follow my controlled substance medication schedule as prescribed by my provider. 2. I understand and agree that I will make any requests for renewals or refills of my prescriptions only at the time of an office visit or during my providers regular office hours subject to the prescription refill requirements of the individual practice. 3. I understand that my provider may not call in prescriptions for controlled substances to my pharmacy. 4. I understand that my provider may adjust or discontinue these medications as deemed appropriate for my medical treatment plan. This Agreement does not guarantee the prescription of controlled medications. 5. I agree that if my medications are adjusted or discontinued, I will properly dispose of any remaining medications. I understand that I will be required to dispose of any remaining controlled medications prior to being provided with any prescriptions for other controlled medications.  
 
 
1. I authorize my provider and my pharmacy to cooperate fully with any local, state, or federal law enforcement agency in the investigation of any possible misuse, sale, or other diversion of my controlled substance prescriptions or medications. RISKS: 
 
 
1. I understand that if I do not adhere to this Agreement in any way, my provider may change my prescriptions, stop prescribing controlled substances or end our provider-patient relationship. 2. If my provider decides to stop prescribing medication, or decides to end our provider-patient relationship,my provider may require that I taper my medications slowly. If necessary, my provider may also provide a prescription for other medications to treat my withdrawal symptoms. UNDERSTANDING THIS AGREEMENT: 
 
I understand that my provider may adjust or stop my prescriptions for controlled substances based on my medical condition and my treatment plan. I understand that this Agreement does not guarantee that I will be prescribed medications or controlled substances. I understand that controlled substances may be just one part 
of my treatment plan.  
 
My initial on each page and my signature below shows that I have read each page of this Agreement, I have had an opportunity to ask questions, and all of my questions have been answered to my satisfaction by my provider. By signing below, I agree to comply with this Agreement, and I understand that if I do not follow the Agreements listed above, my provider may stop 
 
 
 
_________________________________________  Date/Time 10/11/2018 4:29 PM   
             (Patient Signature)

## 2018-10-11 NOTE — ASSESSMENT & PLAN NOTE
Improving, based on history, physical exam and review of pertinent labs, studies and medications; meds reconciled; continue current treatment plan, lifestyle modifications recommended. Key Obesity Meds Patient is on no anti-obesity meds.   
  
 
No results found for: LEPTN, INSUL, HBA1C, GLU, CHOL, CHOLPOCT, HDL, LDLC, LDL, LDLCEXT, LDLCPOC, TRIGL, TGLPOCT, TSH, NA, NAPOC, K, KPOCT, GPT, ALTPOC, ALT, SGOT, ASTPOC, VITD3, CRP, THM6KTOL, TSHEXT

## 2018-10-11 NOTE — TELEPHONE ENCOUNTER
Writer called [employer], per patient,  concentrator is not working and bi pap is not working, writer spoke with Chales Mortimer who will call patient.

## 2018-10-11 NOTE — ASSESSMENT & PLAN NOTE
Stable, based on history, physical exam and review of pertinent labs, studies and medications; meds reconciled; continue current treatment plan. Key Psychotherapeutic Meds buPROPion XL (WELLBUTRIN XL) 150 mg tablet  (Taking) take 1 tablet by mouth every morning Other [facility] HYDROcodone-acetaminophen (NORCO) 7.5-325 mg per tablet Starting on 12/11/2018. Take 1 Tab by mouth two (2) times daily as needed for Pain. Max Daily Amount: 2 Tabs. HYDROcodone-acetaminophen (NORCO) 7.5-325 mg per tablet Starting on 11/11/2018. Take 1 Tab by mouth two (2) times daily as needed for Pain. Max Daily Amount: 2 Tabs. HYDROcodone-acetaminophen (NORCO) 7.5-325 mg per tablet  (Taking) Take 1 Tab by mouth two (2) times daily as needed for Pain. Max Daily Amount: 2 Tabs.  
 gabapentin (NEURONTIN) 300 mg capsule  (Taking) take 2 to 3 capsules by mouth three times a day if needed No results found for: NA, NAPOC, CREA, CREAPOC, CREATEXT, TSH, WBC, WBCT, WBCPOC, GPT, ALTPOC, ALT, SGOT, ASTPOC, GGT, LITHM, ATRPA, NORTR, TSHEXT

## 2018-10-15 ENCOUNTER — TELEPHONE (OUTPATIENT)
Dept: FAMILY MEDICINE CLINIC | Age: 58
End: 2018-10-15

## 2018-10-15 NOTE — TELEPHONE ENCOUNTER
Outbound call to [facility], spoke to \"Deepika\". Identified self, role and nature of the call. Pt identifiers ( & address) verified per HIPAA policy. Saroj Solis of call re: O2 concentrator & Bi-Pap machine broken. Per Santos Enciso our records show someone called in about her\". Writer inquired if someone from ([facility]), has been in contact w/patient re: broken equipment \"again, our records show someone called in about her. Will get a message over to the service rep to call the patient\". Acknowledged understanding, and voiced a call will be made later in the week on patient's behalf.       SHIRA Michael

## 2018-10-16 DIAGNOSIS — Z79.891 CHRONIC PRESCRIPTION OPIATE USE: Primary | ICD-10-CM

## 2018-10-16 DIAGNOSIS — M25.571 CHRONIC PAIN OF RIGHT ANKLE: ICD-10-CM

## 2018-10-16 DIAGNOSIS — G89.29 CHRONIC PAIN OF RIGHT ANKLE: ICD-10-CM

## 2018-10-17 ENCOUNTER — TELEPHONE (OUTPATIENT)
Dept: FAMILY MEDICINE CLINIC | Age: 58
End: 2018-10-17

## 2018-10-17 NOTE — TELEPHONE ENCOUNTER
10:45 am, Outbound call to patient, voice message left w/contact information. SHIRA Thomason      10:23 am, Outbound call to patient, identifiers ( & address) verified per HIPAA policy. Identified self, role and nature of the call. Nature of the call re: O2 concentrator broken; missed lab appt and needing to schedule a F/U w/PCP. Writer inquired if she's spoken to anyone @ [place] [address] re: O2 concentrator. Patient reports the following information to writer:    -is meeting w/service rep from [address] [place] [address] tomorrow to assess her oxygen tanks.      -came to the office yesterday for labs to be drawn.      -needs to schedule a follow up w/Dr. Green re: cellulitis on her leg. Tue/Thurs work best w/her schedule.        -have been receiving bills regarding prior labs and W. POLINA Mendoza not paying. Writer to assist w/getting a F/U appt w/PCP, and provide appt information. Instructed pt to bring in all lab bills at next appt.       SHIRA Thomason

## 2018-10-22 ENCOUNTER — TELEPHONE (OUTPATIENT)
Dept: FAMILY MEDICINE CLINIC | Age: 58
End: 2018-10-22

## 2018-10-22 NOTE — TELEPHONE ENCOUNTER
Outbound F/U call to patient, identifiers ( & address) verified per HIPAA policy. Identified self, role and nature of the call. Writer informed pt of new appt date/time 2018 @ 11:20 am.  Patient acknowledged understanding.       Al April, SHIRA

## 2018-10-22 NOTE — TELEPHONE ENCOUNTER
Outbound call to patient, identifiers ( & address) verified per HIPAA policy. Identified self, role and nature of the call, pt acknowledged understanding. Alexa Oswald of the call re: scheduled F/U appt on 10/25/18 @ 12:20 pm.      Pt informed writer she has an appt to meet w/DSS  on Oct 25th, 2018 @ 1:15 pm, requesting another appt date/time. Writer voiced would be in touch w/her regarding new appt date/time.       SHIRA Ravi

## 2018-10-31 PROBLEM — R15.9 FUNCTIONAL FECAL INCONTINENCE: Status: ACTIVE | Noted: 2018-10-31

## 2018-11-01 ENCOUNTER — OFFICE VISIT (OUTPATIENT)
Dept: FAMILY MEDICINE CLINIC | Age: 58
End: 2018-11-01

## 2018-11-01 VITALS
SYSTOLIC BLOOD PRESSURE: 113 MMHG | DIASTOLIC BLOOD PRESSURE: 43 MMHG | OXYGEN SATURATION: 84 % | TEMPERATURE: 99.5 F | HEART RATE: 95 BPM | RESPIRATION RATE: 20 BRPM

## 2018-11-01 DIAGNOSIS — L03.119 RECURRENT CELLULITIS OF THIGH: Primary | ICD-10-CM

## 2018-11-01 DIAGNOSIS — R09.02 HYPOXIA: ICD-10-CM

## 2018-11-01 RX ORDER — CEPHALEXIN 500 MG/1
500 CAPSULE ORAL 3 TIMES DAILY
Qty: 90 CAP | Refills: 0 | Status: SHIPPED | OUTPATIENT
Start: 2018-11-01 | End: 2018-12-01

## 2018-11-01 NOTE — PROGRESS NOTES
Chacorta Rueda  Identified pt with two pt identifiers(name and ). Chief Complaint   Patient presents with    Skin Infection     f/u    Request For New Medication       1. Have you been to the ER, urgent care clinic since your last visit? Hospitalized since your last visit? No    2. Have you seen or consulted any other health care providers outside of the [address] [place] [address] since your last visit? Include any pap smears or colon screening. No    In the event something were to happen to you and you were unable to speak on your behalf, do you have an Advance Directive/ Living Will in place stating your wishes? NO    If yes, do we have a copy on file NO    If no, would you like information:            Would you like to sign up for [employer] today, if you have not already done so? Patient has information. If not, would you like information on MyChart, and how to sign up at a later time? No      Medication reconciliation up to date and corrected with patient at this time. Today's provider has been notified of reason for visit, vitals and flowsheets obtained on patients. Reviewed record in preparation for visit, huddled with provider and have obtained necessary documentation.       Health Maintenance Due   Topic    DTaP/Tdap/Td series (1 - Tdap)    Shingrix Vaccine Age 50> (1 of 2)    PAP AKA CERVICAL CYTOLOGY        Wt Readings from Last 3 Encounters:   10/11/18 (!) 362 lb 3.2 oz (164.3 kg)   18 (!) 370 lb 9.6 oz (168.1 kg)   18 (!) 366 lb 6.4 oz (166.2 kg)     Temp Readings from Last 3 Encounters:   10/11/18 97.6 °F (36.4 °C) (Oral)   18 98.6 °F (37 °C) (Oral)   05/15/18 98 °F (36.7 °C)     BP Readings from Last 3 Encounters:   10/11/18 126/80   18 102/52   05/15/18 126/80     Pulse Readings from Last 3 Encounters:   10/11/18 69   18 95   05/15/18 70     Vitals:    18 1138   PainSc:   8   PainLoc: Generalized         Learning Assessment:  :     Learning Assessment 7/6/2018 9/3/2014 6/16/2014   PRIMARY LEARNER Patient Patient Patient   HIGHEST LEVEL OF EDUCATION - PRIMARY LEARNER  SOME COLLEGE - -   BARRIERS PRIMARY LEARNER NONE - NONE   CO-LEARNER CAREGIVER No - No   PRIMARY LANGUAGE ENGLISH ENGLISH ENGLISH   LEARNER PREFERENCE PRIMARY DEMONSTRATION READING LISTENING     - - READING     - - DEMONSTRATION   ANSWERED BY patient patient Nathaly Pacheco   RELATIONSHIP SELF SELF SELF       Depression Screening:  :     PHQ over the last two weeks 10/11/2018   Little interest or pleasure in doing things Several days   Feeling down, depressed, irritable, or hopeless Not at all   Total Score PHQ 2 1       Fall Risk Assessment:  :     Fall Risk Assessment, last 12 mths 10/11/2018   Able to walk? Yes   Fall in past 12 months? No       Abuse Screening:  :     Abuse Screening Questionnaire 10/11/2018   Do you ever feel afraid of your partner? N   Are you in a relationship with someone who physically or mentally threatens you? N   Is it safe for you to go home?  Y       ADL Screening:  :     ADL Assessment 10/11/2018   Feeding yourself No Help Needed   Getting from bed to chair No Help Needed   Getting dressed No Help Needed   Bathing or showering No Help Needed   Walk across the room (includes cane/walker) Help Needed   Using the telphone No Help Needed   Taking your medications No Help Needed   Preparing meals Help Needed   Managing money (expenses/bills) No Help Needed   Moderately strenuous housework (laundry) Help Needed   Shopping for personal items (toiletries/medicines) Help Needed   Shopping for groceries Help Needed   Driving Help Needed   Climbing a flight of stairs Help Needed   Getting to places beyond walking distances Help Needed

## 2018-11-01 NOTE — PROGRESS NOTES
Here to complete labs for chronic opiate refill. Still needs battery for portable oxygen as desaturates in office. Script to take to [facility]. Here to recheck cellulitis post most recent course of antibiotics. Thinks softened on antibiotic. Visit Vitals  /43 (BP 1 Location: Right arm, BP Patient Position: Sitting)   Pulse 95   Temp 99.5 °F (37.5 °C) (Oral)   Resp 20   SpO2 (!) 84%       Patient alert and cooperative. Still with firm, indurated, darkened, tender areas behind both legs at the distal posterolateral hamstring, also areas on bilateral hips, also with firmness and tenderness, though no overlying skin darkening. Assessment:  1. Question recurrent cellulitis. Patient thinks did seem to respond to the antibiotics given previously, areas would soften. Plan:  1. Will put back on antibiotics for a month. 2. Set up ID referral.  3. Follow otherwise here prn.

## 2018-11-01 NOTE — PROGRESS NOTES
Patient's oxygen while rooming was 84% on room air. Patient on 2L O2 via NC. Oxygen went up to 94% while on 2L O2 via NC. Patient went to lab where oxygen was taken off. O2 when she got back from lab was 84% on room air.

## 2018-11-06 LAB
6-ACETYLMORPHINE: NEGATIVE
AMPHETAMINES BLD QL SCN: NEGATIVE NG/ML
BARBITURATES SERPLBLD QL: NEGATIVE UG/ML
BENZODIAZ BLD QL: NEGATIVE NG/ML
CANNABINOIDS BLD QL SCN: NEGATIVE NG/ML
COCAINE+BZE SERPLBLD QL SCN: NEGATIVE NG/ML
CODEINE, 737848: NEGATIVE NG/ML
DIHYDROCODEINE, 764159: NEGATIVE NG/ML
FENTANYL BLD QL SCN: NEGATIVE NG/ML
HYDROCODONE, 737854: 20.4 NG/ML
HYDROMORPHONE, 737852: NEGATIVE NG/ML
MEPERIDINE, DR296L: NEGATIVE NG/ML
METHADONE BLD QL SCN: NEGATIVE NG/ML
MORPHINE, 737850: NEGATIVE NG/ML
OPIATES BLD QL SCN: ABNORMAL NG/ML
OPIATES SPEC QL: POSITIVE
OXYCOCONE: NEGATIVE NG/ML
OXYCODONES CONFIRMATION, 738393: NEGATIVE
OXYCODONES, 738315: NEGATIVE NG/ML
OXYMORPHONE, 763902: NEGATIVE NG/ML
PCP BLD QL SCN: NEGATIVE NG/ML
PROPOXYPH BLD QL SCN: NEGATIVE NG/ML
TRAMADOL, DR297L: NEGATIVE NG/ML

## 2018-11-08 ENCOUNTER — TELEPHONE (OUTPATIENT)
Dept: FAMILY MEDICINE CLINIC | Age: 58
End: 2018-11-08

## 2018-11-08 NOTE — TELEPHONE ENCOUNTER
10:58 am, Outbound call to patient. Identifiers ( & address) verified per HIPAA policy. Identified self, role and nature of the call, pt acknowledged understanding. Writer informed pt of the information regarding discussion w/[facility]. Pt acknowledged understanding; reports Bi-Pap machine is still broken and still needs a battery for oxygen pack. Pt is currently in [facility]). Admission date was 2018; scheduled to have surgery tomorrow and unsure of discharge at this point. Acknowledged understanding, and voiced this information will be relayed to Dr. Florecita Gross. Plan:    -writer to F/U w/patient next week. SHIRA Zabala    10:18 am, Outbound F/U call to Marta N Darrius Culver. Identified self, role and nature of the call. Spoke to Sumanth Guerrero". Inquired about the status of O2 concentrator & BiPap machine being broken. Per Foster Cobos pt's BiPap machine has been assessed by techinician; but doesn't know if equipment has been repaired or replaced. Kevin Bloch reports technician repaired O2 concentrator on 10/18/2018. Inquired if her battery has been replaced \"we weren't aware the battery isn't working, as the patient didn't notify us and we don't follow-up w/patient's after  goes out\". Acknowledged understanding. Will call patient to inform she needs to call [address] [facility] [place] re: needing new battery.      SHIRA Zabala

## 2018-12-04 ENCOUNTER — OFFICE VISIT (OUTPATIENT)
Dept: FAMILY MEDICINE CLINIC | Age: 58
End: 2018-12-04

## 2018-12-04 VITALS
OXYGEN SATURATION: 95 % | HEIGHT: 65 IN | TEMPERATURE: 96.8 F | RESPIRATION RATE: 18 BRPM | HEART RATE: 62 BPM | SYSTOLIC BLOOD PRESSURE: 123 MMHG | BODY MASS INDEX: 48.82 KG/M2 | DIASTOLIC BLOOD PRESSURE: 72 MMHG | WEIGHT: 293 LBS

## 2018-12-04 DIAGNOSIS — G89.29 CHRONIC TOE PAIN, RIGHT FOOT: ICD-10-CM

## 2018-12-04 DIAGNOSIS — M79.674 CHRONIC TOE PAIN, RIGHT FOOT: ICD-10-CM

## 2018-12-04 DIAGNOSIS — Z23 ENCOUNTER FOR IMMUNIZATION: Primary | ICD-10-CM

## 2018-12-04 PROBLEM — E66.01 MORBID OBESITY WITH BMI OF 50.0-59.9, ADULT (HCC): Status: ACTIVE | Noted: 2018-05-03

## 2018-12-04 RX ORDER — CINACALCET HYDROCHLORIDE 60 MG/1
TABLET, COATED ORAL
Refills: 0 | COMMUNITY
Start: 2018-11-21

## 2018-12-04 NOTE — PROGRESS NOTES
Immunization/s administered 12/4/2018 by Joyce Low with patient's consent. Patient tolerated procedure well. No reactions noted.

## 2018-12-04 NOTE — PROGRESS NOTES
Steve Tucker  Identified pt with two pt identifiers(name and ). Chief Complaint   Patient presents with    Skin Infection     f/u    Referral Request     ortho       1. Have you been to the ER, urgent care clinic since your last visit? Hospitalized since your last visit? Surgery on skin graft on left arm and had catheter removed    2. Have you seen or consulted any other health care providers outside of the [address] [place] [address] since your last visit? Include any pap smears or colon screening. NO    In the event something were to happen to you and you were unable to speak on your behalf, do you have an Advance Directive/ Living Will in place stating your wishes? NO    If yes, do we have a copy on file NO    If no, would you like information:            Would you like to sign up for MyChart today, if you have not already done so? Patient has a mychart  If not, would you like information on MyChart, and how to sign up at a later time? No      Medication reconciliation up to date and corrected with patient at this time. Today's provider has been notified of reason for visit, vitals and flowsheets obtained on patients. Reviewed record in preparation for visit, huddled with provider and have obtained necessary documentation.       Health Maintenance Due   Topic    DTaP/Tdap/Td series (1 - Tdap)    Shingrix Vaccine Age 50> (1 of 2)    PAP AKA CERVICAL CYTOLOGY        Wt Readings from Last 3 Encounters:   18 (!) 355 lb 3.2 oz (161.1 kg)   10/11/18 (!) 362 lb 3.2 oz (164.3 kg)   18 (!) 370 lb 9.6 oz (168.1 kg)     Temp Readings from Last 3 Encounters:   18 99.5 °F (37.5 °C) (Oral)   10/11/18 97.6 °F (36.4 °C) (Oral)   18 98.6 °F (37 °C) (Oral)     BP Readings from Last 3 Encounters:   18 113/43   10/11/18 126/80   18 102/52     Pulse Readings from Last 3 Encounters:   18 95   10/11/18 69   18 95     Vitals:    18 1239   BP: 123/72   Pulse: 62 Resp: 18   Temp: 96.8 °F (36 °C)   TempSrc: Oral   SpO2: 95%   Weight: (!) 355 lb 3.2 oz (161.1 kg)   Height: 5' 5\" (1.651 m)   PainSc:   0 - No pain         Learning Assessment:  :     Learning Assessment 7/6/2018 9/3/2014 6/16/2014   PRIMARY LEARNER Patient Patient Patient   HIGHEST LEVEL OF EDUCATION - PRIMARY LEARNER  SOME COLLEGE - -   BARRIERS PRIMARY LEARNER NONE - NONE   CO-LEARNER CAREGIVER No - No   PRIMARY LANGUAGE ENGLISH ENGLISH ENGLISH   LEARNER PREFERENCE PRIMARY DEMONSTRATION READING LISTENING     - - READING     - - DEMONSTRATION   ANSWERED BY patient patient Beronica Mathews   RELATIONSHIP SELF SELF SELF       Depression Screening:  :     PHQ over the last two weeks 10/11/2018   Little interest or pleasure in doing things Several days   Feeling down, depressed, irritable, or hopeless Not at all   Total Score PHQ 2 1       Fall Risk Assessment:  :     Fall Risk Assessment, last 12 mths 10/11/2018   Able to walk? Yes   Fall in past 12 months? No       Abuse Screening:  :     Abuse Screening Questionnaire 10/11/2018   Do you ever feel afraid of your partner? N   Are you in a relationship with someone who physically or mentally threatens you? N   Is it safe for you to go home?  Y       ADL Screening:  :     ADL Assessment 10/11/2018   Feeding yourself No Help Needed   Getting from bed to chair No Help Needed   Getting dressed No Help Needed   Bathing or showering No Help Needed   Walk across the room (includes cane/walker) Help Needed   Using the telphone No Help Needed   Taking your medications No Help Needed   Preparing meals Help Needed   Managing money (expenses/bills) No Help Needed   Moderately strenuous housework (laundry) Help Needed   Shopping for personal items (toiletries/medicines) Help Needed   Shopping for groceries Help Needed   Driving Help Needed   Climbing a flight of stairs Help Needed   Getting to places beyond walking distances Help Needed

## 2018-12-04 NOTE — PROGRESS NOTES
In [facility], cleaned out graft in left arm for dialysis. Had temp cath in right groin removed. D/c'd on 11/10. Told prob not cellulitis of legs but still having pains. No hosp f/u planned. Wants to see ortho about right 5th toe pain. Has gotten shots in past with benefit. Needs script for bipap to [facility].    Visit Vitals  /72 (BP 1 Location: Right arm, BP Patient Position: Sitting)   Pulse 62   Temp 96.8 °F (36 °C) (Oral)   Resp 18   Ht 5' 5\" (1.651 m)   Wt (!) 355 lb 3.2 oz (161.1 kg)   SpO2 95%   BMI 59.11 kg/m²       Patient alert and cooperative. Reviewed above. Assessment:  1. Chronic right fifth toe pain. Plan:  1. Set up ortho referral for possible repeat injection. 2. Written script for BIPAP for sleep apnea. 3. Flu shot given. 4. Follow otherwise here prn.

## 2018-12-19 ENCOUNTER — TELEPHONE (OUTPATIENT)
Dept: FAMILY MEDICINE CLINIC | Age: 58
End: 2018-12-19

## 2018-12-19 NOTE — TELEPHONE ENCOUNTER
10:20 am, Outbound call to patient. Identified self, role and nature of the call . Pt identifiers ( & address) verified per HIPAA policy. Writer informed pt of following information:    Status of BiPap machine, needing a new script from Dr. Evelin Hope, insurance process/authorization, and [address] [place] [address], [place] following up w/her only during the 90 day compliance period. Further informed pt she'll need to report any issues w/equipment directly to [address] [place] [address], [place] Patient states she's having overnight sleep study tomorrow @ 8:00 pm. Acknowledged understanding and will inform Dr. Evelin Hope.       Yamil Toure, MSW

## 2018-12-19 NOTE — TELEPHONE ENCOUNTER
09:50 am, Outbound call to [facility]. Spoke to [employer]". Identified self, role and nature of the call \"Sumeet\" acknowledged understanding. Pt identifiers (name & ) verified per HIPAA policy. Writer inquired the status of patient's  BiPap machine. \"Sumeet\" reports the following information:    -we need a script from PCP. Script needs to have her pressure settings and Dx Code CLEMENTINA G47.33. Script is good for six months; and the machine can only be replaced once every five years per insurance guidelines. This is so the patient can get a brand new machine. Fax the script to [phone].    -insurance authorization typically takes 3-5 days. Once she has new machine, our staff will follow up only during the 90 compliance period. If any issues arise after this patient will have to call us. Writer asked if PCP will need to write a new script after six mos \"no\". Verbalized this information will be relayed to PCP and patient.       SHIRA Ravi

## 2019-01-10 ENCOUNTER — OFFICE VISIT (OUTPATIENT)
Dept: FAMILY MEDICINE CLINIC | Age: 59
End: 2019-01-10

## 2019-01-10 VITALS
BODY MASS INDEX: 48.82 KG/M2 | RESPIRATION RATE: 20 BRPM | TEMPERATURE: 97.2 F | SYSTOLIC BLOOD PRESSURE: 131 MMHG | HEIGHT: 65 IN | OXYGEN SATURATION: 100 % | DIASTOLIC BLOOD PRESSURE: 79 MMHG | HEART RATE: 67 BPM | WEIGHT: 293 LBS

## 2019-01-10 DIAGNOSIS — E66.01 MORBID OBESITY WITH BMI OF 50.0-59.9, ADULT (HCC): ICD-10-CM

## 2019-01-10 DIAGNOSIS — N18.6 KIDNEY DISEASE, CHRONIC, END STAGE ON DIALYSIS (HCC): Primary | ICD-10-CM

## 2019-01-10 DIAGNOSIS — L85.3 DRY SKIN: ICD-10-CM

## 2019-01-10 DIAGNOSIS — J45.909 UNCOMPLICATED ASTHMA, UNSPECIFIED ASTHMA SEVERITY, UNSPECIFIED WHETHER PERSISTENT: ICD-10-CM

## 2019-01-10 DIAGNOSIS — E66.2 OBESITY HYPOVENTILATION SYNDROME (HCC): ICD-10-CM

## 2019-01-10 DIAGNOSIS — Z99.2 KIDNEY DISEASE, CHRONIC, END STAGE ON DIALYSIS (HCC): Primary | ICD-10-CM

## 2019-01-10 RX ORDER — PETROLATUM,WHITE 41 %
OINTMENT (GRAM) TOPICAL AS NEEDED
Qty: 56 G | Refills: 11 | Status: SHIPPED | OUTPATIENT
Start: 2019-01-10

## 2019-01-10 RX ORDER — ALBUTEROL SULFATE 0.83 MG/ML
2.5 SOLUTION RESPIRATORY (INHALATION)
Qty: 1 PACKAGE | Refills: 4 | Status: SHIPPED | OUTPATIENT
Start: 2019-01-10 | End: 2021-03-25 | Stop reason: SDUPTHER

## 2019-01-10 NOTE — PROGRESS NOTES
Konrad Beltran  Identified pt with two pt identifiers(name and ). Chief Complaint   Patient presents with    Referral Request     bariatric walker with seat    ED Follow-up     2018 for syncope       1. Have you been to the ER, urgent care clinic since your last visit? Hospitalized since your last visit? ED on 18     2. Have you seen or consulted any other health care providers outside of the [address] [place] [address] since your last visit? Include any pap smears or colon screening. No    In the event something were to happen to you and you were unable to speak on your behalf, do you have an Advance Directive/ Living Will in place stating your wishes? NO    If yes, do we have a copy on file NO    If no, would you like information:            Would you like to sign up for [place] today, if you have not already done so? No  If not, would you like information on MyChart, and how to sign up at a later time? No      Medication reconciliation up to date and corrected with patient at this time. Today's provider has been notified of reason for visit, vitals and flowsheets obtained on patients. Reviewed record in preparation for visit, huddled with provider and have obtained necessary documentation.       Health Maintenance Due   Topic    DTaP/Tdap/Td series (1 - Tdap)    Shingrix Vaccine Age 50> (1 of 2)    PAP AKA CERVICAL CYTOLOGY        Wt Readings from Last 3 Encounters:   01/10/19 349 lb (158.3 kg)   18 (!) 355 lb 3.2 oz (161.1 kg)   10/11/18 (!) 362 lb 3.2 oz (164.3 kg)     Temp Readings from Last 3 Encounters:   18 96.8 °F (36 °C) (Oral)   18 99.5 °F (37.5 °C) (Oral)   10/11/18 97.6 °F (36.4 °C) (Oral)     BP Readings from Last 3 Encounters:   18 123/72   18 113/43   10/11/18 126/80     Pulse Readings from Last 3 Encounters:   18 62   18 95   10/11/18 69     Vitals:    01/10/19 1115   BP: 131/79   Pulse: 67   Resp: 20   Temp: 97.2 °F (36.2 °C) TempSrc: Oral   SpO2: 100%   Weight: 349 lb (158.3 kg)   Height: 5' 5\" (1.651 m)   PainSc:   7   PainLoc: Leg         Learning Assessment:  :     Learning Assessment 7/6/2018 9/3/2014 6/16/2014   PRIMARY LEARNER Patient Patient Patient   HIGHEST LEVEL OF EDUCATION - PRIMARY LEARNER  SOME COLLEGE - -   BARRIERS PRIMARY LEARNER NONE - NONE   CO-LEARNER CAREGIVER No - No   PRIMARY LANGUAGE ENGLISH ENGLISH ENGLISH   LEARNER PREFERENCE PRIMARY DEMONSTRATION READING LISTENING     - - READING     - - DEMONSTRATION   ANSWERED BY patient patient Ona Alpers   RELATIONSHIP SELF SELF SELF       Depression Screening:  :     PHQ over the last two weeks 10/11/2018   Little interest or pleasure in doing things Several days   Feeling down, depressed, irritable, or hopeless Not at all   Total Score PHQ 2 1       Fall Risk Assessment:  :     Fall Risk Assessment, last 12 mths 10/11/2018   Able to walk? Yes   Fall in past 12 months? No       Abuse Screening:  :     Abuse Screening Questionnaire 10/11/2018   Do you ever feel afraid of your partner? N   Are you in a relationship with someone who physically or mentally threatens you? N   Is it safe for you to go home?  Y       ADL Screening:  :     ADL Assessment 10/11/2018   Feeding yourself No Help Needed   Getting from bed to chair No Help Needed   Getting dressed No Help Needed   Bathing or showering No Help Needed   Walk across the room (includes cane/walker) Help Needed   Using the telphone No Help Needed   Taking your medications No Help Needed   Preparing meals Help Needed   Managing money (expenses/bills) No Help Needed   Moderately strenuous housework (laundry) Help Needed   Shopping for personal items (toiletries/medicines) Help Needed   Shopping for groceries Help Needed   Driving Help Needed   Climbing a flight of stairs Help Needed   Getting to places beyond walking distances Help Needed

## 2019-01-10 NOTE — PROGRESS NOTES
Requests bariatric walker with seat. Gets supplies from [facility].  Recent visit to [address] [place] [address] for syncope. Nothing found except low BP. Has new dialysis catheter in neck. Uses cane and wheelchair in house. Has a walker at home w/o seat. No falls at home. Unable to ambulate more than 10 feet without having to rest with current cane and walker. Still need to see [facility] surgeon about legs. Needs to have to rollator with seat and brakes to perform ADLs at home. Current wheelchair is hurting hips. Wants to be able to walk around house more to strengthen legs. Staying in the wheelchair increases her risk of DVT and subs possibility of PE which increases her morbidity risk. Visit Vitals  /79 (BP 1 Location: Right arm, BP Patient Position: Sitting)   Pulse 67   Temp 97.2 °F (36.2 °C) (Oral)   Resp 20   Ht 5' 5\" (1.651 m)   Wt 349 lb (158.3 kg)   SpO2 100%   BMI 58.08 kg/m²     Alert, coop. Reviewed above. A: ESKD on dialysis       Limited mobility. P: Script for heavy duty rollator with set sent to [facility]. Return for opiate refill when on last bottle. TOTAL FACE TO FACE TIME OF 15 MINUTES SPENT WITH PATIENT. GREATER THAN 50% OF TIME WAS SPENT IN COUNSELING AND/OR COORDINATION OF CARE. SEE HPI, ASSESSMENT AND PLAN FOR DETAILS.

## 2019-01-28 NOTE — PERIOP NOTES
Preop phone call completed. Preop instructions and preop medications reveiwed with patient. Pre-Operative Instructions DO NOT EAT OR DRINK ANYTHING AFTER MIDNIGHT THE NIGHT BEFORE SURGERY. 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

## 2019-01-31 ENCOUNTER — ANESTHESIA EVENT (OUTPATIENT)
Dept: SURGERY | Age: 59
End: 2019-01-31
Payer: MEDICAID

## 2019-01-31 ENCOUNTER — HOSPITAL ENCOUNTER (OUTPATIENT)
Age: 59
Setting detail: OUTPATIENT SURGERY
Discharge: HOME | End: 2019-01-31
Attending: PODIATRIST | Admitting: PODIATRIST
Payer: MEDICAID

## 2019-01-31 ENCOUNTER — ANESTHESIA (OUTPATIENT)
Dept: SURGERY | Age: 59
End: 2019-01-31
Payer: MEDICAID

## 2019-01-31 VITALS
WEIGHT: 293 LBS | DIASTOLIC BLOOD PRESSURE: 65 MMHG | HEART RATE: 76 BPM | SYSTOLIC BLOOD PRESSURE: 126 MMHG | RESPIRATION RATE: 18 BRPM | TEMPERATURE: 98.2 F | HEIGHT: 65 IN | OXYGEN SATURATION: 97 % | BODY MASS INDEX: 48.82 KG/M2

## 2019-01-31 LAB
ANION GAP BLD CALC-SCNC: 16 MMOL/L (ref 10–20)
BUN BLD-MCNC: 17 MG/DL (ref 9–20)
CA-I BLD-MCNC: 1.01 MMOL/L (ref 1.12–1.32)
CHLORIDE BLD-SCNC: 100 MMOL/L (ref 98–107)
CO2 BLD-SCNC: 26 MMOL/L (ref 21–32)
CREAT BLD-MCNC: 6.5 MG/DL (ref 0.6–1.3)
GLUCOSE BLD-MCNC: 84 MG/DL (ref 65–100)
HCT VFR BLD CALC: 33 % (ref 35–47)
POTASSIUM BLD-SCNC: 4.3 MMOL/L (ref 3.5–5.1)
SERVICE CMNT-IMP: ABNORMAL
SODIUM BLD-SCNC: 137 MMOL/L (ref 136–145)

## 2019-01-31 PROCEDURE — 76010000138 HC OR TIME 0.5 TO 1 HR: Performed by: PODIATRIST

## 2019-01-31 PROCEDURE — 99153 MOD SED SAME PHYS/QHP EA: CPT

## 2019-01-31 PROCEDURE — 77030018836 HC SOL IRR NACL ICUM -A: Performed by: PODIATRIST

## 2019-01-31 PROCEDURE — 77030006788 HC BLD SAW OSC STRY -B: Performed by: PODIATRIST

## 2019-01-31 PROCEDURE — 99152 MOD SED SAME PHYS/QHP 5/>YRS: CPT

## 2019-01-31 PROCEDURE — 74011000272 HC RX REV CODE- 272: Performed by: PODIATRIST

## 2019-01-31 PROCEDURE — 74011000250 HC RX REV CODE- 250: Performed by: PODIATRIST

## 2019-01-31 PROCEDURE — 80047 BASIC METABLC PNL IONIZED CA: CPT

## 2019-01-31 PROCEDURE — 76210000063 HC OR PH I REC FIRST 0.5 HR: Performed by: PODIATRIST

## 2019-01-31 PROCEDURE — 74011000250 HC RX REV CODE- 250

## 2019-01-31 PROCEDURE — 77030020782 HC GWN BAIR PAWS FLX 3M -B

## 2019-01-31 PROCEDURE — 74011250636 HC RX REV CODE- 250/636

## 2019-01-31 PROCEDURE — 77030031139 HC SUT VCRL2 J&J -A: Performed by: PODIATRIST

## 2019-01-31 PROCEDURE — 77030002916 HC SUT ETHLN J&J -A: Performed by: PODIATRIST

## 2019-01-31 PROCEDURE — 76210000020 HC REC RM PH II FIRST 0.5 HR: Performed by: PODIATRIST

## 2019-01-31 PROCEDURE — 77030011640 HC PAD GRND REM COVD -A: Performed by: PODIATRIST

## 2019-01-31 PROCEDURE — 77030020754 HC CUF TRNQT 2BLA STRY -B: Performed by: PODIATRIST

## 2019-01-31 PROCEDURE — 74011250636 HC RX REV CODE- 250/636: Performed by: PODIATRIST

## 2019-01-31 PROCEDURE — 74011000258 HC RX REV CODE- 258

## 2019-01-31 RX ORDER — LIDOCAINE HYDROCHLORIDE 10 MG/ML
INJECTION INFILTRATION; PERINEURAL AS NEEDED
Status: DISCONTINUED | OUTPATIENT
Start: 2019-01-31 | End: 2019-01-31 | Stop reason: HOSPADM

## 2019-01-31 RX ORDER — CEFAZOLIN SODIUM/WATER 2 G/20 ML
2 SYRINGE (ML) INTRAVENOUS ONCE
Status: COMPLETED | OUTPATIENT
Start: 2019-01-31 | End: 2019-01-31

## 2019-01-31 RX ORDER — MIDAZOLAM HYDROCHLORIDE 1 MG/ML
INJECTION, SOLUTION INTRAMUSCULAR; INTRAVENOUS AS NEEDED
Status: DISCONTINUED | OUTPATIENT
Start: 2019-01-31 | End: 2019-01-31 | Stop reason: HOSPADM

## 2019-01-31 RX ORDER — BUPIVACAINE HYDROCHLORIDE 5 MG/ML
INJECTION, SOLUTION EPIDURAL; INTRACAUDAL AS NEEDED
Status: DISCONTINUED | OUTPATIENT
Start: 2019-01-31 | End: 2019-01-31 | Stop reason: HOSPADM

## 2019-01-31 RX ORDER — DEXMEDETOMIDINE HYDROCHLORIDE 100 UG/ML
INJECTION, SOLUTION INTRAVENOUS AS NEEDED
Status: DISCONTINUED | OUTPATIENT
Start: 2019-01-31 | End: 2019-01-31 | Stop reason: HOSPADM

## 2019-01-31 RX ORDER — SODIUM CHLORIDE 9 MG/ML
INJECTION, SOLUTION INTRAVENOUS
Status: DISCONTINUED | OUTPATIENT
Start: 2019-01-31 | End: 2019-01-31 | Stop reason: HOSPADM

## 2019-01-31 RX ADMIN — DEXMEDETOMIDINE HYDROCHLORIDE 5 MCG: 100 INJECTION, SOLUTION INTRAVENOUS at 13:41

## 2019-01-31 RX ADMIN — Medication 2 G: at 13:24

## 2019-01-31 RX ADMIN — MIDAZOLAM HYDROCHLORIDE 1 MG: 1 INJECTION, SOLUTION INTRAMUSCULAR; INTRAVENOUS at 13:22

## 2019-01-31 RX ADMIN — MIDAZOLAM HYDROCHLORIDE 1 MG: 1 INJECTION, SOLUTION INTRAMUSCULAR; INTRAVENOUS at 13:31

## 2019-01-31 RX ADMIN — MIDAZOLAM HYDROCHLORIDE 1 MG: 1 INJECTION, SOLUTION INTRAMUSCULAR; INTRAVENOUS at 13:25

## 2019-01-31 RX ADMIN — MIDAZOLAM HYDROCHLORIDE 2 MG: 1 INJECTION, SOLUTION INTRAMUSCULAR; INTRAVENOUS at 13:14

## 2019-01-31 RX ADMIN — DEXMEDETOMIDINE HYDROCHLORIDE 5 MCG: 100 INJECTION, SOLUTION INTRAVENOUS at 13:31

## 2019-01-31 RX ADMIN — SODIUM CHLORIDE: 9 INJECTION, SOLUTION INTRAVENOUS at 13:12

## 2019-01-31 RX ADMIN — DEXMEDETOMIDINE HYDROCHLORIDE 5 MCG: 100 INJECTION, SOLUTION INTRAVENOUS at 13:27

## 2019-01-31 NOTE — PERIOP NOTES
Patient ready for phase 2. States she did not bring portable O2 with her from home today. States she will be fine without it during the time it takes her to get from here to home. Has tank at home.

## 2019-01-31 NOTE — ANESTHESIA POSTPROCEDURE EVALUATION
Post-Anesthesia Evaluation and Assessment Patient: Delmi Mayfield MRN: [id number]  SSN: [id number] YOB: 1960  Age: 62 y.o. Sex: female Cardiovascular Function/Vital Signs Visit Vitals /71 Pulse 76 Temp 36.8 °C (98.2 °F) Resp 22 Ht 5' 5\" (1.651 m) Wt 157.4 kg (347 lb) SpO2 99% BMI 57.74 kg/m² Patient is status post Local anesthesia for Procedure(s): RIGHT 5TH TOE ARTHROPLASTY. Nausea/Vomiting: None Postoperative hydration reviewed and adequate. Pain: 
Pain Scale 1: Numeric (0 - 10) (01/31/19 1400) Pain Intensity 1: 0 (01/31/19 1400) Managed Neurological Status:  
Neuro (WDL): (s/p local injection R foot ) (01/31/19 1400) At baseline Mental Status and Level of Consciousness: Alert and oriented to person, place, and time Pulmonary Status:  
O2 Device: Nasal cannula (01/31/19 1401) Adequate oxygenation and airway patent Complications related to anesthesia: None Post-anesthesia assessment completed. No concerns Signed By: Honey Millan MD   
 January 31, 2019 Procedure(s): RIGHT 5TH TOE ARTHROPLASTY. <BSHSIANPOST> Visit Vitals /71 Pulse 76 Temp 36.8 °C (98.2 °F) Resp 22 Ht 5' 5\" (1.651 m) Wt 157.4 kg (347 lb) SpO2 99% BMI 57.74 kg/m²

## 2019-01-31 NOTE — DISCHARGE INSTRUCTIONS
OUTPATIENT DEPARTMENT DISCHARGE SUMMARY from Nurse    The following personal items collected during your admission are returned to you: ALL BELONGINGS RETURNED TO PATIENT PRIOR TO DISCHARGE HOME               PATIENT INSTRUCTIONS:    Follow-up Appointments   Procedures    FOLLOW UP VISIT Appointment in: 3 - 5 Days     Standing Status:   Standing     Number of Occurrences:   1     Order Specific Question:   Appointment in     Answer:   3 - 5 Days       What to do at Home:    FOLLOW ALL DIRECTIONS ON DR. BRADSHAW'S SEPARATELY PRINTED POSTOP INSTRUCTIONS    Recommended activity: Activity as tolerated and no driving for today, weight bearing on Right Heel Only, with postop shoe on    If you experience any of the following symptoms fever >101, unpleasant odor, excessive drainage from surgery site, please follow up with Dr. Carmine Dominguez. The discharge information has been reviewed with the patient. The patient verbalized understanding.

## 2019-01-31 NOTE — BRIEF OP NOTE
BRIEF OPERATIVE NOTE Date of Procedure: 1/31/2019 Preoperative Diagnosis: DEFORMITY OF TOE RIGHT FOOT Postoperative Diagnosis: DEFORMITY OF TOE RIGHT FOOT Procedure(s): RIGHT 5TH TOE ARTHROPLASTY Surgeon(s) and Role: Laquita Blandon DPM - Primary Surgical Assistant: none Surgical Staff: 
Circ-1: Raleigh Amezcua RN Scrub Tech-1: Juan Deal Event Time In Time Out Incision Start [id number] Incision Close 1355 Anesthesia: Local  
Estimated Blood Loss: less than 10ml Specimens: * No specimens in log * Findings: prominent bone right 5th toe Complications: none Implants: * No implants in log *

## 2019-02-07 DIAGNOSIS — N18.6 KIDNEY DISEASE, CHRONIC, END STAGE ON DIALYSIS (HCC): ICD-10-CM

## 2019-02-07 DIAGNOSIS — N18.6 ESRD (END STAGE RENAL DISEASE) (HCC): ICD-10-CM

## 2019-02-07 DIAGNOSIS — Z99.2 KIDNEY DISEASE, CHRONIC, END STAGE ON DIALYSIS (HCC): ICD-10-CM

## 2019-02-07 RX ORDER — LANOLIN ALCOHOL/MO/W.PET/CERES
CREAM (GRAM) TOPICAL
Qty: 90 TAB | Refills: 0 | Status: SHIPPED | OUTPATIENT
Start: 2019-02-07 | End: 2019-09-12 | Stop reason: SDUPTHER

## 2019-02-07 RX ORDER — SEVELAMER CARBONATE 2400 MG/1
2.4 POWDER, FOR SUSPENSION ORAL
Qty: 180 PACKET | Refills: 0 | Status: SHIPPED | OUTPATIENT
Start: 2019-02-07 | End: 2021-06-03

## 2019-02-07 NOTE — OP NOTES
[facility]  OPERATIVE REPORT    Name:  Norma Muñoz  MR#:  [id number]  :  1960  ACCOUNT #:  [de-identified]  DATE OF SERVICE:  2019      PREOPERATIVE DIAGNOSIS:  Right fifth digit proximal  interphalangeal joint hammertoe deformity with pain. POSTOPERATIVE DIAGNOSIS:  Right fifth digit proximal  interphalangeal joint hammertoe deformity with pain. PROCEDURE PERFORMED:  Right fifth digit PIPJ arthroplasty. SURGEON:  Tila Hunt DPM    PATHOLOGY:  None. ANESTHESIA:  MAC.    COMPLICATIONS:  None. SPECIMENS REMOVED:  None. IMPLANTS:  _____. ESTIMATED BLOOD LOSS:  Less than 10 mL. HEMOSTASIS:  _____. MATERIALS:  3-0 Vicryl, 4-0 nylon interrupted. INJECTABLES:  20 mL of 1% lidocaine plain, 10 mL of 0.5%  Marcaine plain. PROCEDURE:  After the patient was properly identified and  the correct site marked, the patient was taken to the  preoperative holding area, after which placed on the  operating room table in the normal supine fashion. _____ in  the operating room then identified the patient as the  aforementioned Ms. Matias Grant. Once MAC was initiated,  the patient's right foot was anesthetized with 20 mL of 1%  lidocaine plain. The patient's right leg was prepped and  draped in normal sterile manner. Upon physical examination, the patient was noted to have a  PIPJ hammertoe deformity in the right fifth digit which was  painful on the medial and lateral aspects of the joint  secondary to the hammertoe deformity as well as some  hypertrophy from a previous injury. The patient failed  conservative treatments and sought surgical intervention. Attention was then directed to the patient's right foot,  whereby a fresh 15-blade was utilized to make an incision  over the PIPJ. This incision was deepened through  full-thickness skin and subcutaneous tissues directly down  to bone.   Subperiosteal resection was carried out after  tenotomy and capsulotomy was performed at the PIPJ level. The collateral ligaments were transected. The proximal  phalangeal had a sliver within the wound. Bone cutting  forceps was utilized to transect the proximal phalangeal  edges proximal to the metaphyseal flare. This portion of  the bone was passed to the back table. The area was  irrigated with gentamicin solution. The tendon was  re-approximated with 3-0 Vicryl, skin close treated with 4-0  nylon. Postoperative anesthesia was introduced at this time  with 10 mL of 0.5% Marcaine plain. The incision was dressed  with Xeroform, sterile 4x4 fluffs, Masha and a Coban wrap. The patient tolerated the procedure well and was transferred  from the operating room to the recovery room with vital  signs stable, vascular status intact to her right lower  extremity. After spending enough time in the PACU, the  patient was discharged to home with instructions to follow  up with Dr. Jose Antonio Peacock in approximately 5 days. She was  instructed to take all medications, keep all appointments  and ice and elevate her right foot and remain weightbearing  as tolerated in a surgical shoe.         Adri Patel DPM      [id number]  D:  02/06/2019 21:43  T:  02/07/2019 4:48  JOB #:  [id number]

## 2019-02-07 NOTE — OP NOTES
[address] [facility]  OPERATIVE REPORT    Name:  Norma uMñoz  MR#:  [id number]  :  1960  ACCOUNT #:  [de-identified]  DATE OF SERVICE:  2019      SURGEON:  Tila Hunt DPM    ASSISTANT:    PREOPERATIVE DIAGNOSIS:  Right fifth digit proximal  interphalangeal joint hammertoe deformity with hypertrophic  bone and pain. POSTOPERATIVE DIAGNOSIS:  Right fifth digit proximal  interphalangeal joint hammertoe deformity with hypertrophic  bone and pain. PROCEDURE PERFORMED:  Right fifth digit PIPJ arthroplasty  with removal of hypertrophic bone. PATHOLOGY:  None. ANESTHESIA:  MAC. HEMOSTASIS:  Pneumatic ankle tourniquet set at 250 mmHg. ESTIMATED BLOOD LOSS:  Less than 10 mL. MATERIALS:  3-0 Vicryl, 4-0 Nylon. INTRAOPERATIVE INJECTABLES:  20 mL of 1% lidocaine plain, 10  mL of 0.5% Marcaine plain. COMPLICATIONS:  None    SPECIMENS REMOVED:    IMPLANTS:    DESCRIPTION OF PROCEDURE:  After the patient was  appropriately identified and correct extremity marked, the  patient was taken to the preoperative holding area and  placed on the operating room table in the normal supine  fashion. And fastened with a lap belt. Once back positioned, the patient's right  foot was anesthetized with 20 mL 1% lidocaine plain. The  patient's right foot was then prepped and draped in the  normal sterile manner. On physical examination, the patient was noted to have pain  over the medial as well as lateral aspect of the right fifth  digit at the PIPJ level secondary to a moderate hammertoe  deformity along with hypertrophy of the proximal phalangeal  bone which occurred after a previous longstanding injury  resulting in healing of what was likely a proximal phalanx  fracture, but with hypertrophic bone formation which was  causing pain. The patient had failed conservative  treatments including shoe changes and padding and sought  surgical intervention.     Attention was directed to the patient's right foot, whereby  a fresh #15 blade was utilized to make a longitudinal  incision over the PIPJ. This incision was deepened through  full-thickness skin and subcutaneous tissues down to the  tendon and capsule of the PIPJ. Transverse tenotomy  capsulotomy was performed, the collateral ligaments were  transected. Proximal phalangeal head was delivered through  the wound and a bone cutting forceps was then utilized to  transect the proximal phalangeal head proximal to  metaphyseal flare. This portion of bone which was  hypertrophic was passed to the back table. The area was  copiously irrigated with gentamicin solution. Layered  closure was achieved with 3-0 Vicryl to reapproximate the  tendon and capsule, and 4-0 Nylon was utilized to close the  skin. The incision was dressed with Xeroform, sterile 4X4s  fluffs, Masha and Coban wrap. Postop anesthesia achieved at  this time with 10 mL of 0.5% Marcaine plain and the patient  was transferred from the operating room to recovery room  with vital signs stable, vascular studies intact to right  lower extremity. After spending enough time in the PACU,  the patient was discharged to home with instructions to  follow up with Dr. Yadiel Rodriguez in approximately 5 days. She was  instructed to take all medications, keep her appointments,  instructed to elevate the right foot and remain PWB in a surgical shoe at all times. ALEX Abrams/BAYRON_[facility]_T/BC_[facility]  D:  02/06/2019 21:50  T:  02/07/2019 10:51  JOB #:  [id number]

## 2019-02-28 ENCOUNTER — OFFICE VISIT (OUTPATIENT)
Dept: FAMILY MEDICINE CLINIC | Age: 59
End: 2019-02-28

## 2019-02-28 VITALS
OXYGEN SATURATION: 95 % | DIASTOLIC BLOOD PRESSURE: 73 MMHG | HEART RATE: 77 BPM | WEIGHT: 293 LBS | BODY MASS INDEX: 48.82 KG/M2 | RESPIRATION RATE: 22 BRPM | TEMPERATURE: 96.9 F | HEIGHT: 65 IN | SYSTOLIC BLOOD PRESSURE: 140 MMHG

## 2019-02-28 DIAGNOSIS — F33.9 RECURRENT DEPRESSION (HCC): ICD-10-CM

## 2019-02-28 DIAGNOSIS — M25.571 CHRONIC PAIN OF RIGHT ANKLE: Primary | ICD-10-CM

## 2019-02-28 DIAGNOSIS — G89.29 OTHER CHRONIC PAIN: ICD-10-CM

## 2019-02-28 DIAGNOSIS — Z79.891 CHRONIC PRESCRIPTION OPIATE USE: ICD-10-CM

## 2019-02-28 DIAGNOSIS — G89.29 CHRONIC PAIN OF RIGHT ANKLE: Primary | ICD-10-CM

## 2019-02-28 DIAGNOSIS — G89.29 CHRONIC MIDLINE LOW BACK PAIN WITH BILATERAL SCIATICA: ICD-10-CM

## 2019-02-28 DIAGNOSIS — M54.42 CHRONIC MIDLINE LOW BACK PAIN WITH BILATERAL SCIATICA: ICD-10-CM

## 2019-02-28 DIAGNOSIS — M54.41 CHRONIC MIDLINE LOW BACK PAIN WITH BILATERAL SCIATICA: ICD-10-CM

## 2019-02-28 RX ORDER — HYDROCODONE BITARTRATE AND ACETAMINOPHEN 7.5; 325 MG/1; MG/1
1 TABLET ORAL
Qty: 60 TAB | Refills: 0 | Status: SHIPPED | OUTPATIENT
Start: 2019-04-28 | End: 2019-05-30 | Stop reason: SDUPTHER

## 2019-02-28 RX ORDER — HYDROCODONE BITARTRATE AND ACETAMINOPHEN 7.5; 325 MG/1; MG/1
1 TABLET ORAL
Qty: 60 TAB | Refills: 0 | Status: SHIPPED | OUTPATIENT
Start: 2019-02-28 | End: 2019-05-30 | Stop reason: SDUPTHER

## 2019-02-28 RX ORDER — TRAMADOL HYDROCHLORIDE 50 MG/1
TABLET ORAL
Refills: 0 | COMMUNITY
Start: 2019-02-21 | End: 2019-02-28 | Stop reason: ALTCHOICE

## 2019-02-28 RX ORDER — PROMETHAZINE HYDROCHLORIDE 25 MG/1
TABLET ORAL
Refills: 0 | COMMUNITY
Start: 2019-01-31 | End: 2019-02-28 | Stop reason: ALTCHOICE

## 2019-02-28 RX ORDER — HYDROCODONE BITARTRATE AND ACETAMINOPHEN 7.5; 325 MG/1; MG/1
1 TABLET ORAL
Qty: 60 TAB | Refills: 0 | Status: SHIPPED | OUTPATIENT
Start: 2019-03-28 | End: 2019-05-30 | Stop reason: SDUPTHER

## 2019-02-28 NOTE — ACP (ADVANCE CARE PLANNING)
In the event something were to happen to you and you were unable to speak on your behalf, do you have an Advance Directive/ Living Will in place stating your wishes? NO    If yes, do we have a copy on file NO    Advance Care Planning (ACP)       Attempted to discuss ACP with Ms. Carlos Sifuentes today, she declines to discuss at this time. Will readdress at future visit.

## 2019-02-28 NOTE — PROGRESS NOTES
Jamar Mosqueda is here for controlled opiate refill. Is using bipap with good results. Got new rollator. RTC today to follow up on chronic pain diagnosis. We discussed her osteoarthritis that is affecting her right ankle and back. Significant changes since last visit: none. She is  able to do her normal daily activities. She reports the following adverse side effects: none. Least pain over the last week has been 7/10. Worst pain over the last week has been 9/10. Opioid Risk Tool Reviewed: YES Aberrant behaviors: None. Urine Drug Screen: reviewed and up to date. Controlled substance agreement on file: YES.  reviewed:yes Pill count is consistent with her prescription: yes Concomitant use of a benzodiazepine: no 
 
NA 
 
NA Also,  abstinence syndrome was reviewed and discussed with her today N/A

## 2019-02-28 NOTE — PROGRESS NOTES
Ona Alpers  Identified pt with two pt identifiers(name and ). Chief Complaint Patient presents with  Medication Refill 1. Have you been to the ER, urgent care clinic since your last visit? Hospitalized since your last visit? No 
 
2. Have you seen or consulted any other health care providers outside of the [address] [place] [address] since your last visit? Include any pap smears or colon screening. Dr. Deondre Krause Would you like to sign up for MyChart today, if you have not already done so? No 
If not, would you like information on MyChart, and how to sign up at a later time? No 
 
 
Medication reconciliation up to date and corrected with patient at this time. Today's provider has been notified of reason for visit, vitals and flowsheets obtained on patients. Reviewed record in preparation for visit, huddled with provider and have obtained necessary documentation. Health Maintenance Due Topic  DTaP/Tdap/Td series (1 - Tdap)  Shingrix Vaccine Age 50> (1 of 2)  PAP AKA CERVICAL CYTOLOGY Wt Readings from Last 3 Encounters:  
19 333 lb 6.4 oz (151.2 kg) 19 347 lb (157.4 kg) 01/10/19 349 lb (158.3 kg) Temp Readings from Last 3 Encounters:  
19 96.9 °F (36.1 °C) (Oral) 19 98.2 °F (36.8 °C)  
01/10/19 97.2 °F (36.2 °C) (Oral) BP Readings from Last 3 Encounters:  
19 140/73  
19 126/65  
01/10/19 131/79 Pulse Readings from Last 3 Encounters:  
19 77  
19 76  
01/10/19 67 Vitals:  
 19 1219 BP: 140/73 Pulse: 77 Resp: 22 Temp: 96.9 °F (36.1 °C) TempSrc: Oral  
SpO2: 95% Weight: 333 lb 6.4 oz (151.2 kg) Height: 5' 5\" (1.651 m) PainSc:   5 PainLoc: Foot Learning Assessment: 
:  
 
Learning Assessment 2018 9/3/2014 2014 PRIMARY LEARNER Patient Patient Patient HIGHEST LEVEL OF EDUCATION - PRIMARY LEARNER  SOME COLLEGE - -  
BARRIERS PRIMARY LEARNER NONE - NONE CO-LEARNER CAREGIVER No - No  
PRIMARY LANGUAGE ENGLISH ENGLISH ENGLISH  
LEARNER PREFERENCE PRIMARY DEMONSTRATION READING LISTENING  
  - - READING  
  - - DEMONSTRATION  
ANSWERED BY patient patient Dakota Peak RELATIONSHIP SELF SELF SELF Depression Screening: 
:  
 
3 most recent PHQ Screens 2/28/2019 Little interest or pleasure in doing things Several days Feeling down, depressed, irritable, or hopeless Several days Total Score PHQ 2 2 Trouble falling or staying asleep, or sleeping too much Several days Feeling tired or having little energy Nearly every day Poor appetite, weight loss, or overeating Several days Feeling bad about yourself - or that you are a failure or have let yourself or your family down Several days Trouble concentrating on things such as school, work, reading, or watching TV Several days Moving or speaking so slowly that other people could have noticed; or the opposite being so fidgety that others notice Not at all Thoughts of being better off dead, or hurting yourself in some way Not at all PHQ 9 Score 9 How difficult have these problems made it for you to do your work, take care of your home and get along with others Somewhat difficult Fall Risk Assessment: 
:  
 
Fall Risk Assessment, last 12 mths 2/28/2019 Able to walk? No  
Fall in past 12 months? -  
 
 
Abuse Screening: 
:  
 
Abuse Screening Questionnaire 2/28/2019 10/11/2018 Do you ever feel afraid of your partner? Therese Cuff Are you in a relationship with someone who physically or mentally threatens you? Therese Cuff Is it safe for you to go home? Y Y  
 
 
ADL Screening: 
:  
 
ADL Assessment 2/28/2019 Feeding yourself No Help Needed Getting from bed to chair No Help Needed Getting dressed No Help Needed Bathing or showering No Help Needed Walk across the room (includes cane/walker) Help Needed Using the telphone No Help Needed Taking your medications No Help Needed Preparing meals No Help Needed Managing money (expenses/bills) No Help Needed Moderately strenuous housework (laundry) Help Needed Shopping for personal items (toiletries/medicines) No Help Needed Shopping for groceries No Help Needed Driving Help Needed Climbing a flight of stairs Help Needed Getting to places beyond walking distances Help Needed Patient presents for Controlled Substance Prescription follow up. Last prescription: 
HYDROcodone-acetaminophen (NORCO) 7.5-325 mg per tablet [[id number]] Order Details Dose: 1 Tab Route: Oral Frequency: 2 TIMES DAILY AS NEEDED for Pain Dispense Quantity: 60 Tab Refills: 0 Fills remaining: --   
      
Sig: Take 1 Tab by mouth two (2) times daily as needed for Pain. Max Daily Amount: 2 Tabs.  
      
Written Date: 10/11/18 Expiration Date: --    
Start Date: 12/11/18 End Date: --    
Roberta Maloney Date: 12/11/18     
      
Ordering Provider:  -- OSWALD #:  -- NPI:  --   
Authorizing Provider:  Jose Vergara MD OSWALD #:  [id number] NPI:  [id number] Ordering User:  Jose Vergara MD     
     
Medication Notes Zion Davila  -- 2/28/19 12:12 PM     
>> DEENA YAÑEZ Feb 28, 2019 12:12 PM     
      
Vijaya West RN  -- 1/28/19  2:43 PM     
>> Mera Weir Jan 28, 2019  2:43 PM     
Pt instructed may take this med 4 hrs prior to arrival for surgery per anesthesia guidelines. Diagnosis Association: Other chronic pain (G89.29) Original Order:  HYDROcodone-acetaminophen (NORCO) 7.5-325 mg per tablet [[id number]] [facility] [address] [place] [address],[address] OSWALD #:  [id number] Pharmacy Comments:  --  
      
Fill quantity remaining:  -- Fill quantity used:  --    
  
Outpatient Morphine Equivalent Daily Dose (MEDD)  
 
12/11/18 and after 15 mg MEDD Order Name Dose Route Frequency Maximum MEDD  HYDROcodone-acetaminophen (NORCO) 7.5-325 mg per tablet 1 Tab Oral 2 TIMES DAILY AS NEEDED 15 mg MEDD Total Potential Daily Morphine Equivalence 15 mg MEDD Calculation Information   
 
  
  
   
Priority and Order Details Priority Class Routine Print Warnings History Total number of overridden warnings: 3 Full Warnings History [facility] [address] [place] [address] [address] Destination This Medication Went ToChristene Downy [[id number]] Medication Detail Disp Refills Start End HYDROcodone-acetaminophen (NORCO) 7.5-325 mg per tablet 60 Tab 0 12/11/2018 Sig - Route: Take 1 Tab by mouth two (2) times daily as needed for Pain. Max Daily Amount: 2 Tabs. - Oral   
Class: Print Earliest Fill Date: 12/11/2018 Bottle matches last prescription. 0 pills remaining in bottle. Patient reports last dose taken about 2 weeks  printed. Urine collected. Controlled Substance Agreement letter printed.

## 2019-05-30 ENCOUNTER — OFFICE VISIT (OUTPATIENT)
Dept: FAMILY MEDICINE CLINIC | Age: 59
End: 2019-05-30

## 2019-05-30 VITALS
HEIGHT: 65 IN | OXYGEN SATURATION: 94 % | BODY MASS INDEX: 48.82 KG/M2 | TEMPERATURE: 97.5 F | DIASTOLIC BLOOD PRESSURE: 58 MMHG | SYSTOLIC BLOOD PRESSURE: 105 MMHG | HEART RATE: 72 BPM | WEIGHT: 293 LBS | RESPIRATION RATE: 24 BRPM

## 2019-05-30 DIAGNOSIS — M54.42 CHRONIC MIDLINE LOW BACK PAIN WITH BILATERAL SCIATICA: ICD-10-CM

## 2019-05-30 DIAGNOSIS — G89.29 OTHER CHRONIC PAIN: ICD-10-CM

## 2019-05-30 DIAGNOSIS — G89.29 CHRONIC PAIN OF RIGHT ANKLE: ICD-10-CM

## 2019-05-30 DIAGNOSIS — M54.41 CHRONIC MIDLINE LOW BACK PAIN WITH BILATERAL SCIATICA: ICD-10-CM

## 2019-05-30 DIAGNOSIS — G89.29 CHRONIC MIDLINE LOW BACK PAIN WITH BILATERAL SCIATICA: ICD-10-CM

## 2019-05-30 DIAGNOSIS — M25.571 CHRONIC PAIN OF RIGHT ANKLE: ICD-10-CM

## 2019-05-30 DIAGNOSIS — Z79.891 CHRONIC PRESCRIPTION OPIATE USE: Primary | ICD-10-CM

## 2019-05-30 RX ORDER — HYDROCODONE BITARTRATE AND ACETAMINOPHEN 7.5; 325 MG/1; MG/1
1 TABLET ORAL
Qty: 60 TAB | Refills: 0 | Status: SHIPPED | OUTPATIENT
Start: 2019-06-30 | End: 2019-10-10 | Stop reason: SDUPTHER

## 2019-05-30 RX ORDER — HYDROCODONE BITARTRATE AND ACETAMINOPHEN 7.5; 325 MG/1; MG/1
1 TABLET ORAL
Qty: 60 TAB | Refills: 0 | Status: SHIPPED | OUTPATIENT
Start: 2019-07-30 | End: 2019-10-10 | Stop reason: SDUPTHER

## 2019-05-30 RX ORDER — HYDROCODONE BITARTRATE AND ACETAMINOPHEN 7.5; 325 MG/1; MG/1
1 TABLET ORAL
Qty: 60 TAB | Refills: 0 | Status: SHIPPED | OUTPATIENT
Start: 2019-05-30 | End: 2019-10-10 | Stop reason: SDUPTHER

## 2019-05-30 NOTE — PROGRESS NOTES
Clara Welsh  Identified pt with two pt identifiers(name and ). Chief Complaint   Patient presents with    Medication Refill       1. Have you been to the ER, urgent care clinic since your last visit? Hospitalized since your last visit? No    2. Have you seen or consulted any other health care providers outside of the [address] [place] [address] since your last visit? Include any pap smears or colon screening. No      Would you like to sign up for MyChart today, if you have not already done so? NO  If not, would you like information on MyChart, and how to sign up at a later time? NO      Medication reconciliation up to date and corrected with patient at this time. Today's provider has been notified of reason for visit, vitals and flowsheets obtained on patients. Reviewed record in preparation for visit, huddled with provider and have obtained necessary documentation.       Health Maintenance Due   Topic    DTaP/Tdap/Td series (1 - Tdap)    Shingrix Vaccine Age 49> (1 of 2)    Pneumococcal 0-64 years (2 of 3 - PPSV23)    PAP AKA CERVICAL CYTOLOGY        Wt Readings from Last 3 Encounters:   19 325 lb 6.4 oz (147.6 kg)   19 333 lb 6.4 oz (151.2 kg)   19 347 lb (157.4 kg)     Temp Readings from Last 3 Encounters:   19 97.5 °F (36.4 °C) (Oral)   19 96.9 °F (36.1 °C) (Oral)   19 98.2 °F (36.8 °C)     BP Readings from Last 3 Encounters:   19 105/58   19 140/73   19 126/65     Pulse Readings from Last 3 Encounters:   19 72   19 77   19 76     Vitals:    19 1137   BP: 105/58   Pulse: 72   Resp: 24   Temp: 97.5 °F (36.4 °C)   TempSrc: Oral   SpO2: 94%   Weight: 325 lb 6.4 oz (147.6 kg)   Height: 5' 5\" (1.651 m)   PainSc:   0 - No pain         Learning Assessment:  :     Learning Assessment 2018 9/3/2014 2014   PRIMARY LEARNER Patient Patient Patient   HIGHEST LEVEL OF EDUCATION - PRIMARY LEARNER  SOME COLLEGE - - BARRIERS PRIMARY LEARNER NONE - NONE   CO-LEARNER CAREGIVER No - No   PRIMARY LANGUAGE ENGLISH ENGLISH ENGLISH   LEARNER PREFERENCE PRIMARY DEMONSTRATION READING LISTENING     - - READING     - - DEMONSTRATION   ANSWERED BY patient patient Peggy Collado   RELATIONSHIP SELF SELF SELF       Depression Screening:  :     3 most recent [facility] Screens 2/28/2019   Little interest or pleasure in doing things Several days   Feeling down, depressed, irritable, or hopeless Several days   Total Score PHQ 2 2   Trouble falling or staying asleep, or sleeping too much Several days   Feeling tired or having little energy Nearly every day   Poor appetite, weight loss, or overeating Several days   Feeling bad about yourself - or that you are a failure or have let yourself or your family down Several days   Trouble concentrating on things such as school, work, reading, or watching TV Several days   Moving or speaking so slowly that other people could have noticed; or the opposite being so fidgety that others notice Not at all   Thoughts of being better off dead, or hurting yourself in some way Not at all   PHQ 9 Score 9   How difficult have these problems made it for you to do your work, take care of your home and get along with others Somewhat difficult       Fall Risk Assessment:  :     Fall Risk Assessment, last 12 mths 2/28/2019   Able to walk? No   Fall in past 12 months? -       Abuse Screening:  :     Abuse Screening Questionnaire 2/28/2019 10/11/2018   Do you ever feel afraid of your partner? N N   Are you in a relationship with someone who physically or mentally threatens you? N N   Is it safe for you to go home?  Y Y       ADL Screening:  :     ADL Assessment 2/28/2019   Feeding yourself No Help Needed   Getting from bed to chair No Help Needed   Getting dressed No Help Needed   Bathing or showering No Help Needed   Walk across the room (includes cane/walker) Help Needed   Using the telphone No Help Needed   Taking your medications No Help Needed   Preparing meals No Help Needed   Managing money (expenses/bills) No Help Needed   Moderately strenuous housework (laundry) Help Needed   Shopping for personal items (toiletries/medicines) No Help Needed   Shopping for groceries No Help Needed   Driving Help Needed   Climbing a flight of stairs Help Needed   Getting to places beyond walking distances Help Needed     Patient presents for Controlled Substance Prescription follow up. Last prescription:  HYDROcodone-acetaminophen (NORCO) 7.5-325 mg per tablet [[id number]]  ENDED     Order Details   Dose: 1 Tab Route: Oral Frequency: 2 TIMES DAILY AS NEEDED for Pain   Dispense Quantity: 60 Tab Refills: 0 Fills remaining: --           Sig: Take 1 Tab by mouth two (2) times daily as needed for Pain for up to 30 days. Max Daily Amount: 2 Tabs.          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 , M54.42 , G89.29)      Original Order:  HYDROcodone-acetaminophen (NORCO) 7.5-325 mg per tablet [[id number]]      Ph[facility]acy:  [facility] [address] [place] [address],[address] OSWALD #:  [id number]     Pharmacy Comments:  --          Fill quantity remaining:  -- Fill quantity used:  --        Outpatient Morphine Equivalent Daily Dose (MEDD)     4/28/19 - 5/28/19   15 mg MEDD   Order Name Dose Route Frequency Maximum MEDD    HYDROcodone-acetaminophen (NORCO) 7.5-325 mg per tablet 1 Tab Oral 2 TIMES DAILY AS NEEDED 15 mg MEDD   Total Potential Daily Morphine Equivalence 15 mg MEDD Calculation Information                5/29/19 and after   None   Priority and Order Details     Priority Class    Routine Print    Warnings History     No Interaction Warnings Shown    Pharmacy     [id number] [facility] [place] [place]     This Medication Went ToBevely Albino   Outpatient Medication Detail      Disp Refills Start End    HYDROcodone-acetaminophen (NORCO) 7.5-325 mg per tablet 60 Tab 0 4/28/2019 5/28/2019    Sig - Route: Take 1 Tab by mouth two (2) times daily as needed for Pain for up to 30 days. Max Daily Amount: 2 Tabs. - Oral    Class: Print    Earliest Fill Date: 4/28/2019      Bottle matches last prescription. 0 pills remaining in bottle. Patient reports last dose taken at 0600 pm on May 28   printed. Urine collected. Controlled Substance Agreement letter printed.

## 2019-05-30 NOTE — PROGRESS NOTES
[facility] RTC today to follow up on chronic pain diagnosis. We discussed her osteoarthritis that is affecting her right ankle and back. Significant changes since last visit: none. She is  able to do her normal daily activities. She reports the following adverse side effects: none. Least pain over the last week has been 4/10. Worst pain over the last week has been 9/10. Opioid Risk Tool Reviewed: YES    Aberrant behaviors: None. Urine Drug Screen: due - order placed. Controlled substance agreement on file: YES.        reviewed:yes    Pill count is consistent with her prescription: yes    Concomitant use of a benzodiazepine: no    NA    NA     Also,  abstinence syndrome was reviewed and discussed with her today N/A

## 2019-06-10 LAB
6-ACETYLMORPHINE: NEGATIVE
AMPHETAMINES BLD QL SCN: NEGATIVE NG/ML
BARBITURATES SERPLBLD QL: NEGATIVE UG/ML
BENZODIAZ BLD QL: NEGATIVE NG/ML
CANNABINOIDS BLD QL SCN: NEGATIVE NG/ML
COCAINE+BZE SERPLBLD QL SCN: NEGATIVE NG/ML
CODEINE, 737848: NEGATIVE NG/ML
DIHYDROCODEINE, 764159: 2.2 NG/ML
FENTANYL BLD QL SCN: NEGATIVE NG/ML
HYDROCODONE, 737854: 31.5 NG/ML
HYDROMORPHONE, 737852: NEGATIVE NG/ML
MEPERIDINE, DR296L: NEGATIVE NG/ML
METHADONE BLD QL SCN: NEGATIVE NG/ML
MORPHINE, 737850: NEGATIVE NG/ML
OPIATES BLD QL SCN: ABNORMAL NG/ML
OPIATES SPEC QL: POSITIVE
OXYCOCONE: NEGATIVE NG/ML
OXYCODONES CONFIRMATION, 738393: NEGATIVE
OXYCODONES, 738315: NEGATIVE NG/ML
OXYMORPHONE, 763902: NEGATIVE NG/ML
PCP BLD QL SCN: NEGATIVE NG/ML
PROPOXYPH BLD QL SCN: NEGATIVE NG/ML
TRAMADOL, DR297L: NEGATIVE NG/ML

## 2019-07-12 RX ORDER — LETROZOLE 2.5 MG/1
TABLET, FILM COATED ORAL
Qty: 90 TAB | Refills: 1 | OUTPATIENT
Start: 2019-07-12

## 2019-07-12 NOTE — TELEPHONE ENCOUNTER
PCP: Lotus Negron MD    Last appt: 5/30/2019  Future Appointments   Date Time Provider Vincenzo Hendricksoni   8/22/2019 11:15 AM Jackie Green MD [facility]       Requested Prescriptions     Pending Prescriptions Disp Refills    letrozole [facility]) 2.5 mg tablet 90 Tab 1     Sig: take 1 tablet by mouth once daily       Prior labs and Blood pressures:  BP Readings from Last 3 Encounters:   05/30/19 105/58   02/28/19 140/73   01/31/19 126/65     Lab Results   Component Value Date/Time    Sodium 144 10/01/2015 12:18 PM    Potassium 5.0 10/01/2015 12:18 PM    Chloride 103 10/01/2015 12:18 PM    CO2 25 10/01/2015 12:18 PM    Glucose 79 10/01/2015 12:18 PM    BUN 38 (H) 10/01/2015 12:18 PM    Creatinine 3.94 (H) 10/01/2015 12:18 PM    BUN/Creatinine ratio 10 10/01/2015 12:18 PM    GFR est AA 14 (L) 10/01/2015 12:18 PM    GFR est non-AA 12 (L) 10/01/2015 12:18 PM    Calcium 8.8 10/01/2015 12:18 PM     No results found for: HBA1C, HGBE8, QUU8TTPI, YOP4NUQE  Lab Results   Component Value Date/Time    Cholesterol, total 195 10/01/2015 12:18 PM    HDL Cholesterol 69 10/01/2015 12:18 PM    LDL, calculated 111 (H) 10/01/2015 12:18 PM    VLDL, calculated 15 10/01/2015 12:18 PM    Triglyceride 73 10/01/2015 12:18 PM     Lab Results   Component Value Date/Time    VITAMIN D, 25-HYDROXY 18.7 (L) 10/01/2015 12:18 PM       Lab Results   Component Value Date/Time    TSH 2.390 10/01/2015 12:18 PM

## 2019-08-12 RX ORDER — OMEPRAZOLE 20 MG/1
CAPSULE, DELAYED RELEASE ORAL
Qty: 60 CAP | Refills: 0 | Status: SHIPPED | OUTPATIENT
Start: 2019-08-12 | End: 2019-09-12 | Stop reason: SDUPTHER

## 2019-08-15 ENCOUNTER — OFFICE VISIT (OUTPATIENT)
Dept: SURGERY | Age: 59
End: 2019-08-15

## 2019-08-15 VITALS
OXYGEN SATURATION: 92 % | TEMPERATURE: 98.7 F | HEIGHT: 65 IN | DIASTOLIC BLOOD PRESSURE: 84 MMHG | SYSTOLIC BLOOD PRESSURE: 135 MMHG | WEIGHT: 293 LBS | BODY MASS INDEX: 48.82 KG/M2 | HEART RATE: 69 BPM | RESPIRATION RATE: 24 BRPM

## 2019-08-15 DIAGNOSIS — N18.6 CKD (CHRONIC KIDNEY DISEASE) STAGE V REQUIRING CHRONIC DIALYSIS (HCC): Primary | ICD-10-CM

## 2019-08-15 DIAGNOSIS — Z99.2 CKD (CHRONIC KIDNEY DISEASE) STAGE V REQUIRING CHRONIC DIALYSIS (HCC): Primary | ICD-10-CM

## 2019-08-15 NOTE — PROGRESS NOTES
The patient is a 64-year old woman referred by Dr. Cathy Sapp regarding dialysis access. The patient has a history of a previous graft in the left upper arm which had been placed by Dr. Sancho Galindo in 2016. She reports that the graft had worked satisfactorily for a time and then required a number of interventions. She reports that she has been dialyzed by way of a catheter now for > 6 months. She currently has a catheter in the right chest. She also at one time had a catheter in the right groin. The patient has a history of right breast cancer surgery and she believes she had a right sentinel node biopsy. She reports that the surgery was done at [facility]. She reports the date of surgery was 2014. The patient is right-handed. She has no history of pacemaker or defibrillator. The patient denies history of MI, coronary intervention. She reports some shortness of breath with exertion and does have a history of asthma. The patient is severely obese. She reports she has been using a wheelchair for about 3 years but recently has been trying to use the wheelchair less. She says she can walk 30 feet. She has some difficulty with balance. Past medical history includes right breast cancer, chronic ankle pain, depression, gastroesophageal reflux disease, congestive heart failure, hypertension, abdominal hernia, sleep apnea treated by BiPAP with oxygen, use of home oxygen prn. The patient has never smoked. She does not use alcohol. The patient denies a history of diabetes. Physical Examination:  GENERAL: On examination the patient is a severely obese woman in no acute distress. VITAL SIGNS: /84, P 69, R 24, T 98.7, O2 saturation 92% on room air,  pounds, HT 5'5\". HEAD/NECK: No mass, jaundice or thyromegaly. There is a right sided central catheter. LUNGS: Clear bilaterally without rales, rhonchi or wheezes. HEART: Regular without murmur, gallop or rub. NEURO: Patient is alert and oriented. She moves all extremities equally. Facial movement is symmetrical. Speech is normal.  EXTREMITIES: Upper extremity exam revealed 2+ radial pulse on the right. On the left there was 2+ radial and 2+ ulnar pulse. On the right side doppler exam at the radial site was normal. Ulnar site showed slight decrease. Palmar arch on the right showed slight decrease with compression of radial artery. On the left side doppler signal was normal at radial and ulnar and palmar arch was intact. In the left upper arm there is a thrombosed arteriovenous graft. All incision sites are well healed. There is mild keloid formation along the puncture zone. I looked with duplex ultrasound. Very proximally in the left upper arm, the brachial vein is 14 mm diameter and is patent there. Compression of the forearm produces an appropriate increased velocity of flow there. The old anastomoses for the AV graft took off from the mid portion of the left upper arm. The brachial artery adjacent to the vein appears to be approximately 5 mm diameter. I looked also at the right side where brachial vein proximally in the right upper arm is 8 mm diameter. I do not see suitable veins for a fistula on either side. I will obtain records from the patient's endovascular interventions for a thrombosed graft. I explained to the patient that we may need to get a venogram to confirm that the central veins on the left are suitable. I will also try to obtain records regarding the right breast cancer surgery to confirm that a sentinel node biopsy was performed. I believe this surgery may have been done by Dr Yasir Rosas at [facility] in 2014. I explained to the patient that if indeed she had a sentinel node biopsy, I would not want to place arteriovenous access in the right arm because of risk of infection related to punctures. There is also risk of inducing lymphedema.       I will be in contact with the patient once this additional information has been obtained. I think there is the potential for placing an arteriovenous graft in the left upper arm with anastomosis to vein and artery being more proximal than the prior graft. I reviewed with the patient typical operative and postoperative course for insertion of arteriovenous graft left upper arm. This will be done under general anesthesia. I would anticipate she will stay overnight for observation. Risks vs benefits of surgery were reviewed. Risks would include bleeding, infection, failure of the graft, ischemia of the hand, swelling of the arm, injury to vessels or nerves, risks associated with general anesthesia, etc. The patient understands and wishes to proceed. Await review of prior medical records before scheduling surgery. Final Diagnosis: End-stage renal disease, thrombosis of AV graft.     c: Han Mac MD

## 2019-08-15 NOTE — PROGRESS NOTES
Chief Complaint   Patient presents with    Chronic Kidney Disease     eval new AVF     Discussed advanced directive. Patient states that she does not have an advanced directive.

## 2019-08-21 ENCOUNTER — TELEPHONE (OUTPATIENT)
Dept: SURGERY | Age: 59
End: 2019-08-21

## 2019-08-21 ENCOUNTER — DOCUMENTATION ONLY (OUTPATIENT)
Dept: SURGERY | Age: 59
End: 2019-08-21

## 2019-08-21 NOTE — TELEPHONE ENCOUNTER
Spoke with Ms Hetal Alamo. Dr Beba Stephenson reviewed pervious fistulograms & would like to schedule pt for venogram of left arm & central veins. Scheduled on Thursday, 8/29/19 at 9:30 am.  Pt will take Medrol 32 mg at 10:00 pm on 8/28/19 & again at 8:00 am on 8/29/19. Pt will take Benadryl 50 mg at 9:00 am on 8/29/19. Pt will call office if she has any other questions.

## 2019-08-21 NOTE — TELEPHONE ENCOUNTER
Called in to [facility], spoke with Te, Medrol 32 mg, PO, 12 hrs before procedure & 2 hrs before procedure. Also called in Benadryl 50 mg, PO, 1 hr before procedure, no refills for either Rx.

## 2019-08-21 NOTE — PROGRESS NOTES
Surgery    Review of prior fistulagrams does not give sufficient data about left central veins. Venogram of left upper extremity will be ordered.     Colleen Jacobson MD

## 2019-08-28 ENCOUNTER — DOCUMENTATION ONLY (OUTPATIENT)
Dept: SURGERY | Age: 59
End: 2019-08-28

## 2019-08-28 NOTE — PROGRESS NOTES
Surgery    The patient did have right breast lumpectomy and sentinel node biopsy at [address] [place] [address] by Dr Herve Fitch on 4/7/2014 for DCIS.     Jona Yanes MD

## 2019-08-29 ENCOUNTER — HOSPITAL ENCOUNTER (OUTPATIENT)
Dept: INTERVENTIONAL RADIOLOGY/VASCULAR | Age: 59
Discharge: HOME | End: 2019-08-29
Attending: SURGERY
Payer: MEDICAID

## 2019-08-29 VITALS
BODY MASS INDEX: 48.82 KG/M2 | SYSTOLIC BLOOD PRESSURE: 158 MMHG | HEIGHT: 65 IN | TEMPERATURE: 98.4 F | RESPIRATION RATE: 16 BRPM | HEART RATE: 65 BPM | OXYGEN SATURATION: 96 % | DIASTOLIC BLOOD PRESSURE: 94 MMHG | WEIGHT: 293 LBS

## 2019-08-29 DIAGNOSIS — N18.6 END STAGE RENAL DISEASE (HCC): ICD-10-CM

## 2019-08-29 PROCEDURE — 77030004561 HC CATH ANGI DX COBRA ANGI -B

## 2019-08-29 PROCEDURE — 74011000250 HC RX REV CODE- 250: Performed by: RADIOLOGY

## 2019-08-29 PROCEDURE — 36005 INJECTION EXT VENOGRAPHY: CPT

## 2019-08-29 PROCEDURE — 77030031139 HC SUT VCRL2 J&J -A

## 2019-08-29 PROCEDURE — C1892 INTRO/SHEATH,FIXED,PEEL-AWAY: HCPCS

## 2019-08-29 PROCEDURE — 74011636320 HC RX REV CODE- 636/320: Performed by: RADIOLOGY

## 2019-08-29 PROCEDURE — 75820 VEIN X-RAY ARM/LEG: CPT

## 2019-08-29 PROCEDURE — 77030009378 HC DEV TORQ OLCT F/GWIRE MANIP COOK -A

## 2019-08-29 PROCEDURE — C1788 PORT, INDWELLING, IMP: HCPCS

## 2019-08-29 PROCEDURE — C1769 GUIDE WIRE: HCPCS

## 2019-08-29 PROCEDURE — 77030039266 HC ADH SKN EXOFIN S2SG -A

## 2019-08-29 PROCEDURE — 74011250636 HC RX REV CODE- 250/636: Performed by: RADIOLOGY

## 2019-08-29 RX ORDER — HEPARIN SODIUM 200 [USP'U]/100ML
400 INJECTION, SOLUTION INTRAVENOUS ONCE
Status: COMPLETED | OUTPATIENT
Start: 2019-08-29 | End: 2019-08-29

## 2019-08-29 RX ORDER — LIDOCAINE HYDROCHLORIDE 20 MG/ML
20 INJECTION, SOLUTION INFILTRATION; PERINEURAL ONCE
Status: COMPLETED | OUTPATIENT
Start: 2019-08-29 | End: 2019-08-29

## 2019-08-29 RX ADMIN — HEPARIN SODIUM IN SODIUM CHLORIDE 800 UNITS: 200 INJECTION INTRAVENOUS at 10:20

## 2019-08-29 RX ADMIN — LIDOCAINE HYDROCHLORIDE 200 MG: 20 INJECTION, SOLUTION INFILTRATION; PERINEURAL at 10:20

## 2019-08-29 RX ADMIN — IOPAMIDOL 65 ML: 755 INJECTION, SOLUTION INTRAVENOUS at 10:20

## 2019-08-29 NOTE — DISCHARGE INSTRUCTIONS
[facility]  Special Procedures/Radiology Department      Radiologist:  Cintia Larios    Date: 8/29/2019    Venogram Discharge Instructions     Watch for bleeding from the puncture site. If bleeding occurs apply pressure for at least 15 minutes. Resume your previous diet and follow the medication reconciliation form. You may take Tylenol, as directed on the label, for pain. Avoid ibuprofen (Advil, Motrin) and aspirin as they may cause you to bleed. If you have any questions or concerns, call [phone] and ask to speak to the nurse on-call.

## 2019-09-04 ENCOUNTER — TELEPHONE (OUTPATIENT)
Dept: SURGERY | Age: 59
End: 2019-09-04

## 2019-09-04 DIAGNOSIS — Z01.818 PRE-OP TESTING: Primary | ICD-10-CM

## 2019-09-04 NOTE — TELEPHONE ENCOUNTER
Spoke with Ms Shanti German. Surgery scheduled for 9/24/19. Will fax information to dialysis for pt. Ms Shanti German will go to [facility] on 9/5/19 to have EKG done prior to surgery.

## 2019-09-05 ENCOUNTER — HOSPITAL ENCOUNTER (OUTPATIENT)
Dept: NON INVASIVE DIAGNOSTICS | Age: 59
Discharge: HOME | End: 2019-09-05
Payer: MEDICAID

## 2019-09-05 DIAGNOSIS — Z01.818 PRE-OP TESTING: ICD-10-CM

## 2019-09-05 LAB
ATRIAL RATE: 74 BPM
CALCULATED P AXIS, ECG09: 75 DEGREES
CALCULATED R AXIS, ECG10: 68 DEGREES
CALCULATED T AXIS, ECG11: 53 DEGREES
DIAGNOSIS, 93000: NORMAL
P-R INTERVAL, ECG05: 174 MS
Q-T INTERVAL, ECG07: 410 MS
QRS DURATION, ECG06: 86 MS
QTC CALCULATION (BEZET), ECG08: 455 MS
VENTRICULAR RATE, ECG03: 74 BPM

## 2019-09-05 PROCEDURE — 93005 ELECTROCARDIOGRAM TRACING: CPT

## 2019-09-05 NOTE — TELEPHONE ENCOUNTER
Faxed surgery information & post op appt with Dr Pretty Hewitt to dialysis (a) [phone], confirmation received.

## 2019-09-09 NOTE — PERIOP NOTES
[facility]  Preoperative Instructions        Surgery Date 9/24/19          Time of Arrival 0800    1. On the day of your surgery, please report to the Surgical Services Registration Desk and sign in at your designated time. The Surgery Center is located to the right of the Emergency Room. 2. You must have someone with you to drive you home. You should not drive a car for 24 hours following surgery. Please make arrangements for a friend or family member to stay with you for the first 24 hours after your surgery. 3. Do not have anything to eat or drink (including water, gum, mints, coffee, juice) after midnight 9/23/19?? Oc Samuels ? This may not apply to medications prescribed by your physician. ?(Please note below the special instructions with medications to take the morning of your procedure.)    4. We recommend you do not drink any alcoholic beverages for 24 hours before and after your surgery. 5. Contact your surgeons office for instructions on the following medications: non-steroidal anti-inflammatory drugs (i.e. Advil, Aleve), vitamins, and supplements. (Some surgeons will want you to stop these medications prior to surgery and others may allow you to take them)  **If you are currently taking Plavix, Coumadin, Aspirin and/or other blood-thinning agents, contact your surgeon for instructions. ** Your surgeon will partner with the physician prescribing these medications to determine if it is safe to stop or if you need to continue taking. Please do not stop taking these medications without instructions from your surgeon    6. Wear comfortable clothes. Wear glasses instead of contacts. Do not bring any money or jewelry. Please bring picture ID, insurance card, and any prearranged co-payment or hospital payment. Do not wear make-up, particularly mascara the morning of your surgery. Do not wear nail polish, particularly if you are having foot /hand surgery.   Wear your hair loose or down, no ponytails, buns, carmen pins or clips. All body piercings must be removed. Please shower with antibacterial soap for three consecutive days before and on the morning of surgery, but do not apply any lotions, powders or deodorants after the shower on the day of surgery. Please use a fresh towels after each shower. Please sleep in clean clothes and change bed linens the night before surgery. Please do not shave for 48 hours prior to surgery. Shaving of the face is acceptable. 7. You should understand that if you do not follow these instructions your surgery may be cancelled. If your physical condition changes (I.e. fever, cold or flu) please contact your surgeon as soon as possible. 8. It is important that you be on time. If a situation occurs where you may be late, please call [phone] (OR Holding Area). 9. If you have any questions and or problems, please call [phone] (Pre-admission Testing). 10. Your surgery time may be subject to change. You will receive a phone call the evening prior if your time changes. 11.  If having outpatient surgery, you must have someone to drive you here, stay with you during the duration of your stay, and to drive you home at time of discharge. 12.   In an effort to improve the efficiency, privacy, and safety for all of our Pre-op patients visitors are not allowed in the Holding area. Once you arrive and are registered your family/visitors will be asked to remain in the waiting room. The Pre-op staff will get you from the Surgical Waiting Area and will explain to you and your family/visitors that the Pre-op phase is beginning. The staff will answer any questions and provide instructions for tracking of the patient, by use of the existing tracking number and color-coded status board in the waiting room.   At this time the staff will also ask for your designated spokesperson information in the event that the physician or staff need to provide an update or obtain any pertinent information. The designated spokesperson will be notified if the physician needs to speak to family during the pre-operative phase. If at any time your family/visitors has questions or concerns they may approach the volunteer desk in the waiting area for assistance. Special Instructions:bring bipap. Use albuterol nebulizer. MEDICATIONS TO TAKE THE MORNING OF SURGERY WITH A SIP OF WATER:wellbutrin,gabapentin,singulair,sensipar      I understand a pre-operative phone call will be made to verify my surgery time. In the event that I am not available, I give permission for a message to be left on my answering service and/or with another person?   {yes @ [phone].         ___________________      __________   _________    (Signature of Patient)             (Witness)                (Date and Time)

## 2019-09-09 NOTE — PERIOP NOTES
Chart reviewed by NILSON Ludwig,NP & anesthesia would like her to see pulmonologist before surgery on 9/24. Left msg for Nelli Blackburn making her aware of same.

## 2019-09-09 NOTE — ADVANCED PRACTICE NURSE
Planned procedure, PMHx, functional status, echocardiogram from 2015 reviewed with Dr. Boris Nunez as well as prior anesthesia record from 1/2019. Recommends pulmonary evaluation prior to surgery if possible for optimization but surgery does not need to be delayed at this time for pulmonary evaluation.

## 2019-09-11 ENCOUNTER — TELEPHONE (OUTPATIENT)
Dept: SURGERY | Age: 59
End: 2019-09-11

## 2019-09-11 DIAGNOSIS — R06.02 SOB (SHORTNESS OF BREATH): Primary | ICD-10-CM

## 2019-09-11 NOTE — TELEPHONE ENCOUNTER
Ms Meme Rascon needs to be seen by pulmonologist prior to surgery due to SOB at rest.  Appt scheduled on Thursday, 9/26/19 at 12:30 pm with Dr Ellie Kirkland at [facility] office. Pt will need CXR done before that appt, order placed. Rescheduled surgery from 9/24/19 to 10/1/19. Pt requested surgery information & pulmonologist appt be faxed to [facility]. Faxed to [address] [place] [address] [phone], confirmation received.

## 2019-09-12 ENCOUNTER — OFFICE VISIT (OUTPATIENT)
Dept: FAMILY MEDICINE CLINIC | Age: 59
End: 2019-09-12

## 2019-09-12 VITALS
HEART RATE: 77 BPM | SYSTOLIC BLOOD PRESSURE: 111 MMHG | OXYGEN SATURATION: 93 % | TEMPERATURE: 98.3 F | RESPIRATION RATE: 24 BRPM | HEIGHT: 65 IN | BODY MASS INDEX: 48.82 KG/M2 | WEIGHT: 293 LBS | DIASTOLIC BLOOD PRESSURE: 69 MMHG

## 2019-09-12 DIAGNOSIS — J45.40 MODERATE PERSISTENT ASTHMA WITHOUT COMPLICATION: ICD-10-CM

## 2019-09-12 DIAGNOSIS — E55.9 VITAMIN D DEFICIENCY: ICD-10-CM

## 2019-09-12 DIAGNOSIS — Z23 ENCOUNTER FOR IMMUNIZATION: ICD-10-CM

## 2019-09-12 DIAGNOSIS — F33.9 RECURRENT DEPRESSION (HCC): ICD-10-CM

## 2019-09-12 DIAGNOSIS — N18.6 KIDNEY DISEASE, CHRONIC, END STAGE ON DIALYSIS (HCC): Primary | ICD-10-CM

## 2019-09-12 DIAGNOSIS — M25.531 RIGHT WRIST PAIN: ICD-10-CM

## 2019-09-12 DIAGNOSIS — D05.11 DUCTAL CARCINOMA IN SITU (DCIS) OF RIGHT BREAST: ICD-10-CM

## 2019-09-12 DIAGNOSIS — K21.9 GASTROESOPHAGEAL REFLUX DISEASE WITHOUT ESOPHAGITIS: ICD-10-CM

## 2019-09-12 DIAGNOSIS — Z99.2 KIDNEY DISEASE, CHRONIC, END STAGE ON DIALYSIS (HCC): Primary | ICD-10-CM

## 2019-09-12 DIAGNOSIS — E66.01 MORBID OBESITY WITH BMI OF 50.0-59.9, ADULT (HCC): ICD-10-CM

## 2019-09-12 DIAGNOSIS — E83.42 HYPOMAGNESEMIA: ICD-10-CM

## 2019-09-12 RX ORDER — BUPROPION HYDROCHLORIDE 150 MG/1
TABLET ORAL
Qty: 90 TAB | Refills: 3 | Status: SHIPPED | OUTPATIENT
Start: 2019-09-12 | End: 2020-09-10 | Stop reason: SDUPTHER

## 2019-09-12 RX ORDER — OMEPRAZOLE 20 MG/1
CAPSULE, DELAYED RELEASE ORAL
Qty: 180 CAP | Refills: 3 | Status: SHIPPED | OUTPATIENT
Start: 2019-09-12 | End: 2022-01-21 | Stop reason: SDUPTHER

## 2019-09-12 RX ORDER — MONTELUKAST SODIUM 10 MG/1
TABLET ORAL
Qty: 90 TAB | Refills: 3 | Status: SHIPPED | OUTPATIENT
Start: 2019-09-12 | End: 2020-09-27

## 2019-09-12 RX ORDER — PSEUDOEPHED/ACETAMINOPH/DIPHEN 30MG-500MG
TABLET ORAL
Refills: 0 | COMMUNITY
Start: 2019-08-21 | End: 2019-09-12 | Stop reason: ALTCHOICE

## 2019-09-12 RX ORDER — LANOLIN ALCOHOL/MO/W.PET/CERES
CREAM (GRAM) TOPICAL
Qty: 90 TAB | Refills: 3 | Status: SHIPPED | OUTPATIENT
Start: 2019-09-12 | End: 2021-08-24 | Stop reason: SDUPTHER

## 2019-09-12 RX ORDER — METHYLPREDNISOLONE 32 MG/1
TABLET ORAL
Refills: 0 | COMMUNITY
Start: 2019-08-21 | End: 2019-09-12 | Stop reason: ALTCHOICE

## 2019-09-12 NOTE — PROGRESS NOTES
Britayn Torre  Identified pt with two pt identifiers(name and ). Chief Complaint   Patient presents with    Medication Refill    Wrist Pain     right wrist    Other     patient is having surgery and wants to update MD       1. Have you been to the ER, urgent care clinic since your last visit? Hospitalized since your last visit? No    2. Have you seen or consulted any other health care providers outside of the [address] [place] [address] since your last visit? Include any pap smears or colon screening. No      Would you like to sign up for MyChart today, if you have not already done so? No  If not, would you like information on MyChart, and how to sign up at a later time? No      Medication reconciliation up to date and corrected with patient at this time. Today's provider has been notified of reason for visit, vitals and flowsheets obtained on patients. Reviewed record in preparation for visit, huddled with provider and have obtained necessary documentation.       Health Maintenance Due   Topic    DTaP/Tdap/Td series (1 - Tdap)    Shingrix Vaccine Age 49> (1 of 2)    Pneumococcal 0-64 years (1 of 1 - PPSV23)    PAP AKA CERVICAL CYTOLOGY     Influenza Age 5 to Adult        Wt Readings from Last 3 Encounters:   19 329 lb 8 oz (149.5 kg)   19 330 lb (149.7 kg)   08/15/19 332 lb (150.6 kg)     Temp Readings from Last 3 Encounters:   19 98.3 °F (36.8 °C) (Oral)   19 98.4 °F (36.9 °C)   08/15/19 98.7 °F (37.1 °C)     BP Readings from Last 3 Encounters:   19 111/69   19 (!) 158/94   08/15/19 135/84     Pulse Readings from Last 3 Encounters:   19 77   19 65   08/15/19 69     Vitals:    19 1352   BP: 111/69   Pulse: 77   Resp: 24   Temp: 98.3 °F (36.8 °C)   TempSrc: Oral   SpO2: 93%   Weight: 329 lb 8 oz (149.5 kg)   Height: 5' 5\" (1.651 m)   PainSc:   6   PainLoc: Wrist         Learning Assessment:  :     Learning Assessment 2018 9/3/2014 2014 PRIMARY LEARNER Patient Patient Patient   HIGHEST LEVEL OF EDUCATION - PRIMARY LEARNER  SOME COLLEGE - -   BARRIERS PRIMARY LEARNER NONE - NONE   CO-LEARNER CAREGIVER No - No   PRIMARY LANGUAGE ENGLISH ENGLISH ENGLISH   LEARNER PREFERENCE PRIMARY DEMONSTRATION READING LISTENING     - - READING     - - DEMONSTRATION   ANSWERED BY patient patient Shireen Resendez   RELATIONSHIP SELF SELF SELF       Depression Screening:  :     3 most recent [facility] Screens 2/28/2019   Little interest or pleasure in doing things Several days   Feeling down, depressed, irritable, or hopeless Several days   Total Score PHQ 2 2   Trouble falling or staying asleep, or sleeping too much Several days   Feeling tired or having little energy Nearly every day   Poor appetite, weight loss, or overeating Several days   Feeling bad about yourself - or that you are a failure or have let yourself or your family down Several days   Trouble concentrating on things such as school, work, reading, or watching TV Several days   Moving or speaking so slowly that other people could have noticed; or the opposite being so fidgety that others notice Not at all   Thoughts of being better off dead, or hurting yourself in some way Not at all   PHQ 9 Score 9   How difficult have these problems made it for you to do your work, take care of your home and get along with others Somewhat difficult       Fall Risk Assessment:  :     Fall Risk Assessment, last 12 mths 2/28/2019   Able to walk? No   Fall in past 12 months? -       Abuse Screening:  :     Abuse Screening Questionnaire 2/28/2019 10/11/2018   Do you ever feel afraid of your partner? N N   Are you in a relationship with someone who physically or mentally threatens you? N N   Is it safe for you to go home?  Y Y       ADL Screening:  :     ADL Assessment 2/28/2019   Feeding yourself No Help Needed   Getting from bed to chair No Help Needed   Getting dressed No Help Needed   Bathing or showering No Help Needed   Walk across the room (includes cane/walker) Help Needed   Using the telphone No Help Needed   Taking your medications No Help Needed   Preparing meals No Help Needed   Managing money (expenses/bills) No Help Needed   Moderately strenuous housework (laundry) Help Needed   Shopping for personal items (toiletries/medicines) No Help Needed   Shopping for groceries No Help Needed   Driving Help Needed   Climbing a flight of stairs Help Needed   Getting to places beyond walking distances Help Needed     Patient presents for Controlled Substance Prescription follow up. Last prescription:  HYDROcodone-acetaminophen (NORCO) 7.5-325 mg per tablet [[id number]]  DISCONTINUED     Order Details   Dose: 1 Tab Route: Oral Frequency: 2 TIMES DAILY AS NEEDED for Pain   Dispense Quantity: 60 Tab Refills: 0 Fills remaining: --           Sig: Take 1 Tab by mouth two (2) times daily as needed for Pain for up to 30 days. Max Daily Amount: 2 Tabs.          Discontinue Date:  5/30/2019 11:46 Discontinue User:  Leticia Reid MD Discontinue Reason:  Reorder   Written Date: 02/28/19 Expiration Date: --     Start Date: 04/28/19 End Date: 05/30/19     Earliest Fill Date: 04/28/19             Ordering Provider: -- OSWALD #:  -- NPI:  --    Authorizing Provider: Leticia Reid MD OSWALD #:  [id number] NPI:  [id number]    Ordering User:  Leticia Reid MD            Diagnosis Association: Other chronic pain (G89.29); Chronic pain of right ankle (M25.571 , G89.29); Chronic prescription opiate use (Z79.891);  Chronic midline low back pain with bilateral sciatica (M54.41 , M54.42 , G89.29)      Original Order:  HYDROcodone-acetaminophen (NORCO) 7.5-325 mg per tablet [[id number]]      Pharmacy:  [facility] OSWALD #:  [id number]     Pharmacy Comments:  --          Fill quantity remaining:  -- Fill quantity used:  -- Next fill due: --       Outpatient Morphine Equivalent Daily Dose (MEDD)     4/28/19 - 5/28/19   15 mg MEDD   Order Name Dose Route Frequency Maximum MEDD    HYDROcodone-acetaminophen (NORCO) 7.5-325 mg per tablet 1 Tab Oral 2 TIMES DAILY AS NEEDED 15 mg MEDD   Total Potential Daily Morphine Equivalence 15 mg MEDD   Calculation Information                5/29/19 and after   None   Priority and Order Details     Priority Class    Routine Print    Warnings History     No Interaction Warnings Shown    Pharmacy     [id number] [place]     [facility]   Outpatient Medication Detail      Disp Refills Start End    HYDROcodone-acetaminophen (NORCO) 7.5-325 mg per tablet (Discontinued) 60 Tab 0 4/28/2019 5/30/2019    Sig - Route: Take 1 Tab by mouth two (2) times daily as needed for Pain for up to 30 days. Max Daily Amount: 2 Tabs. - Oral    Class: Print    Earliest Fill Date: 4/28/2019    Reason for Discontinue: Reorder    Tracking Reports      Cosign Tracking Order Transmittal Tracking     Bottle matches last prescription. 0 pills remaining in bottle filled in August. Just got 3rd prescription filled and picking up today. Patient reports last dose taken at 0900 am on September 12   printed. Urine collected. Controlled Substance Agreement letter printed.

## 2019-09-12 NOTE — PROGRESS NOTES
Out of Femara > 1 yr. Advise f/u with breast surgeon. Pulm appt end of month. Left arm graft new next month. Right wrist swollen, aches past month. No recalled injury. Muscle cramps in dialysis. Sees neph this Friday for f/u. To  last bottle of hydrocodone today. Patient denies chest pain, dyspnea, unexpected weight change, unexpected pain, mood or memory changes. Visit Vitals  /69 (BP 1 Location: Right arm, BP Patient Position: Sitting)   Pulse 77   Temp 98.3 °F (36.8 °C) (Oral)   Resp 24   Ht 5' 5\" (1.651 m)   Wt 329 lb 8 oz (149.5 kg)   LMP  (LMP Unknown)   SpO2 93%   BMI 54.83 kg/m²     Patient alert and cooperative. Reviewed above. Assessment:  1. End stage renal disease, on dialysis. 2. Having muscle cramps. 3. Asthma. 4. Depression. 5. GERD. 6. New right wrist pain. 7. Obesity. 8. Hypomagnesemia. 9. History of right breast ductal CIS. Plan:  1. Check annual labs per orders. 2. Discuss muscle cramps with neph tomorrow. 3. Refilled her Wellbutrin, magnesium, Singulair and Prilosec. 4. Flu shot. 5. Return in 1-2 months for controlled opiate refill. 6. Written script for wrist splint to wear. To ortho if not improving.

## 2019-09-24 ENCOUNTER — HOSPITAL ENCOUNTER (OUTPATIENT)
Dept: GENERAL RADIOLOGY | Age: 59
Discharge: HOME | End: 2019-09-24
Payer: MEDICAID

## 2019-09-24 DIAGNOSIS — R06.02 SOB (SHORTNESS OF BREATH): ICD-10-CM

## 2019-09-24 PROCEDURE — 71046 X-RAY EXAM CHEST 2 VIEWS: CPT

## 2019-09-27 RX ORDER — FLUTICASONE FUROATE AND VILANTEROL 200; 25 UG/1; UG/1
1 POWDER RESPIRATORY (INHALATION) DAILY
COMMUNITY
End: 2020-05-11 | Stop reason: CLARIF

## 2019-09-27 RX ORDER — PREDNISONE 50 MG/1
50 TABLET ORAL DAILY
COMMUNITY
End: 2019-10-10 | Stop reason: ALTCHOICE

## 2019-09-27 NOTE — PERIOP NOTES
[facility]  Preoperative Instructions        Surgery Date 10/1/10          Time of Arrival 0800    1. On the day of your surgery, please report to the Surgical Services Registration Desk and sign in at your designated time. The Surgery Center is located to the right of the Emergency Room. 2. You must have someone with you to drive you home. You should not drive a car for 24 hours following surgery. Please make arrangements for a friend or family member to stay with you for the first 24 hours after your surgery. 3. Do not have anything to eat or drink (including water, gum, mints, coffee, juice) after midnight 9/30/19.?? Honorio Francis ? This may not apply to medications prescribed by your physician. ?(Please note below the special instructions with medications to take the morning of your procedure.)    4. We recommend you do not drink any alcoholic beverages for 24 hours before and after your surgery. 5. Contact your surgeons office for instructions on the following medications: non-steroidal anti-inflammatory drugs (i.e. Advil, Aleve), vitamins, and supplements. (Some surgeons will want you to stop these medications prior to surgery and others may allow you to take them)  **If you are currently taking Plavix, Coumadin, Aspirin and/or other blood-thinning agents, contact your surgeon for instructions. ** Your surgeon will partner with the physician prescribing these medications to determine if it is safe to stop or if you need to continue taking. Please do not stop taking these medications without instructions from your surgeon    6. Wear comfortable clothes. Wear glasses instead of contacts. Do not bring any money or jewelry. Please bring picture ID, insurance card, and any prearranged co-payment or hospital payment. Do not wear make-up, particularly mascara the morning of your surgery. Do not wear nail polish, particularly if you are having foot /hand surgery.   Wear your hair loose or down, no ponytails, buns, carmen pins or clips. All body piercings must be removed. Please shower with antibacterial soap for three consecutive days before and on the morning of surgery, but do not apply any lotions, powders or deodorants after the shower on the day of surgery. Please use a fresh towels after each shower. Please sleep in clean clothes and change bed linens the night before surgery. Please do not shave for 48 hours prior to surgery. Shaving of the face is acceptable. 7. You should understand that if you do not follow these instructions your surgery may be cancelled. If your physical condition changes (I.e. fever, cold or flu) please contact your surgeon as soon as possible. 8. It is important that you be on time. If a situation occurs where you may be late, please call [phone] (OR Holding Area). 9. If you have any questions and or problems, please call [phone] (Pre-admission Testing). 10. Your surgery time may be subject to change. You will receive a phone call the evening prior if your time changes. 11.  If having outpatient surgery, you must have someone to drive you here, stay with you during the duration of your stay, and to drive you home at time of discharge. 12.   In an effort to improve the efficiency, privacy, and safety for all of our Pre-op patients visitors are not allowed in the Holding area. Once you arrive and are registered your family/visitors will be asked to remain in the waiting room. The Pre-op staff will get you from the Surgical Waiting Area and will explain to you and your family/visitors that the Pre-op phase is beginning. The staff will answer any questions and provide instructions for tracking of the patient, by use of the existing tracking number and color-coded status board in the waiting room.   At this time the staff will also ask for your designated spokesperson information in the event that the physician or staff need to provide an update or obtain any pertinent information. The designated spokesperson will be notified if the physician needs to speak to family during the pre-operative phase. If at any time your family/visitors has questions or concerns they may approach the volunteer desk in the waiting area for assistance. Special Instructions:use and bring inhalers. MEDICATIONS TO TAKE THE MORNING OF SURGERY WITH A SIP OF WATER:wellbutrin, gabapentin, mag-ox, singulair, sensipar. Will be done with prednisone. I understand a pre-operative phone call will be made to verify my surgery time. In the event that I am not available, I give permission for a message to be left on my answering service and/or with another person?   {yes @ [phone].         ___________________      __________   _________    (Signature of Patient)             (Witness)                (Date and Time)

## 2019-10-01 ENCOUNTER — HOSPITAL ENCOUNTER (OUTPATIENT)
Age: 59
Setting detail: OUTPATIENT SURGERY
Discharge: HOME | End: 2019-10-01
Attending: SURGERY | Admitting: SURGERY
Payer: MEDICAID

## 2019-10-01 VITALS
RESPIRATION RATE: 20 BRPM | OXYGEN SATURATION: 100 % | HEART RATE: 86 BPM | WEIGHT: 293 LBS | BODY MASS INDEX: 48.82 KG/M2 | DIASTOLIC BLOOD PRESSURE: 84 MMHG | SYSTOLIC BLOOD PRESSURE: 115 MMHG | TEMPERATURE: 98.3 F | HEIGHT: 65 IN

## 2019-10-01 LAB
ANION GAP BLD CALC-SCNC: 17 MMOL/L (ref 10–20)
BUN BLD-MCNC: 61 MG/DL (ref 9–20)
CA-I BLD-MCNC: 1.01 MMOL/L (ref 1.12–1.32)
CHLORIDE BLD-SCNC: 99 MMOL/L (ref 98–107)
CO2 BLD-SCNC: 26 MMOL/L (ref 21–32)
CREAT BLD-MCNC: 10.7 MG/DL (ref 0.6–1.3)
GLUCOSE BLD-MCNC: 100 MG/DL (ref 65–100)
HCT VFR BLD CALC: 43 % (ref 35–47)
POTASSIUM BLD-SCNC: 5.9 MMOL/L (ref 3.5–5.1)
SERVICE CMNT-IMP: ABNORMAL
SODIUM BLD-SCNC: 136 MMOL/L (ref 136–145)

## 2019-10-01 PROCEDURE — 74011250636 HC RX REV CODE- 250/636: Performed by: ANESTHESIOLOGY

## 2019-10-01 PROCEDURE — 80047 BASIC METABLC PNL IONIZED CA: CPT

## 2019-10-01 PROCEDURE — 77030020782 HC GWN BAIR PAWS FLX 3M -B

## 2019-10-01 RX ORDER — SODIUM CHLORIDE, SODIUM LACTATE, POTASSIUM CHLORIDE, CALCIUM CHLORIDE 600; 310; 30; 20 MG/100ML; MG/100ML; MG/100ML; MG/100ML
25 INJECTION, SOLUTION INTRAVENOUS CONTINUOUS
Status: CANCELLED | OUTPATIENT
Start: 2019-10-01

## 2019-10-01 RX ORDER — FENTANYL CITRATE 50 UG/ML
25 INJECTION, SOLUTION INTRAMUSCULAR; INTRAVENOUS
Status: CANCELLED | OUTPATIENT
Start: 2019-10-01

## 2019-10-01 RX ORDER — MIDAZOLAM HYDROCHLORIDE 1 MG/ML
1 INJECTION, SOLUTION INTRAMUSCULAR; INTRAVENOUS AS NEEDED
Status: CANCELLED | OUTPATIENT
Start: 2019-10-01

## 2019-10-01 RX ORDER — LIDOCAINE HYDROCHLORIDE 10 MG/ML
0.1 INJECTION, SOLUTION EPIDURAL; INFILTRATION; INTRACAUDAL; PERINEURAL AS NEEDED
Status: CANCELLED | OUTPATIENT
Start: 2019-10-01

## 2019-10-01 RX ORDER — MORPHINE SULFATE 10 MG/ML
2 INJECTION, SOLUTION INTRAMUSCULAR; INTRAVENOUS
Status: CANCELLED | OUTPATIENT
Start: 2019-10-01 | End: 2019-10-01

## 2019-10-01 RX ORDER — ONDANSETRON 2 MG/ML
4 INJECTION INTRAMUSCULAR; INTRAVENOUS AS NEEDED
Status: CANCELLED | OUTPATIENT
Start: 2019-10-01

## 2019-10-01 RX ORDER — SODIUM CHLORIDE 9 MG/ML
25 INJECTION, SOLUTION INTRAVENOUS CONTINUOUS
Status: DISCONTINUED | OUTPATIENT
Start: 2019-10-01 | End: 2019-10-01 | Stop reason: HOSPADM

## 2019-10-01 RX ORDER — FENTANYL CITRATE 50 UG/ML
50 INJECTION, SOLUTION INTRAMUSCULAR; INTRAVENOUS AS NEEDED
Status: CANCELLED | OUTPATIENT
Start: 2019-10-01

## 2019-10-01 RX ADMIN — SODIUM CHLORIDE 25 ML/HR: 900 INJECTION, SOLUTION INTRAVENOUS at 09:10

## 2019-10-01 NOTE — PROGRESS NOTES
Surgery    Discussed with DR Laya Olson. There is concern about patient's recent ER visit at [facility]  and her reports of episodes of left body (arm and leg) pain and numbness. We will cancel surgery for today and get records from [facility] and have outpatient neurology evaluation prior to rescheduling surgery.     Kishan Davis MD

## 2019-10-01 NOTE — PROGRESS NOTES
0845-Pt went to ED [facility]) last night at 2200. Pt had a painful sensation down L arm & leg that began the previous evening, stopped and began again. Pt completed dialysis at 2030, then went to ED. She states they ruled out cardiac or neurologic problems. Diagnosed with angina. Pt states pain was resolved prior to leaving ED without medication and has not returned. Also, pt's weight was 324lb 9/26/19 at MD office, per our scale she is now 293. I will attempt to verify what her weight was at dialysis yesterday. 1030-Gwendolyn Cheung at bedside, talking w/pt & son. Will reschedule case based on last night's occurrence. Team notified that case is cancelled. 1100-Pt given information (phone #, location ) re; [facility]. She will follow up. Release of information faxed to [facility] to send ED report to Dr. Kari Hall office. 1125-Pt dressed, discharge home via car w/son. Concerns addressed, questions answered.

## 2019-10-02 LAB
BACTERIA SPEC CULT: ABNORMAL
BACTERIA SPEC CULT: ABNORMAL
SERVICE CMNT-IMP: ABNORMAL

## 2019-10-10 ENCOUNTER — OFFICE VISIT (OUTPATIENT)
Dept: FAMILY MEDICINE CLINIC | Age: 59
End: 2019-10-10

## 2019-10-10 VITALS
TEMPERATURE: 97.9 F | RESPIRATION RATE: 22 BRPM | BODY MASS INDEX: 48.82 KG/M2 | HEART RATE: 88 BPM | SYSTOLIC BLOOD PRESSURE: 120 MMHG | HEIGHT: 65 IN | WEIGHT: 293 LBS | DIASTOLIC BLOOD PRESSURE: 68 MMHG | OXYGEN SATURATION: 96 %

## 2019-10-10 DIAGNOSIS — M54.41 CHRONIC MIDLINE LOW BACK PAIN WITH BILATERAL SCIATICA: ICD-10-CM

## 2019-10-10 DIAGNOSIS — M54.42 CHRONIC MIDLINE LOW BACK PAIN WITH BILATERAL SCIATICA: ICD-10-CM

## 2019-10-10 DIAGNOSIS — Z79.891 CHRONIC PRESCRIPTION OPIATE USE: ICD-10-CM

## 2019-10-10 DIAGNOSIS — G89.29 CHRONIC MIDLINE LOW BACK PAIN WITH BILATERAL SCIATICA: ICD-10-CM

## 2019-10-10 DIAGNOSIS — L85.3 DRY SKIN: ICD-10-CM

## 2019-10-10 DIAGNOSIS — G89.29 OTHER CHRONIC PAIN: ICD-10-CM

## 2019-10-10 DIAGNOSIS — G89.29 CHRONIC PAIN OF RIGHT ANKLE: ICD-10-CM

## 2019-10-10 DIAGNOSIS — M25.571 CHRONIC PAIN OF RIGHT ANKLE: ICD-10-CM

## 2019-10-10 RX ORDER — HYDROCODONE BITARTRATE AND ACETAMINOPHEN 7.5; 325 MG/1; MG/1
1 TABLET ORAL
Qty: 60 TAB | Refills: 0 | Status: SHIPPED | OUTPATIENT
Start: 2019-11-10 | End: 2019-12-10

## 2019-10-10 RX ORDER — PETROLATUM 42 G/100G
OINTMENT TOPICAL AS NEEDED
Qty: 452 G | Refills: 11 | Status: SHIPPED | OUTPATIENT
Start: 2019-10-10 | End: 2020-05-11 | Stop reason: CLARIF

## 2019-10-10 RX ORDER — HYDROCODONE BITARTRATE AND ACETAMINOPHEN 7.5; 325 MG/1; MG/1
1 TABLET ORAL
Qty: 60 TAB | Refills: 0 | Status: SHIPPED | OUTPATIENT
Start: 2019-10-10 | End: 2019-11-09

## 2019-10-10 RX ORDER — HYDROCODONE BITARTRATE AND ACETAMINOPHEN 7.5; 325 MG/1; MG/1
1 TABLET ORAL
Qty: 60 TAB | Refills: 0 | Status: SHIPPED | OUTPATIENT
Start: 2019-12-10 | End: 2020-01-09

## 2019-10-10 RX ORDER — MELATONIN
2000 DAILY
Qty: 180 TAB | Refills: 3 | Status: SHIPPED | OUTPATIENT
Start: 2019-10-10 | End: 2021-10-28

## 2019-10-10 RX ORDER — HYDROCODONE BITARTRATE AND ACETAMINOPHEN 7.5; 325 MG/1; MG/1
TABLET ORAL
Refills: 0 | COMMUNITY
Start: 2019-09-11 | End: 2019-12-18

## 2019-10-10 RX ORDER — HYDROCODONE BITARTRATE AND ACETAMINOPHEN 7.5; 325 MG/1; MG/1
TABLET ORAL
Status: CANCELLED | OUTPATIENT
Start: 2019-10-10

## 2019-10-10 RX ORDER — HYDROCODONE BITARTRATE AND ACETAMINOPHEN 7.5; 325 MG/1; MG/1
TABLET ORAL
COMMUNITY
End: 2019-12-18

## 2019-10-10 RX ORDER — HYDROCODONE BITARTRATE AND ACETAMINOPHEN 7.5; 325 MG/1; MG/1
TABLET ORAL
Refills: 0 | Status: CANCELLED | OUTPATIENT
Start: 2019-10-10

## 2019-10-10 NOTE — PROGRESS NOTES
Saw pulm for sleep apnea and has to see neuro for left sided numbness prior to getting graft replaced.

## 2019-10-10 NOTE — PROGRESS NOTES
Damon Dominguez  Identified pt with two pt identifiers(name and ). Chief Complaint   Patient presents with    Medication Refill       1. Have you been to the ER, urgent care clinic since your last visit? Hospitalized since your last visit? [facility]    2. Have you seen or consulted any other health care providers outside of the [address] [place] [address] since your last visit? Include any pap smears or colon screening. NO      Would you like to sign up for MyChart today, if you have not already done so? No  If not, would you like information on MyChart, and how to sign up at a later time? No      Medication reconciliation up to date and corrected with patient at this time. Today's provider has been notified of reason for visit, vitals and flowsheets obtained on patients. Reviewed record in preparation for visit, huddled with provider and have obtained necessary documentation.       Health Maintenance Due   Topic    DTaP/Tdap/Td series (1 - Tdap)    Shingrix Vaccine Age 49> (1 of 2)    Pneumococcal 0-64 years (1 of 1 - PPSV23)    PAP AKA CERVICAL CYTOLOGY        Wt Readings from Last 3 Encounters:   10/10/19 328 lb 3.2 oz (148.9 kg)   10/01/19 293 lb 6.9 oz (133.1 kg)   19 329 lb 8 oz (149.5 kg)     Temp Readings from Last 3 Encounters:   10/10/19 97.9 °F (36.6 °C) (Oral)   10/01/19 98.3 °F (36.8 °C)   19 98.3 °F (36.8 °C) (Oral)     BP Readings from Last 3 Encounters:   10/10/19 120/68   10/01/19 115/84   19 111/69     Pulse Readings from Last 3 Encounters:   10/10/19 88   10/01/19 86   19 77     Vitals:    10/10/19 1404   BP: 120/68   Pulse: 88   Resp: 22   Temp: 97.9 °F (36.6 °C)   TempSrc: Oral   SpO2: 96%   Weight: 328 lb 3.2 oz (148.9 kg)   Height: 5' 5\" (1.651 m)   PainSc:   0 - No pain         Learning Assessment:  :     Learning Assessment 2018 9/3/2014 2014   PRIMARY LEARNER Patient Patient Patient   HIGHEST LEVEL OF EDUCATION - PRIMARY LEARNER  SOME COLLEGE - -   BARRIERS PRIMARY LEARNER NONE - NONE   CO-LEARNER CAREGIVER No - No   PRIMARY LANGUAGE ENGLISH ENGLISH ENGLISH   LEARNER PREFERENCE PRIMARY DEMONSTRATION READING LISTENING     - - READING     - - DEMONSTRATION   ANSWERED BY patient patient Frederick Lara   RELATIONSHIP SELF SELF SELF       Depression Screening:  :     3 most recent [facility] 36 Screens 2/28/2019   Little interest or pleasure in doing things Several days   Feeling down, depressed, irritable, or hopeless Several days   Total Score PHQ 2 2   Trouble falling or staying asleep, or sleeping too much Several days   Feeling tired or having little energy Nearly every day   Poor appetite, weight loss, or overeating Several days   Feeling bad about yourself - or that you are a failure or have let yourself or your family down Several days   Trouble concentrating on things such as school, work, reading, or watching TV Several days   Moving or speaking so slowly that other people could have noticed; or the opposite being so fidgety that others notice Not at all   Thoughts of being better off dead, or hurting yourself in some way Not at all   PHQ 9 Score 9   How difficult have these problems made it for you to do your work, take care of your home and get along with others Somewhat difficult       Fall Risk Assessment:  :     Fall Risk Assessment, last 12 mths 2/28/2019   Able to walk? No   Fall in past 12 months? -       Abuse Screening:  :     Abuse Screening Questionnaire 2/28/2019 10/11/2018   Do you ever feel afraid of your partner? N N   Are you in a relationship with someone who physically or mentally threatens you? N N   Is it safe for you to go home?  Y Y       ADL Screening:  :     ADL Assessment 2/28/2019   Feeding yourself No Help Needed   Getting from bed to chair No Help Needed   Getting dressed No Help Needed   Bathing or showering No Help Needed   Walk across the room (includes cane/walker) Help Needed   Using the telphone No Help Needed   Taking your medications No Help Needed   Preparing meals No Help Needed   Managing money (expenses/bills) No Help Needed   Moderately strenuous housework (laundry) Help Needed   Shopping for personal items (toiletries/medicines) No Help Needed   Shopping for groceries No Help Needed   Driving Help Needed   Climbing a flight of stairs Help Needed   Getting to places beyond walking distances Help Needed       Patient presents for Controlled Substance Prescription follow up. Last prescription:  HYDROcodone-acetaminophen ([address]) 7.5-325 mg per tablet [[id number]]     Order Details   Dose, Route, Frequency: As Directed    Dispense Quantity: -- Refills: 0 Fills remaining: --           Sig: TK 1 T PO BID AS NEEDED FOR PAIN FOR UP TO 30 DAYS.          Written Date: -- Expiration Date: -- Ordering Date: 10/10/19    Start Date: 09/11/19 End Date: --     Earliest Fill Date: 09/11/19      Source:  Received from: External Pharmacy          Ordering Provider: -- OSWALD #:  -- NPI:  --    Authorizing Provider: Radha, Historical OSWALD #:  -- NPI:  --    Ordering User:  Tanner [address] [place] [address] Road:  [facility] #[id number] - [address] AT [address] [place] [address] #:  [phone]     Pharmacy Comments:  --          Fill quantity remaining:  -- Fill quantity used:  -- Next fill due: --       Outpatient Morphine Equivalent Daily Dose (MEDD)     9/11/19 and after   Unknown   Order Name Dose Route Frequency Maximum MEDD    HYDROcodone-acetaminophen (NORCO) 7.5-325 mg per tablet     Unknown   Total Potential Daily Morphine Equivalence Unknown   An error was encountered while attempting to calculate the morphine equivalent daily dose for at least one order.     Calculation Information                Priority and Order Details     Priority Class    Routine Historical Med    Warnings History     Total number of overridden warnings: 2   Full Warnings History   Pharmacy     [address] #[id number] - [place], [place] - [address] AT [address] [facility]   Outpatient Medication Detail      Disp Refills Start End    HYDROcodone-acetaminophen (NORCO) 7.5-325 mg per tablet  0 9/11/2019     Sig: TK 1 T PO BID AS NEEDED FOR PAIN FOR UP TO 30 DAYS. Class: Historical Med    Earliest Fill Date: 9/11/2019    Tracking Reports      Cosign Tracking Order Transmittal Tracking     Bottle matches last prescription. 0 pills remaining in bottle. Patient reports last dose taken at 0900 am on October 10   printed. Urine collected. Controlled Substance Agreement letter printed.

## 2019-10-10 NOTE — PROGRESS NOTES
[facility] today to follow up on chronic pain diagnosis. We discussed her osteoarthritis that is affecting her right ankle and back. Significant changes since last visit: none. She is  able to do her normal daily activities. She reports the following adverse side effects: none. Least pain over the last week has been 6/10. Worst pain over the last week has been 10/10. Opioid Risk Tool Reviewed: YES    Aberrant behaviors: None. Urine Drug Screen: reviewed and up to date. Controlled substance agreement on file: YES.        reviewed:yes    Pill count is consistent with her prescription: yes    Concomitant use of a benzodiazepine: no    NA    NA     Also,  abstinence syndrome was reviewed and discussed with her today N/A

## 2019-10-11 ENCOUNTER — TELEPHONE (OUTPATIENT)
Dept: SURGERY | Age: 59
End: 2019-10-11

## 2019-10-11 NOTE — TELEPHONE ENCOUNTER
Pt missed appt with Rose Pettit 10-9,has not had surg,[facility] is sooner then Dec 18.

## 2019-10-16 NOTE — TELEPHONE ENCOUNTER
No earlier appts available at neurologist office before 12/18/19. Will get [facility] [place] notes from Dr Francis Padilla to fax to neurologist office & call pt back.

## 2019-12-12 NOTE — TELEPHONE ENCOUNTER
Faxed [address] notes (16 pgs) to Dr Kalpesh Hebert [phone]) [phone], confirmation received. Ms Schaefer's appt on 12/18/19.

## 2019-12-18 ENCOUNTER — OFFICE VISIT (OUTPATIENT)
Dept: NEUROLOGY | Age: 59
End: 2019-12-18

## 2019-12-18 VITALS
HEIGHT: 65 IN | WEIGHT: 293 LBS | SYSTOLIC BLOOD PRESSURE: 110 MMHG | HEART RATE: 66 BPM | DIASTOLIC BLOOD PRESSURE: 70 MMHG | OXYGEN SATURATION: 100 % | RESPIRATION RATE: 16 BRPM | BODY MASS INDEX: 48.82 KG/M2

## 2019-12-18 DIAGNOSIS — R55 SPELL OF LOSS OF CONSCIOUSNESS: Primary | ICD-10-CM

## 2019-12-18 DIAGNOSIS — G45.9 TIA (TRANSIENT ISCHEMIC ATTACK): ICD-10-CM

## 2019-12-18 DIAGNOSIS — R20.0 NUMBNESS AND TINGLING IN LEFT ARM: ICD-10-CM

## 2019-12-18 DIAGNOSIS — G62.9 PERIPHERAL POLYNEUROPATHY: ICD-10-CM

## 2019-12-18 DIAGNOSIS — R20.2 NUMBNESS AND TINGLING IN LEFT ARM: ICD-10-CM

## 2019-12-18 RX ORDER — ASPIRIN 81 MG/1
81 TABLET ORAL DAILY
COMMUNITY
Start: 2019-12-18

## 2019-12-18 NOTE — PROGRESS NOTES
Neurology Consult Note      HISTORY PROVIDED BY: patient and son    Chief Complaint:   Chief Complaint   Patient presents with   [address]     NP, pain in left arm and leg, referred by Dr. Mary wells      Subjective:    Damon Dominguez is a 61 y.o. right handed female who presents in consultation for recurrent syncope according to the referral, but pt reports that it is for left arm numbness/tingling. She has h/o PN she believes due to kidney failure, does not have a h/o DM. Has HTN. No numbness in left arm now. Has nerve pain in legs that she attributes to sciatica. No h/o spine surgery. Has never had a NCS/EMG, so not clear if she has PN. Pt doesn't recall passing out. Her son reports that she gets dizzy and has syncope or near syncope with standing, BP drops. Was on midodrine at one point, but no longer on this. She was in the ED at [facility] yesterday, had an episode at home, was told she had a seizure. Son reports that she had an episode of poor responsiveness, right facial droop, fine after one minute. The family was concerned about stroke so called 911. They tell me that the ED did blood work and told her it was a sz. Has family h/o seizures in brother, she believes it was due to head injury. No personal h/o sz, no h/o CNS infection, no h/o head injury with LOC, no febrile sz. Notes from [facility] EMERGENCY [facility] received and reviewed: ED visit 9/30/2019 for \"left-sided chest pain radiating into left shoulder and arm that started yesterday. Patient also complained that she will get lightheaded and feels short of breath with activity and at times will pass out. This was not a new symptom for her, had been occurring for several months. According to the family she had been seen by several physicians for multiple episodes of passing out usually occurring upon standing or walking from a seated position.   The family reported this was almost a daily occurrence, twice that day, and they compensate by having someone with her at all times. They reported in the past she was told it was due to her oxygen levels and at baseline wears 2 L nasal cannula while awake and 4 L via BiPAP at night. It was noted that she is on Midrin 5 mg 3 times daily at that visit. WBCs 14.05, potassium 5.2, BUN 43/creatinine 8.1. It was felt that she likely had syncopal events due to orthostatic hypotension related to dialysis and end-stage renal disease. Notes from her ED visit yesterday, 12/17/2019 are not provided. Past Medical History:   Diagnosis Date    Advanced care planning/counseling discussion 4/7/16    Arthritis     back    Arthritis of ankle or foot, right     Asthma     Dr. Bartolo Zuleta [facility]    Breast cancer [facility])     Right Lumpectomy 2014 - DCIS    Chronic kidney disease     STAGE IV/ dialysis [facility]    Chronic obstructive pulmonary disease ([facility]     Chronic pain 1989    ANKLE-right- h/o fx ankle    CKD (chronic kidney disease) stage V requiring chronic dialysis ([facility]     Dr. Lucita Melgoza [facility]    Depression     DEPRESSION AND ANXIETY    GERD (gastroesophageal reflux disease)     Heart failure ([address]     h/o chf - Normal EF, likely due to volume overload    History of abdominal hernia     History of MRSA infection 01/2018    h/o MRSA leg     Hypertension     Morbid obesity ([address]     Respiratory failure, chronic ([address] 10/07/14    Dr. Mayela Burleson Stroke [facility]) 2009    ?  TIA @ age 52    Unspecified sleep apnea 10/07/2014    BIPAP with oxygen    Vitamin D deficiency 12/2011    10.4 [facility] labs      Past Surgical History:   Procedure Laterality Date    ABDOMEN SURGERY PROC UNLISTED  2009    choleystectomy    HX BREAST BIOPSY Right 4/7/14    DCIS    HX COLONOSCOPY  8/18/11    Dr. Myrna Laureano HX GYN      tuabl ligation    HX ORTHOPAEDIC  1989    r ankle and leg plate and screw    HX ORTHOPAEDIC  2019    bone taken out of right pink toe    HX OTHER SURGICAL  3/10/16    Graft in left arm     HX VASCULAR ACCESS Right 08/2019    DIALYSIS CATHETER-upper chest    VASCULAR SURGERY PROCEDURE UNLIST      4 surgeries on left upper arm grafts stopping up. Social History     Socioeconomic History    Marital status: SINGLE     Spouse name: Not on file    Number of children: Not on file    Years of education: Not on file    Highest education level: Not on file   Occupational History    Occupation: prior retail work     Comment: disabled   Social Needs    Financial resource strain: Not on file    Food insecurity:     Worry: Not on file     Inability: Not on file   Keegy needs:     Medical: Not on file     Non-medical: Not on file   Tobacco Use    Smoking status: Never Smoker    Smokeless tobacco: Never Used   Substance and Sexual Activity    Alcohol use: Yes     Comment: Occ on holidays    Drug use: No    Sexual activity: Not Currently   Lifestyle    Physical activity:     Days per week: Not on file     Minutes per session: Not on file    Stress: Not on file   Relationships    Social connections:     Talks on phone: Not on file     Gets together: Not on file     Attends Orthodox service: Not on file     Active member of club or organization: Not on file     Attends meetings of clubs or organizations: Not on file     Relationship status: Not on file    Intimate partner violence:     Fear of current or ex partner: Not on file     Emotionally abused: Not on file     Physically abused: Not on file     Forced sexual activity: Not on file   Other Topics Concern    Not on file   Social History Narrative    Lives in [facility] alone     Family History   Problem Relation Age of Onset    Diabetes Mother     Heart Disease Mother     Heart Disease Father     Hypertension Sister     Breast Cancer Paternal Aunt 28    No Known Problems Son     Anesth Problems Neg Hx          Objective:   Review of Systems   Constitutional: Negative. HENT: Negative. Eyes: Negative. Respiratory: Negative. Cardiovascular: Negative. Gastrointestinal: Negative. Genitourinary: Negative. Musculoskeletal: Positive for joint pain and myalgias. Skin: Negative. Neurological: Positive for sensory change. Endo/Heme/Allergies: Negative. Psychiatric/Behavioral: Positive for depression. Allergies   Allergen Reactions    Iodine Anaphylaxis    Shellfish Containing Products Anaphylaxis    Other Medication Other (comments)     Metal causes numbness in hands,arms all over    Sulfa (Sulfonamide Antibiotics) Itching        Meds:  Outpatient Medications Prior to Visit   Medication Sig Dispense Refill    cholecalciferol (VITAMIN D3) (1000 Units /25 mcg) tablet Take 2 Tabs by mouth daily. 180 Tab 3    mineral oil-hydrophil petrolat (AQUAPHOR) ointment Apply  to affected area as needed for Dry Skin or Itching. 452 g 11    HYDROcodone-acetaminophen (NORCO) 7.5-325 mg per tablet Take 1 Tab by mouth two (2) times daily as needed for Pain for up to 30 days. Max Daily Amount: 2 Tabs. 60 Tab 0    fluticasone furoate-vilanterol (BREO ELLIPTA) 200-25 mcg/dose inhaler Take 1 Puff by inhalation daily.  omeprazole (PRILOSEC) 20 mg capsule TAKE 1 CAPSULE BY MOUTH TWICE DAILY 180 Cap 3    montelukast (SINGULAIR) 10 mg tablet take 1 tablet by mouth daily 90 Tab 3    magnesium oxide (MAG-OX) 400 mg tablet take 1 tablet by mouth daily 90 Tab 3    buPROPion XL (WELLBUTRIN XL) 150 mg tablet take 1 tablet by mouth every morning 90 Tab 3    RENVELA 2.4 gram pwpk oral powder Take 1 Packet by mouth three (3) times daily (with meals). 180 Packet 0    albuterol (PROVENTIL VENTOLIN) 2.5 mg /3 mL (0.083 %) nebulizer solution 3 mL by Nebulization route every four (4) hours as needed for Wheezing or Shortness of Breath. 1 Package 4    AQUAPHOR HEALING 41 % ointment Apply  to affected area as needed for Dry Skin.  56 g 11    SENSIPAR 60 mg tab   0    gabapentin (NEURONTIN) 300 mg capsule take 2 to 3 capsules by mouth three times a day if needed (Patient taking differently: take 1 capsule daily) 270 Cap 11    letrozole (FEMARA) 2.5 mg tablet take 1 tablet by mouth once daily 30 Tab 5    atorvastatin (LIPITOR) 10 mg tablet take 1 tablet by mouth NIGHTLY. 90 Tab 3    miscellaneous medical supply misc Please dispense 1 bariatric wheelchair 1 Each 1    lidocaine 4 % gel 1 Inch by Apply Externally route two (2) times daily as needed. To ankle for pain 30 g 1    inhalational spacing device 1 Each by Does Not Apply route as needed. 1 Device 1    HYDROcodone-acetaminophen (NORCO) 7.5-325 mg per tablet TK 1 T PO BID AS NEEDED FOR PAIN FOR UP TO 30 DAYS.  0    HYDROcodone-acetaminophen (NORCO) 7.5-325 mg per tablet        No facility-administered medications prior to visit. Imaging:  MRI Results (most recent):  No results found for this or any previous visit. CT Results (most recent):  No results found for this or any previous visit. Reviewed records in [facility] tab today    Lab Review   Results for orders placed or performed during the hospital encounter of 10/01/19   CULTURE, MRSA   Result Value Ref Range    Special Requests: NO SPECIAL REQUESTS      Culture result: (A)       MRSA NOT PRESENT. Apparent Staphylococus aureus (not MRSA noted). Culture result:            Screening of patient nares for MRSA is for surveillance purposes and, if positive, to facilitate isolation considerations in high risk settings. It is not intended for automatic decolonization interventions per se as regimens are not sufficiently effective to warrant routine use.    POC CHEM8   Result Value Ref Range    Calcium, ionized (POC) 1.01 (L) 1.12 - 1.32 mmol/L    Sodium (POC) 136 136 - 145 mmol/L    Potassium (POC) 5.9 (H) 3.5 - 5.1 mmol/L    Chloride (POC) 99 98 - 107 mmol/L    CO2 (POC) 26 21 - 32 mmol/L    Anion gap (POC) 17 10 - 20 mmol/L    Glucose (POC) 100 65 - 100 mg/dL    BUN (POC) 61 (H) 9 - 20 mg/dL Creatinine (POC) 10.7 (H) 0.6 - 1.3 mg/dL    GFRAA, POC 4 (L) >60 ml/min/1.73m2    GFRNA, POC 4 (L) >60 ml/min/1.73m2    Hematocrit (POC) 43 35.0 - 47.0 %    Comment Comment Not Indicated. Exam:  Visit Vitals  /70 (BP 1 Location: Right arm, BP Patient Position: Sitting)   Pulse 66   Resp 16   Ht 5' 5\" (1.651 m)   Wt 142.5 kg (314 lb 3.2 oz)   LMP  (LMP Unknown)   SpO2 100%   BMI 52.29 kg/m²     General:  Alert, cooperative, no distress. Head:  Normocephalic, without obvious abnormality, atraumatic. Respiratory:  Heart:   Non labored breathing  Regular rate and rhythm, no murmurs   Neck:   2+ carotids, no bruits   Extremities: Warm, no cyanosis or edema. Pulses: 2+ radial pulses. Neurologic:  MS: Alert and oriented x 4, speech intact. Language intact. Attention and fund of knowledge appropriate. Recent and remote memory impaired to history. Cranial Nerves:  II: visual fields Full to confrontation   II: pupils Equal, round, reactive to light   II: optic disc    III,VII: ptosis none   III,IV,VI: extraocular muscles  EOMI, no nystagmus or diplopia   V: facial light touch sensation  normal   VII: facial muscle function   symmetric   VIII: hearing intact   IX: soft palate elevation  normal   XI: trapezius strength  5/5   XI: sternocleidomastoid strength 5/5   XII: tongue  Midline     Motor: normal bulk and tone, no tremor              Strength: 5/5 throughout, no PD  Sensory: intact to LT, PP, neg Phalen's   Coordination: FTN and HTS intact, ROSARIO intact  Gait: normal gait, tandem walking not attempted  Reflexes: 2+ symmetric           Assessment/Plan   Pt is a 61 y.o. right handed female with ESRD on HD, HTN, TIA, present with recurrent syncope with known h/o orthostatic hypotension on midodrine in Sept, not on this now, told yesterday in ED at [facility] that it was a seizure, though son denies any seizure activity.  Has sz risk factor of family h/o sz in brother, but believes this was due to trauma. No records provided. Pt and son are not able to tell me why midodrine was stopped, they note confusion b/c nephrologist and PCP are both managing the same meds. Again, no records provided from nephrologist. Additionally, pt is referred by Dr. Dariela Chang for evaluation of left arm numbness/tingling prior to vascular access surgery for HD. His note from 10/1/19 reports that pt c/o numbness in arm and leg, pt did not mention leg numbness today. She reports a h/o PN, but does not have exam findings to suggest this and has not had NCS/EMG to confirm diagnosis. Exam with BMI 52, o/w non-focal.  History is very limited, suspect episodes of LOC are due to orthostatic hypotension and she would benefit from treatment of this, but will defer to the physician that stopped the midodrine. Will order EEG and MRI brain wo contrast to assess for evidence of epilepsy or epileptogenic focus. Recommend NCS/EMG of LE to assess for PN and of left UE to assess for a mononeuropathy. MRI brain will also exclude stroke as cause for original c/o left sided arm and leg numbness. Will request records from Dr. Marques Esqueda and recent [facility] ED visit. F/u in clinic in 3 months, instructed to call in the interim if needed. Pt is asked to call after testing completed to discuss results. ICD-10-CM ICD-9-CM    1. Spell of loss of consciousness R55 780.09 MRI BRAIN WO CONT      EEG   2. TIA (transient ischemic attack) G45.9 435.9 MRI BRAIN WO CONT   3. Numbness and tingling in left arm R20.0 782. 0 EMG NCV MOTOR WITH F/WAVE PER NERVE    R20.2     4. Peripheral polyneuropathy G62.9 356.9 EMG NCV MOTOR WITH F/WAVE PER NERVE       Signed:   Ethelle Dubin, MD  12/18/2019

## 2019-12-18 NOTE — PROGRESS NOTES
Identified pt with two pt identifiers(name and ). Reviewed record in preparation for visit and have obtained necessary documentation. Chief Complaint   Patient presents with   [facility] Establish Care     NP, pain in left arm and leg, referred by Dr. Nora Rosas Maintenance Due   Topic    DTaP/Tdap/Td series (1 - Tdap)    Shingrix Vaccine Age 50> (1 of 2)    Pneumococcal 0-64 years (2 of 3 - PPSV23)    PAP AKA CERVICAL CYTOLOGY        Coordination of Care Questionnaire:  :   1) Have you been to an emergency room, urgent care, or hospitalized since your last visit? If yes, where when, and reason for visit? yes [facility] ED for possible seizure    2. Have seen or consulted any other health care provider since your last visit? If yes, where when, and reason for visit? NO      3) Do you have an Advanced Directive/ Living Will in place? NO  If yes, do we have a copy on file NO  If no, would you like information NO    Patient is accompanied by son I have received verbal consent from Eliecer Singer to discuss any/all medical information while they are present in the room.     Visit Vitals  /70 (BP 1 Location: Right arm, BP Patient Position: Sitting)   Pulse 66   Resp 16   Ht 5' 5\" (1.651 m)   Wt 142.5 kg (314 lb 3.2 oz)   SpO2 100%   BMI 52.29 kg/m²     Lavonia Bence, LPN

## 2020-01-09 ENCOUNTER — HOSPITAL ENCOUNTER (OUTPATIENT)
Dept: NEUROLOGY | Age: 60
Discharge: HOME | End: 2020-01-09
Attending: PSYCHIATRY & NEUROLOGY
Payer: MEDICAID

## 2020-01-09 ENCOUNTER — HOSPITAL ENCOUNTER (OUTPATIENT)
Dept: MRI IMAGING | Age: 60
Discharge: HOME | End: 2020-01-09
Attending: PSYCHIATRY & NEUROLOGY
Payer: MEDICAID

## 2020-01-09 DIAGNOSIS — G45.9 TIA (TRANSIENT ISCHEMIC ATTACK): ICD-10-CM

## 2020-01-09 DIAGNOSIS — R55 SPELL OF LOSS OF CONSCIOUSNESS: ICD-10-CM

## 2020-01-09 PROCEDURE — 70551 MRI BRAIN STEM W/O DYE: CPT

## 2020-01-09 PROCEDURE — 95816 EEG AWAKE AND DROWSY: CPT

## 2020-01-13 ENCOUNTER — TELEPHONE (OUTPATIENT)
Dept: NEUROLOGY | Age: 60
End: 2020-01-13

## 2020-01-13 DIAGNOSIS — G45.9 TIA (TRANSIENT ISCHEMIC ATTACK): Primary | ICD-10-CM

## 2020-01-13 DIAGNOSIS — I69.30 CHRONIC ISCHEMIC LEFT MCA STROKE: ICD-10-CM

## 2020-01-13 NOTE — TELEPHONE ENCOUNTER
EEG 1/9/20 was abnormal with 2 non-specific right CP sharp waves seen. MRI brain wo contrast 1/9/20 is abnormal with old stroke in left frontal white matter and lacunar infarcts in left cerebellum. Mild to mod CIWM changes. No acute strokes. No explanation for right A/L numbness. NCS/EMG is pending. Called pt's son and pt also present - discussed results. Recommend lipid profile as ordered by Dr. Kerri De Jesus and HgbA1C if not already done. I ordered CD to assess for stenosis. Pt should take ASA 81mg daily. Would not start an ASD based on EEG results at this time.

## 2020-01-13 NOTE — PROCEDURES
PROCEDURE: ROUTINE INPATIENT EEG  NAME:   Yogesh Myers  ACCOUNT NUMBER : [de-identified]  MRN:   [id number]  DATE OF SERVICE: 1/9/20    HISTORY/INDICATION: Pt is a 65yo female with h/o orthostatic hypotension and syncope with recent spell of LOC that the ED suggested might be seizure, though family denies any seizure activity. EEG is performed to assess for underlying epilepsy. MEDICATIONS:   Current Outpatient Medications   Medication Sig Dispense Refill    aspirin delayed-release 81 mg tablet Take 1 Tab by mouth daily.  cholecalciferol (VITAMIN D3) (1000 Units /25 mcg) tablet Take 2 Tabs by mouth daily. 180 Tab 3    mineral oil-hydrophil petrolat (AQUAPHOR) ointment Apply  to affected area as needed for Dry Skin or Itching. 452 g 11    fluticasone furoate-vilanterol (BREO ELLIPTA) 200-25 mcg/dose inhaler Take 1 Puff by inhalation daily.  omeprazole (PRILOSEC) 20 mg capsule TAKE 1 CAPSULE BY MOUTH TWICE DAILY 180 Cap 3    montelukast (SINGULAIR) 10 mg tablet take 1 tablet by mouth daily 90 Tab 3    magnesium oxide (MAG-OX) 400 mg tablet take 1 tablet by mouth daily 90 Tab 3    buPROPion XL (WELLBUTRIN XL) 150 mg tablet take 1 tablet by mouth every morning 90 Tab 3    RENVELA 2.4 gram pwpk oral powder Take 1 Packet by mouth three (3) times daily (with meals). 180 Packet 0    albuterol (PROVENTIL VENTOLIN) 2.5 mg /3 mL (0.083 %) nebulizer solution 3 mL by Nebulization route every four (4) hours as needed for Wheezing or Shortness of Breath. 1 Package 4    AQUAPHOR HEALING 41 % ointment Apply  to affected area as needed for Dry Skin. 56 g 11    SENSIPAR 60 mg tab   0    gabapentin (NEURONTIN) 300 mg capsule take 2 to 3 capsules by mouth three times a day if needed (Patient taking differently: take 1 capsule daily) 270 Cap 11    letrozole (FEMARA) 2.5 mg tablet take 1 tablet by mouth once daily 30 Tab 5    atorvastatin (LIPITOR) 10 mg tablet take 1 tablet by mouth NIGHTLY.  90 Tab 3    miscellaneous medical supply misc Please dispense 1 bariatric wheelchair 1 Each 1    lidocaine 4 % gel 1 Inch by Apply Externally route two (2) times daily as needed. To ankle for pain 30 g 1    inhalational spacing device 1 Each by Does Not Apply route as needed. 1 Device 1       CONDITIONS OF RECORDING: This is a routine 21-channel EEG recording performed in accordance with the international 10-20 system with one channel devoted to limited EKG. This study was done during states of wakefulness and drowsiness. Photic stimulation was performed as an activating procedure. DESCRIPTION:   Upon maximal arousal the posterior dominant rhythm has a frequency of 9Hz with an amplitude of 20uV. This activity is symmetric over the bilateral posterior derivations and attenuates with eye opening. Photic stimulation did not significantly alter the tracing. The patient becomes drowsy, but deeper stages of sleep are not attained. There are no focal abnormalities, epileptiform discharges, or electrographic seizures seen. INTERPRETATION: Abnormal EEG due to two right centroparietal sharp waves    CLINICAL CORRELATION: Finding is non-specific and could indicate underlying structural or functional abnormality or epileptogenic focus, clinical correlation is advised.      Dami Chung MD

## 2020-01-22 ENCOUNTER — OFFICE VISIT (OUTPATIENT)
Dept: NEUROLOGY | Age: 60
End: 2020-01-22

## 2020-01-22 VITALS
DIASTOLIC BLOOD PRESSURE: 70 MMHG | HEART RATE: 70 BPM | WEIGHT: 293 LBS | OXYGEN SATURATION: 97 % | HEIGHT: 65 IN | BODY MASS INDEX: 48.82 KG/M2 | RESPIRATION RATE: 16 BRPM | SYSTOLIC BLOOD PRESSURE: 120 MMHG

## 2020-01-22 DIAGNOSIS — G56.02 CARPAL TUNNEL SYNDROME OF LEFT WRIST: Primary | ICD-10-CM

## 2020-01-22 DIAGNOSIS — G62.9 POLYNEUROPATHY: ICD-10-CM

## 2020-01-22 NOTE — PROGRESS NOTES
[address] [facility] Neurology [facility]  [address] [facility], [address] [place] [address]  Phone [phone] Fax [phone]  Test Date:  2020    Patient: Rip Chacko : 1960 Physician: Donald Chavira. Jeanell Dancer, DO   Sex: Female Height: 5' 5\" Ref Phys: Margret Arevalo MD   ID#:  [id number] Weight: 314 lbs. Technician: Long Yo. .EMG TECH     Patient Complaints:  Paresthesias of the left upper extremity and bilateral lower extremities. NCV & EMG Findings:  Evaluation of the left median motor nerve showed prolonged distal onset latency (5.0 ms). The left peroneal motor and the right peroneal motor nerves showed no response (Ankle), no response (B Fib), and no response (Poplt). The right Perpneal TA motor nerve showed no response (Fib Head) and no response (Poplit). The left tibial motor nerve showed no response (Knee) and reduced amplitude (0.3 mV). The right tibial motor nerve showed no response (Ankle) and no response (Knee). The left ulnar motor nerve showed decreased conduction velocity (B Elbow-Wrist, 47 m/s). The left median sensory nerve showed prolonged distal peak latency (4.2 ms) and decreased conduction velocity (Wrist-2nd Digit, 33 m/s). The left Sup Peroneal sensory and the right Sup Peroneal sensory nerves showed no response (14 cm). The left sural sensory and the right sural sensory nerves showed no response (Calf). All remaining nerves  were within normal limits. F Wave studies indicate that the left tibial F wave has no response. All remaining F Wave latencies were within normal limits. All examined muscles (as indicated in the following table) showed no evidence of electrical instability. Impression:  Extensive electrodiagnostic evaluation of the left upper and lower extremities reveals the followin.  A generalized sensorimotor polyneuropathy affecting the lower limbs bilaterally, axon loss in type and moderate in degree electrically. 2. A left median neuropathy at or distal to the wrist, consistent with a clinical diagnosis of carpal tunnel syndrome, mild in degree electrically. 3. No evidence of a left cervical or lumbosacral motor radiculopathy. ___________________________  Antony Albert Zenaida, DO        Nerve Conduction Studies  Anti Sensory Summary Table     Stim Site NR Peak (ms) Norm Peak (ms) P-T Amp (µV) Norm P-T Amp Onset (ms) Site1 Site2 Delta-P (ms) Dist (cm) Prosper (m/s) Norm Prosper (m/s)   Left Median Anti Sensory (2nd Digit)  33.4°C   Wrist    4.2 <3.6 27.3 >10 3.6 Wrist 2nd Digit 4.2 14.0 33 >39   Left Radial Anti Sensory (Base 1st Digit)  33.7°C   Wrist    2.3 <3.1 41.4  1.9 Wrist Base 1st Digit 2.3 10.0 43    Left Sup Peroneal Anti Sensory (Ant Lat Mall)  33.4°C   14 cm NR  <4.4  >5.0  14 cm Ant Lat Mall  10.0  >32   Right Sup Peroneal Anti Sensory (Ant Lat Mall)  35.6°C   14 cm NR  <4.4  >5.0  14 cm Ant Lat Mall  10.0  >32   Left Sural Anti Sensory (Lat Mall)  33.4°C   Calf NR  <4.0  >5.0  Calf Lat Mall  14.0  >35   Right Sural Anti Sensory (Lat Mall)  35.5°C   Calf NR  <4.0  >5.0  Calf Lat Mall  14.0  >35   Left Ulnar Anti Sensory (5th Digit)  33.6°C   Wrist    3.4 <3.7 23.8 >15.0 3.0 Wrist 5th Digit 3.4 14.0 41 >38     Motor Summary Table     Stim Site NR Onset (ms) Norm Onset (ms) O-P Amp (mV) Norm O-P Amp Site1 Site2 Delta-0 (ms) Dist (cm) Prosper (m/s) Norm Prosper (m/s)   Left Median Motor (Abd Poll Brev)  33.3°C   Wrist    5.0 <4.2 7.3 >5 Elbow Wrist 4.1 22.0 54 >50   Elbow    9.1  5.4          Left Peroneal Motor (Ext Dig Brev)  33.2°C   Ankle NR  <6.1  >2.5 B Fib Ankle  0.0  >38   B Fib NR     Poplt B Fib  10.0  >40   Poplt NR             Right Peroneal Motor (Ext Dig Brev)  31.5°C   Ankle NR  <6.1  >2.5 B Fib Ankle  0.0  >38   B Fib NR     Poplt B Fib  10.0  >40   Poplt NR             Left Perpneal TA Motor (Tib Ant)  33.2°C   Fib Head    3.5 <4.2 1.1  Poplit Fib Head 1.1 0.0  >40.5   Poplit    4.6 <5.7 1.1          Right Perpneal TA Motor (Tib Ant)  26°C   Fib Head NR  <4.2   Poplit Fib Head  0.0  >40.5   Poplit NR  <5.7           Left Tibial Motor (Abd Joe Brev)  33.3°C   Ankle    5.8 <6.1 0.3 >3.0 Knee Ankle  0.0  >35   Knee NR             Right Tibial Motor (Abd Joe Brev)  34.9°C   Ankle NR  <6.1  >3.0 Knee Ankle  0.0  >35   Knee NR             Left Ulnar Motor (Abd Dig Minimi)  33.9°C   Wrist    3.1 <4.2 10.2 >3 B Elbow Wrist 5.1 24.0 47 >53   B Elbow    8.2  8.5  A Elbow B Elbow 1.6 10.0 62 >53   A Elbow    9.8  8.4            F Wave Studies     NR F-Lat (ms) Lat Norm (ms) L-R F-Lat (ms) L-R Lat Norm   Left Tibial (Mrkrs) (Abd Hallucis)  33.1°C   NR  <61  <5.7   Right Tibial (Mrkrs) (Abd Hallucis)  36°C      53.30 <61  <5.7   Left Ulnar (Mrkrs) (Abd Dig Min)  34°C      33.08 <36  <2.5     EMG     Side Muscle Nerve Root Ins Act Fibs Psw Amp Dur Poly Recrt Int Pat Comment   Left Ext Dig Brev Dp Br Peronel L5, S1 Nml Nml Nml Nml Nml 0 Nml Nml    Left AbdHallucis MedPlantar S1-2 Nml Nml Nml Nml Nml 0 Nml Nml    Left PostTibialis Tibial L5, S1 Nml Nml Nml Nml Nml 0 Nml Nml    Left Gastroc Tibial S1-2 Nml Nml Nml Nml Nml 0 Nml Nml    Left AntTibialis Dp Br Peronel L4-5 Nml Nml Nml Nml Nml 0 Nml Nml    Left RectFemoris Femoral L2-4 Nml Nml Nml Nml Nml 0 Nml Nml    Left 1stDorInt Ulnar C8-T1 Nml Nml Nml Nml Nml 0 Nml Nml    Left Ext Indicis Radial (Post Int) C7-8 Nml Nml Nml Nml Nml 0 Nml Nml    Left Triceps Radial C6-7-8 Nml Nml Nml Nml Nml 0 Nml Nml    Left Deltoid Axillary C5-6 Nml Nml Nml Nml Nml 0 Nml Nml    Left Abd Poll Brev Median C8-T1 Nml Nml Nml Nml Nml 0 Nml Nml          Waveforms:

## 2020-01-30 ENCOUNTER — TELEPHONE (OUTPATIENT)
Dept: NEUROLOGY | Age: 60
End: 2020-01-30

## 2020-01-31 NOTE — TELEPHONE ENCOUNTER
I informed patient Dr. Reanna Guzman ordered a Carotid Doppler and I gave her Coordination of Care contact information.

## 2020-02-13 ENCOUNTER — HOSPITAL ENCOUNTER (OUTPATIENT)
Dept: VASCULAR SURGERY | Age: 60
Discharge: HOME | End: 2020-02-13
Attending: PSYCHIATRY & NEUROLOGY
Payer: MEDICAID

## 2020-02-13 DIAGNOSIS — I69.30 CHRONIC ISCHEMIC LEFT MCA STROKE: ICD-10-CM

## 2020-02-13 DIAGNOSIS — G45.9 TIA (TRANSIENT ISCHEMIC ATTACK): ICD-10-CM

## 2020-02-13 LAB
LEFT CCA DIST DIAS: 17.9 CM/S
LEFT CCA DIST SYS: 58.5 CM/S
LEFT CCA PROX DIAS: 10.6 CM/S
LEFT CCA PROX SYS: 75.8 CM/S
LEFT ECA DIAS: 8.1 CM/S
LEFT ECA SYS: 72 CM/S
LEFT ICA DIST DIAS: 25.2 CM/S
LEFT ICA DIST SYS: 74.4 CM/S
LEFT ICA MID DIAS: 25.2 CM/S
LEFT ICA MID SYS: 59 CM/S
LEFT ICA PROX DIAS: 18.5 CM/S
LEFT ICA PROX SYS: 37.4 CM/S
LEFT ICA/CCA SYS: 1.3
LEFT VERTEBRAL DIAS: 31.2 CM/S
LEFT VERTEBRAL SYS: 59.7 CM/S
RIGHT CCA DIST DIAS: 20.3 CM/S
RIGHT CCA DIST SYS: 62 CM/S
RIGHT CCA PROX DIAS: 15.1 CM/S
RIGHT CCA PROX SYS: 64.7 CM/S
RIGHT ECA DIAS: 13.8 CM/S
RIGHT ECA SYS: 68.5 CM/S
RIGHT ICA DIST DIAS: 18.2 CM/S
RIGHT ICA DIST SYS: 37.4 CM/S
RIGHT ICA MID DIAS: 31.1 CM/S
RIGHT ICA MID SYS: 70.3 CM/S
RIGHT ICA PROX DIAS: 11.9 CM/S
RIGHT ICA PROX SYS: 30.2 CM/S
RIGHT ICA/CCA SYS: 1.1
RIGHT VERTEBRAL DIAS: 30.3 CM/S
RIGHT VERTEBRAL SYS: 65.5 CM/S

## 2020-02-13 PROCEDURE — 93880 EXTRACRANIAL BILAT STUDY: CPT

## 2020-02-17 ENCOUNTER — TELEPHONE (OUTPATIENT)
Dept: NEUROLOGY | Age: 60
End: 2020-02-17

## 2020-02-17 ENCOUNTER — TELEPHONE (OUTPATIENT)
Dept: FAMILY MEDICINE CLINIC | Age: 60
End: 2020-02-17

## 2020-02-17 DIAGNOSIS — G62.9 POLYNEUROPATHY: Primary | ICD-10-CM

## 2020-02-17 DIAGNOSIS — G45.9 TIA (TRANSIENT ISCHEMIC ATTACK): ICD-10-CM

## 2020-02-17 NOTE — TELEPHONE ENCOUNTER
----- Message from Nona Carr sent at 2/17/2020  9:34 AM EST -----  Regarding: Read/telephone  Pt is requesting an order for he oxygen tank and the portable oxygen to be faxed to [facility] stated they are telling her they will come to her   house and  the oxygen is an order is not faxed. Pts number is and [facility] number is [phone] and fax is [phone]. Pts number is [phone].

## 2020-02-18 NOTE — TELEPHONE ENCOUNTER
Call pt:  NCS/EMG 1/22/20 with Dr. Eneida Wilson revealed generalized sensorimotor polyneuropathy affecting the lower limbs bilaterally, axon loss in type and moderate in degree electrically, mild left CTS, and no evidence of a left cervical or lumbosacral motor radiculopathy.   CD 2/13/20 - No stenosis in bilateral ICAs, antegrade vertebral flow bilaterally    Recommend - Lipid profile, HgbA1C, B12/MMA, SPEP with IF

## 2020-02-18 NOTE — PROGRESS NOTES
CD 2/13/20 reviewed - No stenosis in bilateral ICAs, antegrade vertebral flow bilaterally. See phone note.

## 2020-02-19 NOTE — TELEPHONE ENCOUNTER
Call to [address] [place] [address] ([phone] )  Spoke to Rep who stated order was originally from 2015 for pt's O2 but she could not determine who originally prescribed. Explained that we had not given pt order for O2 but wanted to help in any way we could. Rep stated that she would have a  from their office research to determine from where the original referral was receved. Stated they would  Call this office back if more info is needed.

## 2020-02-19 NOTE — TELEPHONE ENCOUNTER
Call to pt who stated [facility] told her her PCP should give her what is needed to keep her O2. Explained that our office has not given her an order before for her O2 and asked if she knew the Dr who originally prescribed her O2. Pt was not sure of the name of the prescribing Dr. Pedro Tamez to pt that I would see how we could help and call back if anything else is needed.

## 2020-02-21 NOTE — TELEPHONE ENCOUNTER
Called pt with test results. Left a detailed message on her personal VM. Janene [employer] - Labs ordered.

## 2020-04-29 DIAGNOSIS — G56.03 BILATERAL CARPAL TUNNEL SYNDROME: ICD-10-CM

## 2020-04-29 DIAGNOSIS — M54.31 RIGHT SIDED SCIATICA: ICD-10-CM

## 2020-04-29 RX ORDER — GABAPENTIN 300 MG/1
CAPSULE ORAL
Qty: 270 CAP | Refills: 11 | Status: CANCELLED | OUTPATIENT
Start: 2020-04-29

## 2020-04-29 NOTE — TELEPHONE ENCOUNTER
PCP: Clarissa Guzmán MD    Last appt: 1/9/2020  Future Appointments   Date Time Provider Vincenzo Licea   4/30/2020  3:40 PM Henrique Martinez MD [address] [facility]   7/9/2020 11:00 AM Alon Parker  [address]       Requested Prescriptions      No prescriptions requested or ordered in this encounter       Pharmacy verified: Yes    Prior labs and Blood pressures:  BP Readings from Last 3 Encounters:   01/22/20 120/70   12/18/19 110/70   10/10/19 120/68     Lab Results   Component Value Date/Time    Sodium 144 10/01/2015 12:18 PM    Potassium 5.0 10/01/2015 12:18 PM    Chloride 103 10/01/2015 12:18 PM    CO2 25 10/01/2015 12:18 PM    Glucose 79 10/01/2015 12:18 PM    BUN 38 (H) 10/01/2015 12:18 PM    Creatinine 3.94 (H) 10/01/2015 12:18 PM    BUN/Creatinine ratio 10 10/01/2015 12:18 PM    GFR est AA 14 (L) 10/01/2015 12:18 PM    GFR est non-AA 12 (L) 10/01/2015 12:18 PM    Calcium 8.8 10/01/2015 12:18 PM     No results found for: HBA1C, HGBE8, RQS3ZPZZ, DXU0XXOF  Lab Results   Component Value Date/Time    Cholesterol, total 195 10/01/2015 12:18 PM    HDL Cholesterol 69 10/01/2015 12:18 PM    LDL, calculated 111 (H) 10/01/2015 12:18 PM    VLDL, calculated 15 10/01/2015 12:18 PM    Triglyceride 73 10/01/2015 12:18 PM     Lab Results   Component Value Date/Time    VITAMIN D, 25-HYDROXY 18.7 (L) 10/01/2015 12:18 PM       Lab Results   Component Value Date/Time    TSH 2.390 10/01/2015 12:18 PM

## 2020-04-29 NOTE — TELEPHONE ENCOUNTER
----- Message from Olya Blood sent at 4/28/2020  2:00 PM EDT -----  Regarding: Dr. Maxwell Boo Refill  Patient would like a refill of her Gabapentin 300 mg Medication sent to [facility]. Patient also states that she need a new prescription for [employer] to state pt still need all  of her equipment for breathing. Patient states they are threatening to recover her equipment.

## 2020-04-30 ENCOUNTER — OFFICE VISIT (OUTPATIENT)
Dept: SURGERY | Age: 60
End: 2020-04-30

## 2020-04-30 VITALS
DIASTOLIC BLOOD PRESSURE: 69 MMHG | OXYGEN SATURATION: 92 % | HEIGHT: 65 IN | BODY MASS INDEX: 48.82 KG/M2 | SYSTOLIC BLOOD PRESSURE: 120 MMHG | WEIGHT: 293 LBS | TEMPERATURE: 98.7 F | HEART RATE: 83 BPM

## 2020-04-30 DIAGNOSIS — Z99.2 CKD (CHRONIC KIDNEY DISEASE) STAGE V REQUIRING CHRONIC DIALYSIS (HCC): Primary | ICD-10-CM

## 2020-04-30 DIAGNOSIS — N18.6 CKD (CHRONIC KIDNEY DISEASE) STAGE V REQUIRING CHRONIC DIALYSIS (HCC): Primary | ICD-10-CM

## 2020-04-30 RX ORDER — GABAPENTIN 300 MG/1
CAPSULE ORAL
Qty: 270 CAP | Refills: 11 | OUTPATIENT
Start: 2020-04-30

## 2020-04-30 NOTE — PROGRESS NOTES
Chief Complaint   Patient presents with    End Stage Renal Disease     f/u discuss AVG      Discussed advanced directive. Patient states that she does not have an advanced directive.

## 2020-04-30 NOTE — PROGRESS NOTES
The patient is a 64-year old woman who was first seen by me in August 2019 regarding dialysis access. The patient is currently managed with Dr. Quang Muse. The patient has a prior history of AV graft left upper arm placed in 2016 and which has had a number of interventions. She is currently dialyzed by way of a right-sided central catheter. The patient has a history of right breast cancer surgery and did have a right sentinel node biopsy in 2014. I have reviewed those reports. The patient is right-handed. She has no history of pacemaker or defibrillator. The patient has no history of anginal chest pain, irregular heartbeat, history of MI or coronary intervention. The patient can ambulate short distances using a Rollator. The patient has a history of asthma. She is able to lie down in bed at night without shortness of breath. The patient does not have a history of diabetes. Past medical history includes right breast cancer, chronic ankle pain, depression, gastroesophageal reflux disease, congestive heart failure, hypertension, abdominal hernia, sleep apnea treated by BiPAP with oxygen, use of home oxygen prn. The patient has never smoked. She does not use alcohol. Physical Examination:   GENERAL: The patient is an alert overweight woman in no acute distress. VITAL SIGNS: /69, P 83, T 98.7, O2 saturation 92% on room air, , HT 5'5\". HEAD/NECK: Revealed no jaundice, mass or thyromegaly. There is a right-sided central catheter. LUNGS: Clear bilaterally without rales, rhonchi or wheezes. HEART: Regular without murmur, gallop or rub. NEURO: Patient is alert and oriented. She moves all extremities equally. Facial movement is symmetrical. Speech is normal.   EXTREMITIES: Examination of the left upper extremity revealed 2+ radial pulse. Radial and ulnar doppler signals were normal on the left. Palmar arch was intact to doppler exam on the left.  There is considerable obesity in the arm and associated skin fold. There is a nonfunctioning arteriovenous graft in the left upper arm and there is some keloid formation along the puncture zone of that nonfunctioning graft. The patient had previous vein mapping which showed that the old anastomosis of the prior AV graft are in the mid portion of the upper arm and that the proximal brachial vein in the upper arm is widely patent. The brachial artery was 5-6 mm diameter at that level. The patient had a venogram performed of left upper extremity and central veins on 8/28/19 which showed patent central veins. The patient has not had any left central catheters since the time of that study. The patient's son was present in the room for discussions regarding surgery. The patient was going to have left AV graft placed late last year but because of neurological symptoms had surgery canceled. She has been evaluated by the neurologist, Dr. Raymundo Mccurdy, and is not felt to have a contraindication to general anesthesia. I reviewed with the patient the operative and postoperative course for insertion of arteriovenous graft left upper extremity under general anesthesia. I would anticipate the patient staying in the hospital for 23-hour observation following the surgery related to her obesity. Risks vs benefits of surgery were reviewed. Risks would include bleeding, infection, failure of the graft, ischemia of the hand, swelling of the arm, injury to vessels or nerves, risks associated with general anesthesia, etc. The patient understands and wishes to proceed. I explained to the patient that related to her size and the presence of end-stage renal disease and congestive heart failure, she has some increasing risk of complications associated with anesthesia and surgery. The patient understands and wishes to proceed. Final Diagnosis: End-stage renal disease.     c: Marcela Kovacs MD

## 2020-04-30 NOTE — PROGRESS NOTES
Surgery Instruction Sheet    You have been scheduled for surgery on Tuesday 05/19/2020 at 7:30 am am at [address]. Please report to the Surgery Center at 5:45 am, this is approximately 2 hours prior to your surgery time. The Surgery Center is located on the [address] [place] [address] Street side of the [facility], just next to the Emergency Room. Reserved parking is available and  parking if lot is full. You will not need to have a pre-op visit before surgery, but the Pre-op Nurse will call you before surgery. The Pre-op nurse will review your medical history, medications and give you additional instructions. They will also confirm your arrival time. Call your physician immediately if you notice a change in your health between the time you saw your physician and the day of surgery. If you take a blood thinner, please let us know. Call your ordering Doctor to make sure you can stop taking it prior to your surgery. STOP YOUR ASPIRIN 5 DAYS PRIOR TO SURGERY. TAKE LAST DOSE ASPIRIN Wednesday 5/13/20    DO NOT TAKE  IBUPROFEN, ADVIL, MOTRIN, ALEVE, EXCEDRIN, BC POWDER, GOODIES, FISH OIL OR ANY MEDICATION CONTAINING ASPIRIN 10 DAYS PRIOR TO YOUR SURGERY. MAY TAKE TYLENOL. Eat a light dinner the evening before your surgery. DO NOT EAT OR DRINK ANYTHING AFTER MIDNIGHT THE NIGHT BEFORE YOUR SURGERY. This includes water, chewing gum, lifesavers, etc.  The Pre op nurse will check with you about any medication that you may need to take the morning of surgery. Shower with a new bar of anti-bacterial soap (Dial, Safeguard) or solution given to you by Pre-op, the night before surgery. Do not use lotion, powder, deodorant on the skin after showering.   Wear loose, comfortable clothing the day of surgery and bring a container to store your contacts, eyeglasses, dentures, hearing aid, etc.  Do not bring money, valuables, jewelry, etc. to the hospital.      If you are having outpatient surgery, someone must come with you the morning of surgery to drive you home. You can not drive for 24 hours after any anesthesia. Sometimes it is necessary to stay overnight and leave the next morning. This is still considered outpatient for most insurance deductibles. Someone will still need to drive you home. If you have questions or concerns, please feel free to call Dr Maribel Levi [phone]. If you need to cancel your surgery, please call as soon as possible.

## 2020-05-11 NOTE — PERIOP NOTES
[facility]  Preoperative Instructions        Surgery Date 5/19/20          Time of Arrival 0545    1. On the day of your surgery, please report to the Surgical Services Registration Desk and sign in at your designated time. The Surgery Center is located to the right of the Emergency Room. 2. You must have someone with you to drive you home. You should not drive a car for 24 hours following surgery. Please make arrangements for a friend or family member to stay with you for the first 24 hours after your surgery. 3. Do not have anything to eat or drink (including water, gum, mints, coffee, juice) after midnight ? 5/18/20? Marques Patrick ? This may not apply to medications prescribed by your physician. ?(Please note below the special instructions with medications to take the morning of your procedure.)    4. We recommend you do not drink any alcoholic beverages for 24 hours before and after your surgery. 5. Contact your surgeons office for instructions on the following medications: non-steroidal anti-inflammatory drugs (i.e. Advil, Aleve), vitamins, and supplements. (Some surgeons will want you to stop these medications prior to surgery and others may allow you to take them)  **If you are currently taking Plavix, Coumadin, Aspirin and/or other blood-thinning agents, contact your surgeon for instructions. ** Your surgeon will partner with the physician prescribing these medications to determine if it is safe to stop or if you need to continue taking. Please do not stop taking these medications without instructions from your surgeon    6. Wear comfortable clothes. Wear glasses instead of contacts. Do not bring any money or jewelry. Please bring picture ID, insurance card, and any prearranged co-payment or hospital payment. Do not wear make-up, particularly mascara the morning of your surgery. Do not wear nail polish, particularly if you are having foot /hand surgery.   Wear your hair loose or down, no ponytails, buns, carmen pins or clips. All body piercings must be removed. Please shower with antibacterial soap for three consecutive days before and on the morning of surgery, but do not apply any lotions, powders or deodorants after the shower on the day of surgery. Please use a fresh towels after each shower. Please sleep in clean clothes and change bed linens the night before surgery. Please do not shave for 48 hours prior to surgery. Shaving of the face is acceptable. 7. You should understand that if you do not follow these instructions your surgery may be cancelled. If your physical condition changes (I.e. fever, cold or flu) please contact your surgeon as soon as possible. 8. It is important that you be on time. If a situation occurs where you may be late, please call [phone] (OR Holding Area). 9. If you have any questions and or problems, please call [phone] (Pre-admission Testing). 10. Your surgery time may be subject to change. You will receive a phone call the evening prior if your time changes. 11.  If having outpatient surgery, you must have someone to drive you here, stay with you during the duration of your stay, and to drive you home at time of discharge. 12.   In an effort to improve the efficiency, privacy, and safety for all of our Pre-op patients visitors are not allowed in the Holding area. Once you arrive and are registered your family/visitors will be asked to remain in your vehicle. The Pre-op staff will call you when they are ready for you to enter the building and will explain to you and your family/visitors that the Pre-op phase is beginning. At this time, if your procedure is outpatient, your family member will be asked to stay in their vehicle until the surgery is complete and you are ready for discharge.  If you are going to be admitted after your surgery, once you are called to come inside the building, your family will be able to leave the parking lot. At this time the staff will also ask for your designated spokesperson information in the event that the physician or staff need to provide an update or obtain any pertinent information. The designated spokesperson will be notified if the physician needs to speak to family during the pre-operative phase.and/or in the post op phase. Special Instructions: take last dose of aspirin on 5/13/20,  Do a nebulizer treatment prior to arrival on DOS    TAKE ALL MEDICATIONS DAY OF SURGERY EXCEPT:  aspirin    I understand a pre-operative phone call will be made to verify my surgery time. In the event that I am not available, I give permission for a message to be left on my answering service and/or with another person?   Yes [phone]         ___________________      __________   _________    (Signature of Patient)             (Witness)                (Date and Time)

## 2020-05-16 ENCOUNTER — HOSPITAL ENCOUNTER (OUTPATIENT)
Dept: PREADMISSION TESTING | Age: 60
Discharge: HOME | End: 2020-05-16
Payer: MEDICAID

## 2020-05-16 PROCEDURE — 87635 SARS-COV-2 COVID-19 AMP PRB: CPT

## 2020-05-18 LAB
SARS-COV-2, COV2NT: NOT DETECTED
SOURCE, COVRS: NORMAL

## 2020-05-19 ENCOUNTER — ANESTHESIA (OUTPATIENT)
Dept: SURGERY | Age: 60
End: 2020-05-19
Payer: MEDICAID

## 2020-05-19 ENCOUNTER — ANESTHESIA EVENT (OUTPATIENT)
Dept: SURGERY | Age: 60
End: 2020-05-19
Payer: MEDICAID

## 2020-05-19 ENCOUNTER — HOSPITAL ENCOUNTER (OUTPATIENT)
Age: 60
Setting detail: OBSERVATION
Discharge: HOME | End: 2020-05-20
Attending: SURGERY | Admitting: SURGERY
Payer: MEDICAID

## 2020-05-19 DIAGNOSIS — L76.82 PAIN AT SURGICAL INCISION: Primary | ICD-10-CM

## 2020-05-19 PROBLEM — N18.6 ESRD (END STAGE RENAL DISEASE) (HCC): Status: ACTIVE | Noted: 2020-05-19

## 2020-05-19 LAB
ANION GAP SERPL CALC-SCNC: 9 MMOL/L (ref 5–15)
BUN SERPL-MCNC: 45 MG/DL (ref 6–20)
BUN/CREAT SERPL: 5 (ref 12–20)
CALCIUM SERPL-MCNC: 9.5 MG/DL (ref 8.5–10.1)
CHLORIDE SERPL-SCNC: 97 MMOL/L (ref 97–108)
CO2 SERPL-SCNC: 28 MMOL/L (ref 21–32)
CREAT SERPL-MCNC: 9.98 MG/DL (ref 0.55–1.02)
GLUCOSE SERPL-MCNC: 81 MG/DL (ref 65–100)
POTASSIUM SERPL-SCNC: 4.6 MMOL/L (ref 3.5–5.1)
SODIUM SERPL-SCNC: 134 MMOL/L (ref 136–145)

## 2020-05-19 PROCEDURE — 94760 N-INVAS EAR/PLS OXIMETRY 1: CPT

## 2020-05-19 PROCEDURE — 99218 HC RM OBSERVATION: CPT

## 2020-05-19 PROCEDURE — C1768 GRAFT, VASCULAR: HCPCS | Performed by: SURGERY

## 2020-05-19 PROCEDURE — 74011250636 HC RX REV CODE- 250/636: Performed by: SURGERY

## 2020-05-19 PROCEDURE — 74011250636 HC RX REV CODE- 250/636: Performed by: NURSE ANESTHETIST, CERTIFIED REGISTERED

## 2020-05-19 PROCEDURE — 80048 BASIC METABOLIC PNL TOTAL CA: CPT

## 2020-05-19 PROCEDURE — 77030008684 HC TU ET CUF COVD -B: Performed by: NURSE ANESTHETIST, CERTIFIED REGISTERED

## 2020-05-19 PROCEDURE — 74011250636 HC RX REV CODE- 250/636: Performed by: ANESTHESIOLOGY

## 2020-05-19 PROCEDURE — 77030018836 HC SOL IRR NACL ICUM -A: Performed by: SURGERY

## 2020-05-19 PROCEDURE — 77030021678 HC GLIDESCP STAT DISP VERT -B: Performed by: NURSE ANESTHETIST, CERTIFIED REGISTERED

## 2020-05-19 PROCEDURE — 74011250637 HC RX REV CODE- 250/637: Performed by: ANESTHESIOLOGY

## 2020-05-19 PROCEDURE — 77030002924 HC SUT GORTX WLGO -B: Performed by: SURGERY

## 2020-05-19 PROCEDURE — 77030008462 HC STPLR SKN PROX J&J -A: Performed by: SURGERY

## 2020-05-19 PROCEDURE — 76060000040 HC ANESTHESIA 4.5 TO 5 HR: Performed by: SURGERY

## 2020-05-19 PROCEDURE — 76210000016 HC OR PH I REC 1 TO 1.5 HR: Performed by: SURGERY

## 2020-05-19 PROCEDURE — 77030002916 HC SUT ETHLN J&J -A: Performed by: SURGERY

## 2020-05-19 PROCEDURE — 77030002888 HC SUT CHRMC J&J -A: Performed by: SURGERY

## 2020-05-19 PROCEDURE — 74011250637 HC RX REV CODE- 250/637: Performed by: SURGERY

## 2020-05-19 PROCEDURE — 77030011640 HC PAD GRND REM COVD -A: Performed by: SURGERY

## 2020-05-19 PROCEDURE — 74011000250 HC RX REV CODE- 250: Performed by: NURSE ANESTHETIST, CERTIFIED REGISTERED

## 2020-05-19 PROCEDURE — 36415 COLL VENOUS BLD VENIPUNCTURE: CPT

## 2020-05-19 PROCEDURE — 77030020863 HC CLMP VASC BULDG SCAN -A: Performed by: SURGERY

## 2020-05-19 PROCEDURE — 77030002996 HC SUT SLK J&J -A: Performed by: SURGERY

## 2020-05-19 PROCEDURE — 74011000258 HC RX REV CODE- 258: Performed by: NURSE ANESTHETIST, CERTIFIED REGISTERED

## 2020-05-19 PROCEDURE — 77030026438 HC STYL ET INTUB CARD -A: Performed by: NURSE ANESTHETIST, CERTIFIED REGISTERED

## 2020-05-19 PROCEDURE — 77030002706 HC INSRT CLMP EDWD -A: Performed by: SURGERY

## 2020-05-19 PROCEDURE — 77010033678 HC OXYGEN DAILY

## 2020-05-19 PROCEDURE — 77030018390 HC SPNG HEMSTAT2 J&J -B: Performed by: SURGERY

## 2020-05-19 PROCEDURE — 77030019908 HC STETH ESOPH SIMS -A: Performed by: NURSE ANESTHETIST, CERTIFIED REGISTERED

## 2020-05-19 PROCEDURE — 76010000136 HC OR TIME 4.5 TO 5 HR: Performed by: SURGERY

## 2020-05-19 PROCEDURE — 77030040361 HC SLV COMPR DVT MDII -B: Performed by: SURGERY

## 2020-05-19 PROCEDURE — 77030002987 HC SUT PROL J&J -B: Performed by: SURGERY

## 2020-05-19 PROCEDURE — 77030002986 HC SUT PROL J&J -A: Performed by: SURGERY

## 2020-05-19 PROCEDURE — 74011000250 HC RX REV CODE- 250: Performed by: SURGERY

## 2020-05-19 PROCEDURE — 77030040922 HC BLNKT HYPOTHRM STRY -A

## 2020-05-19 PROCEDURE — 77030020255 HC SOL INJ LR 1000ML BG: Performed by: SURGERY

## 2020-05-19 DEVICE — PROPATEN VASCULAR GRAFT TW 6MMX80CM HEPARIN
Type: IMPLANTABLE DEVICE | Site: ARM | Status: FUNCTIONAL
Brand: GORE PROPATEN VASCULAR GRAFT

## 2020-05-19 RX ORDER — SODIUM CHLORIDE 0.9 % (FLUSH) 0.9 %
5-40 SYRINGE (ML) INJECTION EVERY 8 HOURS
Status: DISCONTINUED | OUTPATIENT
Start: 2020-05-19 | End: 2020-05-20 | Stop reason: HOSPADM

## 2020-05-19 RX ORDER — CISATRACURIUM BESYLATE 2 MG/ML
INJECTION, SOLUTION INTRAVENOUS AS NEEDED
Status: DISCONTINUED | OUTPATIENT
Start: 2020-05-19 | End: 2020-05-19 | Stop reason: HOSPADM

## 2020-05-19 RX ORDER — MORPHINE SULFATE 10 MG/ML
INJECTION, SOLUTION INTRAMUSCULAR; INTRAVENOUS AS NEEDED
Status: DISCONTINUED | OUTPATIENT
Start: 2020-05-19 | End: 2020-05-19 | Stop reason: HOSPADM

## 2020-05-19 RX ORDER — MONTELUKAST SODIUM 10 MG/1
10 TABLET ORAL
Status: DISCONTINUED | OUTPATIENT
Start: 2020-05-19 | End: 2020-05-20 | Stop reason: HOSPADM

## 2020-05-19 RX ORDER — ACETAMINOPHEN 325 MG/1
650 TABLET ORAL
Status: DISCONTINUED | OUTPATIENT
Start: 2020-05-19 | End: 2020-05-20 | Stop reason: HOSPADM

## 2020-05-19 RX ORDER — DIPHENHYDRAMINE HYDROCHLORIDE 50 MG/ML
12.5 INJECTION, SOLUTION INTRAMUSCULAR; INTRAVENOUS
Status: COMPLETED | OUTPATIENT
Start: 2020-05-19 | End: 2020-05-19

## 2020-05-19 RX ORDER — GABAPENTIN 300 MG/1
300 CAPSULE ORAL DAILY
Status: DISCONTINUED | OUTPATIENT
Start: 2020-05-20 | End: 2020-05-20 | Stop reason: HOSPADM

## 2020-05-19 RX ORDER — PROTAMINE SULFATE 10 MG/ML
INJECTION, SOLUTION INTRAVENOUS AS NEEDED
Status: DISCONTINUED | OUTPATIENT
Start: 2020-05-19 | End: 2020-05-19 | Stop reason: HOSPADM

## 2020-05-19 RX ORDER — SUCCINYLCHOLINE CHLORIDE 20 MG/ML
INJECTION INTRAMUSCULAR; INTRAVENOUS AS NEEDED
Status: DISCONTINUED | OUTPATIENT
Start: 2020-05-19 | End: 2020-05-19 | Stop reason: HOSPADM

## 2020-05-19 RX ORDER — FENTANYL CITRATE 50 UG/ML
25 INJECTION, SOLUTION INTRAMUSCULAR; INTRAVENOUS
Status: DISCONTINUED | OUTPATIENT
Start: 2020-05-19 | End: 2020-05-19 | Stop reason: HOSPADM

## 2020-05-19 RX ORDER — ONDANSETRON 2 MG/ML
4 INJECTION INTRAMUSCULAR; INTRAVENOUS AS NEEDED
Status: DISCONTINUED | OUTPATIENT
Start: 2020-05-19 | End: 2020-05-19 | Stop reason: HOSPADM

## 2020-05-19 RX ORDER — FENTANYL CITRATE 50 UG/ML
INJECTION, SOLUTION INTRAMUSCULAR; INTRAVENOUS AS NEEDED
Status: DISCONTINUED | OUTPATIENT
Start: 2020-05-19 | End: 2020-05-19 | Stop reason: HOSPADM

## 2020-05-19 RX ORDER — SODIUM CHLORIDE, SODIUM LACTATE, POTASSIUM CHLORIDE, CALCIUM CHLORIDE 600; 310; 30; 20 MG/100ML; MG/100ML; MG/100ML; MG/100ML
25 INJECTION, SOLUTION INTRAVENOUS CONTINUOUS
Status: DISCONTINUED | OUTPATIENT
Start: 2020-05-19 | End: 2020-05-19 | Stop reason: HOSPADM

## 2020-05-19 RX ORDER — NEOSTIGMINE METHYLSULFATE 1 MG/ML
INJECTION, SOLUTION INTRAVENOUS AS NEEDED
Status: DISCONTINUED | OUTPATIENT
Start: 2020-05-19 | End: 2020-05-19 | Stop reason: HOSPADM

## 2020-05-19 RX ORDER — SODIUM CHLORIDE 0.9 % (FLUSH) 0.9 %
5-40 SYRINGE (ML) INJECTION AS NEEDED
Status: DISCONTINUED | OUTPATIENT
Start: 2020-05-19 | End: 2020-05-20 | Stop reason: HOSPADM

## 2020-05-19 RX ORDER — NALOXONE HYDROCHLORIDE 0.4 MG/ML
0.4 INJECTION, SOLUTION INTRAMUSCULAR; INTRAVENOUS; SUBCUTANEOUS AS NEEDED
Status: DISCONTINUED | OUTPATIENT
Start: 2020-05-19 | End: 2020-05-20 | Stop reason: HOSPADM

## 2020-05-19 RX ORDER — DEXAMETHASONE SODIUM PHOSPHATE 4 MG/ML
INJECTION, SOLUTION INTRA-ARTICULAR; INTRALESIONAL; INTRAMUSCULAR; INTRAVENOUS; SOFT TISSUE AS NEEDED
Status: DISCONTINUED | OUTPATIENT
Start: 2020-05-19 | End: 2020-05-19 | Stop reason: HOSPADM

## 2020-05-19 RX ORDER — PANTOPRAZOLE SODIUM 40 MG/1
40 TABLET, DELAYED RELEASE ORAL
Status: DISCONTINUED | OUTPATIENT
Start: 2020-05-20 | End: 2020-05-20 | Stop reason: HOSPADM

## 2020-05-19 RX ORDER — MIDAZOLAM HYDROCHLORIDE 1 MG/ML
1 INJECTION, SOLUTION INTRAMUSCULAR; INTRAVENOUS AS NEEDED
Status: DISCONTINUED | OUTPATIENT
Start: 2020-05-19 | End: 2020-05-19 | Stop reason: HOSPADM

## 2020-05-19 RX ORDER — PROPOFOL 10 MG/ML
INJECTION, EMULSION INTRAVENOUS AS NEEDED
Status: DISCONTINUED | OUTPATIENT
Start: 2020-05-19 | End: 2020-05-19 | Stop reason: HOSPADM

## 2020-05-19 RX ORDER — ROCURONIUM BROMIDE 10 MG/ML
INJECTION, SOLUTION INTRAVENOUS AS NEEDED
Status: DISCONTINUED | OUTPATIENT
Start: 2020-05-19 | End: 2020-05-19 | Stop reason: HOSPADM

## 2020-05-19 RX ORDER — BUPROPION HYDROCHLORIDE 150 MG/1
150 TABLET ORAL
Status: DISCONTINUED | OUTPATIENT
Start: 2020-05-20 | End: 2020-05-20 | Stop reason: HOSPADM

## 2020-05-19 RX ORDER — MORPHINE SULFATE 10 MG/ML
2 INJECTION, SOLUTION INTRAMUSCULAR; INTRAVENOUS
Status: DISCONTINUED | OUTPATIENT
Start: 2020-05-19 | End: 2020-05-19 | Stop reason: HOSPADM

## 2020-05-19 RX ORDER — LETROZOLE 2.5 MG/1
2.5 TABLET, FILM COATED ORAL DAILY
Status: DISCONTINUED | OUTPATIENT
Start: 2020-05-20 | End: 2020-05-20 | Stop reason: HOSPADM

## 2020-05-19 RX ORDER — LIDOCAINE HYDROCHLORIDE 10 MG/ML
0.1 INJECTION, SOLUTION EPIDURAL; INFILTRATION; INTRACAUDAL; PERINEURAL AS NEEDED
Status: DISCONTINUED | OUTPATIENT
Start: 2020-05-19 | End: 2020-05-19 | Stop reason: HOSPADM

## 2020-05-19 RX ORDER — ONDANSETRON 2 MG/ML
INJECTION INTRAMUSCULAR; INTRAVENOUS AS NEEDED
Status: DISCONTINUED | OUTPATIENT
Start: 2020-05-19 | End: 2020-05-19 | Stop reason: HOSPADM

## 2020-05-19 RX ORDER — CINACALCET 30 MG/1
60 TABLET, FILM COATED ORAL DAILY
Status: DISCONTINUED | OUTPATIENT
Start: 2020-05-20 | End: 2020-05-20 | Stop reason: HOSPADM

## 2020-05-19 RX ORDER — PHENYLEPHRINE HCL IN 0.9% NACL 0.4MG/10ML
SYRINGE (ML) INTRAVENOUS AS NEEDED
Status: DISCONTINUED | OUTPATIENT
Start: 2020-05-19 | End: 2020-05-19 | Stop reason: HOSPADM

## 2020-05-19 RX ORDER — SEVELAMER CARBONATE FOR ORAL SUSPENSION 2400 MG/1
2.4 POWDER, FOR SUSPENSION ORAL
Status: DISCONTINUED | OUTPATIENT
Start: 2020-05-19 | End: 2020-05-20 | Stop reason: HOSPADM

## 2020-05-19 RX ORDER — SODIUM CHLORIDE 9 MG/ML
25 INJECTION, SOLUTION INTRAVENOUS CONTINUOUS
Status: DISCONTINUED | OUTPATIENT
Start: 2020-05-19 | End: 2020-05-19 | Stop reason: HOSPADM

## 2020-05-19 RX ORDER — HYDROMORPHONE HYDROCHLORIDE 1 MG/ML
0.5 INJECTION, SOLUTION INTRAMUSCULAR; INTRAVENOUS; SUBCUTANEOUS
Status: DISCONTINUED | OUTPATIENT
Start: 2020-05-19 | End: 2020-05-20 | Stop reason: HOSPADM

## 2020-05-19 RX ORDER — FENTANYL CITRATE 50 UG/ML
50 INJECTION, SOLUTION INTRAMUSCULAR; INTRAVENOUS AS NEEDED
Status: DISCONTINUED | OUTPATIENT
Start: 2020-05-19 | End: 2020-05-19 | Stop reason: HOSPADM

## 2020-05-19 RX ORDER — LIDOCAINE HYDROCHLORIDE 20 MG/ML
INJECTION, SOLUTION EPIDURAL; INFILTRATION; INTRACAUDAL; PERINEURAL AS NEEDED
Status: DISCONTINUED | OUTPATIENT
Start: 2020-05-19 | End: 2020-05-19 | Stop reason: HOSPADM

## 2020-05-19 RX ORDER — ALBUTEROL SULFATE 0.83 MG/ML
2.5 SOLUTION RESPIRATORY (INHALATION)
Status: DISCONTINUED | OUTPATIENT
Start: 2020-05-19 | End: 2020-05-20 | Stop reason: HOSPADM

## 2020-05-19 RX ORDER — HEPARIN SODIUM 1000 [USP'U]/ML
INJECTION, SOLUTION INTRAVENOUS; SUBCUTANEOUS AS NEEDED
Status: DISCONTINUED | OUTPATIENT
Start: 2020-05-19 | End: 2020-05-19 | Stop reason: HOSPADM

## 2020-05-19 RX ORDER — ONDANSETRON 2 MG/ML
4 INJECTION INTRAMUSCULAR; INTRAVENOUS
Status: DISCONTINUED | OUTPATIENT
Start: 2020-05-19 | End: 2020-05-20 | Stop reason: HOSPADM

## 2020-05-19 RX ORDER — MIDAZOLAM HYDROCHLORIDE 1 MG/ML
INJECTION, SOLUTION INTRAMUSCULAR; INTRAVENOUS AS NEEDED
Status: DISCONTINUED | OUTPATIENT
Start: 2020-05-19 | End: 2020-05-19 | Stop reason: HOSPADM

## 2020-05-19 RX ORDER — GLYCOPYRROLATE 0.2 MG/ML
INJECTION INTRAMUSCULAR; INTRAVENOUS AS NEEDED
Status: DISCONTINUED | OUTPATIENT
Start: 2020-05-19 | End: 2020-05-19 | Stop reason: HOSPADM

## 2020-05-19 RX ORDER — ATORVASTATIN CALCIUM 10 MG/1
10 TABLET, FILM COATED ORAL
Status: DISCONTINUED | OUTPATIENT
Start: 2020-05-19 | End: 2020-05-20 | Stop reason: HOSPADM

## 2020-05-19 RX ORDER — LANOLIN ALCOHOL/MO/W.PET/CERES
400 CREAM (GRAM) TOPICAL DAILY
Status: DISCONTINUED | OUTPATIENT
Start: 2020-05-20 | End: 2020-05-20 | Stop reason: HOSPADM

## 2020-05-19 RX ORDER — HYDROCODONE BITARTRATE AND ACETAMINOPHEN 5; 325 MG/1; MG/1
1-2 TABLET ORAL
Status: DISCONTINUED | OUTPATIENT
Start: 2020-05-19 | End: 2020-05-20 | Stop reason: HOSPADM

## 2020-05-19 RX ADMIN — PROPOFOL 160 MG: 10 INJECTION, EMULSION INTRAVENOUS at 07:36

## 2020-05-19 RX ADMIN — ROCURONIUM BROMIDE 10 MG: 10 INJECTION INTRAVENOUS at 07:36

## 2020-05-19 RX ADMIN — HEPARIN SODIUM 6000 UNITS: 1000 INJECTION, SOLUTION INTRAVENOUS; SUBCUTANEOUS at 09:31

## 2020-05-19 RX ADMIN — MONTELUKAST SODIUM 10 MG: 10 TABLET, FILM COATED ORAL at 22:21

## 2020-05-19 RX ADMIN — Medication 3 MG: at 11:40

## 2020-05-19 RX ADMIN — SUCCINYLCHOLINE CHLORIDE 180 MG: 20 INJECTION, SOLUTION INTRAMUSCULAR; INTRAVENOUS at 07:36

## 2020-05-19 RX ADMIN — FENTANYL CITRATE 25 MCG: 50 INJECTION, SOLUTION INTRAMUSCULAR; INTRAVENOUS at 12:43

## 2020-05-19 RX ADMIN — MORPHINE SULFATE 2 MG: 10 INJECTION INTRAVENOUS at 11:54

## 2020-05-19 RX ADMIN — Medication 10 ML: at 22:22

## 2020-05-19 RX ADMIN — HEPARIN SODIUM 1000 UNITS: 1000 INJECTION, SOLUTION INTRAVENOUS; SUBCUTANEOUS at 10:30

## 2020-05-19 RX ADMIN — FENTANYL CITRATE 50 MCG: 50 INJECTION INTRAMUSCULAR; INTRAVENOUS at 07:51

## 2020-05-19 RX ADMIN — PHENYLEPHRINE HYDROCHLORIDE 80 MCG/MIN: 10 INJECTION INTRAVENOUS at 07:45

## 2020-05-19 RX ADMIN — HYDROMORPHONE HYDROCHLORIDE 0.5 MG: 1 INJECTION, SOLUTION INTRAMUSCULAR; INTRAVENOUS; SUBCUTANEOUS at 23:58

## 2020-05-19 RX ADMIN — LIDOCAINE HYDROCHLORIDE 60 MG: 20 INJECTION, SOLUTION EPIDURAL; INFILTRATION; INTRACAUDAL; PERINEURAL at 07:36

## 2020-05-19 RX ADMIN — PROTAMINE SULFATE 80 MG: 10 INJECTION, SOLUTION INTRAVENOUS at 11:01

## 2020-05-19 RX ADMIN — ONDANSETRON HYDROCHLORIDE 4 MG: 2 INJECTION, SOLUTION INTRAMUSCULAR; INTRAVENOUS at 09:02

## 2020-05-19 RX ADMIN — ATORVASTATIN CALCIUM 10 MG: 10 TABLET, FILM COATED ORAL at 22:21

## 2020-05-19 RX ADMIN — CISATRACURIUM BESYLATE 10 MG: 2 INJECTION INTRAVENOUS at 08:08

## 2020-05-19 RX ADMIN — CISATRACURIUM BESYLATE 6 MG: 2 INJECTION INTRAVENOUS at 08:37

## 2020-05-19 RX ADMIN — PROPOFOL 40 MG: 10 INJECTION, EMULSION INTRAVENOUS at 08:06

## 2020-05-19 RX ADMIN — FENTANYL CITRATE 25 MCG: 50 INJECTION, SOLUTION INTRAMUSCULAR; INTRAVENOUS at 13:10

## 2020-05-19 RX ADMIN — DIPHENHYDRAMINE HYDROCHLORIDE 12.5 MG: 50 INJECTION, SOLUTION INTRAMUSCULAR; INTRAVENOUS at 16:44

## 2020-05-19 RX ADMIN — Medication 80 MCG: at 07:45

## 2020-05-19 RX ADMIN — HYDROMORPHONE HYDROCHLORIDE 0.5 MG: 1 INJECTION, SOLUTION INTRAMUSCULAR; INTRAVENOUS; SUBCUTANEOUS at 20:31

## 2020-05-19 RX ADMIN — WATER 3 G: 1 INJECTION INTRAMUSCULAR; INTRAVENOUS; SUBCUTANEOUS at 07:45

## 2020-05-19 RX ADMIN — FENTANYL CITRATE 50 MCG: 50 INJECTION INTRAMUSCULAR; INTRAVENOUS at 10:35

## 2020-05-19 RX ADMIN — FENTANYL CITRATE 50 MCG: 50 INJECTION INTRAMUSCULAR; INTRAVENOUS at 09:21

## 2020-05-19 RX ADMIN — Medication 80 MCG: at 07:54

## 2020-05-19 RX ADMIN — Medication 80 MCG: at 07:50

## 2020-05-19 RX ADMIN — HYDROMORPHONE HYDROCHLORIDE 0.5 MG: 1 INJECTION, SOLUTION INTRAMUSCULAR; INTRAVENOUS; SUBCUTANEOUS at 14:51

## 2020-05-19 RX ADMIN — SODIUM CHLORIDE 2 MCG/KG/MIN: 9 INJECTION, SOLUTION INTRAVENOUS at 08:56

## 2020-05-19 RX ADMIN — DEXAMETHASONE SODIUM PHOSPHATE 4 MG: 4 INJECTION, SOLUTION INTRAMUSCULAR; INTRAVENOUS at 09:02

## 2020-05-19 RX ADMIN — SODIUM CHLORIDE 25 ML/HR: 900 INJECTION, SOLUTION INTRAVENOUS at 06:59

## 2020-05-19 RX ADMIN — FENTANYL CITRATE 25 MCG: 50 INJECTION, SOLUTION INTRAMUSCULAR; INTRAVENOUS at 12:35

## 2020-05-19 RX ADMIN — FENTANYL CITRATE 50 MCG: 50 INJECTION INTRAMUSCULAR; INTRAVENOUS at 08:37

## 2020-05-19 RX ADMIN — MIDAZOLAM HYDROCHLORIDE 1 MG: 1 INJECTION, SOLUTION INTRAMUSCULAR; INTRAVENOUS at 07:28

## 2020-05-19 RX ADMIN — GLYCOPYRROLATE 0.4 MG: 0.2 INJECTION, SOLUTION INTRAMUSCULAR; INTRAVENOUS at 11:40

## 2020-05-19 RX ADMIN — Medication 3 AMPULE: at 06:59

## 2020-05-19 RX ADMIN — HYDROCODONE BITARTRATE AND ACETAMINOPHEN 2 TABLET: 5; 325 TABLET ORAL at 22:27

## 2020-05-19 RX ADMIN — FENTANYL CITRATE 50 MCG: 50 INJECTION INTRAMUSCULAR; INTRAVENOUS at 07:36

## 2020-05-19 RX ADMIN — CISATRACURIUM BESYLATE 2 MG: 2 INJECTION INTRAVENOUS at 09:04

## 2020-05-19 RX ADMIN — MIDAZOLAM HYDROCHLORIDE 1 MG: 1 INJECTION, SOLUTION INTRAMUSCULAR; INTRAVENOUS at 07:33

## 2020-05-19 RX ADMIN — SEVELAMER CARBONATE 2400 MG: 2.4 POWDER, FOR SUSPENSION ORAL at 18:35

## 2020-05-19 RX ADMIN — MORPHINE SULFATE 2 MG: 10 INJECTION INTRAVENOUS at 12:00

## 2020-05-19 RX ADMIN — MORPHINE SULFATE 2 MG: 10 INJECTION INTRAVENOUS at 12:10

## 2020-05-19 NOTE — PERIOP NOTES
Patient Covid 19 testing done 5/16/2020. Test negative. Patient stated she self quarantined, but did have to attend dialysis 5/18/2020 and has no signs or symptoms. She stated she wears a mask at dialysis and has her temperature checked.

## 2020-05-19 NOTE — ANESTHESIA PREPROCEDURE EVALUATION
Anesthetic History   No history of anesthetic complications            Review of Systems / Medical History  Patient summary reviewed, nursing notes reviewed and pertinent labs reviewed    Pulmonary    COPD: mild    Sleep apnea: CPAP  Shortness of breath  Asthma     Comments:  On home O2 - 2L during the day, 4L at night   Neuro/Psych         TIA and psychiatric history     Cardiovascular    Hypertension      CHF        Exercise tolerance: <4 METS     GI/Hepatic/Renal     GERD    Renal disease: ESRD and dialysis       Endo/Other        Morbid obesity, arthritis and anemia     Other Findings            Physical Exam    Airway  Mallampati: II  TM Distance: 4 - 6 cm  Neck ROM: normal range of motion   Mouth opening: Normal     Cardiovascular  Regular rate and rhythm,  S1 and S2 normal,  no murmur, click, rub, or gallop             Dental  No notable dental hx       Pulmonary  Breath sounds clear to auscultation               Abdominal  GI exam deferred       Other Findings            Anesthetic Plan    ASA: 4  Anesthesia type: general    Monitoring Plan: BIS      Induction: Intravenous  Anesthetic plan and risks discussed with: Patient

## 2020-05-19 NOTE — PERIOP NOTES
TRANSFER - OUT REPORT:    Verbal report given to [facility] RN(name) on [employer]  being transferred to [facility](unit) for routine post - op       Report consisted of patients Situation, Background, Assessment and   Recommendations(SBAR). Information from the following report(s) SBAR, OR Summary, Intake/Output, MAR and Cardiac Rhythm NSR was reviewed with the receiving nurse. Opportunity for questions and clarification was provided.       Patient transported with:   O2 @ 2 liters  Tech

## 2020-05-19 NOTE — BRIEF OP NOTE
Brief Postoperative Note    Patient: Donna Salgado  YOB: 1960  MRN: [id number]    Date of Procedure: 5/19/2020     Pre-Op Diagnosis: END STAGE RENAL DISEASE    Post-Op Diagnosis: Same as preoperative diagnosis. Procedure(s):  INSERTION ARTERIO VENOUS GRAFT LEFT ARM    Surgeon(s):  Jean-Paul Solano MD    Surgical Assistant: None    Anesthesia: General     Estimated Blood Loss (mL): less than 273     Complications: None    Specimens: * No specimens in log *     Implants:   Implant Name Type Inv. Item Serial No.  Lot No. LRB No. Used Action   GRAFT TW STRTCH [id number] -- Raul Sieving Graft GRAFT TW STRTCH [id number] -- Yesi Barton [id number]  [employer] NA Left 1 Implanted       Drains: * No LDAs found *    Findings: Palpable thrill in AV graft and palpable pulse in radial artery at the wrist post op.     Electronically Signed by Hedy Pradhan MD on 5/19/2020 at 11:57 AM

## 2020-05-19 NOTE — CONSULTS
Consultation Note    NAME: Dilan العلي   :  1960   MRN:  [id number]     Date/Time:  2020 5:06 PM    I have been asked to see this patient by Dr. Elma Davis  for advice/opinion re: ESKD. Assessment :    Plan:  ESKD  AV access placement  Perm cath  Obesity   Mild hyponatremia MWF HD at [address] [place] [address]    HD in the morning and then home (orders placed and davita is aware)       Subjective:   CHIEF COMPLAINT:  \"eskd    HISTORY OF PRESENT ILLNESS:     Loretta Jimenez is a 61 y.o.   female who has a history of the below. S/p av access surgery. C/o itching to her back. No sob. Not very talkative. We discussed the above. Past Medical History:   Diagnosis Date    Advanced care planning/counseling discussion 16    Arthritis     back    Arthritis of ankle or foot, right     [place] [facility] [place] [facility] cancer [facility])     Right Lumpectomy  - DCIS    Chronic kidney disease     STAGE IV/ dialysis [facility] mwf    Chronic obstructive pulmonary disease ([address]     Chronic pain     ANKLE-right- h/o fx ankle    CKD (chronic kidney disease) stage V requiring chronic dialysis ([address]     Dr. Randa Vera [facility]    Depression     DEPRESSION AND ANXIETY    GERD (gastroesophageal reflux disease)     Heart failure ([address]     h/o chf - Normal EF, likely due to volume overload    History of abdominal hernia     History of MRSA infection 2018    h/o MRSA leg     Hypertension     Morbid obesity ([address]     Respiratory failure, chronic ([address] 10/07/14    Dr. Benjamin Ding Stroke [facility])     ?  TIA @ age 52    Unspecified sleep apnea 10/07/2014    BIPAP with oxygen    Vitamin D deficiency 2011    10.4 [facility] labs      Past Surgical History:   Procedure Laterality Date    HX BREAST BIOPSY Right 14    DCIS    HX CHOLECYSTECTOMY  2009    choleystectomy    HX COLONOSCOPY  11    Dr. Raya Headings HX GYN      tuabl ligation    HX ORTHOPAEDIC      r ankle and leg plate and screw    HX ORTHOPAEDIC  2019    bone taken out of right pink toe    HX OTHER SURGICAL  3/10/16    Graft in left arm     HX VASCULAR ACCESS Right 08/2019    DIALYSIS CATHETER-upper chest    VASCULAR SURGERY PROCEDURE UNLIST      4 surgeries on left upper arm grafts stopping up. Social History     Tobacco Use    Smoking status: Never Smoker    Smokeless tobacco: Never Used   Substance Use Topics    Alcohol use: Yes     Comment: Occ on holidays      Family History   Problem Relation Age of Onset    Diabetes Mother     Heart Disease Mother     Heart Disease Father     Hypertension Sister     Breast Cancer Paternal Aunt 28    No Known Problems Son     Anesth Problems Neg Hx       Allergies   Allergen Reactions    Iodine Anaphylaxis    Shellfish Containing Products Anaphylaxis    Other Medication Other (comments)     Metal causes numbness in hands,arms all over    Sulfa (Sulfonamide Antibiotics) Itching      Prior to Admission medications    Medication Sig Start Date End Date Taking? Authorizing Provider   aspirin delayed-release 81 mg tablet Take 1 Tab by mouth daily. 12/18/19  Yes Andreas Jacobson MD   cholecalciferol (VITAMIN D3) (1000 Units /25 mcg) tablet Take 2 Tabs by mouth daily. 10/10/19  Yes Dana Green MD   omeprazole (PRILOSEC) 20 mg capsule TAKE 1 CAPSULE BY MOUTH TWICE DAILY 9/12/19  Yes Dana Green MD   montelukast (SINGULAIR) 10 mg tablet take 1 tablet by mouth daily 9/12/19  Yes Dana Green MD   magnesium oxide (MAG-OX) 400 mg tablet take 1 tablet by mouth daily 9/12/19  Yes Luis Garcia MD   buPROPion XL (WELLBUTRIN XL) 150 mg tablet take 1 tablet by mouth every morning 9/12/19  Yes Dana Green MD   RENVELA 2.4 gram pwpk oral powder Take 1 Packet by mouth three (3) times daily (with meals).  2/7/19  Yes Jessie Marks NP   albuterol (PROVENTIL VENTOLIN) 2.5 mg /3 mL (0.083 %) nebulizer solution 3 mL by Nebulization route every four (4) hours as needed for Wheezing or Shortness of Breath. 1/10/19  Yes Kadie Green MD   AQUAPHOR HEALING 41 % ointment Apply  to affected area as needed for Dry Skin. 1/10/19  Yes Kadie Green MD   SENSIPAR 60 mg tab  11/21/18  Yes Provider, Historical   gabapentin (NEURONTIN) 300 mg capsule take 2 to 3 capsules by mouth three times a day if needed  Patient taking differently: take 1 capsule daily 6/1/18  Yes Anaid Villalobos MD   atorvastatin (LIPITOR) 10 mg tablet take 1 tablet by mouth NIGHTLY. 7/5/17  Yes Anaid Villalobos MD   lidocaine 4 % gel 1 Inch by Apply Externally route two (2) times daily as needed. To ankle for pain 12/12/16  Yes Anaid Villalobos MD   inhalational spacing device 1 Each by Does Not Apply route as needed.  11/15/16  Yes Anaid Villalobos MD   letrozole [facility]) 2.5 mg tablet take 1 tablet by mouth once daily 1/19/18   Anaid Villalobos MD     REVIEW OF SYSTEMS:     []  Unable to obtain reliable ROS due to  [] mental status  [] sedated   [] intubated   [x] Total of 12 systems reviewed as follows:  Constitutional: negative fever, negative chills, negative weight loss  Eyes:   negative visual changes  ENT:   negative sore throat, tongue or lip swelling  Respiratory:  negative cough, negative dyspnea  Cards:  negative for chest pain, palpitations, lower extremity edema  GI:   negative for nausea, vomiting, diarrhea, and abdominal pain  :  negative for frequency, dysuria  Integument:  negative for rash and pruritus  Heme:  negative for easy bruising and gum/nose bleeding  Musculoskel: negative for myalgias,  back pain and muscle weakness  Neuro:  negative for headaches, dizziness, vertigo  Psych:  negative for feelings of anxiety, depression   Travel?: none    Objective:   VITALS:    Visit Vitals  /65   Pulse 67   Temp 98.6 °F (37 °C)   Resp 18   Ht 5' 5\" (1.651 m)   Wt 147.6 kg (325 lb 6.4 oz)   SpO2 99%   BMI 54.15 kg/m²     PHYSICAL EXAM:  Gen:  []  WD []  WN  [] cachectic [] thin [x]  obese []  disheveled             []  ill apearing  []   Critical  []   Chronic    [x]  No acute distress    HEENT:   [] NC/AT/PERRLA/EOMI    [] pink conjunctivae      [] pale conjunctivae                  PERRL  [] yes  [] no      [] moist mucosa    [] dry mucosa    hearing intact to voice [x] yes  [] No                 NECK:   supple [] yes  [] no        masses [] yes  [] No               thyroid  []  non tender  []  tender    RESP:   [x] CTA bilaterally/no wheezing/rhonchi/rales/crackles    [] rhonchi bilaterally - no dullness  [] wheezing   [] rhonchi   [] crackles     use of accessory muscles [] yes [x] no    CARD:   [x]  regular rate and rhythm/No murmurs/rubs/gallops    murmur  [] yes ()  [] no      Rubs  [] yes  [] no       Gallops [] yes  [] no    Rate []  regular  []  irregular        carotid bruits  [] Right  []  Left                 LE edema [x] yes  [] no           JVP  []  yes   []  no    ABD:    [x] soft/non distended/non tender/+bowel sounds/no HSM    []  Rigid    tenderness [] yes [] no   Liver enlargement  []  yes []  no                Spleen enlargement  []  yes []  no     distended []  yes [] no     bowel sound  [] hypoactive   [] hyperactive    LYMPH:    Neck []  yes []  no       Axillae []  yes []  no    SKIN:   Rashes []  yes   []  no    Ulcers []  yes   []  no               [] tight to palpitation    skin turgor []  good  [] poor  [] decreased               Cyanosis/clubbing []  yes []  no    NEUR:   [x] cranial nerves II-XII grossly intact       [] Cranial nerves deficit                 []  facial droop    []  slurred speech   [] aphasic     [] Strength normal     []  weakness  []  LUE  []   RUE/ []  LLE  []   RLE    follows commands  [x]  yes []  no           PSYCH:   insight [] poor [] good   Alert and Oriented to  [x] person  [x] place  [x]  time                    [] depressed [] anxious [] agitated  [] lethargic [] stuporous  [] sedated     LAB DATA REVIEWED:    No results for input(s): WBC, HGB, HCT, PLT, HGBEXT, HCTEXT, PLTEXT in the last 72 hours. Recent Labs     05/19/20  0651   *   K 4.6   CL 97   CO2 28   BUN 45*   CREA 9.98*   GLU 81   CA 9.5     No results for input(s): SGOT, GPT, ALT, AP, TBIL, TBILI, ALB, GLOB, GGT, AML, LPSE in the last 72 hours. No lab exists for component: AMYP, HLPSE  No results for input(s): INR, PTP, APTT, INREXT in the last 72 hours. No results for input(s): FE, TIBC, PSAT, FERR in the last 72 hours. No results for input(s): PH, PCO2, PO2 in the last 72 hours. No results for input(s): CPK, CKMB in the last 72 hours. No lab exists for component: TROPONINI  Lab Results   Component Value Date/Time    Glucose (POC) 100 10/01/2019 09:08 AM    Glucose (POC) 84 01/31/2019 12:36 PM    Glucose (POC) 86 03/10/2016 07:21 AM       Procedures: see electronic medical records for all procedures/Xrays and details which were not copied into this note but were reviewed prior to creation of Plan.    ________________________________________________________________________       ___________________________________________________  Consulting Physician:  Lise Gaxiola MD

## 2020-05-19 NOTE — ANESTHESIA POSTPROCEDURE EVALUATION
Procedure(s):  INSERTION ARTERIO VENOUS GRAFT LEFT ARM. general    Anesthesia Post Evaluation        Patient location during evaluation: PACU  Note status: Adequate. Level of consciousness: responsive to verbal stimuli and sleepy but conscious  Pain management: satisfactory to patient  Airway patency: patent  Anesthetic complications: no  Cardiovascular status: acceptable  Respiratory status: acceptable  Hydration status: acceptable  Comments: +Post-Anesthesia Evaluation and Assessment    Patient: Alaina Kyle MRN: [id number]  SSN: [id number]   YOB: 1960  Age: 61 y.o. Sex: female      Cardiovascular Function/Vital Signs    /50   Pulse 75   Temp 37 °C (98.6 °F)   Resp 19   Ht 5' 5\" (1.651 m)   Wt 147.6 kg (325 lb 6.4 oz)   SpO2 94%   BMI 54.15 kg/m²     Patient is status post Procedure(s):  INSERTION ARTERIO VENOUS GRAFT LEFT ARM. Nausea/Vomiting: Controlled. Postoperative hydration reviewed and adequate. Pain:  Pain Scale 1: Numeric (0 - 10) (05/19/20 1243)  Pain Intensity 1: 4 (05/19/20 1243)   Managed. Neurological Status:   Neuro (WDL): Exceptions to WDL (05/19/20 1220)   At baseline. Mental Status and Level of Consciousness: Arousable. Pulmonary Status:   O2 Device: Nasal cannula (05/19/20 1245)   Adequate oxygenation and airway patent. Complications related to anesthesia: None    Post-anesthesia assessment completed. No concerns. Signed By: Jeevan Duffy MD    5/19/2020  Post anesthesia nausea and vomiting:  controlled      Vitals Value Taken Time   /50 5/19/2020  1:00 PM   Temp 37 °C (98.6 °F) 5/19/2020 12:10 PM   Pulse 76 5/19/2020  1:09 PM   Resp 17 5/19/2020  1:09 PM   SpO2 96 % 5/19/2020  1:09 PM   Vitals shown include unvalidated device data.

## 2020-05-19 NOTE — OP NOTES
Operative Report    CPT 64310      Insertion of Arterio-Venous Graft Left Upper Arm  (PTFE)      Patient:  Giulle Boggs    Nephrologist: Dr Donald Quiñonez    Date of Surgery:  5/19/2020    Preoperative Diagnosis - ESRD    Postoperative Diagnosis - Same    Anesthesia - General    Surgeon - Shreya Rodriguez    Procedure - Insertion of Arterio-Venous Graft left Upper Arm    Operative Summary -     The patient was taken to the operating room and placed on the operating table in the supine position. The patient's left upper extremity  was  prepared and steriley draped. The patient had previous history of AV graft left upper arm which eventually failed. Ultrasound and venogram showed patent brachial vein in proximal upper arm with patent central system. An incision was made medially in the proximal leftupper arm. The brachial vein was exposed and freed for about 3 cm and surrounded by vessel loops. It was around 8 mm in diameter. .  The  brachial artery was exposed and surrounded by vessel loops. Three counter incisions were made distally and with a curved  6mm tunneler a shallow subcutaneous tunnel was made. This was placed to try to avoid the old graft. The brachial artery was controlled proximally and distally with  Vascular clamps after IV heparin was given. A short longitudinal arteriotomy was made. The end of a 6 mm Propaten PTFE graft was cut obliquely and anastomosed end graft to side artery with CV6 Goretex suture. The graft was drawn through the subcutaneous tunnel in a clockwise direction and brought back up to the upper incision. Control was obtained of the vein with vascular clamps and the vein was opened logitudinally. The end of the graft was cut obliquely to length and was anastomosed end graft to side vein with CV6 Goretex suture. Prior to completion, the artery was flushed through the graft.   After completion, with initiation of flow, there was a thrill at the AV anastomosis and a palpable pulse at the radial artery distally. Wounds were irrigated with antibiotic solution. The  incisions were closed with 3-0 chromic at the subcutaneous level and 4-0 nylon at the skin. Staples were used on the proximal incision. A gauze dressing was applied. The patient tolerated the procedure well and was taken to the PACU in stable condition.      Specimen - none     Drain - none    Complications - none    Estimated Blood Loss - 75 ml    G Britany Huffman MD

## 2020-05-19 NOTE — PROGRESS NOTES
General Surgery End of Shift Nursing Note    Shift worked:   [id number]   Summary of shift:    Received from PACU around 1440 today. Patient received pain medication x1 today (see MAR). Patient has been drowsy off and on from the anesthesia. Diet advanced to renal regular. Issues for physician to address:   None at this time. Number times ambulated in hallway past shift: 0    Number of times OOB to chair past shift: 0    Pain Management:  Current medication: tylenol, dilaudid, norco  Patient states pain is manageable on current pain medication: YES    GI:    Current diet:  DIET RENAL WITH OPTIONS 80-80-80; Regular    Tolerating current diet: YES  Passing flatus: YES  Last Bowel Movement: unknown   Appearance: unknown    Respiratory:    Incentive Spirometer at bedside: YES  Patient instructed on use: YES    Patient Safety:    Falls Score: 2  Bed Alarm On? No  Sitter?  No    Leo Salas LPN

## 2020-05-19 NOTE — PERIOP NOTES
Handoff Report from Operating Room to PACU    Report received from Niraj Navarro RN and Jaime Whitmore CRNA regarding Ezequiel Fass. Surgeon(s):  Manuela Roque MD  And Procedure(s) (LRB):  INSERTION ARTERIO VENOUS GRAFT LEFT ARM (Left)  confirmed   with allergies and dressings discussed. Anesthesia type, drugs, patient history, complications, estimated blood loss, vital signs, intake and output, and last pain medication, lines, reversal medications and temperature were reviewed. Pt c/o chest heaviness upon arrival to PACU, CRNA treated pt with Morphine 2mg IV, Dr. Cody Linder notified. Upon bedside assessment pt stated to Dr. Cody Linder that chest felt better and now her arm was her c/o pain. Will continue to treat with PRN meds for pain control. 1222: Report given to Fidencio Alves RN for continuation of care.

## 2020-05-20 VITALS
OXYGEN SATURATION: 98 % | WEIGHT: 293 LBS | SYSTOLIC BLOOD PRESSURE: 86 MMHG | DIASTOLIC BLOOD PRESSURE: 47 MMHG | BODY MASS INDEX: 48.82 KG/M2 | RESPIRATION RATE: 60 BRPM | TEMPERATURE: 97.7 F | HEIGHT: 65 IN | HEART RATE: 56 BPM

## 2020-05-20 LAB
ANION GAP SERPL CALC-SCNC: 11 MMOL/L (ref 5–15)
BACTERIA SPEC CULT: NORMAL
BACTERIA SPEC CULT: NORMAL
BASOPHILS # BLD: 0 K/UL (ref 0–0.1)
BASOPHILS NFR BLD: 0 % (ref 0–1)
BUN SERPL-MCNC: 59 MG/DL (ref 6–20)
BUN/CREAT SERPL: 5 (ref 12–20)
CALCIUM SERPL-MCNC: 9.2 MG/DL (ref 8.5–10.1)
CHLORIDE SERPL-SCNC: 96 MMOL/L (ref 97–108)
CO2 SERPL-SCNC: 25 MMOL/L (ref 21–32)
CREAT SERPL-MCNC: 12.2 MG/DL (ref 0.55–1.02)
DIFFERENTIAL METHOD BLD: ABNORMAL
EOSINOPHIL # BLD: 0 K/UL (ref 0–0.4)
EOSINOPHIL NFR BLD: 0 % (ref 0–7)
ERYTHROCYTE [DISTWIDTH] IN BLOOD BY AUTOMATED COUNT: 14.8 % (ref 11.5–14.5)
GLUCOSE SERPL-MCNC: 85 MG/DL (ref 65–100)
HCT VFR BLD AUTO: 33.6 % (ref 35–47)
HGB BLD-MCNC: 10.3 G/DL (ref 11.5–16)
IMM GRANULOCYTES # BLD AUTO: 0.1 K/UL (ref 0–0.04)
IMM GRANULOCYTES NFR BLD AUTO: 1 % (ref 0–0.5)
LYMPHOCYTES # BLD: 1.5 K/UL (ref 0.8–3.5)
LYMPHOCYTES NFR BLD: 14 % (ref 12–49)
MCH RBC QN AUTO: 30.1 PG (ref 26–34)
MCHC RBC AUTO-ENTMCNC: 30.7 G/DL (ref 30–36.5)
MCV RBC AUTO: 98.2 FL (ref 80–99)
MONOCYTES # BLD: 1.6 K/UL (ref 0–1)
MONOCYTES NFR BLD: 15 % (ref 5–13)
NEUTS SEG # BLD: 7.3 K/UL (ref 1.8–8)
NEUTS SEG NFR BLD: 70 % (ref 32–75)
NRBC # BLD: 0 K/UL (ref 0–0.01)
NRBC BLD-RTO: 0 PER 100 WBC
PLATELET # BLD AUTO: 152 K/UL (ref 150–400)
PMV BLD AUTO: 12.4 FL (ref 8.9–12.9)
POTASSIUM SERPL-SCNC: 5.6 MMOL/L (ref 3.5–5.1)
RBC # BLD AUTO: 3.42 M/UL (ref 3.8–5.2)
SERVICE CMNT-IMP: NORMAL
SODIUM SERPL-SCNC: 132 MMOL/L (ref 136–145)
WBC # BLD AUTO: 10.4 K/UL (ref 3.6–11)

## 2020-05-20 PROCEDURE — 99218 HC RM OBSERVATION: CPT

## 2020-05-20 PROCEDURE — 94760 N-INVAS EAR/PLS OXIMETRY 1: CPT

## 2020-05-20 PROCEDURE — 36415 COLL VENOUS BLD VENIPUNCTURE: CPT

## 2020-05-20 PROCEDURE — 74011250636 HC RX REV CODE- 250/636: Performed by: SURGERY

## 2020-05-20 PROCEDURE — 77010033678 HC OXYGEN DAILY

## 2020-05-20 PROCEDURE — 74011250636 HC RX REV CODE- 250/636: Performed by: INTERNAL MEDICINE

## 2020-05-20 PROCEDURE — 90935 HEMODIALYSIS ONE EVALUATION: CPT

## 2020-05-20 PROCEDURE — 80048 BASIC METABOLIC PNL TOTAL CA: CPT

## 2020-05-20 PROCEDURE — 85025 COMPLETE CBC W/AUTO DIFF WBC: CPT

## 2020-05-20 PROCEDURE — 74011250637 HC RX REV CODE- 250/637: Performed by: SURGERY

## 2020-05-20 RX ORDER — HEPARIN SODIUM 1000 [USP'U]/ML
3700 INJECTION, SOLUTION INTRAVENOUS; SUBCUTANEOUS
Status: DISCONTINUED | OUTPATIENT
Start: 2020-05-20 | End: 2020-05-20 | Stop reason: HOSPADM

## 2020-05-20 RX ORDER — HYDROCODONE BITARTRATE AND ACETAMINOPHEN 5; 325 MG/1; MG/1
1 TABLET ORAL
Qty: 12 TAB | Refills: 0 | Status: SHIPPED | OUTPATIENT
Start: 2020-05-20 | End: 2020-05-23

## 2020-05-20 RX ADMIN — SEVELAMER CARBONATE 2400 MG: 2.4 POWDER, FOR SUSPENSION ORAL at 13:19

## 2020-05-20 RX ADMIN — HYDROMORPHONE HYDROCHLORIDE 0.5 MG: 1 INJECTION, SOLUTION INTRAMUSCULAR; INTRAVENOUS; SUBCUTANEOUS at 07:42

## 2020-05-20 RX ADMIN — GABAPENTIN 300 MG: 300 CAPSULE ORAL at 13:17

## 2020-05-20 RX ADMIN — PANTOPRAZOLE SODIUM 40 MG: 40 TABLET, DELAYED RELEASE ORAL at 13:18

## 2020-05-20 RX ADMIN — CINACALCET HYDROCHLORIDE 60 MG: 30 TABLET, FILM COATED ORAL at 13:20

## 2020-05-20 RX ADMIN — MAGNESIUM OXIDE 400 MG (241.3 MG MAGNESIUM) TABLET 400 MG: TABLET at 13:17

## 2020-05-20 RX ADMIN — BUPROPION HYDROCHLORIDE 150 MG: 150 TABLET, FILM COATED, EXTENDED RELEASE ORAL at 13:17

## 2020-05-20 RX ADMIN — LETROZOLE 2.5 MG: 2.5 TABLET ORAL at 13:21

## 2020-05-20 RX ADMIN — HEPARIN SODIUM 3700 UNITS: 1000 INJECTION INTRAVENOUS; SUBCUTANEOUS at 10:41

## 2020-05-20 RX ADMIN — Medication 10 ML: at 05:33

## 2020-05-20 NOTE — DISCHARGE INSTRUCTIONS
Discharge Instructions Following AV Graft Surgery            Keep dressing in place for two days. Keep incision dry for two days. See Dr. Noris Santana in the office in two weeks - call for appointment - [phone]. Take pain medications as prescribed as needed or use Tylenol. Do not drive while taking narcotic pain medication.     For any problems, call Dr. Noris Santana at [phone] or Ana Montana MD

## 2020-05-20 NOTE — PROGRESS NOTES
HD TRANSFER - OUT REPORT:    Verbal report given to [facility] on Alaina Kyle being transferred to [facility]  (Unit) for ordered procedure       Report consisted of patient's Situation, Background, Assessment and   Recommendations(SBAR). Information from the following report(s) Kardex was reviewed with the receiving nurse. Method:  $$ Method: Hemodialysis (05/20/20 1130)    Fluid Removed  NET Fluid Removed (mL): 2000 ml (05/20/20 1130)     Patient response to treatment:  Stable    End Time  Hemodialysis End Time: 1130 (05/20/20 1130)  If not documented, dialysis nurse to update post-dialysis row in HD/Filtration flowsheet     Medications /Volume expansion agents or Fluid boluses administered during treatment? no    Post-dialysis medication administration due?  no  Remind nurse to administer post-HD medication upon return to unit. Fistula hemostasis? no    Line heparinization? yes    Lines: secure    Opportunity for questions and clarification was provided.       Patient transported with: HD at bedside

## 2020-05-20 NOTE — PROGRESS NOTES
Surgery    Mild arm pain. Afebrile, VSS. Alert. Left hand warm, trace left radial pulse. Bruit in AVG. Dressing in place. For dialysis today, then discharge. Follow up in 2 weeks in the office.      Jean Monte MD

## 2020-05-20 NOTE — PROCEDURES
[facility]  [phone]    Vitals   Pre   Post   Assessment   Pre   Post     Temp  Temp: 98.2 °F (36.8 °C) (05/20/20 0800)  97.7 LOC  A & O x 4 same   HR   Pulse (Heart Rate): 62 (05/20/20 0845) 61 Lungs   Clear LL  same   B/P   BP: 98/49 (05/20/20 0845) 91/45 Cardiac   HRR  same   Resp   Resp Rate: 16 (05/20/20 0845) 16 Skin   Warm, dry, AVG placement to Left upper arm same   Pain level  Pain Intensity 1: 6 (05/19/20 2110) 8 Edema  +4 generalized, obesity     same   Orders:    Duration:   Start:    0800 End:    1130 Total:   3.5   Dialyzer:   Dialyzer/Set Up Inspection: Revaclear (05/20/20 0800)   K Bath:   Dialysate K (mEq/L): 3 (05/20/20 0800)   Ca Bath:   Dialysate CA (mEq/L): 2.5 (05/20/20 0800)   Na/Bicarb:   Dialysate NA (mEq/L): 138 (05/20/20 0800)   Target Fluid Removal:   Goal/Amount of Fluid to Remove (mL): 2500 mL (05/20/20 0800)   Access  CVC   Type & Location:   Right [facility]    Labs     Obtained/Reviewed   Critical Results Called   Date when labs were drawn-  Hgb-    HGB   Date Value Ref Range Status   05/20/2020 10.3 (L) 11.5 - 16.0 g/dL Final     K-    Potassium   Date Value Ref Range Status   05/19/2020 4.6 3.5 - 5.1 mmol/L Final     Ca-   Calcium   Date Value Ref Range Status   05/19/2020 9.5 8.5 - 10.1 MG/DL Final     Bun-   BUN   Date Value Ref Range Status   05/19/2020 45 (H) 6 - 20 MG/DL Final     Creat-   Creatinine   Date Value Ref Range Status   05/19/2020 9.98 (H) 0.55 - 1.02 MG/DL Final        Medications/ Blood Products Given     Name   Dose   Route and Time     Heparin arterial  1.8ml  IV 1130   Heparin venous  1.9ml IV 1130        Blood Volume Processed (BVP):    66.3 LP Net Fluid   Removed:  2000ml   Comments RN reviewed LPN assessment and completed RN assessment. RN completed patient assessment. RN reviewed technicians vital signs and procedure note. Tx completed.  Reviewed by ELIEESR Conklin  Time Out Done: 0730  Primary Nurse Rpt Pre: Hereford Jewels, RN  Primary Nurse Rpt Post: Mae Richards RN  Pt Education: access care  Care Plan: continue HD  Tx Summary:Right sub clav permcath CVC: Dressing CDI. No s/s of infection. Both lumens aspirate & flush well. Running well at . SBAR received from Primary RN. Pt arrived to HD suite [address]. Consent signed & on file. Pt. Was given IV pain med. Prior to HD treatment. 0800: Both lumens of permcath disinfected with Prevantics per policy. Each lumen aspirated for blood return and flushed with Normal Saline per policy. Labs drawn per request/ order. VSS. Dialysis Tx initiated. 0815: Pt. Stable, lines secure  0830: Pt. Hypotensive and asymptomatic. bp taken in righ forearm. Pt. Eating and moving arm. Retake bp. Decreased UF goal.  0845: Pt. Stable, lines secure. Talking and watching tv. .  0900: Pt. Stable, lines secure and talking. Dr. Sue Clark rounding  5584: Pt. Stable, talking lines secure. Pt. Stated that she runs low bp at dialysis unit  0930: Pt. [facility]. Hd well. No s/s of distress  0945: Pt. Asymptomatic with hypotension. 1000: Pt. Resting well, lines secure and visible  1015: Pt. Stable, lines secure and visible  1030: Pt. Stable, lines secure and visible  1045: Pt. [facility]. Hd well. Lines secure  1100: Pt. Stable, lines secure and visible  1115: Pt. Resting well, lines secure  1130: Tx ended. VSS. All possible blood returned to patient. Central line catheter flushed with normal saline per policy. Ports disinfected with Prevantics per policy, lines disconnected, Heparin dwells instilled, and lines capped using aseptic technique. Bed locked and in the lowest position, call bell and belongings in reach. SBAR given to Primary, RN. Patient is stable at time of their/ my departure. All Dialysis related medications have been reviewed.     Admiting Diagnosis: AVF placement  Pt's previous clinic- [facility]  Consent signed - Informed Consent Verified: Yes (05/20/20 0800)  [facility] Consent - verified  Hepatitis Status- negative/immune 02/05/2020  Machine #- Machine Number: [id number] (05/20/20 0800)  Telemetry status-none  Pre-dialysis wt. -

## 2020-05-20 NOTE — PROGRESS NOTES
Plan:  -Home   -Resume OP HD MWF [address] [place] [address]   -PCP f/u appt   -Son to transport at d/c       Reason for Admission:   ESRD               RUR Score:    None      PCP: First and Last name: Lela Pineda   Name of Practice:[facility]   Are you a current patient: Yes/No:Yes   Approximate date of last visit: October 2019             Resources/supports as identified by patient/family:   Supportive son                 Top Challenges facing patient (as identified by patient/family and CM): Finances/Medication cost?      No needs identified               Transportation? Drives and son helps               Support system or lack thereof? Supportive son and other friends/family                      Living arrangements? Alone            Self-care/ADLs/Cognition? Mostly independent           Current Advanced Directive/Advance Care Plan:  Not on file, son NOK                          Plan for utilizing home health:    None                  Transition of Care Plan:       Home              11:00AM  CM PC to pt in her room to complete assessment and discuss d/c plan. Pt is a 60 yo female admitted for insertion of arterio venous graft. Pt lives alone and reports being mostly independent at baseline. She states she was using a WC to get around but is now able to ambulate short distances with her rollator. Pt also drives. Per pt, her son, Titi Talbot, lives in [place] and is her \"caretaker,\" checking on her regularly and driving her to HD MWF at 11:30AM at [address] [place] [address].  PC to [address] [place] [address] ([phone]) to update on d/c today and return to OP center on Friday. MAGDIEL requested labs and notes be faxed to [phone]. Pt in obs status. State notice provided and verbally explained to pt by phone. Pt verbalized understanding. No signature due to COVID-19 precautions. Pt received HD IP today and will d/c home this afternoon transported by her son.  PCP appt scheduled and added to AVS. Pt ready for d/c from CM perspective. CM available for any additional needs. Care Management Interventions  PCP Verified by CM: Yes(Dr. Danyel Bills )  Mode of Transport at Discharge:  Other (see comment)(Pt's son )  Transition of Care Consult (CM Consult): Discharge Planning  Discharge Durable Medical Equipment: No  Physical Therapy Consult: No  Occupational Therapy Consult: No  Speech Therapy Consult: No  Current Support Network: Lives Alone  Confirm Follow Up Transport: Family  Discharge Location  Discharge Placement: Home with outpatient services([address])      SHIRA Covington  Care Manager

## 2020-05-20 NOTE — PROGRESS NOTES
Pt in room getting dialysis until 1210. Masoud Campos gave report. Pt has orders to be discharged. 1415 - Pt has been discharged.

## 2020-05-20 NOTE — PROGRESS NOTES
General Surgery End of Shift Nursing Note    Bedside shift change report given to Chin Mo, RN (oncoming nurse) by Sho Mccord RN (offgoing nurse). Report included the following information SBAR, Kardex and MAR. Shift worked:   [id number]     Summary of shift:    Pt came on floor from PACU. Received report from Paulina Ruiz at [phone]. Pt resting in bed. Given PRN pain medication   Issues for physician to address:   None at this time     Number times ambulated in hallway past shift: 0    Number of times OOB to chair past shift: 0    Pain Management:  Current medication: See MAR  Patient states pain is manageable on current pain medication: YES    GI:    Current diet:  DIET RENAL WITH OPTIONS 80-80-80; Regular    Tolerating current diet: YES        Patient Safety:    Falls Score: 2  Bed Alarm On? No  Sitter?  No    Celi Juarez

## 2020-05-20 NOTE — ROUTINE PROCESS
The following appointments have been successfully scheduled: 
 
Date/time Tuesday, May 26, 2020 11:30 AM 
Patient  Erich Angelucci 1960 [id number] 30 [phone] 40 Department [facility]-MAIN OFFICE-PCP Appointment type Virtual Visit Special Case 30 Provider ANDRIA EAGLE Appointment type notes Virtual Visit Special Case 30 
for visits related to COVID

## 2020-05-28 ENCOUNTER — OFFICE VISIT (OUTPATIENT)
Dept: SURGERY | Age: 60
End: 2020-05-28

## 2020-05-28 ENCOUNTER — VIRTUAL VISIT (OUTPATIENT)
Dept: FAMILY MEDICINE CLINIC | Age: 60
End: 2020-05-28

## 2020-05-28 VITALS
OXYGEN SATURATION: 94 % | TEMPERATURE: 98.3 F | HEART RATE: 72 BPM | WEIGHT: 293 LBS | HEIGHT: 65 IN | BODY MASS INDEX: 48.82 KG/M2

## 2020-05-28 VITALS — WEIGHT: 293 LBS | BODY MASS INDEX: 55.75 KG/M2 | SYSTOLIC BLOOD PRESSURE: 150 MMHG | DIASTOLIC BLOOD PRESSURE: 75 MMHG

## 2020-05-28 DIAGNOSIS — N18.6 ESRD (END STAGE RENAL DISEASE) (HCC): Primary | ICD-10-CM

## 2020-05-28 DIAGNOSIS — M54.41 CHRONIC MIDLINE LOW BACK PAIN WITH BILATERAL SCIATICA: ICD-10-CM

## 2020-05-28 DIAGNOSIS — Z09 POSTOPERATIVE EXAMINATION: Primary | ICD-10-CM

## 2020-05-28 DIAGNOSIS — M54.42 CHRONIC MIDLINE LOW BACK PAIN WITH BILATERAL SCIATICA: ICD-10-CM

## 2020-05-28 DIAGNOSIS — G89.29 CHRONIC MIDLINE LOW BACK PAIN WITH BILATERAL SCIATICA: ICD-10-CM

## 2020-05-28 DIAGNOSIS — Z79.891 CHRONIC PRESCRIPTION OPIATE USE: ICD-10-CM

## 2020-05-28 DIAGNOSIS — G89.29 OTHER CHRONIC PAIN: ICD-10-CM

## 2020-05-28 DIAGNOSIS — M25.571 CHRONIC PAIN OF RIGHT ANKLE: ICD-10-CM

## 2020-05-28 DIAGNOSIS — G89.29 CHRONIC PAIN OF RIGHT ANKLE: ICD-10-CM

## 2020-05-28 PROBLEM — R56.9 NEW ONSET SEIZURE (HCC): Status: ACTIVE | Noted: 2020-05-28

## 2020-05-28 RX ORDER — HYDROCODONE BITARTRATE AND ACETAMINOPHEN 7.5; 325 MG/1; MG/1
1 TABLET ORAL
Qty: 60 TAB | Refills: 0 | Status: SHIPPED | OUTPATIENT
Start: 2020-05-28 | End: 2020-12-21 | Stop reason: SDUPTHER

## 2020-05-28 RX ORDER — HYDROCODONE BITARTRATE AND ACETAMINOPHEN 7.5; 325 MG/1; MG/1
1 TABLET ORAL
Qty: 60 TAB | Refills: 0 | Status: SHIPPED | OUTPATIENT
Start: 2020-06-28 | End: 2020-12-21 | Stop reason: SDUPTHER

## 2020-05-28 RX ORDER — HYDROCODONE BITARTRATE AND ACETAMINOPHEN 7.5; 325 MG/1; MG/1
1 TABLET ORAL
Qty: 60 TAB | Refills: 0 | Status: SHIPPED | OUTPATIENT
Start: 2020-07-28 | End: 2020-12-21 | Stop reason: SDUPTHER

## 2020-05-28 NOTE — PROGRESS NOTES
Vijay Murphy is a 61 y.o. female  Chief Complaint   Patient presents with   [facility] Follow Up     Health Maintenance Due   Topic Date Due    DTaP/Tdap/Td series (1 - Tdap) 05/10/1981    Shingrix Vaccine Age 50> (1 of 2) 05/10/2010    Pneumococcal 0-64 years (2 of 3 - PPSV23) 04/13/2015    PAP AKA CERVICAL CYTOLOGY  09/03/2017    Lipid Screen  04/27/2018    Breast Cancer Screen Mammogram  04/24/2019     Visit Vitals  /75   Wt 335 lb (152 kg)   BMI 55.75 kg/m²     1. Have you been to the ER, urgent care clinic since your last visit? Hospitalized since your last visit? No     2. Have you seen or consulted any other health care providers outside of the [address] [place] [address] since your last visit? Include any pap smears or colon screening. No     Pt phone number: [phone] (doxy. me visit)

## 2020-05-28 NOTE — PROGRESS NOTES
**THIS IS A VIRTUAL VISIT VIA A VIDEO SYNCHRONOUS DISCUSSION OVER DOXY. ME PATIENT AGREED TO HAVE THEIR CARE DELIVERED OVER A [employer] VIDEO VISIT IN PLACE OF THEIR REGULARLY SCHEDULED OFFICE VISIT**       Ananya Alva is a 61 y.o. female who was seen by synchronous (real-time) audio-video technology on 5/28/2020. Consent: Ananya Alva, who was seen by synchronous (real-time) audio-video technology, and/or her healthcare decision maker, is aware that this patient-initiated, Telehealth encounter on 5/28/2020 is a billable service, with coverage as determined by her insurance carrier. She is aware that she may receive a bill and has provided verbal consent to proceed: Yes. Assessment & Plan:   Diagnoses and all orders for this visit:    1. ESRD (end stage renal disease) ([facility] [address]  -     GA DISCHARGE MEDS RECONCILED W/ CURRENT OUTPATIENT MED LIST    2. Other chronic pain  -     HYDROcodone-acetaminophen (NORCO) 7.5-325 mg per tablet; Take 1 Tab by mouth two (2) times daily as needed for Pain for up to 30 days. Max Daily Amount: 2 Tabs. -     HYDROcodone-acetaminophen (NORCO) 7.5-325 mg per tablet; Take 1 Tab by mouth two (2) times daily as needed for Pain for up to 30 days. Max Daily Amount: 2 Tabs. -     HYDROcodone-acetaminophen (NORCO) 7.5-325 mg per tablet; Take 1 Tab by mouth two (2) times daily as needed for Pain for up to 30 days. Max Daily Amount: 2 Tabs. 3. Chronic pain of right ankle  -     HYDROcodone-acetaminophen (NORCO) 7.5-325 mg per tablet; Take 1 Tab by mouth two (2) times daily as needed for Pain for up to 30 days. Max Daily Amount: 2 Tabs. -     HYDROcodone-acetaminophen (NORCO) 7.5-325 mg per tablet; Take 1 Tab by mouth two (2) times daily as needed for Pain for up to 30 days. Max Daily Amount: 2 Tabs. -     HYDROcodone-acetaminophen (NORCO) 7.5-325 mg per tablet; Take 1 Tab by mouth two (2) times daily as needed for Pain for up to 30 days. Max Daily Amount: 2 Tabs.     4. Chronic prescription opiate use  -     HYDROcodone-acetaminophen (NORCO) 7.5-325 mg per tablet; Take 1 Tab by mouth two (2) times daily as needed for Pain for up to 30 days. Max Daily Amount: 2 Tabs. -     HYDROcodone-acetaminophen (NORCO) 7.5-325 mg per tablet; Take 1 Tab by mouth two (2) times daily as needed for Pain for up to 30 days. Max Daily Amount: 2 Tabs. -     HYDROcodone-acetaminophen (NORCO) 7.5-325 mg per tablet; Take 1 Tab by mouth two (2) times daily as needed for Pain for up to 30 days. Max Daily Amount: 2 Tabs. 5. Chronic midline low back pain with bilateral sciatica  -     HYDROcodone-acetaminophen (NORCO) 7.5-325 mg per tablet; Take 1 Tab by mouth two (2) times daily as needed for Pain for up to 30 days. Max Daily Amount: 2 Tabs. -     HYDROcodone-acetaminophen (NORCO) 7.5-325 mg per tablet; Take 1 Tab by mouth two (2) times daily as needed for Pain for up to 30 days. Max Daily Amount: 2 Tabs. -     HYDROcodone-acetaminophen (NORCO) 7.5-325 mg per tablet; Take 1 Tab by mouth two (2) times daily as needed for Pain for up to 30 days. Max Daily Amount: 2 Tabs. The complexity of medical decision making for this visit is moderate         I spent at least 16 minutes on this visit with this established patient. Subjective:   Lorenzo Espinal is a 61 y.o. female who was seen for [facility] discharge 5/19. Admitted for dialysis catheter graft left arm replacement. Dialysis yesterday and had to use one in chest as graft not working. Appt with surgeon today. Lorenzo Espinal RTC today to follow up on chronic pain diagnosis. We discussed her osteoarthritis that is affecting her right ankle and back. Significant changes since last visit: none. She is  able to do her normal daily activities. She reports the following adverse side effects: none. Least pain over the last week has been 5/10. Worst pain over the last week has been 10/10.     Opioid Risk Tool Reviewed: YES    Aberrant behaviors: None. Urine Drug Screen: due - order placed. Controlled substance agreement on file: YES.  reviewed:yes    Pill count is consistent with her prescription: yes and n/a    Concomitant use of a benzodiazepine: no    NA    NA     Also,  abstinence syndrome was reviewed and discussed with her today N/A    Prior to Admission medications    Medication Sig Start Date End Date Taking? Authorizing Provider   aspirin delayed-release 81 mg tablet Take 1 Tab by mouth daily. 19  Yes Gayatri Yun MD   cholecalciferol (VITAMIN D3) (1000 Units /25 mcg) tablet Take 2 Tabs by mouth daily. 10/10/19  Yes Kelvin Green MD   omeprazole (PRILOSEC) 20 mg capsule TAKE 1 CAPSULE BY MOUTH TWICE DAILY 19  Yes Kelvin Green MD   montelukast (SINGULAIR) 10 mg tablet take 1 tablet by mouth daily 19  Yes Kelvin Green MD   magnesium oxide (MAG-OX) 400 mg tablet take 1 tablet by mouth daily 19  Yes Severiano Liberty, MD   buPROPion XL (WELLBUTRIN XL) 150 mg tablet take 1 tablet by mouth every morning 19  Yes Kelvin Green MD   RENVELA 2.4 gram pwpk oral powder Take 1 Packet by mouth three (3) times daily (with meals). 19  Yes Kyle Farley NP   albuterol (PROVENTIL VENTOLIN) 2.5 mg /3 mL (0.083 %) nebulizer solution 3 mL by Nebulization route every four (4) hours as needed for Wheezing or Shortness of Breath. 1/10/19  Yes Kelvin Green MD   AQUAPHOR HEALING 41 % ointment Apply  to affected area as needed for Dry Skin.  1/10/19  Yes Kelvin Green MD   SENSIPAR 60 mg tab  18  Yes Provider, Historical   gabapentin (NEURONTIN) 300 mg capsule take 2 to 3 capsules by mouth three times a day if needed  Patient taking differently: take 1 capsule daily 18  Yes Ian Baptiste MD   letrozole [facility]) 2.5 mg tablet take 1 tablet by mouth once daily 18  Yes Ian Baptiste MD   atorvastatin (LIPITOR) 10 mg tablet take 1 tablet by mouth NIGHTLY. 7/5/17  Yes Matt Cronin MD   lidocaine 4 % gel 1 Inch by Apply Externally route two (2) times daily as needed. To ankle for pain 12/12/16  Yes Matt Cronin MD   inhalational spacing device 1 Each by Does Not Apply route as needed. 11/15/16   Matt Cronin MD     Allergies   Allergen Reactions    Iodine Anaphylaxis    Shellfish Containing Products Anaphylaxis    Other Medication Other (comments)     Metal causes numbness in hands,arms all over    Sulfa (Sulfonamide Antibiotics) Itching       Patient Active Problem List   Diagnosis Code    Asthma J45.909    Kidney disease, chronic, end stage on dialysis ([facility] [address] N18.6, Z99.2    GERD (gastroesophageal reflux disease) K21.9    Unspecified sleep apnea G47.30    Vitamin D deficiency E55.9    Chronic ankle pain M25.579, G89.29    Obesity hypoventilation syndrome ([facility]) E66.2    DCIS (ductal carcinoma in situ) of breast D05.10    Recurrent depression ([facility]) F33.9    Morbid obesity with BMI of 50.0-59.9, adult ([facility]) E66.01, Z68.43    Chronic prescription opiate use Z79.891    Functional fecal incontinence R15.9    Right wrist pain M25.531    Hypomagnesemia E83.42    ESRD (end stage renal disease) ([facility]) N18.6    New onset seizure ([facility]) R56.9     Current Outpatient Medications   Medication Sig Dispense Refill    aspirin delayed-release 81 mg tablet Take 1 Tab by mouth daily.  cholecalciferol (VITAMIN D3) (1000 Units /25 mcg) tablet Take 2 Tabs by mouth daily. 180 Tab 3    omeprazole (PRILOSEC) 20 mg capsule TAKE 1 CAPSULE BY MOUTH TWICE DAILY 180 Cap 3    montelukast (SINGULAIR) 10 mg tablet take 1 tablet by mouth daily 90 Tab 3    magnesium oxide (MAG-OX) 400 mg tablet take 1 tablet by mouth daily 90 Tab 3    buPROPion XL (WELLBUTRIN XL) 150 mg tablet take 1 tablet by mouth every morning 90 Tab 3    RENVELA 2.4 gram pwpk oral powder Take 1 Packet by mouth three (3) times daily (with meals).  180 Packet 0    albuterol (PROVENTIL VENTOLIN) 2.5 mg /3 mL (0.083 %) nebulizer solution 3 mL by Nebulization route every four (4) hours as needed for Wheezing or Shortness of Breath. 1 Package 4    AQUAPHOR HEALING 41 % ointment Apply  to affected area as needed for Dry Skin. 56 g 11    SENSIPAR 60 mg tab   0    gabapentin (NEURONTIN) 300 mg capsule take 2 to 3 capsules by mouth three times a day if needed (Patient taking differently: take 1 capsule daily) 270 Cap 11    letrozole (FEMARA) 2.5 mg tablet take 1 tablet by mouth once daily 30 Tab 5    atorvastatin (LIPITOR) 10 mg tablet take 1 tablet by mouth NIGHTLY. 90 Tab 3    lidocaine 4 % gel 1 Inch by Apply Externally route two (2) times daily as needed. To ankle for pain 30 g 1    inhalational spacing device 1 Each by Does Not Apply route as needed.  1 Device 1     Allergies   Allergen Reactions    Iodine Anaphylaxis    Shellfish Containing Products Anaphylaxis    Other Medication Other (comments)     Metal causes numbness in hands,arms all over    Sulfa (Sulfonamide Antibiotics) Itching     Past Medical History:   Diagnosis Date    Advanced care planning/counseling discussion 4/7/16    Arthritis     back    Arthritis of ankle or foot, right     Asthma     Dr. Day Barton [facility]    Breast cancer [facility])     Right Lumpectomy 2014 - DCIS    Chronic kidney disease     STAGE IV/ dialysis [facility]    Chronic obstructive pulmonary disease ([address]     Chronic pain 1989    ANKLE-right- h/o fx ankle    CKD (chronic kidney disease) stage V requiring chronic dialysis ([address]     Dr. Randa Vera [facility]    Depression     DEPRESSION AND ANXIETY    GERD (gastroesophageal reflux disease)     Heart failure ([address]     h/o chf - Normal EF, likely due to volume overload    History of abdominal hernia     History of MRSA infection 01/2018    h/o MRSA leg     Hypertension     Morbid obesity ([address]     Respiratory failure, chronic ([address] 10/07/14    Dr. Benjamin Ding Stroke [facility]) 2009    ? TIA @ age 52    Unspecified sleep apnea 10/07/2014    BIPAP with oxygen    Vitamin D deficiency 12/2011    10.[facility] labs     Past Surgical History:   Procedure Laterality Date    HX BREAST BIOPSY Right 4/7/14    DCIS    HX CHOLECYSTECTOMY  2009    choleystectomy    HX COLONOSCOPY  8/18/11    Dr. Bose Smoke HX GYN      tuabl ligation    HX ORTHOPAEDIC  1989    r ankle and leg plate and screw    HX ORTHOPAEDIC  2019    bone taken out of right pink toe    HX OTHER SURGICAL  3/10/16    Graft in left arm     HX VASCULAR ACCESS Right 08/2019    DIALYSIS CATHETER-upper chest    VASCULAR SURGERY PROCEDURE UNLIST      4 surgeries on left upper arm grafts stopping up. Family History   Problem Relation Age of Onset    Diabetes Mother     Heart Disease Mother     Heart Disease Father     Hypertension Sister     Breast Cancer Paternal Aunt 28    No Known Problems Son     Anesth Problems Neg Hx      Social History     Tobacco Use    Smoking status: Never Smoker    Smokeless tobacco: Never Used   Substance Use Topics    Alcohol use: Yes     Comment:  Occ on holidays       ROS    Objective:   Vital Signs: (As obtained by patient/caregiver at home)  Visit Vitals  /75   Wt 335 lb (152 kg)   LMP  (LMP Unknown)   BMI 55.75 kg/m²        [INSTRUCTIONS:  \"[x]\" Indicates a positive item  \"[]\" Indicates a negative item  -- DELETE ALL ITEMS NOT EXAMINED]    Constitutional: [x] Appears well-developed and well-nourished [x] No apparent distress      [] Abnormal -     Mental status: [x] Alert and awake  [x] Oriented to person/place/time [x] Able to follow commands    [] Abnormal -     Eyes:   EOM    [x]  Normal    [] Abnormal -   Sclera  [x]  Normal    [] Abnormal -          Discharge [x]  None visible   [] Abnormal -       Neurological:        [x] No Facial Asymmetry (Cranial nerve 7 motor function) (limited exam due to video visit)          [x] No gaze palsy        [] Abnormal - Psychiatric:       [x] Normal Affect [] Abnormal -        [x] No Hallucinations    Other pertinent observable physical exam findings:-        We discussed the expected course, resolution and complications of the diagnosis(es) in detail. Medication risks, benefits, costs, interactions, and alternatives were discussed as indicated. I advised her to contact the office if her condition worsens, changes or fails to improve as anticipated. She expressed understanding with the diagnosis(es) and plan. Minh Reed is a 61 y.o. female who was evaluated by a video visit encounter for concerns as above. Patient identification was verified prior to start of the visit. A caregiver was present when appropriate. Due to this being a TeleHealth encounter (During IASOF-21 public health emergency), evaluation of the following organ systems was limited: Vitals/Constitutional/EENT/Resp/CV/GI//MS/Neuro/Skin/Heme-Lymph-Imm. Pursuant to the emergency declaration under the [facility], [facility]5 waiver authority and the [employer] Act, this Virtual  Visit was conducted, with patient's (and/or legal guardian's) consent, to reduce the patient's risk of exposure to COVID-19 and provide necessary medical care. Services were provided through a video synchronous discussion virtually to substitute for in-person clinic visit. Patient and provider were located at their individual homes.       Patricia Lane MD

## 2020-05-28 NOTE — PROGRESS NOTES
INSTRUCTIONS FOR INTERVENTIONAL RADIOLOGY:    Arrive at [address] [place] [address], outpatient registration on Tuesday, June 9, 2020 at 7:30 am    Nothing to eat or drink after midnight Monday, except can take medications with a little water    Must have a

## 2020-05-28 NOTE — PROGRESS NOTES
The patient is a 64-year old woman who is status post insertion of arteriovenous graft in the left upper arm on 5/19/20. The patient had a previous history of an AV graft in the left upper arm. She has a history of right breast cancer and had in the past had a right sentinel node biopsy. The patient was noted at dialysis three days ago to have good flow in the graft. When she went to dialysis yesterday, no flow was noted in the graft. The graft itself has not been used for dialysis. She has a right-sided central catheter. On examination the incision sites left arm are all doing well. I do not feel a thrill in the graft. Duplex ultrasound shows thrombosis of the AV graft. I will arrange for the patient to have thrombolysis and fistulogram and angioplasty next week after the graft has had a bit more time to seal in its tunnel. I will plan to see the patient in the office a few days after that procedure is done. Final Diagnosis:  Thrombosis of AV graft placed on 5/19/20.    c: Dylan Gallegos MD

## 2020-06-04 ENCOUNTER — TELEPHONE (OUTPATIENT)
Dept: SURGERY | Age: 60
End: 2020-06-04

## 2020-06-04 NOTE — TELEPHONE ENCOUNTER
Spoke with Ms Herve Schilling. Thrombolysis & angioplasty of AV graft left arm to be scheduled at [facility]. Will call pt back when scheduled. Spoke with Ms Herve Schilling. Office notes, demographic & insurance information faxed to [facility], confirmation received. [facility] having computer issues, will call 6/5/20 with appt for pt.

## 2020-06-08 NOTE — TELEPHONE ENCOUNTER
Ms Rola Trevino called regarding appt. Niraj Aceves left v/m for pt to call. Gave pt phone number for Niraj Aceves.   Appt scheduled on 6/11/20 at 9:40 am.  Also scheduled appt for pt to see Dr Zonia Rider at 2:40 pm regarding possible staple removal.

## 2020-06-11 ENCOUNTER — OFFICE VISIT (OUTPATIENT)
Dept: SURGERY | Age: 60
End: 2020-06-11

## 2020-06-11 VITALS
WEIGHT: 293 LBS | OXYGEN SATURATION: 85 % | HEIGHT: 65 IN | RESPIRATION RATE: 20 BRPM | DIASTOLIC BLOOD PRESSURE: 78 MMHG | HEART RATE: 57 BPM | BODY MASS INDEX: 48.82 KG/M2 | TEMPERATURE: 97.5 F | SYSTOLIC BLOOD PRESSURE: 154 MMHG

## 2020-06-11 DIAGNOSIS — Z09 POSTOPERATIVE EXAMINATION: ICD-10-CM

## 2020-06-11 DIAGNOSIS — T82.868D THROMBOSIS OF ARTERIOVENOUS GRAFT, SUBSEQUENT ENCOUNTER: Primary | ICD-10-CM

## 2020-06-11 NOTE — PROGRESS NOTES
The patient is status post insertion of AV graft 5/19/20. She was noted to have thrombosis of AV graft. She went today to [place] and had thrombolysis of the graft. I spoke with Dr. Yobany Romero who reported that clearing the graft took quite a bit of effort and flow in the graft is not as brisk as would be hoped. On examination there is an excellent bruit in the AV graft. I removed the sutures from all of the more distal sites from surgery and the staples from the proximal site. There were a few tiny areas of skin opening at the axillary site which appeared very superficial and were only 2 mm across. The tissues there were soft and free of induration or purulence. A dry dressing was applied to the axilla. No dressing was applied at the other incision sites. I changed the Band-Aids overlying the puncture sites from today's fistulogram. There were two sites that had been sutured. I will plan to see the patient in follow up in one week. The patient reports that she at times has to be hypotensive. I explained I would have some concern about the graft staying open if she should experience periods of hypotension. Final Diagnosis: Status post insertion AV graft, thrombosis AV graft, status post successful angiographic opening of AV graft.

## 2020-06-18 ENCOUNTER — TELEPHONE (OUTPATIENT)
Dept: SURGERY | Age: 60
End: 2020-06-18

## 2020-06-18 ENCOUNTER — OFFICE VISIT (OUTPATIENT)
Dept: SURGERY | Age: 60
End: 2020-06-18

## 2020-06-18 VITALS
TEMPERATURE: 97.2 F | OXYGEN SATURATION: 92 % | HEART RATE: 89 BPM | WEIGHT: 293 LBS | HEIGHT: 65 IN | RESPIRATION RATE: 20 BRPM | BODY MASS INDEX: 48.82 KG/M2 | DIASTOLIC BLOOD PRESSURE: 56 MMHG | SYSTOLIC BLOOD PRESSURE: 120 MMHG

## 2020-06-18 DIAGNOSIS — Z09 POSTOPERATIVE EXAMINATION: Primary | ICD-10-CM

## 2020-06-18 NOTE — PROGRESS NOTES
Surgery    The patient is S/P insertion of arteriovenous graft on 5/19/2020. The patient has no complaints. The patient on 6/11/2020 had thrombolysis of AV graft performed at [facility]. On examination, there is a bruit in the AV graft. The AV graft is most easily felt along the inferior portion of the loop and to the outer aspect of the old graft in the proximal anterior upper arm. There is mild edema along the graft. The arteriovenous graft can be used for dialysis starting on 6/29/2020. My office will notify the dialysis unit. The patient can follow up PRN.     Lovely Koyanagi, MD      CC:  Dr Yared Blandon

## 2020-06-18 NOTE — TELEPHONE ENCOUNTER
Spoke with dialysis nurse. Per gabby Hyde to use AV graft left arm starting Monday, 6/29/20. Faxed 6/18/20 office note to dialysis (f) [phone], confirmation received.

## 2020-06-18 NOTE — PROGRESS NOTES
1. Have you been to the ER, urgent care clinic since your last visit? Hospitalized since your last visit? No    2. Have you seen or consulted any other health care providers outside of the [address] [place] [address] since your last visit? Include any pap smears or colon screening.   No

## 2020-08-31 ENCOUNTER — TELEPHONE (OUTPATIENT)
Dept: FAMILY MEDICINE CLINIC | Age: 60
End: 2020-08-31

## 2020-08-31 NOTE — TELEPHONE ENCOUNTER
The patient said that she needed a new prescription for a bariatric rollator. The patient said she has fell twice with her rollator wheel has fell off. The patient said she needed her new walker prescription sent to :  [address] [place] [address]   [address], [place], [address] · ~8.5 mi   [phone]   [phone]     Open · Closes 5 PM    The patient would like it sent today if possible.

## 2020-09-01 NOTE — TELEPHONE ENCOUNTER
Thanks Sesar Mendoza, I agree. Yes, I'm pretty certain a visit is required for this type of order since documentation is likely required on the chart.

## 2020-09-01 NOTE — TELEPHONE ENCOUNTER
Is this something that pt has to wait for PCP or can you order it AYANNA Plunkett? Will call pt back once I have more information.

## 2020-09-03 NOTE — TELEPHONE ENCOUNTER
Scheduled pt for VV with PCP. Pt verified understanding and is aware that this visit will be virtual, not in office.

## 2020-09-10 ENCOUNTER — VIRTUAL VISIT (OUTPATIENT)
Dept: FAMILY MEDICINE CLINIC | Age: 60
End: 2020-09-10
Payer: MEDICAID

## 2020-09-10 DIAGNOSIS — Z79.891 CHRONIC PRESCRIPTION OPIATE USE: ICD-10-CM

## 2020-09-10 DIAGNOSIS — T82.898A ANEURYSM OF ARTERIOVENOUS DIALYSIS FISTULA, INITIAL ENCOUNTER (HCC): ICD-10-CM

## 2020-09-10 DIAGNOSIS — F33.9 RECURRENT DEPRESSION (HCC): ICD-10-CM

## 2020-09-10 DIAGNOSIS — N18.6 ESRD (END STAGE RENAL DISEASE) (HCC): ICD-10-CM

## 2020-09-10 DIAGNOSIS — E66.01 MORBID OBESITY WITH BMI OF 50.0-59.9, ADULT (HCC): ICD-10-CM

## 2020-09-10 DIAGNOSIS — R29.898 WEAKNESS OF BOTH LEGS: Primary | ICD-10-CM

## 2020-09-10 PROCEDURE — 99213 OFFICE O/P EST LOW 20 MIN: CPT | Performed by: FAMILY MEDICINE

## 2020-09-10 RX ORDER — BUPROPION HYDROCHLORIDE 150 MG/1
TABLET ORAL
Qty: 90 TAB | Refills: 0 | Status: SHIPPED | OUTPATIENT
Start: 2020-09-10 | End: 2021-06-03

## 2020-09-10 NOTE — PROGRESS NOTES
**THIS IS A VIRTUAL VISIT VIA A VIDEO SYNCHRONOUS DISCUSSION OVER DOXY. ME PATIENT AGREED TO HAVE THEIR CARE DELIVERED OVER A [employer] VIDEO VISIT IN PLACE OF THEIR REGULARLY SCHEDULED OFFICE VISIT**       Ellen Jones is a 61 y.o. female who was seen by synchronous (real-time) audio-video technology on 9/10/2020 for No chief complaint on file. Assessment & Plan:   Diagnoses and all orders for this visit:    1. Weakness of both legs  -     AMB SUPPLY ORDER    2. Recurrent depression (HCC)  -     buPROPion XL (WELLBUTRIN XL) 150 mg tablet; take 1 tablet by mouth every morning    3. Aneurysm of arteriovenous dialysis fistula, initial encounter ([facility]    4. ESRD (end stage renal disease) ([facility]    5. Morbid obesity with BMI of 50.0-59.9, adult ([facility]    6. Chronic prescription opiate use      The complexity of medical decision making for this visit is moderate         I spent at least 13 minutes on this visit with this established patient. Subjective:     Needs bariatric rollator. Current one is broken. Has fallen twice with current equipment. Pbs with dialysis graft in arm. Opened up twice. Has pseudo aneurysm. Seeing vascular surgeon. Follow up 2 mos. Thinks Wellbutrin 150 XL may be causing twitch. Advised to break pill in half. Realized not even taking the Wellbutrin but ibuprofen. Prior to Admission medications    Medication Sig Start Date End Date Taking? Authorizing Provider   aspirin delayed-release 81 mg tablet Take 1 Tab by mouth daily. 12/18/19   Shirlene Garzon MD   cholecalciferol (VITAMIN D3) (1000 Units /25 mcg) tablet Take 2 Tabs by mouth daily.  10/10/19   Khanh Green MD   omeprazole (PRILOSEC) 20 mg capsule TAKE 1 CAPSULE BY MOUTH TWICE DAILY 9/12/19   Khanh Green MD   montelukast (SINGULAIR) 10 mg tablet take 1 tablet by mouth daily 9/12/19   Khanh Green MD   magnesium oxide (MAG-OX) 400 mg tablet take 1 tablet by mouth daily 9/12/19   Khanh Green MD   buPROPion XL (WELLBUTRIN XL) 150 mg tablet take 1 tablet by mouth every morning 9/12/19   Roseanne Green MD   RENVELA 2.4 gram pwpk oral powder Take 1 Packet by mouth three (3) times daily (with meals). 2/7/19   Kristal Jarvis NP   albuterol (PROVENTIL VENTOLIN) 2.5 mg /3 mL (0.083 %) nebulizer solution 3 mL by Nebulization route every four (4) hours as needed for Wheezing or Shortness of Breath. 1/10/19   Roseanne Green MD   AQUAPHOR HEALING 41 % ointment Apply  to affected area as needed for Dry Skin. 1/10/19   Roseanne Green MD   SENSIPAR 60 mg tab  11/21/18   Provider, Historical   gabapentin (NEURONTIN) 300 mg capsule take 2 to 3 capsules by mouth three times a day if needed  Patient taking differently: take 1 capsule daily 6/1/18   Michael Frank MD   letrozole [facility]) 2.5 mg tablet take 1 tablet by mouth once daily 1/19/18   Michael Frank MD   atorvastatin (LIPITOR) 10 mg tablet take 1 tablet by mouth NIGHTLY. 7/5/17   Michael Frank MD   lidocaine 4 % gel 1 Inch by Apply Externally route two (2) times daily as needed. To ankle for pain 12/12/16   Michael Frank MD   inhalational spacing device 1 Each by Does Not Apply route as needed.  11/15/16   Michael Frank MD     Patient Active Problem List   Diagnosis Code    Asthma J45.909    Kidney disease, chronic, end stage on dialysis ([facility] N18.6, Z99.2    GERD (gastroesophageal reflux disease) K21.9    Unspecified sleep apnea G47.30    Vitamin D deficiency E55.9    Chronic ankle pain M25.579, G89.29    Obesity hypoventilation syndrome (HCC) E66.2    DCIS (ductal carcinoma in situ) of breast D05.10    Recurrent depression ([facility] F33.9    Morbid obesity with BMI of 50.0-59.9, adult (HCC) E66.01, Z68.43    Chronic prescription opiate use Z79.891    Functional fecal incontinence R15.9    Right wrist pain M25.531    Hypomagnesemia E83.42    ESRD (end stage renal disease) (HCC) N18.6    New onset seizure ([facility] R56.9     Current Outpatient Medications   Medication Sig Dispense Refill    aspirin delayed-release 81 mg tablet Take 1 Tab by mouth daily.  cholecalciferol (VITAMIN D3) (1000 Units /25 mcg) tablet Take 2 Tabs by mouth daily. 180 Tab 3    omeprazole (PRILOSEC) 20 mg capsule TAKE 1 CAPSULE BY MOUTH TWICE DAILY 180 Cap 3    montelukast (SINGULAIR) 10 mg tablet take 1 tablet by mouth daily 90 Tab 3    magnesium oxide (MAG-OX) 400 mg tablet take 1 tablet by mouth daily 90 Tab 3    buPROPion XL (WELLBUTRIN XL) 150 mg tablet take 1 tablet by mouth every morning 90 Tab 3    RENVELA 2.4 gram pwpk oral powder Take 1 Packet by mouth three (3) times daily (with meals). 180 Packet 0    albuterol (PROVENTIL VENTOLIN) 2.5 mg /3 mL (0.083 %) nebulizer solution 3 mL by Nebulization route every four (4) hours as needed for Wheezing or Shortness of Breath. 1 Package 4    AQUAPHOR HEALING 41 % ointment Apply  to affected area as needed for Dry Skin. 56 g 11    SENSIPAR 60 mg tab   0    gabapentin (NEURONTIN) 300 mg capsule take 2 to 3 capsules by mouth three times a day if needed (Patient taking differently: take 1 capsule daily) 270 Cap 11    letrozole (FEMARA) 2.5 mg tablet take 1 tablet by mouth once daily 30 Tab 5    atorvastatin (LIPITOR) 10 mg tablet take 1 tablet by mouth NIGHTLY. 90 Tab 3    lidocaine 4 % gel 1 Inch by Apply Externally route two (2) times daily as needed. To ankle for pain 30 g 1    inhalational spacing device 1 Each by Does Not Apply route as needed.  1 Device 1     Allergies   Allergen Reactions    Iodine Anaphylaxis    Shellfish Containing Products Anaphylaxis    Other Medication Other (comments)     Metal causes numbness in hands,arms all over    Sulfa (Sulfonamide Antibiotics) Itching     Past Medical History:   Diagnosis Date    Advanced care planning/counseling discussion 4/7/16    Arthritis     back    Arthritis of ankle or foot, right     Asthma     Dr. Mayra Harrington [facility]    Breast cancer [facility]) Right Lumpectomy 2014 - DCIS    Chronic kidney disease     STAGE IV/ dialysis [facility]    Chronic obstructive pulmonary disease (HCC)     Chronic pain 1989    ANKLE-right- h/o fx ankle    CKD (chronic kidney disease) stage V requiring chronic dialysis ([facility])     Dr. April Cordero [facility]    Depression     DEPRESSION AND ANXIETY    GERD (gastroesophageal reflux disease)     Heart failure ([facility]     h/o chf - Normal EF, likely due to volume overload    History of abdominal hernia     History of MRSA infection 01/2018    h/o MRSA leg     Hypertension     Morbid obesity ([facility]     Respiratory failure, chronic ([facility] 10/07/14    Dr. Rachael Hewitt Stroke [facility]) 2009    ? TIA @ age 52    Unspecified sleep apnea 10/07/2014    BIPAP with oxygen    Vitamin D deficiency 12/2011    10.4 [facility] labs     Past Surgical History:   Procedure Laterality Date    HX BREAST BIOPSY Right 4/7/14    DCIS    HX CHOLECYSTECTOMY  2009    choleystectomy    HX COLONOSCOPY  8/18/11    Dr. Josef Taylor HX GYN      tuabl ligation    HX ORTHOPAEDIC  1989    r ankle and leg plate and screw    HX ORTHOPAEDIC  2019    bone taken out of right pink toe    HX OTHER SURGICAL  3/10/16    Graft in left arm     HX VASCULAR ACCESS Right 08/2019    DIALYSIS CATHETER-upper chest    VASCULAR SURGERY PROCEDURE UNLIST      4 surgeries on left upper arm grafts stopping up.  VASCULAR SURGERY PROCEDURE UNLIST  05/19/2020    Insertion of AV graft left arm       ROS    Objective:   No flowsheet data found.      [INSTRUCTIONS:  \"[x]\" Indicates a positive item  \"[]\" Indicates a negative item  -- DELETE ALL ITEMS NOT EXAMINED]    Constitutional: [x] Appears well-developed and well-nourished [x] No apparent distress      [] Abnormal -     Mental status: [x] Alert and awake  [x] Oriented to person/place/time [x] Able to follow commands    [] Abnormal -      Psychiatric:       [x] Normal Affect [] Abnormal -        [x] No Hallucinations    Other pertinent observable physical exam findings:-        We discussed the expected course, resolution and complications of the diagnosis(es) in detail. Medication risks, benefits, costs, interactions, and alternatives were discussed as indicated. I advised her to contact the office if her condition worsens, changes or fails to improve as anticipated. She expressed understanding with the diagnosis(es) and plan. Verona Boggs, who was evaluated through a patient-initiated, synchronous (real-time) audio-video encounter, and/or her healthcare decision maker, is aware that it is a billable service, with coverage as determined by her insurance carrier. She provided verbal consent to proceed: Yes, and patient identification was verified. It was conducted pursuant to the emergency declaration under the [address] [place] [address], [address] [place] [address] authority and the [employer] Act. A caregiver was present when appropriate. Ability to conduct physical exam was limited. I was at home. The patient was at home.       Leann White MD

## 2020-09-15 ENCOUNTER — TELEPHONE (OUTPATIENT)
Dept: FAMILY MEDICINE CLINIC | Age: 60
End: 2020-09-15

## 2020-09-15 NOTE — TELEPHONE ENCOUNTER
The patient recently had a visit with Dr. Alivia Ramirez 9/10/2020. The patient would like to know if the office could fax a prescription for a rollator to [facility] as soon as possible the patient said that she was told that the prescription for the rollator would be faxed but she called [facility] and the prescription was not there. The patient was angry and upset because she needs the walker to walk.  Please fax [phone]

## 2020-09-15 NOTE — TELEPHONE ENCOUNTER
The patient said that she needed a new prescription for a bariatric rollator. The patient said she has fell twice with her rollator wheel has fell off. The patient said she needed her new walker prescription sent to :  [address] [place] [address], [address] · ~8.5 mi   [phone]   [phone]      Open · Closes 5 PM     The patient would like it sent today if possible.

## 2020-09-16 NOTE — TELEPHONE ENCOUNTER
Order has been faxed over to [facility] at [phone] as requested as of 9/16.

## 2020-09-18 ENCOUNTER — TELEPHONE (OUTPATIENT)
Dept: FAMILY MEDICINE CLINIC | Age: 60
End: 2020-09-18

## 2020-09-18 NOTE — TELEPHONE ENCOUNTER
Order faxed over to [facility], second attempt to [phone] as requested. Received the transmission ok fax back as of 9/18/2020.

## 2020-09-18 NOTE — TELEPHONE ENCOUNTER
The [facility] care coordinator called to follow up on the bariatric rollator that the patient has been requesting for weeks. The care coordinator has dispatched a personnel to her house and the wheel is broken and they would like to know the status of the prescription for a new bariatric rollator.  Please call [place] [phone]

## 2020-09-30 ENCOUNTER — TELEPHONE (OUTPATIENT)
Dept: FAMILY MEDICINE CLINIC | Age: 60
End: 2020-09-30

## 2020-09-30 NOTE — TELEPHONE ENCOUNTER
The patient said that she needed a new prescription for a bariatric rollator. The patient was made aware that the rollator prescription was faxed on September 16 and Sept 18th. The patient said [facility] did not receive the fax. The patient said she has fell twice with her rollator wheel has fell off. The patient said she needed her new walker prescription sent to :  [address] [place] [address] · ~1.8 mi   [phone]   [phone]      Open · Closes 5 PM     The patient would like it sent today if possible.

## 2020-10-06 NOTE — TELEPHONE ENCOUNTER
Ambulatory supply order has been faxed to [facility] again today [phone]. Have the [place] transmission.

## 2020-10-21 ENCOUNTER — APPOINTMENT (OUTPATIENT)
Dept: NUCLEAR MEDICINE | Age: 60
DRG: 133 | End: 2020-10-21
Attending: PHYSICIAN ASSISTANT
Payer: MEDICAID

## 2020-10-21 ENCOUNTER — HOSPITAL ENCOUNTER (INPATIENT)
Age: 60
LOS: 4 days | Discharge: HOME | DRG: 133 | End: 2020-10-25
Attending: EMERGENCY MEDICINE | Admitting: INTERNAL MEDICINE
Payer: MEDICAID

## 2020-10-21 ENCOUNTER — APPOINTMENT (OUTPATIENT)
Dept: GENERAL RADIOLOGY | Age: 60
DRG: 133 | End: 2020-10-21
Attending: PHYSICIAN ASSISTANT
Payer: MEDICAID

## 2020-10-21 ENCOUNTER — APPOINTMENT (OUTPATIENT)
Dept: CT IMAGING | Age: 60
DRG: 133 | End: 2020-10-21
Attending: PHYSICIAN ASSISTANT
Payer: MEDICAID

## 2020-10-21 DIAGNOSIS — R06.02 SOB (SHORTNESS OF BREATH): ICD-10-CM

## 2020-10-21 DIAGNOSIS — R79.89 ELEVATED BRAIN NATRIURETIC PEPTIDE (BNP) LEVEL: ICD-10-CM

## 2020-10-21 DIAGNOSIS — R79.89 ELEVATED D-DIMER: ICD-10-CM

## 2020-10-21 DIAGNOSIS — R09.02 HYPOXIA: Primary | ICD-10-CM

## 2020-10-21 LAB
ALBUMIN SERPL-MCNC: 3.9 G/DL (ref 3.5–5)
ALBUMIN/GLOB SERPL: 0.8 {RATIO} (ref 1.1–2.2)
ALP SERPL-CCNC: 86 U/L (ref 45–117)
ALT SERPL-CCNC: 16 U/L (ref 12–78)
ANION GAP SERPL CALC-SCNC: 11 MMOL/L (ref 5–15)
AST SERPL-CCNC: 14 U/L (ref 15–37)
BASOPHILS # BLD: 0 K/UL (ref 0–0.1)
BASOPHILS NFR BLD: 0 % (ref 0–1)
BILIRUB SERPL-MCNC: 0.7 MG/DL (ref 0.2–1)
BNP SERPL-MCNC: ABNORMAL PG/ML
BUN SERPL-MCNC: 22 MG/DL (ref 6–20)
BUN/CREAT SERPL: 3 (ref 12–20)
CALCIUM SERPL-MCNC: 10.1 MG/DL (ref 8.5–10.1)
CHLORIDE SERPL-SCNC: 94 MMOL/L (ref 97–108)
CO2 SERPL-SCNC: 30 MMOL/L (ref 21–32)
COMMENT, HOLDF: NORMAL
CREAT SERPL-MCNC: 6.37 MG/DL (ref 0.55–1.02)
D DIMER PPP FEU-MCNC: 4.27 MG/L FEU (ref 0–0.65)
DIFFERENTIAL METHOD BLD: ABNORMAL
EOSINOPHIL # BLD: 0.1 K/UL (ref 0–0.4)
EOSINOPHIL NFR BLD: 1 % (ref 0–7)
ERYTHROCYTE [DISTWIDTH] IN BLOOD BY AUTOMATED COUNT: 14.5 % (ref 11.5–14.5)
GLOBULIN SER CALC-MCNC: 5.1 G/DL (ref 2–4)
GLUCOSE SERPL-MCNC: 87 MG/DL (ref 65–100)
HCT VFR BLD AUTO: 37.6 % (ref 35–47)
HGB BLD-MCNC: 11.7 G/DL (ref 11.5–16)
IMM GRANULOCYTES # BLD AUTO: 0 K/UL (ref 0–0.04)
IMM GRANULOCYTES NFR BLD AUTO: 1 % (ref 0–0.5)
LYMPHOCYTES # BLD: 1.6 K/UL (ref 0.8–3.5)
LYMPHOCYTES NFR BLD: 21 % (ref 12–49)
MCH RBC QN AUTO: 32 PG (ref 26–34)
MCHC RBC AUTO-ENTMCNC: 31.1 G/DL (ref 30–36.5)
MCV RBC AUTO: 102.7 FL (ref 80–99)
MONOCYTES # BLD: 0.6 K/UL (ref 0–1)
MONOCYTES NFR BLD: 7 % (ref 5–13)
NEUTS SEG # BLD: 5.7 K/UL (ref 1.8–8)
NEUTS SEG NFR BLD: 70 % (ref 32–75)
NRBC # BLD: 0 K/UL (ref 0–0.01)
NRBC BLD-RTO: 0 PER 100 WBC
PLATELET # BLD AUTO: 173 K/UL (ref 150–400)
PMV BLD AUTO: 12.2 FL (ref 8.9–12.9)
POTASSIUM SERPL-SCNC: 3.5 MMOL/L (ref 3.5–5.1)
PROT SERPL-MCNC: 9 G/DL (ref 6.4–8.2)
RBC # BLD AUTO: 3.66 M/UL (ref 3.8–5.2)
SAMPLES BEING HELD,HOLD: NORMAL
SODIUM SERPL-SCNC: 135 MMOL/L (ref 136–145)
WBC # BLD AUTO: 8 K/UL (ref 3.6–11)

## 2020-10-21 PROCEDURE — 80053 COMPREHEN METABOLIC PANEL: CPT

## 2020-10-21 PROCEDURE — 65270000029 HC RM PRIVATE

## 2020-10-21 PROCEDURE — 93005 ELECTROCARDIOGRAM TRACING: CPT

## 2020-10-21 PROCEDURE — 36415 COLL VENOUS BLD VENIPUNCTURE: CPT

## 2020-10-21 PROCEDURE — 85379 FIBRIN DEGRADATION QUANT: CPT

## 2020-10-21 PROCEDURE — 71046 X-RAY EXAM CHEST 2 VIEWS: CPT

## 2020-10-21 PROCEDURE — 83880 ASSAY OF NATRIURETIC PEPTIDE: CPT

## 2020-10-21 PROCEDURE — 85025 COMPLETE CBC W/AUTO DIFF WBC: CPT

## 2020-10-21 PROCEDURE — 99285 EMERGENCY DEPT VISIT HI MDM: CPT

## 2020-10-21 PROCEDURE — 74011250636 HC RX REV CODE- 250/636: Performed by: INTERNAL MEDICINE

## 2020-10-21 PROCEDURE — A9558 XE133 XENON 10MCI: HCPCS

## 2020-10-21 RX ORDER — SODIUM CHLORIDE 0.9 % (FLUSH) 0.9 %
5-40 SYRINGE (ML) INJECTION AS NEEDED
Status: DISCONTINUED | OUTPATIENT
Start: 2020-10-21 | End: 2020-10-25 | Stop reason: HOSPADM

## 2020-10-21 RX ORDER — ACETAMINOPHEN 325 MG/1
650 TABLET ORAL
Status: DISCONTINUED | OUTPATIENT
Start: 2020-10-21 | End: 2020-10-25 | Stop reason: HOSPADM

## 2020-10-21 RX ORDER — SODIUM CHLORIDE 0.9 % (FLUSH) 0.9 %
5-40 SYRINGE (ML) INJECTION EVERY 8 HOURS
Status: DISCONTINUED | OUTPATIENT
Start: 2020-10-21 | End: 2020-10-25 | Stop reason: HOSPADM

## 2020-10-21 RX ORDER — HEPARIN SODIUM 5000 [USP'U]/ML
5000 INJECTION, SOLUTION INTRAVENOUS; SUBCUTANEOUS EVERY 8 HOURS
Status: DISCONTINUED | OUTPATIENT
Start: 2020-10-21 | End: 2020-10-25 | Stop reason: HOSPADM

## 2020-10-21 RX ORDER — ACETAMINOPHEN 650 MG/1
650 SUPPOSITORY RECTAL
Status: DISCONTINUED | OUTPATIENT
Start: 2020-10-21 | End: 2020-10-25 | Stop reason: HOSPADM

## 2020-10-21 RX ORDER — POLYETHYLENE GLYCOL 3350 17 G/17G
17 POWDER, FOR SOLUTION ORAL DAILY PRN
Status: DISCONTINUED | OUTPATIENT
Start: 2020-10-21 | End: 2020-10-25 | Stop reason: HOSPADM

## 2020-10-21 RX ORDER — PROMETHAZINE HYDROCHLORIDE 25 MG/1
12.5 TABLET ORAL
Status: DISCONTINUED | OUTPATIENT
Start: 2020-10-21 | End: 2020-10-25 | Stop reason: HOSPADM

## 2020-10-21 RX ORDER — ONDANSETRON 2 MG/ML
4 INJECTION INTRAMUSCULAR; INTRAVENOUS
Status: DISCONTINUED | OUTPATIENT
Start: 2020-10-21 | End: 2020-10-25 | Stop reason: HOSPADM

## 2020-10-21 RX ADMIN — HEPARIN SODIUM 5000 UNITS: 5000 INJECTION INTRAVENOUS; SUBCUTANEOUS at 23:45

## 2020-10-21 RX ADMIN — Medication 10 ML: at 23:45

## 2020-10-21 NOTE — ED TRIAGE NOTES
She arrives saying she was sent from dialysis because her oxygen kept dropping. She says she usually uses oxygen at home and has a Cpap at night. She says she did have a little dizziness. She says they were concerned about \"COVID\" . She says she finished her dialysis.

## 2020-10-21 NOTE — ED PROVIDER NOTES
Date of Service:  10/21/2020    Patient:  Deonte Parks    Chief Complaint:  Dizziness       HPI:  Deonte Parks is a 61 y.o.  female with PMH arthritis, asthma, breast cancer, stage IV kidney disease, COPD, and anxiety, heart failure, stroke, anemia, morbid obesity. CKD Stage IV, dialysis ever M-W-F, while at dialysis today they noted her oxygen was intermittently dropping to the 80's, with improvement with oxygen. States that she uses Bipap during the night. States she does not normally need oxygen during the day while at rest, this is abnormal for her. Also endorses nausea, vomiting, diarrhea (loose watery stools, no blood in the stool) x 4 days, went to [facility] doctor earlier this week for evaluation of this. Feels SOB, currently. While at dialysis was feeling weak. No fever/chills, chest pain, wheezing, cough, abdominal pain. Past Medical History:   Diagnosis Date    Advanced care planning/counseling discussion 4/7/16    Arthritis     back    Arthritis of ankle or foot, right     Asthma     Dr. Oliver Link [facility]    Breast cancer [facility])     Right Lumpectomy 2014 - DCIS    Chronic kidney disease     STAGE IV/ dialysis [facility]    Chronic obstructive pulmonary disease ([address]     Chronic pain 1989    ANKLE-right- h/o fx ankle    CKD (chronic kidney disease) stage V requiring chronic dialysis ([address]     Dr. Saleem Gaitan [facility]    Depression     DEPRESSION AND ANXIETY    GERD (gastroesophageal reflux disease)     Heart failure ([address]     h/o chf - Normal EF, likely due to volume overload    History of abdominal hernia     History of MRSA infection 01/2018    h/o MRSA leg     Hypertension     Morbid obesity ([address]     Respiratory failure, chronic ([address] 10/07/14    Dr. Quan Enciso Stroke [facility]) 2009    ?  TIA @ age 52    Unspecified sleep apnea 10/07/2014    BIPAP with oxygen    Vitamin D deficiency 12/2011    10.4 [facility] labs       Past Surgical History:   Procedure Laterality Date    HX BREAST BIOPSY Right 4/7/14    DCIS    HX CHOLECYSTECTOMY  2009    choleystectomy    HX COLONOSCOPY  8/18/11    Dr. Lucas Null      tuabl ligation    HX ORTHOPAEDIC  1989    r ankle and leg plate and screw    HX ORTHOPAEDIC  2019    bone taken out of right pink toe    HX OTHER SURGICAL  3/10/16    Graft in left arm     HX VASCULAR ACCESS Right 08/2019    DIALYSIS CATHETER-upper chest    VASCULAR SURGERY PROCEDURE UNLIST      4 surgeries on left upper arm grafts stopping up.  VASCULAR SURGERY PROCEDURE UNLIST  05/19/2020    Insertion of AV graft left arm         Family History:   Problem Relation Age of Onset    Diabetes Mother     Heart Disease Mother     Heart Disease Father     Hypertension Sister     Breast Cancer Paternal Aunt 28    No Known Problems Son     Anesth Problems Neg Hx        Social History     Socioeconomic History    Marital status: SINGLE     Spouse name: Not on file    Number of children: Not on file    Years of education: Not on file    Highest education level: Not on file   Occupational History    Occupation: prior retail work     Comment: disabled   Social Needs    Financial resource strain: Not on file    Food insecurity     Worry: Not on file     Inability: Not on file   [employer] needs     Medical: Not on file     Non-medical: Not on file   Tobacco Use    Smoking status: Never Smoker    Smokeless tobacco: Never Used   Substance and Sexual Activity    Alcohol use: Yes     Comment:  Occ on holidays    Drug use: No    Sexual activity: Not Currently   Lifestyle    Physical activity     Days per week: Not on file     Minutes per session: Not on file    Stress: Not on file   Relationships    Social connections     Talks on phone: Not on file     Gets together: Not on file     Attends Mu-ism service: Not on file     Active member of club or organization: Not on file     Attends meetings of clubs or organizations: Not on file     Relationship status: Not on file    Intimate partner violence     Fear of current or ex partner: Not on file     Emotionally abused: Not on file     Physically abused: Not on file     Forced sexual activity: Not on file   Other Topics Concern    Not on file   Social History Narrative    Lives in [facility]         ALLERGIES: Iodine; Shellfish containing products; Other medication; and Sulfa (sulfonamide antibiotics)    Review of Systems   Constitutional: Negative for chills and fever. HENT: Negative for congestion and sore throat. Eyes: Negative for pain. Respiratory: Positive for shortness of breath. Negative for cough. Cardiovascular: Negative for chest pain and palpitations. Gastrointestinal: Positive for diarrhea, nausea and vomiting. Negative for abdominal pain. Genitourinary: Negative for dysuria, frequency and urgency. Musculoskeletal: Negative for back pain and neck pain. Neurological: Positive for dizziness. Negative for light-headedness and headaches. Vitals:    10/21/20 1704   BP: (!) 144/76   Pulse: 74   Resp: 18   Temp: 96.8 °F (36 °C)   SpO2: 96%            Physical Exam  Vitals signs and nursing note reviewed. Constitutional:       General: She is not in acute distress. Appearance: Normal appearance. She is obese. HENT:      Head: Normocephalic and atraumatic. Nose: Nose normal.   Eyes:      Extraocular Movements: Extraocular movements intact. Conjunctiva/sclera: Conjunctivae normal.      Pupils: Pupils are equal, round, and reactive to light. Neck:      Musculoskeletal: Normal range of motion and neck supple. Cardiovascular:      Rate and Rhythm: Normal rate and regular rhythm. Pulses: Normal pulses. Radial pulses are 2+ on the right side and 2+ on the left side. Posterior tibial pulses are 2+ on the right side and 2+ on the left side. Heart sounds: Normal heart sounds.    Pulmonary:      Effort: Pulmonary effort is normal.      Breath sounds: Normal breath sounds. Abdominal:      General: Abdomen is flat. Bowel sounds are normal.      Palpations: Abdomen is soft. Tenderness: There is no abdominal tenderness. There is no right CVA tenderness, left CVA tenderness, guarding or rebound. Musculoskeletal: Normal range of motion. Skin:     General: Skin is warm and dry. Capillary Refill: Capillary refill takes less than 2 seconds. Neurological:      General: No focal deficit present. Mental Status: She is alert and oriented to person, place, and time. Mental status is at baseline. Psychiatric:         Mood and Affect: Mood normal.         Behavior: Behavior normal.         Thought Content: Thought content normal.          MDM  Number of Diagnoses or Management Options  Elevated brain natriuretic peptide (BNP) level:   Elevated d-dimer:   Hypoxia:   SOB (shortness of breath):   Diagnosis management comments: 2:40 PM  Patient is being admitted to the hospital.  The results of their tests and reasons for their admission have been discussed with them and/or available family. They convey agreement and understanding for the need to be admitted and for their admission diagnosis. Consultation has been made with the inpatient physician specialist for hospitalization.     LABORATORY TESTS:  Recent Results (from the past 12 hour(s))  -METABOLIC PANEL, COMPREHENSIVE  Collection Time: 10/22/20  5:11 AM       Result                      Value             Ref Range           Sodium                      136               136 - 145 mm*       Potassium                   4.0               3.5 - 5.1 mm*       Chloride                    97                97 - 108 mmo*       CO2                         29                21 - 32 mmol*       Anion gap                   10                5 - 15 mmol/L       Glucose                     78                65 - 100 mg/*       BUN                         30 (H)            6 - 20 MG/DL        Creatinine                  7.66 (H)          0.55 - 1.02 *       BUN/Creatinine ratio        4 (L)             12 - 20             GFR est AA                  7 (L)             >60 ml/min/1*       GFR est non-AA              5 (L)             >60 ml/min/1*       Calcium                     9.5               8.5 - 10.1 M*       Bilirubin, total            0.6               0.2 - 1.0 MG*       ALT (SGPT)                  10 (L)            12 - 78 U/L         AST (SGOT)                  10 (L)            15 - 37 U/L         Alk.  phosphatase            75                45 - 117 U/L        Protein, total              7.0               6.4 - 8.2 g/*       Albumin                     3.2 (L)           3.5 - 5.0 g/*       Globulin                    3.8               2.0 - 4.0 g/*       A-G Ratio                   0.8 (L)           1.1 - 2.2      -LD  Collection Time: 10/22/20  5:11 AM       Result                      Value             Ref Range           LD                          176               81 - 246 U/L   -FERRITIN  Collection Time: 10/22/20  5:11 AM       Result                      Value             Ref Range           Ferritin                    861 (H)           26 - 388 NG/*  -TYPE & SCREEN  Collection Time: 10/22/20  5:11 AM       Result                      Value             Ref Range           Crossmatch Expiration       10/25/2020                            ABO/Rh(D)                   Jeanie Pierre POSITIVE                            Antibody screen             NEG                              -PROCALCITONIN  Collection Time: 10/22/20  5:11 AM       Result                      Value             Ref Range           Procalcitonin               0.19              ng/mL          -C REACTIVE PROTEIN, QT  Collection Time: 10/22/20  5:11 AM       Result                      Value             Ref Range           C-Reactive protein          0.87 (H)          0.00 - 0.60 *  -TROPONIN I  Collection Time: 10/22/20 5:11 AM       Result                      Value             Ref Range           Troponin-I, Qt.             <0.05             <0.05 ng/mL    -SAMPLES BEING HELD  Collection Time: 10/22/20  5:11 AM       Result                      Value             Ref Range           SAMPLES BEING HELD          1LAV,1PST                             COMMENT                                                       Add-on orders for these samples will be processed based on acceptable specimen integrity and analyte stability, which may vary by analyte.   -PROTHROMBIN TIME + INR  Collection Time: 10/22/20  5:13 AM       Result                      Value             Ref Range           INR                         1.1               0.9 - 1.1           Prothrombin time            11.2 (H)          9.0 - 11.1 s*  -PTT  Collection Time: 10/22/20  5:13 AM       Result                      Value             Ref Range           aPTT                        31.3              22.1 - 32.0 *       aPTT, therapeutic range                       58.0 - 77.0 *  -FIBRINOGEN  Collection Time: 10/22/20  5:13 AM       Result                      Value             Ref Range           Fibrinogen                  314               200 - 475 mg*  -CBC WITH AUTOMATED DIFF  Collection Time: 10/22/20  7:00 AM       Result                      Value             Ref Range           WBC                         7.0               3.6 - 11.0 K*       RBC                         3.22 (L)          3.80 - 5.20 *       HGB                         10.2 (L)          11.5 - 16.0 *       HCT                         33.1 (L)          35.0 - 47.0 %       MCV                         102.8 (H)         80.0 - 99.0 *       MCH                         31.7              26.0 - 34.0 *       MCHC                        30.8              30.0 - 36.5 *       RDW                         14.4              11.5 - 14.5 %       PLATELET                    169               150 - 400 K/*       MPV 12.0              8.9 - 12.9 FL       NRBC                        0.0               0  WBC       ABSOLUTE NRBC               0.00              0.00 - 0.01 *       NEUTROPHILS                 67                32 - 75 %           LYMPHOCYTES                 20                12 - 49 %           MONOCYTES                   11                5 - 13 %            EOSINOPHILS                 1                 0 - 7 %             BASOPHILS                   1                 0 - 1 %             IMMATURE GRANULOCYTES       0                 0.0 - 0.5 %         ABS. NEUTROPHILS            4.7               1.8 - 8.0 K/*       ABS. LYMPHOCYTES            1.4               0.8 - 3.5 K/*       ABS. MONOCYTES              0.8               0.0 - 1.0 K/*       ABS. EOSINOPHILS            0.1               0.0 - 0.4 K/*       ABS. BASOPHILS              0.0               0.0 - 0.1 K/*       ABS. IMM.  GRANS.            0.0               0.00 - 0.04 *       DF                          AUTOMATED                          MEDICATIONS GIVEN:  Medications  sodium chloride (NS) flush 5-40 mL (10 mL IntraVENous Given 10/22/20 0714)  sodium chloride (NS) flush 5-40 mL (has no administration in time range)  acetaminophen (TYLENOL) tablet 650 mg (650 mg Oral Given 10/22/20 9898)    Or  acetaminophen (TYLENOL) suppository 650 mg ( Rectal See Alternative 10/22/20 0712)  polyethylene glycol (MIRALAX) packet 17 g (has no administration in time range)  promethazine (PHENERGAN) tablet 12.5 mg (has no administration in time range)    Or  ondansetron (ZOFRAN) injection 4 mg (has no administration in time range)  heparin (porcine) injection 5,000 Units (5,000 Units SubCUTAneous Given 10/22/20 0712)  albuterol (PROVENTIL HFA, VENTOLIN HFA, PROAIR HFA) inhaler 1 Puff (has no administration in time range)  sevelamer carbonate (RENVELA) oral powder 2.4 g (2.4 g Oral Given 10/22/20 1208)  pantoprazole (PROTONIX) tablet 20 mg (20 mg Oral Given 10/22/20 1004)  montelukast (SINGULAIR) tablet 10 mg (has no administration in time range)  gabapentin (NEURONTIN) capsule 300 mg (has no administration in time range)  aspirin delayed-release tablet 81 mg (81 mg Oral Given 10/22/20 1004)  atorvastatin (LIPITOR) tablet 10 mg (has no administration in time range)  xenon xe 922 gas 9.5 millicurie (9.5 millicuries Inhalation Given 10/21/20 2252)  technetium albumin aggregated (MAA) solution 4.4 millicurie (4.4 millicuries IntraVENous Given 10/21/20 2257)    IMPRESSION:  Hypoxia  (primary encounter diagnosis)  SOB (shortness of breath)  Elevated d-dimer  Elevated brain natriuretic peptide (BNP) level    PLAN:  1. Admit to hospitalist         Amount and/or Complexity of Data Reviewed  Decide to obtain previous medical records or to obtain history from someone other than the patient: yes      ED Course as of Oct 21 1927   Wed Oct 21, 2020   1849 EKG: Sinus rhythm with premature ventricular complexes, rate 73 bpm, NC interval and QRS duration normal, normal axis, no ST depression or elevation    [EK]   1923 Needs main bed    [EK]      ED Course User Index  [EK] BIRGIT Cramer    Perfect Serve Consult for Admission  9:36 PM    ED Room Number: ER16/16  Patient Name and age:  Linda Gaston 61 y.o.  female  Working Diagnosis:   1. Hypoxia    2. SOB (shortness of breath)    3. Elevated d-dimer    4. Elevated brain natriuretic peptide (BNP) level        COVID-19 Suspicion:  no  Sepsis present:  no  Reassessment needed: no  Code Status:  Full Code  Readmission: no  Isolation Requirements:  no  Recommended Level of Care:  telemetry  Department:[facility] Adult ED - 21   Other:  Linda Gaston is a 61 y.o.  female with PMH arthritis, asthma, breast cancer, stage IV kidney disease, COPD, and anxiety, heart failure, stroke, anemia, morbid obesity.   CKD Stage IV, dialysis ever M-W-F, while at dialysis today they noted her oxygen was continuing to drop, to the 80's, with improvement with oxygen. States that she uses Bipap during the night. States she does not normally need oxygen during the day, this is abnormal for her. Also endorses nausea, vomiting, diarrhea (loose watery stools, no blood in the stool) x 4 days, went to [facility] doctor earlier this week for evaluation of this. Feels SOB, currently. While at dialysis was feeling weak. No fever/chills, chest pain, wheezing, cough, abdominal pain, normal BM. Vitals initially stable, patient has been intermittently hypoxic while in ED. D-dimer: 4.27, BNP: 00,246. CBC, CMP, CXR show no clear etiology. Patient allergic to iodine, V/Q scan pending. I was personally available for consultation in the emergency department. I have reviewed the chart and agree with the documentation recorded by the [facility], including the assessment, treatment plan, and disposition.   Leo Kline MD

## 2020-10-22 LAB
ABO + RH BLD: NORMAL
ALBUMIN SERPL-MCNC: 3.2 G/DL (ref 3.5–5)
ALBUMIN/GLOB SERPL: 0.8 {RATIO} (ref 1.1–2.2)
ALP SERPL-CCNC: 75 U/L (ref 45–117)
ALT SERPL-CCNC: 10 U/L (ref 12–78)
ANION GAP SERPL CALC-SCNC: 10 MMOL/L (ref 5–15)
APTT PPP: 31.3 SEC (ref 22.1–32)
AST SERPL-CCNC: 10 U/L (ref 15–37)
ATRIAL RATE: 73 BPM
BASOPHILS # BLD: 0 K/UL (ref 0–0.1)
BASOPHILS NFR BLD: 1 % (ref 0–1)
BILIRUB SERPL-MCNC: 0.6 MG/DL (ref 0.2–1)
BLOOD GROUP ANTIBODIES SERPL: NORMAL
BUN SERPL-MCNC: 30 MG/DL (ref 6–20)
BUN/CREAT SERPL: 4 (ref 12–20)
CALCIUM SERPL-MCNC: 9.5 MG/DL (ref 8.5–10.1)
CALCULATED P AXIS, ECG09: 75 DEGREES
CALCULATED R AXIS, ECG10: 83 DEGREES
CALCULATED T AXIS, ECG11: 63 DEGREES
CHLORIDE SERPL-SCNC: 97 MMOL/L (ref 97–108)
CO2 SERPL-SCNC: 29 MMOL/L (ref 21–32)
COMMENT, HOLDF: NORMAL
CREAT SERPL-MCNC: 7.66 MG/DL (ref 0.55–1.02)
CRP SERPL-MCNC: 0.87 MG/DL (ref 0–0.6)
DIAGNOSIS, 93000: NORMAL
DIFFERENTIAL METHOD BLD: ABNORMAL
EOSINOPHIL # BLD: 0.1 K/UL (ref 0–0.4)
EOSINOPHIL NFR BLD: 1 % (ref 0–7)
ERYTHROCYTE [DISTWIDTH] IN BLOOD BY AUTOMATED COUNT: 14.4 % (ref 11.5–14.5)
FERRITIN SERPL-MCNC: 861 NG/ML (ref 26–388)
FIBRINOGEN PPP-MCNC: 314 MG/DL (ref 200–475)
GLOBULIN SER CALC-MCNC: 3.8 G/DL (ref 2–4)
GLUCOSE SERPL-MCNC: 78 MG/DL (ref 65–100)
HCT VFR BLD AUTO: 33.1 % (ref 35–47)
HEALTH STATUS, XMCV2T: NORMAL
HGB BLD-MCNC: 10.2 G/DL (ref 11.5–16)
IMM GRANULOCYTES # BLD AUTO: 0 K/UL (ref 0–0.04)
IMM GRANULOCYTES NFR BLD AUTO: 0 % (ref 0–0.5)
INR PPP: 1.1 (ref 0.9–1.1)
LDH SERPL L TO P-CCNC: 176 U/L (ref 81–246)
LYMPHOCYTES # BLD: 1.4 K/UL (ref 0.8–3.5)
LYMPHOCYTES NFR BLD: 20 % (ref 12–49)
MCH RBC QN AUTO: 31.7 PG (ref 26–34)
MCHC RBC AUTO-ENTMCNC: 30.8 G/DL (ref 30–36.5)
MCV RBC AUTO: 102.8 FL (ref 80–99)
MONOCYTES # BLD: 0.8 K/UL (ref 0–1)
MONOCYTES NFR BLD: 11 % (ref 5–13)
NEUTS SEG # BLD: 4.7 K/UL (ref 1.8–8)
NEUTS SEG NFR BLD: 67 % (ref 32–75)
NRBC # BLD: 0 K/UL (ref 0–0.01)
NRBC BLD-RTO: 0 PER 100 WBC
P-R INTERVAL, ECG05: 166 MS
PLATELET # BLD AUTO: 169 K/UL (ref 150–400)
PMV BLD AUTO: 12 FL (ref 8.9–12.9)
POTASSIUM SERPL-SCNC: 4 MMOL/L (ref 3.5–5.1)
PROCALCITONIN SERPL-MCNC: 0.19 NG/ML
PROT SERPL-MCNC: 7 G/DL (ref 6.4–8.2)
PROTHROMBIN TIME: 11.2 SEC (ref 9–11.1)
Q-T INTERVAL, ECG07: 416 MS
QRS DURATION, ECG06: 90 MS
QTC CALCULATION (BEZET), ECG08: 458 MS
RBC # BLD AUTO: 3.22 M/UL (ref 3.8–5.2)
SAMPLES BEING HELD,HOLD: NORMAL
SARS-COV-2, COV2: NOT DETECTED
SODIUM SERPL-SCNC: 136 MMOL/L (ref 136–145)
SOURCE, COVRS: NORMAL
SPECIMEN EXP DATE BLD: NORMAL
SPECIMEN SOURCE, FCOV2M: NORMAL
SPECIMEN TYPE, XMCV1T: NORMAL
THERAPEUTIC RANGE,PTTT: NORMAL SECS (ref 58–77)
TROPONIN I SERPL-MCNC: <0.05 NG/ML
VENTRICULAR RATE, ECG03: 73 BPM
WBC # BLD AUTO: 7 K/UL (ref 3.6–11)

## 2020-10-22 PROCEDURE — 85384 FIBRINOGEN ACTIVITY: CPT

## 2020-10-22 PROCEDURE — 83615 LACTATE (LD) (LDH) ENZYME: CPT

## 2020-10-22 PROCEDURE — 74011250637 HC RX REV CODE- 250/637: Performed by: INTERNAL MEDICINE

## 2020-10-22 PROCEDURE — 5A09457 ASSISTANCE WITH RESPIRATORY VENTILATION, 24-96 CONSECUTIVE HOURS, CONTINUOUS POSITIVE AIRWAY PRESSURE: ICD-10-PCS | Performed by: INTERNAL MEDICINE

## 2020-10-22 PROCEDURE — 85730 THROMBOPLASTIN TIME PARTIAL: CPT

## 2020-10-22 PROCEDURE — 86900 BLOOD TYPING SEROLOGIC ABO: CPT

## 2020-10-22 PROCEDURE — 36600 WITHDRAWAL OF ARTERIAL BLOOD: CPT

## 2020-10-22 PROCEDURE — 65270000029 HC RM PRIVATE

## 2020-10-22 PROCEDURE — 85025 COMPLETE CBC W/AUTO DIFF WBC: CPT

## 2020-10-22 PROCEDURE — 82728 ASSAY OF FERRITIN: CPT

## 2020-10-22 PROCEDURE — 85610 PROTHROMBIN TIME: CPT

## 2020-10-22 PROCEDURE — 80053 COMPREHEN METABOLIC PANEL: CPT

## 2020-10-22 PROCEDURE — 84145 PROCALCITONIN (PCT): CPT

## 2020-10-22 PROCEDURE — 36415 COLL VENOUS BLD VENIPUNCTURE: CPT

## 2020-10-22 PROCEDURE — 86140 C-REACTIVE PROTEIN: CPT

## 2020-10-22 PROCEDURE — 94660 CPAP INITIATION&MGMT: CPT

## 2020-10-22 PROCEDURE — 74011250636 HC RX REV CODE- 250/636: Performed by: INTERNAL MEDICINE

## 2020-10-22 PROCEDURE — 87635 SARS-COV-2 COVID-19 AMP PRB: CPT

## 2020-10-22 PROCEDURE — 84484 ASSAY OF TROPONIN QUANT: CPT

## 2020-10-22 RX ORDER — ALBUTEROL SULFATE 90 UG/1
1 AEROSOL, METERED RESPIRATORY (INHALATION)
Status: DISCONTINUED | OUTPATIENT
Start: 2020-10-22 | End: 2020-10-25 | Stop reason: HOSPADM

## 2020-10-22 RX ORDER — GABAPENTIN 300 MG/1
300 CAPSULE ORAL DAILY
COMMUNITY
End: 2021-06-03

## 2020-10-22 RX ORDER — ATORVASTATIN CALCIUM 10 MG/1
10 TABLET, FILM COATED ORAL
Status: DISCONTINUED | OUTPATIENT
Start: 2020-10-22 | End: 2020-10-25 | Stop reason: HOSPADM

## 2020-10-22 RX ORDER — SEVELAMER CARBONATE FOR ORAL SUSPENSION 2400 MG/1
2.4 POWDER, FOR SUSPENSION ORAL
Status: DISCONTINUED | OUTPATIENT
Start: 2020-10-22 | End: 2020-10-25 | Stop reason: HOSPADM

## 2020-10-22 RX ORDER — PANTOPRAZOLE SODIUM 20 MG/1
20 TABLET, DELAYED RELEASE ORAL
Status: DISCONTINUED | OUTPATIENT
Start: 2020-10-22 | End: 2020-10-25 | Stop reason: HOSPADM

## 2020-10-22 RX ORDER — ASPIRIN 81 MG/1
81 TABLET ORAL DAILY
Status: DISCONTINUED | OUTPATIENT
Start: 2020-10-22 | End: 2020-10-25 | Stop reason: HOSPADM

## 2020-10-22 RX ORDER — MONTELUKAST SODIUM 10 MG/1
10 TABLET ORAL
Status: DISCONTINUED | OUTPATIENT
Start: 2020-10-22 | End: 2020-10-25 | Stop reason: HOSPADM

## 2020-10-22 RX ORDER — GABAPENTIN 300 MG/1
300 CAPSULE ORAL DAILY
Status: DISCONTINUED | OUTPATIENT
Start: 2020-10-23 | End: 2020-10-25 | Stop reason: HOSPADM

## 2020-10-22 RX ADMIN — HEPARIN SODIUM 5000 UNITS: 5000 INJECTION INTRAVENOUS; SUBCUTANEOUS at 23:04

## 2020-10-22 RX ADMIN — SEVELAMER CARBONATE 2.4 G: 2400 POWDER, FOR SUSPENSION ORAL at 17:24

## 2020-10-22 RX ADMIN — PROMETHAZINE HYDROCHLORIDE 12.5 MG: 25 TABLET ORAL at 21:09

## 2020-10-22 RX ADMIN — PANTOPRAZOLE SODIUM 20 MG: 20 TABLET, DELAYED RELEASE ORAL at 10:04

## 2020-10-22 RX ADMIN — SEVELAMER CARBONATE 2.4 G: 2400 POWDER, FOR SUSPENSION ORAL at 12:08

## 2020-10-22 RX ADMIN — HEPARIN SODIUM 5000 UNITS: 5000 INJECTION INTRAVENOUS; SUBCUTANEOUS at 15:48

## 2020-10-22 RX ADMIN — ASPIRIN 81 MG: 81 TABLET, COATED ORAL at 10:04

## 2020-10-22 RX ADMIN — Medication 10 ML: at 07:14

## 2020-10-22 RX ADMIN — SEVELAMER CARBONATE 2.4 G: 2400 POWDER, FOR SUSPENSION ORAL at 10:04

## 2020-10-22 RX ADMIN — HEPARIN SODIUM 5000 UNITS: 5000 INJECTION INTRAVENOUS; SUBCUTANEOUS at 07:12

## 2020-10-22 RX ADMIN — Medication 10 ML: at 23:04

## 2020-10-22 RX ADMIN — ACETAMINOPHEN 650 MG: 325 TABLET ORAL at 07:12

## 2020-10-22 RX ADMIN — Medication 10 ML: at 15:49

## 2020-10-22 NOTE — ROUTINE PROCESS
TRANSFER - OUT REPORT: 
 
Verbal report given to ELIESER Awan(name) on Francisco Kraft  being transferred to [facility]) for routine progression of care Report consisted of patients Situation, Background, Assessment and  
Recommendations(SBAR). Information from the following report(s) SBAR, Kardex, ED Summary, [facility] and Recent Results was reviewed with the receiving nurse. Lines:  
Peripheral IV 10/21/20 Left;Right Antecubital (Active) Site Assessment Clean, dry, & intact 10/21/20 1750 Phlebitis Assessment 0 10/21/20 1750 Infiltration Assessment 4 10/21/20 1750 Dressing Type Transparent 10/21/20 1750 Hub Color/Line Status Pink 10/21/20 1750 Opportunity for questions and clarification was provided. Patient transported with: 
 Monitor Registered Nurse

## 2020-10-22 NOTE — H&P
[address] [facility] Adult  Hospitalist Group  History and Physical    Primary Care Provider: Raimundo Chung MD  Date of Service:  10/21/2020    Subjective:     Linda Gaston is a 61 y.o. female past medical history significant for asthma, end-stage renal disease on hemodialysis, hypertension, CLEMENTINA and obesity presented to the emergency room for evaluation of hypoxia. Patient said for the last several days she has been having intermittent shortness of breath worsened with exertion. She went to dialysis today and she was found to be intermittently hypoxic dropping to the low 80s for which she was sent to the emergency room for evaluation. Patient also reports having diarrhea, intermittent nausea and few episodes of vomitings. No vomiting in the last 24 hours or diarrhea since she is been in the emergency room. 2 days ago she went to her medical hospital for the same complaints. As per patient report nothing was done she was discharged home with follow-up but her shortness of breath has persisted. Today on arrival to the emergency room patient has been found to be hemodynamically stable. O2 sats are in the 90s on room air however noticed to drop to the 80s mostly with movement and exertion. She denies any other symptoms like chest pain, fever, chills, lightheadedness, abdominal pain. Given persistent shortness of breath and risk factors admission was requested. Review of Systems:    Review of Systems   Constitutional: Negative for chills, fever, malaise/fatigue and weight loss. HENT: Negative for congestion, ear discharge and hearing loss. Eyes: Negative for blurred vision and double vision. Respiratory: Positive for shortness of breath. Negative for cough, sputum production and wheezing. Cardiovascular: Positive for orthopnea. Negative for chest pain, palpitations, leg swelling and PND. Gastrointestinal: Positive for diarrhea, nausea and vomiting.  Negative for abdominal pain, blood in stool, constipation, heartburn and melena. Genitourinary: Negative for dysuria, frequency and urgency. Musculoskeletal: Negative for back pain, joint pain and myalgias. Skin: Negative for itching and rash. Neurological: Negative for dizziness, sensory change, speech change, focal weakness, weakness and headaches. Endo/Heme/Allergies: Negative for polydipsia. Does not bruise/bleed easily. Past Medical History:   Diagnosis Date    Advanced care planning/counseling discussion 4/7/16    Arthritis     back    Arthritis of ankle or foot, right     Asthma     Dr. Mary Jo Jain [facility]    Breast cancer [facility])     Right Lumpectomy 2014 - DCIS    Chronic kidney disease     STAGE IV/ [facility]    [facility]     Chronic pain 1989    ANKLE-right- h/o fx ankle    CKD (chronic kidney disease) stage V requiring chronic dialysis ([facility]     [facility]. Jina [facility]rell [facility]    Depression     DEPRESSION AND ANXIETY    GERD (gastroesophageal reflux disease)     Heart failure ([address]     h/o chf - Normal EF, likely due to volume overload    History of abdominal hernia     History of MRSA infection 01/2018    h/o MRSA leg     Hypertension     Morbid obesity ([address]     Respiratory failure, chronic ([address] 10/07/14    Dr. Romario Noguera Stroke [facility]) 2009    ?  TIA @ age 52    Unspecified sleep apnea 10/07/2014    BIPAP with oxygen    Vitamin D deficiency 12/2011    10.4 [facility] labs      Past Surgical History:   Procedure Laterality Date    HX BREAST BIOPSY Right 4/7/14    DCIS    HX CHOLECYSTECTOMY  2009    choleystectomy    HX COLONOSCOPY  8/18/11    Dr. Ghada Maldonado HX GYN      tuabl ligation    HX ORTHOPAEDIC  1989    r ankle and leg plate and screw    HX ORTHOPAEDIC  2019    bone taken out of right pink toe    HX OTHER SURGICAL  3/10/16    Graft in left arm     HX VASCULAR ACCESS Right 08/2019    DIALYSIS CATHETER-upper chest    VASCULAR SURGERY PROCEDURE UNLIST      4 surgeries on left upper arm grafts stopping up.  VASCULAR SURGERY PROCEDURE UNLIST  05/19/2020    Insertion of AV graft left arm     Prior to Admission medications    Medication Sig Start Date End Date Taking? Authorizing Provider   montelukast (SINGULAIR) 10 mg tablet TAKE 1 TABLET BY MOUTH DAILY 9/27/20   Terry BREAUX NP   buPROPion XL (WELLBUTRIN XL) 150 mg tablet take 1 tablet by mouth every morning 9/10/20   Anaid Green MD   aspirin delayed-release 81 mg tablet Take 1 Tab by mouth daily. 12/18/19   Sachi Muñoz MD   cholecalciferol (VITAMIN D3) (1000 Units /25 mcg) tablet Take 2 Tabs by mouth daily. 10/10/19   Anaid Green MD   omeprazole (PRILOSEC) 20 mg capsule TAKE 1 CAPSULE BY MOUTH TWICE DAILY 9/12/19   Anaid Green MD   magnesium oxide (MAG-OX) 400 mg tablet take 1 tablet by mouth daily 9/12/19   Anaid Green MD   RENVELA 2.4 gram pwpk oral powder Take 1 Packet by mouth three (3) times daily (with meals). 2/7/19   Maria T Moran NP   albuterol (PROVENTIL VENTOLIN) 2.5 mg /3 mL (0.083 %) nebulizer solution 3 mL by Nebulization route every four (4) hours as needed for Wheezing or Shortness of Breath. 1/10/19   Anaid Green MD   AQUAPHOR HEALING 41 % ointment Apply  to affected area as needed for Dry Skin. 1/10/19   Anaid Green MD   SENSIPAR 60 mg tab  11/21/18   Provider, Historical   gabapentin (NEURONTIN) 300 mg capsule take 2 to 3 capsules by mouth three times a day if needed  Patient taking differently: take 1 capsule daily 6/1/18   Stephanie Nath MD   letrozole [facility]) 2.5 mg tablet take 1 tablet by mouth once daily 1/19/18   Stephanie Nath MD   atorvastatin (LIPITOR) 10 mg tablet take 1 tablet by mouth NIGHTLY. 7/5/17   Stephanie Nath MD   lidocaine 4 % gel 1 Inch by Apply Externally route two (2) times daily as needed.  To ankle for pain 12/12/16   Stephanie Hensley., MD   inhalational spacing device 1 Each by Does Not Apply route as needed. 11/15/16   Beni Bermudez MD     Allergies   Allergen Reactions    Iodine Anaphylaxis    Shellfish Containing Products Anaphylaxis    Other Medication Other (comments)     Metal causes numbness in hands,arms all over    Sulfa (Sulfonamide Antibiotics) Itching      Family History   Problem Relation Age of Onset    Diabetes Mother     Heart Disease Mother     Heart Disease Father     Hypertension Sister     Breast Cancer Paternal Aunt 28    No Known Problems Son     Anesth Problems Neg Hx         SOCIAL HISTORY:  Patient resides at home  Patient ambulates independently   Smoking history: never  Alcohol history: none        Objective:       Physical Exam:   Patient Vitals for the past 12 hrs:   Temp Pulse Resp BP SpO2   10/22/20 0000  90 18 (!) 158/96 98 %   10/21/20 2330 98.8 °F (37.1 °C) 75 20 (!) 147/78 95 %   10/21/20 2230  74 20 136/72 94 %   10/21/20 2130  78 22 (!) 160/80 96 %   10/21/20 2030 98 °F (36.7 °C) 73 21 (!) 155/84 92 %   10/21/20 2023  77 23 (!) 140/93 (!) 86 %   10/21/20 1704 96.8 °F (36 °C) 74 18 (!) 144/76 96 %     GEN APPEARANCE: Patient resting in bed in NAD. Obese. HEENT: Conjunctiva Clear  CVS: RRR, No M/G/R  LUNGS: Diminished breath sounds No Wheezes; No Rhonchi: No rales  ABD: Soft; No TTP; + Normoactive BS  EXT: mild right LE edema (chronic as per pt). no edema on the left. Skin exam: No gross lesions noted on exposed skin surfaces  MENTAL STATUS: Answers questions appropriately, responds to commands.   NEURO: No gross motor or sensory deficits      Data Review:   Recent Results (from the past 24 hour(s))   EKG, 12 LEAD, INITIAL    Collection Time: 10/21/20  5:34 PM   Result Value Ref Range    Ventricular Rate 73 BPM    Atrial Rate 73 BPM    P-R Interval 166 ms    QRS Duration 90 ms    Q-T Interval 416 ms    QTC Calculation (Bezet) 458 ms    Calculated P Axis 75 degrees    Calculated R Axis 83 degrees    Calculated T Axis 63 degrees    Diagnosis Sinus rhythm with premature ventricular complexes or fusion complexes  When compared with ECG of 05-SEP-2019 11:29,  fusion complexes are now present  premature ventricular complexes are now present     CBC WITH AUTOMATED DIFF    Collection Time: 10/21/20  5:46 PM   Result Value Ref Range    WBC 8.0 3.6 - 11.0 K/uL    RBC 3.66 (L) 3.80 - 5.20 M/uL    HGB 11.7 11.5 - 16.0 g/dL    HCT 37.6 35.0 - 47.0 %    .7 (H) 80.0 - 99.0 FL    MCH 32.0 26.0 - 34.0 PG    MCHC 31.1 30.0 - 36.5 g/dL    RDW 14.5 11.5 - 14.5 %    PLATELET 225 908 - 285 K/uL    MPV 12.2 8.9 - 12.9 FL    NRBC 0.0 0  WBC    ABSOLUTE NRBC 0.00 0.00 - 0.01 K/uL    NEUTROPHILS 70 32 - 75 %    LYMPHOCYTES 21 12 - 49 %    MONOCYTES 7 5 - 13 %    EOSINOPHILS 1 0 - 7 %    BASOPHILS 0 0 - 1 %    IMMATURE GRANULOCYTES 1 (H) 0.0 - 0.5 %    ABS. NEUTROPHILS 5.7 1.8 - 8.0 K/UL    ABS. LYMPHOCYTES 1.6 0.8 - 3.5 K/UL    ABS. MONOCYTES 0.6 0.0 - 1.0 K/UL    ABS. EOSINOPHILS 0.1 0.0 - 0.4 K/UL    ABS. BASOPHILS 0.0 0.0 - 0.1 K/UL    ABS. IMM. GRANS. 0.0 0.00 - 0.04 K/UL    DF AUTOMATED     METABOLIC PANEL, COMPREHENSIVE    Collection Time: 10/21/20  5:46 PM   Result Value Ref Range    Sodium 135 (L) 136 - 145 mmol/L    Potassium 3.5 3.5 - 5.1 mmol/L    Chloride 94 (L) 97 - 108 mmol/L    CO2 30 21 - 32 mmol/L    Anion gap 11 5 - 15 mmol/L    Glucose 87 65 - 100 mg/dL    BUN 22 (H) 6 - 20 MG/DL    Creatinine 6.37 (H) 0.55 - 1.02 MG/DL    BUN/Creatinine ratio 3 (L) 12 - 20      GFR est AA 8 (L) >60 ml/min/1.73m2    GFR est non-AA 7 (L) >60 ml/min/1.73m2    Calcium 10.1 8.5 - 10.1 MG/DL    Bilirubin, total 0.7 0.2 - 1.0 MG/DL    ALT (SGPT) 16 12 - 78 U/L    AST (SGOT) 14 (L) 15 - 37 U/L    Alk.  phosphatase 86 45 - 117 U/L    Protein, total 9.0 (H) 6.4 - 8.2 g/dL    Albumin 3.9 3.5 - 5.0 g/dL    Globulin 5.1 (H) 2.0 - 4.0 g/dL    A-G Ratio 0.8 (L) 1.1 - 2.2     SAMPLES BEING HELD    Collection Time: 10/21/20  5:46 PM   Result Value Ref Range    SAMPLES BEING HELD 1RED, 1BLU     COMMENT        Add-on orders for these samples will be processed based on acceptable specimen integrity and analyte stability, which may vary by analyte. D DIMER    Collection Time: 10/21/20  5:46 PM   Result Value Ref Range    D-dimer 4.27 (H) 0.00 - 0.65 mg/L FEU   NT-PRO BNP    Collection Time: 10/21/20  5:46 PM   Result Value Ref Range    NT pro-BNP 27,887 (H) <125 PG/ML   SARS-COV-2    Collection Time: 10/22/20  1:01 AM   Result Value Ref Range    Specimen source Nasopharyngeal      SARS-CoV-2 PENDING     SARS-CoV-2 PENDING     Specimen source Nasopharyngeal      COVID-19 rapid test PENDING     Specimen type NP Swab      Health status PENDING     COVID-19 PENDING      Xr Chest Pa Lat    Result Date: 10/21/2020  Impression: No acute process or change in the lung fields compared to the prior exam.     VQ scan: low probability    Assessment:     Active Problems:    Shortness of breath (10/21/2020)        Plan:     1. Shortness of breath: Associated with intermittent hypoxia. Currently satting above 96% on room air.  - given SOB in the setting of diarrhea concerned for viral process. Chest x-ray did not show any acute process. Patient had VQ scan given elevated D-dimer in the ED that was very low probability for PE  Given  hypoxia will check for Echo in am.  Elevated proBNP of 27,887. However, does not appear to be hypervolemic on exam.  - COVID -19 ordered. - check inflammatory markers  - Hold antibx   - Bipap nightly and as needed. - abg but patient declined. - Albuterol PRN  - Supplemental O2 to keep O2 saturations greater than 92%. - Placed on droplet precaution plus contact    2. ESRD on HD: M-W-F. Last dialysis was today and tolerated well. No fluid removal as per report. - Nephrology consult  Renal diet.  -Continue home medication. 3. CLEMENTINA; bipap qhs    4. Hypertension; stable. - continue with home meds. Need to be reconciled as pt does not have list with her. 5. Morbid obesity: BMI 53.38. OHS could be contributing to sob. Outpatient management. DVT prophy: heparin sq  Code status: full code  Plan discussed with patient.     FUNCTIONAL STATUS PRIOR TO HOSPITALIZATION Ambulates Independently     Signed By: Sarah Landon MD     October 22, 2020

## 2020-10-22 NOTE — CONSULTS
ATSP pt.   ESRD pt of [facility] [[email or web address]]  I will inform Dr Ray Mejias MD  [facility]  Office :[phone]  Fax: [phone]

## 2020-10-22 NOTE — PROGRESS NOTES
Bedside shift change report given to Arya Jeong (oncoming nurse) by Wendy Plasencia (offgoing nurse). Report included the following information SBAR, Kardex, ED Summary, MAR, Recent Results and Cardiac Rhythm NSR.      Last 3 Recorded Weights in this Encounter    10/22/20 1295   Weight: 145.5 kg (320 lb 12.3 oz)

## 2020-10-22 NOTE — CONSULTS
[facility]    Name:  Jennifer Hand  MR#:  [id number]  :  1960  ACCOUNT #:  [de-identified]  DATE OF SERVICE:  10/22/2020      REASON FOR CONSULTATION:  Seen for end-stage renal disease. Thanks for the consult. HISTORY OF PRESENT ILLNESS:  We had the pleasure of seeing the patient. The patient was seen, she was admitted, and the most of the history was taken from the chart. She was admitted to the ER yesterday, after she had actually dialysis yesterday. She is a dialysis patient, Monday, Wednesday, and Friday. She had multiple medical problems with sleep apnea and obesity, admitted with shortness of breath and she is still being tested for COVID-19 and results are not showing yet. She came here supposedly hypoxic. Looking at her chest x-ray, looks okay. Potassium is okay on admission. No ABG done, and she is satting as per the chart on admission and now at 100% on 2 liters on and off BiPAP, and we called to arrange for dialysis for her tomorrow. PAST MEDICAL HISTORY:  1. Sleep apnea. 2.  Obesity. 3.  Hypertension. 4.  Dyslipidemia. 5.  Anemia. MEDICATIONS:  As inpatient are as follows;  1. Aspirin. 2.  Lipitor. 3.  Neurontin 300 mg daily. 4.  Hep subcu. 5.  Protonix. 6.  Renvela. SOCIAL HISTORY:  Reviewed. FAMILY HISTORY:  Reviewed. PHYSICAL EXAMINATION:  GENERAL:  The patient seen, not examined in view of COVID-19. She is not in acute distress. No labored breathing. Alert and oriented to time and place. VITAL SIGNS:  Blood pressure 151/73, satting 100%. LABORATORY DATA:  Hemoglobin 10.2, platelets 276, BUN 30, creatinine 7.6, potassium 4, bicarb is 29, calcium is 9.5 with an albumin level of 3.2. IMPRESSION AND PLAN:  1. End-stage renal disease on Monday, Wednesday, and Friday. Last dialysis was done yesterday. 2.  Shortness of breath with intermittent hypoxia pending COVID testing. 3.  Obesity. 4.  Anemia, stable.   5. Access patent. 6.  Dyslipidemia. RECOMMENDATIONS:  1. On BiPAP for sleep apnea. 2.  Dialysis tomorrow. 3.  Waiting for COVID test.  4.  V/Q scan was very low probability for PE.  5.  We will follow. See Dr. Janey Patel.       Chad Severe, MD [id number]  D:  10/22/2020 14:49  T:  10/22/2020 17:38  JOB #:  [id number]

## 2020-10-22 NOTE — CONSULTS
Patient seen  Thanks for the consult  A/P:  ESRD MWF ,last hD yesterday  HTN  CLEMENTINA  Hypoxia better    HD in AM  Will follow

## 2020-10-22 NOTE — PROGRESS NOTES
1030  - Significant Medical Information: See chart notes    Preliminary Discharge Plan/Identified;  Demographic and Primary Care Provider (PCP) Jennifer Zuñiga MD verified and correct. CM will continue to follow discharge planning needs for continuum of care. Forrest Mahoney RN, CCM    Reason for Admission:  Bladimir Paez is a 61 y.o. admitted to  for hypoxia - SEE HPI. RUR Score: 14 %           Plan for utilizing home health:    No DME, or [facility]  services currently in the home. No plan on using home health. PCP: Jennifer Zuñiga MD      Are you a current patient: Yes/No:  Yes    Approximate date of last visit:   A month ago                    Current Advanced Directive/Advance Care Plan:  No    CM One Stop/Healthcare Agent :  Yanci Lees - son - [phone]                       Transition of Care Plan:    Home. On will transport at discharge. Care Management Interventions  PCP Verified by CM: Yes  Mode of Transport at Discharge:  Other (see comment)(Son will take her home)  MyChart Signup: No  Discharge Durable Medical Equipment: No  Health Maintenance Reviewed: Yes  Physical Therapy Consult: No  Occupational Therapy Consult: No  Speech Therapy Consult: No  Current Support Network: Lives Alone  Confirm Follow Up Transport: Family  Discharge Location  Discharge Placement: Home

## 2020-10-22 NOTE — PROGRESS NOTES
[address] [facility] Adult  Hospitalist Group                                                                                          Hospitalist Progress Note  Austen Green MD  Answering service: [phone] from in house phone        Date of Service:  10/22/2020  NAME:  Geoffrey Lombardi  :  1960  MRN:  [id number]      Admission Summary:     Geoffrey Lombardi is a 61 y.o. female past medical history significant for asthma, end-stage renal disease on hemodialysis, hypertension, CLEMENTINA and obesity presented to the emergency room for evaluation of hypoxia. Patient said for the last several days she has been having intermittent shortness of breath worsened with exertion. She went to dialysis and she was found to be intermittently hypoxic dropping to the low 80s for which she was sent to the emergency room for evaluation. Patient also reports having diarrhea, intermittent nausea and few episodes of vomitings. No vomiting in the last 24 hours or diarrhea since she is been in the emergency room. 2 days ago she went to her medical hospital for the same complaints. As per patient report nothing was done she was discharged home with follow-up but her shortness of breath has persisted. Today on arrival to the emergency room patient has been found to be hemodynamically stable. O2 sats are in the 90s on room air however noticed to drop to the 80s mostly with movement and exertion. She denies any other symptoms like chest pain, fever, chills, lightheadedness, abdominal pain. Given persistent shortness of breath and risk factors admission was requested.     Interval history / Subjective:     She said she feels better, no chest pain, shortness of breath is improving     Assessment & Plan:     Shortness of breath, with hx of chronic respiratory failure home oxygen dependent 2 l/m during day time and 4 l/m at night, she uses BiPAP q hs  -on 2 l/m nasal canula, BiPAP q hs, prn albuterol inhaler, monitor pulse ox  -V/Q scan low probability for pulmonary embolism.    -chest x ray no acute process or change in the lung fields compared to the prior exam.  -elevated ferritin and CRP  -troponin, LD and procalcitonin normal  -elevated D Dimer  -afebrile, no leukocytosis  -Echo pending  -COVID 19 test pending    Diarrhea   -has loss stool diarrhea   -concerned for viral illness     ESRD DD  -HD per nephrologist    Anemia of chronic disease   -stable, Hgb 10.2  -monitor H/H  -nephrology is consulted    HTN  -BP not at [address], on     Hx of TIA  -continue aspirin and lipitor     CLEMENTINA    Obesity  -diet and weight loss program     Code status: Full Code  DVT prophylaxis:heparin     Care Plan discussed with: Patient/Family, Nurse and   Anticipated Disposition: Home w/Family  Anticipated Discharge: 24 hours to 48 hours     Hospital Problems  Date Reviewed: 9/10/2020          Codes Class Noted POA    Shortness of breath ICD-10-CM: R06.02  ICD-9-CM: 786.05  10/21/2020 Unknown                Vital Signs:    Last 24hrs VS reviewed since prior progress note. Most recent are:  Visit Vitals  BP (!) 161/87 (BP 1 Location: Right arm, BP Patient Position: At rest)   Pulse 71   Temp 97.6 °F (36.4 °C)   Resp 20   Wt 145.5 kg (320 lb 12.3 oz)   SpO2 100%   BMI 53.38 kg/m²       No intake or output data in the 24 hours ending 10/22/20 1106     Physical Examination:             Constitutional:  No acute distress, cooperative, pleasant    ENT:  Oral mucosa moist, oropharynx benign. Resp:  Decrease bronchial breath sound bilaterally. No wheezing/rhonchi/rales. No accessory muscle use   CV:  Regular rhythm, normal rate, no murmurs, gallops, rubs    GI:  Soft, non distended, non tender. normoactive bowel sounds, no hepatosplenomegaly     Musculoskeletal:  No edema,     Neurologic:  Moves all extremities.   AAOx3, CN II-XII reviewed     Skin:  Good turgor, no rashes or ulcers       Data Review:    Review and/or order of clinical lab test  Review and/or order of tests in the radiology section of CPT  Review and/or order of tests in the medicine section of CPT      Labs:     Recent Labs     10/22/20  0700 10/21/20  1746   WBC 7.0 8.0   HGB 10.2* 11.7   HCT 33.1* 37.6    173     Recent Labs     10/22/20  0511 10/21/20  1746    135*   K 4.0 3.5   CL 97 94*   CO2 29 30   BUN 30* 22*   CREA 7.66* 6.37*   GLU 78 87   CA 9.5 10.1     Recent Labs     10/22/20  0511 10/21/20  1746   ALT 10* 16   AP 75 86   TBILI 0.6 0.7   TP 7.0 9.0*   ALB 3.2* 3.9   GLOB 3.8 5.1*     Recent Labs     10/22/20  0513   INR 1.1   PTP 11.2*   APTT 31.3      Recent Labs     10/22/20  0511   FERR 861*      No results found for: FOL, RBCF   No results for input(s): PH, PCO2, PO2 in the last 72 hours.   Recent Labs     10/22/20  0511   TROIQ <0.05     Lab Results   Component Value Date/Time    Cholesterol, total 195 10/01/2015 12:18 PM    HDL Cholesterol 69 10/01/2015 12:18 PM    LDL, calculated 111 (H) 10/01/2015 12:18 PM    Triglyceride 73 10/01/2015 12:18 PM     Lab Results   Component Value Date/Time    Glucose (POC) 100 10/01/2019 09:08 AM    Glucose (POC) 84 01/31/2019 12:36 PM    Glucose (POC) 86 03/10/2016 07:21 AM     No results found for: COLOR, APPRN, SPGRU, REFSG, DARREL, PROTU, GLUCU, KETU, BILU, UROU, SHANNON, LEUKU, GLUKE, EPSU, BACTU, WBCU, RBCU, CASTS, UCRY      Medications Reviewed:     Current Facility-Administered Medications   Medication Dose Route Frequency    albuterol (PROVENTIL HFA, VENTOLIN HFA, PROAIR HFA) inhaler 1 Puff  1 Puff Inhalation Q4H PRN    sevelamer carbonate (RENVELA) oral powder 2.4 g  2.4 g Oral TID WITH MEALS    pantoprazole (PROTONIX) tablet 20 mg  20 mg Oral ACB    montelukast (SINGULAIR) tablet 10 mg  10 mg Oral QHS    [START ON 10/23/2020] gabapentin (NEURONTIN) capsule 300 mg  300 mg Oral DAILY    aspirin delayed-release tablet 81 mg  81 mg Oral DAILY    atorvastatin (LIPITOR) tablet 10 mg  10 mg Oral QHS    sodium chloride (NS) flush 5-40 mL  5-40 mL IntraVENous Q8H    sodium chloride (NS) flush 5-40 mL  5-40 mL IntraVENous PRN    acetaminophen (TYLENOL) tablet 650 mg  650 mg Oral Q6H PRN    Or    acetaminophen (TYLENOL) suppository 650 mg  650 mg Rectal Q6H PRN    polyethylene glycol (MIRALAX) packet 17 g  17 g Oral DAILY PRN    promethazine (PHENERGAN) tablet 12.5 mg  12.5 mg Oral Q6H PRN    Or    ondansetron (ZOFRAN) injection 4 mg  4 mg IntraVENous Q6H PRN    heparin (porcine) injection 5,000 Units  5,000 Units SubCUTAneous Q8H     ______________________________________________________________________  EXPECTED LENGTH OF STAY: 2d 4h  ACTUAL LENGTH OF STAY:          1                 Kristina Dixon MD

## 2020-10-22 NOTE — ED NOTES
Verbal shift change report given to Yamila RN and Davon Low RN (oncoming nurse) by Barb Neff RN (offgoing nurse). Report included the following information SBAR, Kardex, ED Summary, [facility] and Recent Results.

## 2020-10-22 NOTE — PROGRESS NOTES
Primary Nurse [place] Patient and Naina Low RN performed a dual skin assessment on this patient No impairment noted  Anthony score is 23. Problem: Falls - Risk of  Goal: *Absence of Falls  Description: Document Vazquez Burgos Fall Risk and appropriate interventions in the flowsheet. Outcome: Progressing Towards Goal  Note: Fall Risk Interventions:            Medication Interventions: Evaluate medications/consider consulting pharmacy, Patient to call before getting OOB, Teach patient to arise slowly                   Problem: Isolation Precautions - Risk of Spread of Infection  Goal: Prevent transmission of infectious organism to others  Outcome: Progressing Towards Goal  Note: Patient is currently on droplet plus precautions for COVID-19. Problem: Activity Intolerance  Goal: *Oxygen saturation during activity within specified parameters  Note: Patient was on 2L of oxgen while resting in bed. She is now on BiPAP for night-time.

## 2020-10-23 ENCOUNTER — APPOINTMENT (OUTPATIENT)
Dept: GENERAL RADIOLOGY | Age: 60
DRG: 133 | End: 2020-10-23
Attending: HOSPITALIST
Payer: MEDICAID

## 2020-10-23 LAB
ALBUMIN SERPL-MCNC: 3.3 G/DL (ref 3.5–5)
ALBUMIN/GLOB SERPL: 0.8 {RATIO} (ref 1.1–2.2)
ALP SERPL-CCNC: 71 U/L (ref 45–117)
ALT SERPL-CCNC: 12 U/L (ref 12–78)
ANION GAP SERPL CALC-SCNC: 8 MMOL/L (ref 5–15)
APTT PPP: 28.1 SEC (ref 22.1–32)
AST SERPL-CCNC: 12 U/L (ref 15–37)
BASOPHILS # BLD: 0 K/UL (ref 0–0.1)
BASOPHILS NFR BLD: 1 % (ref 0–1)
BILIRUB SERPL-MCNC: 0.6 MG/DL (ref 0.2–1)
BUN SERPL-MCNC: 40 MG/DL (ref 6–20)
BUN/CREAT SERPL: 4 (ref 12–20)
CALCIUM SERPL-MCNC: 10 MG/DL (ref 8.5–10.1)
CHLORIDE SERPL-SCNC: 95 MMOL/L (ref 97–108)
CO2 SERPL-SCNC: 29 MMOL/L (ref 21–32)
CREAT SERPL-MCNC: 9.9 MG/DL (ref 0.55–1.02)
DIFFERENTIAL METHOD BLD: ABNORMAL
EOSINOPHIL # BLD: 0.2 K/UL (ref 0–0.4)
EOSINOPHIL NFR BLD: 3 % (ref 0–7)
ERYTHROCYTE [DISTWIDTH] IN BLOOD BY AUTOMATED COUNT: 14.3 % (ref 11.5–14.5)
FIBRINOGEN PPP-MCNC: 330 MG/DL (ref 200–475)
GLOBULIN SER CALC-MCNC: 4.1 G/DL (ref 2–4)
GLUCOSE SERPL-MCNC: 79 MG/DL (ref 65–100)
HBV SURFACE AB SER QL: REACTIVE
HBV SURFACE AB SER-ACNC: 49.42 MIU/ML
HBV SURFACE AG SER QL: <0.1 INDEX
HBV SURFACE AG SER QL: NEGATIVE
HCT VFR BLD AUTO: 33.7 % (ref 35–47)
HGB BLD-MCNC: 10.7 G/DL (ref 11.5–16)
IMM GRANULOCYTES # BLD AUTO: 0 K/UL (ref 0–0.04)
IMM GRANULOCYTES NFR BLD AUTO: 0 % (ref 0–0.5)
INR PPP: 1 (ref 0.9–1.1)
LYMPHOCYTES # BLD: 2.1 K/UL (ref 0.8–3.5)
LYMPHOCYTES NFR BLD: 30 % (ref 12–49)
MCH RBC QN AUTO: 32.4 PG (ref 26–34)
MCHC RBC AUTO-ENTMCNC: 31.8 G/DL (ref 30–36.5)
MCV RBC AUTO: 102.1 FL (ref 80–99)
MONOCYTES # BLD: 0.9 K/UL (ref 0–1)
MONOCYTES NFR BLD: 12 % (ref 5–13)
NEUTS SEG # BLD: 3.9 K/UL (ref 1.8–8)
NEUTS SEG NFR BLD: 54 % (ref 32–75)
NRBC # BLD: 0 K/UL (ref 0–0.01)
NRBC BLD-RTO: 0 PER 100 WBC
PLATELET # BLD AUTO: 179 K/UL (ref 150–400)
PMV BLD AUTO: 11.7 FL (ref 8.9–12.9)
POTASSIUM SERPL-SCNC: 4.4 MMOL/L (ref 3.5–5.1)
PROT SERPL-MCNC: 7.4 G/DL (ref 6.4–8.2)
PROTHROMBIN TIME: 10.9 SEC (ref 9–11.1)
RBC # BLD AUTO: 3.3 M/UL (ref 3.8–5.2)
SODIUM SERPL-SCNC: 132 MMOL/L (ref 136–145)
THERAPEUTIC RANGE,PTTT: NORMAL SECS (ref 58–77)
WBC # BLD AUTO: 7.1 K/UL (ref 3.6–11)

## 2020-10-23 PROCEDURE — 74011250636 HC RX REV CODE- 250/636: Performed by: HOSPITALIST

## 2020-10-23 PROCEDURE — 85384 FIBRINOGEN ACTIVITY: CPT

## 2020-10-23 PROCEDURE — 74011250636 HC RX REV CODE- 250/636: Performed by: INTERNAL MEDICINE

## 2020-10-23 PROCEDURE — 5A1D70Z PERFORMANCE OF URINARY FILTRATION, INTERMITTENT, LESS THAN 6 HOURS PER DAY: ICD-10-PCS | Performed by: INTERNAL MEDICINE

## 2020-10-23 PROCEDURE — 36415 COLL VENOUS BLD VENIPUNCTURE: CPT

## 2020-10-23 PROCEDURE — 85730 THROMBOPLASTIN TIME PARTIAL: CPT

## 2020-10-23 PROCEDURE — 87340 HEPATITIS B SURFACE AG IA: CPT

## 2020-10-23 PROCEDURE — 90935 HEMODIALYSIS ONE EVALUATION: CPT

## 2020-10-23 PROCEDURE — 74011250637 HC RX REV CODE- 250/637: Performed by: INTERNAL MEDICINE

## 2020-10-23 PROCEDURE — 65660000001 HC RM ICU INTERMED STEPDOWN

## 2020-10-23 PROCEDURE — 80053 COMPREHEN METABOLIC PANEL: CPT

## 2020-10-23 PROCEDURE — 85025 COMPLETE CBC W/AUTO DIFF WBC: CPT

## 2020-10-23 PROCEDURE — P9047 ALBUMIN (HUMAN), 25%, 50ML: HCPCS | Performed by: HOSPITALIST

## 2020-10-23 PROCEDURE — 74011250637 HC RX REV CODE- 250/637: Performed by: HOSPITALIST

## 2020-10-23 PROCEDURE — 86706 HEP B SURFACE ANTIBODY: CPT

## 2020-10-23 PROCEDURE — 85610 PROTHROMBIN TIME: CPT

## 2020-10-23 PROCEDURE — P9047 ALBUMIN (HUMAN), 25%, 50ML: HCPCS | Performed by: INTERNAL MEDICINE

## 2020-10-23 PROCEDURE — 71045 X-RAY EXAM CHEST 1 VIEW: CPT

## 2020-10-23 RX ORDER — ALBUMIN HUMAN 250 G/1000ML
25 SOLUTION INTRAVENOUS EVERY 6 HOURS
Status: DISCONTINUED | OUTPATIENT
Start: 2020-10-23 | End: 2020-10-23

## 2020-10-23 RX ORDER — HYDROCODONE BITARTRATE AND ACETAMINOPHEN 5; 325 MG/1; MG/1
1 TABLET ORAL
Status: DISCONTINUED | OUTPATIENT
Start: 2020-10-23 | End: 2020-10-25 | Stop reason: HOSPADM

## 2020-10-23 RX ORDER — MIDODRINE HYDROCHLORIDE 5 MG/1
5 TABLET ORAL
Status: DISCONTINUED | OUTPATIENT
Start: 2020-10-23 | End: 2020-10-23

## 2020-10-23 RX ORDER — ALBUMIN HUMAN 250 G/1000ML
25 SOLUTION INTRAVENOUS EVERY 6 HOURS
Status: COMPLETED | OUTPATIENT
Start: 2020-10-24 | End: 2020-10-24

## 2020-10-23 RX ORDER — MIDODRINE HYDROCHLORIDE 5 MG/1
5 TABLET ORAL
Status: DISCONTINUED | OUTPATIENT
Start: 2020-10-23 | End: 2020-10-25 | Stop reason: HOSPADM

## 2020-10-23 RX ORDER — ALBUMIN HUMAN 250 G/1000ML
12.5 SOLUTION INTRAVENOUS ONCE
Status: COMPLETED | OUTPATIENT
Start: 2020-10-23 | End: 2020-10-23

## 2020-10-23 RX ORDER — ALBUMIN HUMAN 250 G/1000ML
12.5 SOLUTION INTRAVENOUS
Status: DISCONTINUED | OUTPATIENT
Start: 2020-10-23 | End: 2020-10-23

## 2020-10-23 RX ADMIN — GABAPENTIN 300 MG: 300 CAPSULE ORAL at 08:22

## 2020-10-23 RX ADMIN — ALBUMIN (HUMAN) 12.5 G: 0.25 INJECTION, SOLUTION INTRAVENOUS at 15:25

## 2020-10-23 RX ADMIN — MIDODRINE HYDROCHLORIDE 5 MG: 5 TABLET ORAL at 17:14

## 2020-10-23 RX ADMIN — HEPARIN SODIUM 5000 UNITS: 5000 INJECTION INTRAVENOUS; SUBCUTANEOUS at 08:14

## 2020-10-23 RX ADMIN — Medication 10 ML: at 14:27

## 2020-10-23 RX ADMIN — PANTOPRAZOLE SODIUM 20 MG: 20 TABLET, DELAYED RELEASE ORAL at 08:23

## 2020-10-23 RX ADMIN — SEVELAMER CARBONATE 2.4 G: 2400 POWDER, FOR SUSPENSION ORAL at 08:23

## 2020-10-23 RX ADMIN — SEVELAMER CARBONATE 2.4 G: 2400 POWDER, FOR SUSPENSION ORAL at 11:08

## 2020-10-23 RX ADMIN — HYDROCODONE BITARTRATE AND ACETAMINOPHEN 1 TABLET: 5; 325 TABLET ORAL at 12:30

## 2020-10-23 RX ADMIN — ONDANSETRON 4 MG: 2 INJECTION INTRAMUSCULAR; INTRAVENOUS at 03:56

## 2020-10-23 RX ADMIN — ALBUMIN (HUMAN) 12.5 G: 0.25 INJECTION, SOLUTION INTRAVENOUS at 17:15

## 2020-10-23 RX ADMIN — SEVELAMER CARBONATE 2.4 G: 2400 POWDER, FOR SUSPENSION ORAL at 16:46

## 2020-10-23 RX ADMIN — ASPIRIN 81 MG: 81 TABLET, COATED ORAL at 08:22

## 2020-10-23 RX ADMIN — HEPARIN SODIUM 5000 UNITS: 5000 INJECTION INTRAVENOUS; SUBCUTANEOUS at 14:27

## 2020-10-23 RX ADMIN — SODIUM CHLORIDE 500 ML: 9 INJECTION, SOLUTION INTRAVENOUS at 18:54

## 2020-10-23 RX ADMIN — Medication 10 ML: at 03:57

## 2020-10-23 RX ADMIN — HEPARIN SODIUM 5000 UNITS: 5000 INJECTION INTRAVENOUS; SUBCUTANEOUS at 21:47

## 2020-10-23 RX ADMIN — ALBUMIN (HUMAN) 12.5 G: 0.25 INJECTION, SOLUTION INTRAVENOUS at 14:25

## 2020-10-23 RX ADMIN — MONTELUKAST 10 MG: 10 TABLET, FILM COATED ORAL at 21:47

## 2020-10-23 RX ADMIN — ATORVASTATIN CALCIUM 10 MG: 10 TABLET, FILM COATED ORAL at 21:47

## 2020-10-23 NOTE — PROGRESS NOTES
Problem: Pressure Injury - Risk of  Goal: *Prevention of pressure injury  Description: Document Anthony Scale and appropriate interventions in the flowsheet. Outcome: Progressing Towards Goal  Note: Pressure Injury Interventions:  Sensory Interventions: Avoid rigorous massage over bony prominences, Discuss PT/OT consult with provider, Keep linens dry and wrinkle-free, Minimize linen layers         Activity Interventions: Increase time out of bed, Pressure redistribution bed/mattress(bed type), PT/OT evaluation    Mobility Interventions: Pressure redistribution bed/mattress (bed type), HOB 30 degrees or less, PT/OT evaluation                          Problem: Falls - Risk of  Goal: *Absence of Falls  Description: Document Ceci Fall Risk and appropriate interventions in the flowsheet. Outcome: Progressing Towards Goal  Note: Fall Risk Interventions:  Mobility Interventions: Patient to call before getting OOB, PT Consult for mobility concerns, Communicate number of staff needed for ambulation/transfer, Utilize walker, cane, or other assistive device         Medication Interventions: Teach patient to arise slowly, Patient to call before getting OOB, Evaluate medications/consider consulting pharmacy                   Problem: Activity Intolerance  Goal: *Oxygen saturation during activity within specified parameters  Outcome: Progressing Towards Goal  Note: Patient is currently up with assistance as needed. She was on 2L and now on 4L of oxygen. Patient remains stable and on BiPAP for night and PRN. Problem: Hypertension  Goal: *Blood pressure within specified parameters  Outcome: Progressing Towards Goal  Note: Patient has an elevated blood pressure at times but can be lowered naturally.      Problem: Gas Exchange - Impaired  Goal: Absence of hypoxia  Outcome: Progressing Towards Goal  Note: Patient currently on BiPAP

## 2020-10-23 NOTE — PROGRESS NOTES
Chart reviewed and spoke with nursing regarding evaluation. Patient currently unavailable and on dialysis. Will defer and follow up for evaluation tomorrow.    Philipp Hart, PT, DPT

## 2020-10-23 NOTE — PROGRESS NOTES
Patient had two episodes of hypoxia while sleeping today. While sleeping, her oxygen would drop into the 40s on 2 L NC. Once awake, sitting up, and oxygen increased to 4 L; her oxygen sats would increase to the upper 90s. She states that she uses bipap at home. She states she has had her oxygen drop at home. Charge nurse Harsha Culver spoke with case management to reach out to the place she receives her oxygen to find out the exact equipment she uses at home. Will continue to monitor. Problem: Falls - Risk of  Goal: *Absence of Falls  Description: Document Ivadam Saldana Fall Risk and appropriate interventions in the flowsheet. Outcome: Progressing Towards Goal  Note: Fall Risk Interventions:  Mobility Interventions: Communicate number of staff needed for ambulation/transfer, OT consult for ADLs, PT Consult for mobility concerns, Patient to call before getting OOB, Utilize walker, cane, or other assistive device         Problem: Gas Exchange - Impaired  Goal: Absence of hypoxia  Outcome: Not Progressing Towards Goal     Medication Interventions: Evaluate medications/consider consulting pharmacy, Patient to call before getting OOB    Bedside and Verbal shift change report given to Emperatriz (oncoming nurse) by Chan Kim (offgoing nurse). Report included the following information SBAR, Kardex, and Quality Measures.

## 2020-10-23 NOTE — PROGRESS NOTES
RENAL  PROGRESS NOTE        Subjective:    feels better  Objective:   VITALS SIGNS:    Visit Vitals  BP (!) 121/48 (BP 1 Location: Right arm, BP Patient Position: At rest)   Pulse 75   Temp 98.4 °F (36.9 °C)   Resp 20   Wt 144.9 kg (319 lb 6.4 oz)   LMP  (LMP Unknown)   SpO2 99%   BMI 53.15 kg/m²       O2 Device: BIPAP   O2 Flow Rate (L/min): 4 l/min   Temp (24hrs), Av.2 °F (36.8 °C), Min:97.6 °F (36.4 °C), Max:98.7 °F (37.1 °C)         PHYSICAL EXAM:  NAD    DATA REVIEW:     INTAKE / OUTPUT:   Last shift:      No intake/output data recorded. Last 3 shifts: No intake/output data recorded.   No intake or output data in the 24 hours ending 10/23/20 0854      LABS:   Recent Labs     10/23/20  0352 10/22/20  0700 10/21/20  1746   WBC 7.1 7.0 8.0   HGB 10.7* 10.2* 11.7   HCT 33.7* 33.1* 37.6    169 173     Recent Labs     10/23/20  0352 10/22/20  0513 10/22/20  0511 10/21/20  1746   *  --  136 135*   K 4.4  --  4.0 3.5   CL 95*  --  97 94*   CO2 29  --  29 30   GLU 79  --  78 87   BUN 40*  --  30* 22*   CREA 9.90*  --  7.66* 6.37*   CA 10.0  --  9.5 10.1   ALB 3.3*  --  3.2* 3.9   TBILI 0.6  --  0.6 0.7   ALT 12  --  10* 16   INR 1.0 1.1  --   --            Assessment:   ESRD MWF   HTN  CLEMENTINA  Hypoxia better  Plan:   HD today and tomorrow  Discussed with her  Cy Fry MD

## 2020-10-23 NOTE — PROGRESS NOTES
Bedside shift change report given to Analy (oncoming nurse) by Aletha Matthews (offgoing nurse). Report included the following information SBAR, Kardex, ED Summary, MAR, Recent Results and Cardiac Rhythm NSR.      Last 3 Recorded Weights in this Encounter    10/22/20 0235 10/23/20 0215   Weight: 145.5 kg (320 lb 12.3 oz) 144.9 kg (319 lb 6.4 oz)

## 2020-10-23 NOTE — PROGRESS NOTES
Blood pressures low during & after HD. MD Wanda Levy notified. Albumin given 2x during dialysis & 1x post dialysis. 500 ML bolus ordered and given. Midodrine added. Will continue to cycle & monitor bp. Bedside and Verbal shift change report given to Unruly Culver (oncoming nurse) by Archana Dorantes (offgoing nurse). Report included the following information SBAR, Kardex, Intake/Output, MAR, Accordion, Recent Results and Cardiac Rhythm SR. Problem: Pressure Injury - Risk of  Goal: *Prevention of pressure injury  Description: Document Anthony Scale and appropriate interventions in the flowsheet. Outcome: Progressing Towards Goal  Note: Pressure Injury Interventions:  Sensory Interventions: Assess changes in LOC, Assess need for specialty bed, Avoid rigorous massage over bony prominences, Check visual cues for pain, Discuss PT/OT consult with provider, Float heels, Keep linens dry and wrinkle-free, Maintain/enhance activity level, Minimize linen layers, Monitor skin under medical devices, Pressure redistribution bed/mattress (bed type), Turn and reposition approx. every two hours (pillows and wedges if needed)         Activity Interventions: Assess need for specialty bed, Increase time out of bed, PT/OT evaluation    Mobility Interventions: Assess need for specialty bed, Float heels, HOB 30 degrees or less, PT/OT evaluation, Turn and reposition approx. every two hours(pillow and wedges)                          Problem: Patient Education: Go to Patient Education Activity  Goal: Patient/Family Education  Outcome: Progressing Towards Goal     Problem: Falls - Risk of  Goal: *Absence of Falls  Description: Document Rochele Ten Fall Risk and appropriate interventions in the flowsheet.   Outcome: Progressing Towards Goal  Note: Fall Risk Interventions:  Mobility Interventions: Assess mobility with egress test, Communicate number of staff needed for ambulation/transfer, OT consult for ADLs, Patient to call before getting OOB, PT Consult for mobility concerns, PT Consult for assist device competence, Strengthening exercises (ROM-active/passive), Utilize walker, cane, or other assistive device, Utilize gait belt for transfers/ambulation         Medication Interventions: Evaluate medications/consider consulting pharmacy, Patient to call before getting OOB, Teach patient to arise slowly, Utilize gait belt for transfers/ambulation    Elimination Interventions: Call light in reach, Patient to call for help with toileting needs, Stay With Me (per policy), Toilet paper/wipes in reach, Toileting schedule/hourly rounds              Problem: Patient Education: Go to Patient Education Activity  Goal: Patient/Family Education  Outcome: Progressing Towards Goal     Problem:  Activity Intolerance  Goal: *Oxygen saturation during activity within specified parameters  Outcome: Progressing Towards Goal  Goal: *Able to remain out of bed as prescribed  Outcome: Progressing Towards Goal     Problem: Patient Education: Go to Patient Education Activity  Goal: Patient/Family Education  Outcome: Progressing Towards Goal     Problem: Gas Exchange - Impaired  Goal: Absence of hypoxia  Outcome: Progressing Towards Goal

## 2020-10-23 NOTE — PROGRESS NOTES
Occupational Therapy  Chart reviewed; cleared for tx for OT eval but now on dialysis;  Delbert Noyola OTR/L

## 2020-10-23 NOTE — PROCEDURES
[facility]  [phone]    Vitals   Pre   Post   Assessment   Pre   Post     Temp  Temp: 98.1 °F (36.7 °C) (10/23/20 1350) 97.6  LOC  A&OX4 A&OX4   HR   Pulse (Heart Rate): 72 (10/23/20 1350) 68 Lungs   Diminished, 4L of O2 continuously Diminished, 4L of O2 continuously   B/P   BP: (!) 112/50 (10/23/20 1350) 89/44 Cardiac   Bedside telelmetry, NSR HRR S1 S2 present Bedside telelmetry, NSR HRR S1 S2 present   Resp   Resp Rate: 20 (10/23/20 1350) 22 Skin   Warm, dry, and intact Warm, dry, and intact   Pain level  0 0 Edema  Generalized     Generalized   Orders:    Duration:   Start:    13:50 End:    16:51 Total:   3 hrs   Dialyzer:   Dialyzer/Set Up Inspection: Revaclear (10/23/20 1350)   K Bath:   Dialysate K (mEq/L): 2 (10/23/20 1350)   Ca Bath:   Dialysate CA (mEq/L): 2.5 (10/23/20 1350)   Na/Bicarb:   Dialysate NA (mEq/L): 140 (10/23/20 1350)   Target Fluid Removal:   Goal/Amount of Fluid to Remove (mL): 3000 mL (10/23/20 1350)   Access     Type & Location:   CHARLY AVG: skin CDI. No s/s of infection. + B/T. No issues with cannulation or hemostasis. Running well at -450.    Labs     Obtained/Reviewed   Critical Results Called   Date when labs were drawn-  Hgb-    HGB   Date Value Ref Range Status   10/23/2020 10.7 (L) 11.5 - 16.0 g/dL Final     K-    Potassium   Date Value Ref Range Status   10/23/2020 4.4 3.5 - 5.1 mmol/L Final     Ca-   Calcium   Date Value Ref Range Status   10/23/2020 10.0 8.5 - 10.1 MG/DL Final     Bun-   BUN   Date Value Ref Range Status   10/23/2020 40 (H) 6 - 20 MG/DL Final     Creat-   Creatinine   Date Value Ref Range Status   10/23/2020 9.90 (H) 0.55 - 1.02 MG/DL Final        Medications/ Blood Products Given     Name   Dose   Route and Time     Albumin 25% 12.5 G  IV with HD at 14:25   Albumin 25% 12.5 G IV with HD at 15:25        Blood Volume Processed (BVP):   68.3 L Net Fluid   Removed:  2044 ml   Comments   Time Out Done: 13:45  Primary Nurse Rpt Pre: Brigida Hamilton RN  Primary Nurse Rpt Post: Brigida Hamilton RN  Pt Education: procedural  Care Plan: ongoing, Albumin administration  Tx Summary:  Arrived to patient room set up and tested machine and water per policy. Patient is  A&Ox4. Consent signed & on file. SBAR received from Primary ELIESER Hamilton. 13:50- Pt cannulated with 72K needles per policy & without issue. Labs drawn per request/ order. VSS. Dialysis Tx initiated. 14:00- Pt resting quietly, VSS, denies pain or SOB. Dialysis access visualized and lines intact. 14:25- Patient BP is 98/40, started infusion of 25% Albumin 12.5 G IV with HD.  14:45- BFR set to 400 to prevent arterial pressures from fluctuating below -260.  15:00- Patient BP is 89/45, gave 100 ml of NS bolus for hypotension. UF paused. Patient resting quietly not symptomatic, dialysis access visualized and lines intact. 15:15- BFR increased to 425 as arterial pressures have improved. Patient BP recovered to 93/50, UF resumed. 15:25- Started second infusion of 25% Albumin 12.5 G IV with HD.  16:00- Patient resting quietly, dialysis access visualized and lines intact. BFR set to 400 to prevent arterial pressures from fluctuating below -260.  16:30- Patient BP is 79/46, UF paused and gave patient 100 ml of NS bolus for hypotension. Patient not symptomatic, resting quietly, and dialysis access visualized with lines intact. 16:45- Patient BP recovered to 88/51, UF remains paused. 16:51 Tx ended. VSS. All possible blood returned to patient. Hemostasis achieved without issue. Bed locked and in the lowest position, call bell and belongings in reach. SBAR given to Primary, ELIESER Hamilton. Patient is stable at time of my departure. All Dialysis related medications have been reviewed.      Admiting Diagnosis: SOB  Pt's previous clinic- [facility]  Consent signed - Informed Consent Verified: Yes (10/23/20 1350)  [facility] Consent - on file  Hepatitis Status- HbAg negative 10/23/2020, HbAB immune 49  Machine #- Machine Number: [id number] (10/23/20 6818)  Telemetry status- Bedside telemetry, NSR per primary RN  Pre-dialysis wt. -

## 2020-10-23 NOTE — PROGRESS NOTES
[id number] [facility]  Hospitalist Group                                                                                          Hospitalist Progress Note  Valeria Willis MD  Answering service: [phone] from in house phone        Date of Service:  10/23/2020  NAME:  Francisco Kraft  :  1960  MRN:  [id number]      Admission Summary:     Francisco Kraft is a 61 y.o. female past medical history significant for asthma, end-stage renal disease on hemodialysis, hypertension, CLEMENTINA and obesity presented to the emergency room for evaluation of hypoxia. Patient said for the last several days she has been having intermittent shortness of breath worsened with exertion. She went to dialysis and she was found to be intermittently hypoxic dropping to the low 80s for which she was sent to the emergency room for evaluation. Patient also reports having diarrhea, intermittent nausea and few episodes of vomitings. No vomiting in the last 24 hours or diarrhea since she is been in the emergency room. 2 days ago she went to her medical hospital for the same complaints. As per patient report nothing was done she was discharged home with follow-up but her shortness of breath has persisted. Today on arrival to the emergency room patient has been found to be hemodynamically stable. O2 sats are in the 90s on room air however noticed to drop to the 80s mostly with movement and exertion. She denies any other symptoms like chest pain, fever, chills, lightheadedness, abdominal pain. Given persistent shortness of breath and risk factors admission was requested.     Interval history / Subjective:     She said she feels better, but she has left side chest tightness with breathing in, she attributed to her using the BiPAP     Assessment & Plan:     Shortness of breath, with hx of chronic respiratory failure home oxygen dependent 2 l/m during day time and 4 l/m at night, she uses BiPAP q hs  -on 2 l/m nasal canula, BiPAP q hs, prn albuterol inhaler, monitor pulse ox  -V/Q scan low probability for pulmonary embolism.    -chest x ray no acute process or change in the lung fields compared to the prior exam.  -elevated ferritin and CRP  -troponin, LD and procalcitonin normal  -elevated D Dimer  -afebrile, no leukocytosis  -Echo pending  -Repeat chest x ray   -COVID 19 test negative     Diarrhea   -improved  -concerned for viral illness     ESRD DD  -HD per nephrologist    Anemia of chronic disease   -stable, Hgb 10.7  -monitor H/H  -nephrology is consulted    HTN  -BP normal, not on meds, monitor BP    Hx of TIA  -continue aspirin and lipitor     CLEMENTINA  -BiPAP q hs    Obesity  -diet and weight loss program     Code status: Full Code  DVT prophylaxis:heparin     Care Plan discussed with: Patient/Family, Nurse and   Anticipated Disposition: Home w/Family  Anticipated Discharge: 24 hours to 48 hours     Hospital Problems  Date Reviewed: 9/10/2020          Codes Class Noted POA    Shortness of breath ICD-10-CM: R06.02  ICD-9-CM: 786.05  10/21/2020 Unknown                Vital Signs:    Last 24hrs VS reviewed since prior progress note. Most recent are:  Visit Vitals  BP (!) 128/32 (BP 1 Location: Right leg, BP Patient Position: At rest)   Pulse 81   Temp 98.5 °F (36.9 °C)   Resp 21   Wt 144.9 kg (319 lb 6.4 oz)   SpO2 100%   BMI 53.15 kg/m²         Intake/Output Summary (Last 24 hours) at 10/23/2020 1227  Last data filed at 10/23/2020 0800  Gross per 24 hour   Intake 240 ml   Output    Net 240 ml        Physical Examination:             Constitutional:  No acute distress, cooperative, pleasant    ENT:  Oral mucosa moist, oropharynx benign. Resp:  Decrease bronchial breath sound bilaterally. No wheezing/rhonchi/rales. No accessory muscle use   CV:  Regular rhythm, normal rate, no murmurs, gallops, rubs    GI:  Soft, non distended, non tender.  normoactive bowel sounds, no hepatosplenomegaly     Musculoskeletal:  No edema,     Neurologic:  Moves all extremities. AAOx3, CN II-XII reviewed     Skin:  Good turgor, no rashes or ulcers       Data Review:    Review and/or order of clinical lab test  Review and/or order of tests in the radiology section of CPT  Review and/or order of tests in the medicine section of CPT      Labs:     Recent Labs     10/23/20  0352 10/22/20  0700   WBC 7.1 7.0   HGB 10.7* 10.2*   HCT 33.7* 33.1*    169     Recent Labs     10/23/20  0352 10/22/20  0511 10/21/20  1746   * 136 135*   K 4.4 4.0 3.5   CL 95* 97 94*   CO2 29 29 30   BUN 40* 30* 22*   CREA 9.90* 7.66* 6.37*   GLU 79 78 87   CA 10.0 9.5 10.1     Recent Labs     10/23/20  0352 10/22/20  0511 10/21/20  1746   ALT 12 10* 16   AP 71 75 86   TBILI 0.6 0.6 0.7   TP 7.4 7.0 9.0*   ALB 3.3* 3.2* 3.9   GLOB 4.1* 3.8 5.1*     Recent Labs     10/23/20  0352 10/22/20  0513   INR 1.0 1.1   PTP 10.9 11.2*   APTT 28.1 31.3      Recent Labs     10/22/20  0511   FERR 861*      No results found for: FOL, RBCF   No results for input(s): PH, PCO2, PO2 in the last 72 hours.   Recent Labs     10/22/20  0511   TROIQ <0.05     Lab Results   Component Value Date/Time    Cholesterol, total 195 10/01/2015 12:18 PM    HDL Cholesterol 69 10/01/2015 12:18 PM    LDL, calculated 111 (H) 10/01/2015 12:18 PM    Triglyceride 73 10/01/2015 12:18 PM     Lab Results   Component Value Date/Time    Glucose (POC) 100 10/01/2019 09:08 AM    Glucose (POC) 84 01/31/2019 12:36 PM    Glucose (POC) 86 03/10/2016 07:21 AM     No results found for: COLOR, APPRN, SPGRU, REFSG, DARREL, PROTU, GLUCU, KETU, BILU, UROU, SHANNON, LEUKU, GLUKE, EPSU, BACTU, WBCU, RBCU, CASTS, UCRY      Medications Reviewed:     Current Facility-Administered Medications   Medication Dose Route Frequency    HYDROcodone-acetaminophen (NORCO) 5-325 mg per tablet 1 Tab  1 Tab Oral Q6H PRN    albuterol (PROVENTIL HFA, VENTOLIN HFA, PROAIR HFA) inhaler 1 Puff  1 Puff Inhalation Q4H PRN    sevelamer carbonate (RENVELA) oral powder 2.4 g  2.4 g Oral TID WITH MEALS    pantoprazole (PROTONIX) tablet 20 mg  20 mg Oral ACB    montelukast (SINGULAIR) tablet 10 mg  10 mg Oral QHS    gabapentin (NEURONTIN) capsule 300 mg  300 mg Oral DAILY    aspirin delayed-release tablet 81 mg  81 mg Oral DAILY    atorvastatin (LIPITOR) tablet 10 mg  10 mg Oral QHS    sodium chloride (NS) flush 5-40 mL  5-40 mL IntraVENous Q8H    sodium chloride (NS) flush 5-40 mL  5-40 mL IntraVENous PRN    acetaminophen (TYLENOL) tablet 650 mg  650 mg Oral Q6H PRN    Or    acetaminophen (TYLENOL) suppository 650 mg  650 mg Rectal Q6H PRN    polyethylene glycol (MIRALAX) packet 17 g  17 g Oral DAILY PRN    promethazine (PHENERGAN) tablet 12.5 mg  12.5 mg Oral Q6H PRN    Or    ondansetron (ZOFRAN) injection 4 mg  4 mg IntraVENous Q6H PRN    heparin (porcine) injection 5,000 Units  5,000 Units SubCUTAneous Q8H     ______________________________________________________________________  EXPECTED LENGTH OF STAY: 2d 4h  ACTUAL LENGTH OF STAY:          2                 Joe Dawson MD

## 2020-10-24 ENCOUNTER — APPOINTMENT (OUTPATIENT)
Dept: NON INVASIVE DIAGNOSTICS | Age: 60
DRG: 133 | End: 2020-10-24
Attending: INTERNAL MEDICINE
Payer: MEDICAID

## 2020-10-24 LAB
ALBUMIN SERPL-MCNC: 3.5 G/DL (ref 3.5–5)
ALBUMIN/GLOB SERPL: 1 {RATIO} (ref 1.1–2.2)
ALP SERPL-CCNC: 62 U/L (ref 45–117)
ALT SERPL-CCNC: 8 U/L (ref 12–78)
ANION GAP SERPL CALC-SCNC: 8 MMOL/L (ref 5–15)
APTT PPP: 35 SEC (ref 22.1–32)
AST SERPL-CCNC: 6 U/L (ref 15–37)
BILIRUB SERPL-MCNC: 0.4 MG/DL (ref 0.2–1)
BUN SERPL-MCNC: 25 MG/DL (ref 6–20)
BUN/CREAT SERPL: 3 (ref 12–20)
CALCIUM SERPL-MCNC: 9.8 MG/DL (ref 8.5–10.1)
CHLORIDE SERPL-SCNC: 99 MMOL/L (ref 97–108)
CO2 SERPL-SCNC: 29 MMOL/L (ref 21–32)
CREAT SERPL-MCNC: 7.75 MG/DL (ref 0.55–1.02)
ECHO AV AREA PEAK VELOCITY: 1.97 CM2
ECHO AV AREA VTI: 2.08 CM2
ECHO AV AREA/BSA PEAK VELOCITY: 0.8 CM2/M2
ECHO AV AREA/BSA VTI: 0.9 CM2/M2
ECHO AV MEAN GRADIENT: 9.56 MMHG
ECHO AV PEAK GRADIENT: 16.12 MMHG
ECHO AV PEAK VELOCITY: 200.78 CM/S
ECHO AV VTI: 43.93 CM
ECHO LA MAJOR AXIS: 4.39 CM
ECHO LA MINOR AXIS: 1.82 CM
ECHO LV INTERNAL DIMENSION DIASTOLIC: 5.41 CM (ref 3.9–5.3)
ECHO LV INTERNAL DIMENSION SYSTOLIC: 3.37 CM
ECHO LV IVSD: 0.78 CM (ref 0.6–0.9)
ECHO LV MASS 2D: 154.2 G (ref 67–162)
ECHO LV MASS INDEX 2D: 63.9 G/M2 (ref 43–95)
ECHO LV POSTERIOR WALL DIASTOLIC: 0.81 CM (ref 0.6–0.9)
ECHO LVOT DIAM: 2.09 CM
ECHO LVOT PEAK GRADIENT: 5.3 MMHG
ECHO LVOT PEAK VELOCITY: 115.08 CM/S
ECHO LVOT SV: 91.5 ML
ECHO LVOT VTI: 26.6 CM
ECHO MV AREA VTI: 2.92 CM2
ECHO MV MAX VELOCITY: 124.88 CM/S
ECHO MV MEAN GRADIENT: 2.34 MMHG
ECHO MV PEAK GRADIENT: 6.24 MMHG
ECHO MV VTI: 31.35 CM
ECHO PV MAX VELOCITY: 122.15 CM/S
ECHO PV PEAK INSTANTANEOUS GRADIENT SYSTOLIC: 5.97 MMHG
ECHO RV TAPSE: 2.5 CM (ref 1.5–2)
ECHO TV REGURGITANT MAX VELOCITY: 271.68 CM/S
ECHO TV REGURGITANT PEAK GRADIENT: 29.52 MMHG
ERYTHROCYTE [DISTWIDTH] IN BLOOD BY AUTOMATED COUNT: 14.2 % (ref 11.5–14.5)
FIBRINOGEN PPP-MCNC: 270 MG/DL (ref 200–475)
GLOBULIN SER CALC-MCNC: 3.4 G/DL (ref 2–4)
GLUCOSE SERPL-MCNC: 77 MG/DL (ref 65–100)
HCT VFR BLD AUTO: 28.5 % (ref 35–47)
HGB BLD-MCNC: 8.9 G/DL (ref 11.5–16)
INR PPP: 1.1 (ref 0.9–1.1)
MCH RBC QN AUTO: 32.7 PG (ref 26–34)
MCHC RBC AUTO-ENTMCNC: 31.2 G/DL (ref 30–36.5)
MCV RBC AUTO: 104.8 FL (ref 80–99)
NRBC # BLD: 0 K/UL (ref 0–0.01)
NRBC BLD-RTO: 0 PER 100 WBC
PLATELET # BLD AUTO: 148 K/UL (ref 150–400)
PMV BLD AUTO: 11.5 FL (ref 8.9–12.9)
POTASSIUM SERPL-SCNC: 3.7 MMOL/L (ref 3.5–5.1)
PROT SERPL-MCNC: 6.9 G/DL (ref 6.4–8.2)
PROTHROMBIN TIME: 11.4 SEC (ref 9–11.1)
RBC # BLD AUTO: 2.72 M/UL (ref 3.8–5.2)
SODIUM SERPL-SCNC: 136 MMOL/L (ref 136–145)
THERAPEUTIC RANGE,PTTT: ABNORMAL SECS (ref 58–77)
WBC # BLD AUTO: 7.1 K/UL (ref 3.6–11)

## 2020-10-24 PROCEDURE — 93306 TTE W/DOPPLER COMPLETE: CPT | Performed by: SPECIALIST

## 2020-10-24 PROCEDURE — 97161 PT EVAL LOW COMPLEX 20 MIN: CPT

## 2020-10-24 PROCEDURE — 74011250636 HC RX REV CODE- 250/636: Performed by: HOSPITALIST

## 2020-10-24 PROCEDURE — 80053 COMPREHEN METABOLIC PANEL: CPT

## 2020-10-24 PROCEDURE — C8929 TTE W OR WO FOL WCON,DOPPLER: HCPCS

## 2020-10-24 PROCEDURE — 36415 COLL VENOUS BLD VENIPUNCTURE: CPT

## 2020-10-24 PROCEDURE — 97116 GAIT TRAINING THERAPY: CPT

## 2020-10-24 PROCEDURE — 77010033678 HC OXYGEN DAILY

## 2020-10-24 PROCEDURE — P9047 ALBUMIN (HUMAN), 25%, 50ML: HCPCS | Performed by: HOSPITALIST

## 2020-10-24 PROCEDURE — 90935 HEMODIALYSIS ONE EVALUATION: CPT

## 2020-10-24 PROCEDURE — 74011250637 HC RX REV CODE- 250/637: Performed by: HOSPITALIST

## 2020-10-24 PROCEDURE — 65660000001 HC RM ICU INTERMED STEPDOWN

## 2020-10-24 PROCEDURE — 85027 COMPLETE CBC AUTOMATED: CPT

## 2020-10-24 PROCEDURE — 74011250636 HC RX REV CODE- 250/636: Performed by: INTERNAL MEDICINE

## 2020-10-24 PROCEDURE — 85610 PROTHROMBIN TIME: CPT

## 2020-10-24 PROCEDURE — 85384 FIBRINOGEN ACTIVITY: CPT

## 2020-10-24 PROCEDURE — 85730 THROMBOPLASTIN TIME PARTIAL: CPT

## 2020-10-24 PROCEDURE — 94660 CPAP INITIATION&MGMT: CPT

## 2020-10-24 PROCEDURE — 74011250637 HC RX REV CODE- 250/637: Performed by: INTERNAL MEDICINE

## 2020-10-24 RX ORDER — MIDODRINE HYDROCHLORIDE 2.5 MG/1
2.5 TABLET ORAL AS NEEDED
Qty: 30 TAB | Refills: 0 | Status: SHIPPED | OUTPATIENT
Start: 2020-10-24 | End: 2020-11-23

## 2020-10-24 RX ADMIN — MIDODRINE HYDROCHLORIDE 5 MG: 5 TABLET ORAL at 18:38

## 2020-10-24 RX ADMIN — ALBUMIN (HUMAN) 25 G: 0.25 INJECTION, SOLUTION INTRAVENOUS at 05:56

## 2020-10-24 RX ADMIN — GABAPENTIN 300 MG: 300 CAPSULE ORAL at 08:51

## 2020-10-24 RX ADMIN — ATORVASTATIN CALCIUM 10 MG: 10 TABLET, FILM COATED ORAL at 23:14

## 2020-10-24 RX ADMIN — ASPIRIN 81 MG: 81 TABLET, COATED ORAL at 08:51

## 2020-10-24 RX ADMIN — MIDODRINE HYDROCHLORIDE 5 MG: 5 TABLET ORAL at 12:17

## 2020-10-24 RX ADMIN — SEVELAMER CARBONATE 2.4 G: 2400 POWDER, FOR SUSPENSION ORAL at 12:17

## 2020-10-24 RX ADMIN — ALBUMIN (HUMAN) 25 G: 0.25 INJECTION, SOLUTION INTRAVENOUS at 01:05

## 2020-10-24 RX ADMIN — PERFLUTREN 2 ML: 6.52 INJECTION, SUSPENSION INTRAVENOUS at 10:32

## 2020-10-24 RX ADMIN — SEVELAMER CARBONATE 2.4 G: 2400 POWDER, FOR SUSPENSION ORAL at 18:38

## 2020-10-24 RX ADMIN — HEPARIN SODIUM 5000 UNITS: 5000 INJECTION INTRAVENOUS; SUBCUTANEOUS at 05:56

## 2020-10-24 RX ADMIN — HEPARIN SODIUM 5000 UNITS: 5000 INJECTION INTRAVENOUS; SUBCUTANEOUS at 14:48

## 2020-10-24 RX ADMIN — SEVELAMER CARBONATE 2.4 G: 2400 POWDER, FOR SUSPENSION ORAL at 08:55

## 2020-10-24 RX ADMIN — PANTOPRAZOLE SODIUM 20 MG: 20 TABLET, DELAYED RELEASE ORAL at 06:41

## 2020-10-24 RX ADMIN — HEPARIN SODIUM 5000 UNITS: 5000 INJECTION INTRAVENOUS; SUBCUTANEOUS at 23:14

## 2020-10-24 RX ADMIN — MIDODRINE HYDROCHLORIDE 5 MG: 5 TABLET ORAL at 08:51

## 2020-10-24 RX ADMIN — Medication 10 ML: at 13:17

## 2020-10-24 RX ADMIN — MONTELUKAST 10 MG: 10 TABLET, FILM COATED ORAL at 23:13

## 2020-10-24 NOTE — PROCEDURES
[facility]  [phone]    Vitals   Pre   Post   Assessment   Pre   Post     Temp  Temp: 97.6 °F (36.4 °C) (10/24/20 1905)  97.6 LOC  ALERT AND ORIENTED X 4 ALERT AND ORIENTED X 4   HR   Pulse (Heart Rate): 64 (10/24/20 1930) RATE 70, MULTIFORM PVC'S Lungs   CLEAR  CLEAR   B/P   BP: (!) 102/42 (10/24/20 1930) 95/39 Cardiac   RATE 66, PVC'S  RATE 72   Resp   Resp Rate: 19 (10/24/20 1930) 15 Skin   WAR,/DRY/INTACT  W/D/I   Pain level  Pain Intensity 1: 0 (10/24/20 1451) NO COMPLAINTS Edema    NONE NOTED   NONE NOTED   Orders:    Duration:   Start:    1905 End:    8688 Total:    3.5 HRS   Dialyzer:   Dialyzer/Set Up Inspection: Revaclear (10/24/20 1905)   K Bath:   3K   Ca Bath:   Dialysate CA (mEq/L): 2.5 (10/23/20 1350)   Na/Bicarb:   Dialysate NA (mEq/L): 140 (10/24/20 1905)   Target Fluid Removal:   2500ML   Access     Type & Location:   CHARLY AVG: skin CDI. No s/s of infection. + B/T. No issues with cannulation or hemostasis. Running well at . Labs     Obtained/Reviewed   Critical Results Called   Date when labs were drawn-  Hgb-    HGB   Date Value Ref Range Status   10/24/2020 8.9 (L) 11.5 - 16.0 g/dL Final     K-    Potassium   Date Value Ref Range Status   10/24/2020 3.7 3.5 - 5.1 mmol/L Final     Ca-   Calcium   Date Value Ref Range Status   10/24/2020 9.8 8.5 - 10.1 MG/DL Final     Bun-   BUN   Date Value Ref Range Status   10/24/2020 25 (H) 6 - 20 MG/DL Final     Creat-   Creatinine   Date Value Ref Range Status   10/24/2020 7.75 (H) 0.55 - 1.02 MG/DL Final        Medications/ Blood Products Given     Name   Dose   Route and Time                     Blood Volume Processed (BVP):    72.7 Net Fluid   Removed:  2500   Comments   Time Out Done: 1900  Primary Nurse Rpt Pre: NILSON DAILEY RN  Primary Nurse Rpt Post: KEDAR LOZANO RN  Pt Education: AVG CARE/LOW PHOS FOODS  Care Plan: ONGOING  Tx Summary:    Pt A&Ox4. Consent signed & on file.  SBAR received from Primary RN.  5203: Pt cannulated with 84M needles per policy & without issue. Labs drawn per request/ order. VSS. Dialysis Tx initiated. 1945: BFR decreased to 400 due to high arterial pressure alarms. Attempts made to reposition needle without success. 2100: Pt resting quietly. 2235: Tx ended. VSS. All possible blood returned to patient. Hemostasis achieved without issue. Bed locked and in the lowest position, call bell and belongings in reach. SBAR given to Primary, RN. Patient is stable at time of my departure. All Dialysis related medications have been reviewed.      Admiting Diagnosis: SOB  Pt's previous clinic-[place]  Consent signed - Informed Consent Verified: Yes (10/24/20 1905)  Hepatitis Status- NEG/IMMUN 1/9/2020  Machine #- Machine Number: Samira [id number] (10/24/20 1905)  Telemetry status-RATE 60, TELEMONITOR

## 2020-10-24 NOTE — PROGRESS NOTES
Pt awaiting HD prior to d/c.           Bedside and Verbal shift change report given to Satnam Valentin (oncoming nurse) by Dalton Fitzgerald (offgoing nurse). Report included the following information SBAR, Kardex, Intake/Output, MAR, Accordion and Recent Results. Problem: Pressure Injury - Risk of  Goal: *Prevention of pressure injury  Description: Document Anthony Scale and appropriate interventions in the flowsheet. Outcome: Progressing Towards Goal  Note: Pressure Injury Interventions:  Sensory Interventions: Assess changes in LOC, Assess need for specialty bed, Avoid rigorous massage over bony prominences, Check visual cues for pain, Discuss PT/OT consult with provider, Float heels, Keep linens dry and wrinkle-free, Maintain/enhance activity level, Minimize linen layers, Monitor skin under medical devices, Pressure redistribution bed/mattress (bed type), Turn and reposition approx. every two hours (pillows and wedges if needed)         Activity Interventions: Assess need for specialty bed, Increase time out of bed, PT/OT evaluation    Mobility Interventions: Pressure redistribution bed/mattress (bed type)                          Problem: Patient Education: Go to Patient Education Activity  Goal: Patient/Family Education  Outcome: Progressing Towards Goal     Problem: Falls - Risk of  Goal: *Absence of Falls  Description: Document Hollis Naresh Fall Risk and appropriate interventions in the flowsheet.   Outcome: Progressing Towards Goal  Note: Fall Risk Interventions:  Mobility Interventions: Assess mobility with egress test, Communicate number of staff needed for ambulation/transfer, OT consult for ADLs, Patient to call before getting OOB, PT Consult for mobility concerns, PT Consult for assist device competence, Strengthening exercises (ROM-active/passive), Utilize walker, cane, or other assistive device, Utilize gait belt for transfers/ambulation         Medication Interventions: Evaluate medications/consider consulting pharmacy, Patient to call before getting OOB, Teach patient to arise slowly, Utilize gait belt for transfers/ambulation    Elimination Interventions: Call light in reach, Patient to call for help with toileting needs, Stay With Me (per policy), Toilet paper/wipes in reach, Toileting schedule/hourly rounds              Problem: Patient Education: Go to Patient Education Activity  Goal: Patient/Family Education  Outcome: Progressing Towards Goal     Problem:  Activity Intolerance  Goal: *Oxygen saturation during activity within specified parameters  Outcome: Progressing Towards Goal  Goal: *Able to remain out of bed as prescribed  Outcome: Progressing Towards Goal     Problem: Patient Education: Go to Patient Education Activity  Goal: Patient/Family Education  Outcome: Progressing Towards Goal     Problem: Gas Exchange - Impaired  Goal: Absence of hypoxia  Outcome: Progressing Towards Goal  Goal: Promote optimal lung function  Outcome: Progressing Towards Goal

## 2020-10-24 NOTE — PROGRESS NOTES
Problem: Mobility Impaired (Adult and Pediatric)  Goal: *Acute Goals and Plan of Care (Insert Text)  Description: FUNCTIONAL STATUS PRIOR TO ADMISSION: Patient was modified independent using a rollator walker for functional mobility. HOME SUPPORT PRIOR TO ADMISSION: The patient lived alone with son available to provide assistance. Outcome: Resolved/Met   PHYSICAL THERAPY EVALUATION/DISCHARGE  Patient: Francisco Kraft (89 y.o. female)  Date: 10/24/2020  Primary Diagnosis: Shortness of breath [R06.02]        Precautions:         ASSESSMENT  Based on the objective data described below, the patient presents functioning at her baseline mobility. She is modified independent with transfers and ambulation. She walked 80 ft with a RW while on 4L NCO2. Gait with the walker is steady, SpO2 %. Patient voiced owning a rollator walker though states the wheel is broken. She is in need of a new walker and prefers a two wheel RW. Noted she is scheduled for discharge this afternoon. Informed her that the walker is not likely to arrive by discharge. Patient states her son will be able to assist her into the home, okay to have walker delivered to her home. IP Case Management consult placed for Bariatric RW order. No acute therapy needs identified. Will sign off. Thank you for this referral.     Functional Outcome Measure: The patient scored 80/100 on the Barthel outcome measure. Other factors to consider for discharge: Lives alone. Plans to stay with her son when she discharges today. Her son will assist her into the house. Further skilled acute physical therapy is not indicated at this time. PLAN :  Recommendation for discharge: (in order for the patient to meet his/her long term goals)  No skilled physical therapy/ follow up rehabilitation needs identified at this time.     This discharge recommendation:  Has not yet been discussed the attending provider and/or case management    IF patient discharges home will need the following DME: rolling walker       SUBJECTIVE:   Patient stated \"I'm not going to do any walking. I want to conserve my energy to go home.     OBJECTIVE DATA SUMMARY:   HISTORY:    Past Medical History:   Diagnosis Date    Advanced care planning/counseling discussion 4/7/16    Arthritis     back    Arthritis of ankle or foot, right     Asthma     Dr. Marea [facility]    Breast cancer [facility])     Right Lumpectomy 2014 - DCIS    Chronic kidney disease     STAGE IV/ dialysis [facility] mw    Chronic obstructive pulmonary disease ([facility] [address]     Chronic pain 1989    ANKLE-right- h/o fx ankle    CKD (chronic kidney disease) stage V requiring chronic dialysis ([facility] [address]     Dr. Raciel Stinson [facility]    Depression     DEPRESSION AND ANXIETY    GERD (gastroesophageal reflux disease)     Heart failure ([address]     h/o chf - Normal EF, likely due to volume overload    History of abdominal hernia     History of MRSA infection 01/2018    h/o MRSA leg     Hypertension     Morbid obesity ([facility] [address]     Respiratory failure, chronic ([facility] [address] 10/07/14    Dr. Knott Erm    Stroke [facility]) 2009    ? TIA @ age 52    Unspecified sleep apnea 10/07/2014    BIPAP with oxygen    Vitamin D deficiency 12/2011    10.4 [facility] labs     Past Surgical History:   Procedure Laterality Date    HX BREAST BIOPSY Right 4/7/14    DCIS    HX CHOLECYSTECTOMY  2009    choleystectomy    HX COLONOSCOPY  8/18/11    Dr. Anna Conklin    HX GYN      tuabl ligation    HX ORTHOPAEDIC  1989    r ankle and leg plate and screw    HX ORTHOPAEDIC  2019    bone taken out of right pink toe    HX OTHER SURGICAL  3/10/16    Graft in left arm     HX VASCULAR ACCESS Right 08/2019    DIALYSIS CATHETER-upper chest    VASCULAR SURGERY PROCEDURE UNLIST      4 surgeries on left upper arm grafts stopping up.     VASCULAR SURGERY PROCEDURE UNLIST  05/19/2020    Insertion of AV graft left arm       Prior level of function: Modified independent using a rollator walker  Personal factors and/or comorbidities impacting plan of care: [facility]    Home Situation  Home Environment: Apartment  # Steps to Enter: 0  One/Two Story Residence: One story  Living Alone: Yes  Support Systems: Family member(s), Friends \ neighbors  Patient Expects to be Discharged to[de-identified] Apartment  Current DME Used/Available at Home: Tucker Lavender, rollator, Tub transfer bench, Wheelchair  Tub or Shower Type: Tub/Shower combination    EXAMINATION/PRESENTATION/DECISION MAKING:   Critical Behavior:  Neurologic State: Alert  Orientation Level: Oriented X4        Hearing: Auditory  Auditory Impairment: None    Range Of Motion:  AROM: Within functional limits                       Strength:    Strength: Within functional limits                    Tone & Sensation:   Tone: Normal              Sensation: Intact               Coordination:  Coordination: Within functional limits    Functional Mobility:  Bed Mobility:  Supine to Sit: Modified independent  Sit to Supine: Modified independent  Scooting: Modified independent  Transfers:  Sit to Stand: Modified independent  Stand to Sit: Modified independent                       Balance:   Sitting: Intact; Without support  Standing: Impaired; Without support  Standing - Static: Good  Standing - Dynamic : Fair  Ambulation/Gait Training:  Distance (ft): 50 Feet (ft)  Assistive Device: Walker, rolling  Ambulation - Level of Assistance: Supervision/ Modified independent; Adaptive equipment     Gait Description (WDL): Exceptions to WDL  Gait Abnormalities: Decreased step clearance;Trunk sway increased        Base of Support: Widened     Speed/Amalia: Slow  Step Length: Right shortened;Left shortened  Discussed 2 wheel RW vs rollator walker. Patient prefers the 2 wheel RW. Assisted with ambulation today to monitor SpO2. SpO2 % with ambulation. Gait with the walker is steady. Cues provided for turning. Patient is otherwise modified independent.                 Functional Measure:  Barthel Index:    Bathin  Bladder: 10  Bowels: 10  Groomin  Dressing: 10  Feeding: 10  Mobility: 0  Stairs: 5  Toilet Use: 10  Transfer (Bed to Chair and Back): 15  Total: 80/100       The Barthel ADL Index: Guidelines  1. The index should be used as a record of what a patient does, not as a record of what a patient could do. 2. The main aim is to establish degree of independence from any help, physical or verbal, however minor and for whatever reason. 3. The need for supervision renders the patient not independent. 4. A patient's performance should be established using the best available evidence. Asking the patient, friends/relatives and nurses are the usual sources, but direct observation and common sense are also important. However direct testing is not needed. 5. Usually the patient's performance over the preceding 24-48 hours is important, but occasionally longer periods will be relevant. 6. Middle categories imply that the patient supplies over 50 per cent of the effort. 7. Use of aids to be independent is allowed. Homa De La Cruz, Barthel, D.W. (6520). Functional evaluation: the Barthel Index. [address] [facility] (14)2. [facility] [place] LIZZY Ag, Zachary Espana., Ilana Obregon., [place], [address] [place] [address] (). Measuring the change indisability after inpatient rehabilitation; comparison of the responsiveness of the Barthel Index and Functional Ranger Measure. Journal of Neurology, Neurosurgery, and Psychiatry, 66(4), 418-719. Wyatt Burnette, N.J.A, AMARILYS Green, & Eloy Sawyer MNATALIE. (2004.) Assessment of post-stroke quality of life in cost-effectiveness studies: The usefulness of the Barthel Index and the EuroQoL-5D.  Quality of Life Research, 15, 128-51            Physical Therapy Evaluation Charge Determination   History Examination Presentation Decision-Making   MEDIUM  Complexity : 1-2 comorbidities / personal factors will impact the outcome/ POC  LOW Complexity : 1-2 Standardized tests and measures addressing body structure, function, activity limitation and / or participation in recreation  LOW Complexity : Stable, uncomplicated  LOW Complexity : FOTO score of       Based on the above components, the patient evaluation is determined to be of the following complexity level: LOW     Pain Rating:  Voiced no pain    Activity Tolerance:   Fair  Please refer to the flowsheet for vital signs taken during this treatment. After treatment patient left in no apparent distress:   Sitting in bed, call bell within reach    COMMUNICATION/EDUCATION:   The patients plan of care was discussed with: Registered nurse. Fall prevention education was provided and the patient/caregiver indicated understanding. Patient participated in plan of care.     Thank you for this referral.  Trixie Mahoney, PT   Time Calculation: 28 mins

## 2020-10-24 NOTE — PROGRESS NOTES
Bedside shift change report given to Analy (oncoming nurse) by Author Lisa (offgoing nurse). Report included the following information SBAR. Problem: Pressure Injury - Risk of  Goal: *Prevention of pressure injury  Description: Document Anthony Scale and appropriate interventions in the flowsheet. Outcome: Progressing Towards Goal  Note: Pressure Injury Interventions:  Sensory Interventions: Assess changes in LOC, Assess need for specialty bed, Avoid rigorous massage over bony prominences, Check visual cues for pain, Discuss PT/OT consult with provider, Float heels, Keep linens dry and wrinkle-free, Maintain/enhance activity level, Minimize linen layers, Monitor skin under medical devices, Pressure redistribution bed/mattress (bed type), Turn and reposition approx. every two hours (pillows and wedges if needed)         Activity Interventions: Pressure redistribution bed/mattress(bed type)    Mobility Interventions: Pressure redistribution bed/mattress (bed type)                          Problem: Patient Education: Go to Patient Education Activity  Goal: Patient/Family Education  Outcome: Progressing Towards Goal     Problem: Falls - Risk of  Goal: *Absence of Falls  Description: Document Ceci Fall Risk and appropriate interventions in the flowsheet. Outcome: Progressing Towards Goal  Note: Fall Risk Interventions:  Mobility Interventions: Patient to call before getting OOB         Medication Interventions: Patient to call before getting OOB    Elimination Interventions: Patient to call for help with toileting needs              Problem: Patient Education: Go to Patient Education Activity  Goal: Patient/Family Education  Outcome: Progressing Towards Goal     Problem:  Activity Intolerance  Goal: *Oxygen saturation during activity within specified parameters  Outcome: Progressing Towards Goal  Goal: *Able to remain out of bed as prescribed  Outcome: Progressing Towards Goal     Problem: Patient Education: Go to Patient Education Activity  Goal: Patient/Family Education  Outcome: Progressing Towards Goal     Problem: Hypertension  Goal: *Blood pressure within specified parameters  Outcome: Progressing Towards Goal  Goal: *Fluid volume balance  Outcome: Progressing Towards Goal  Goal: *Labs within defined limits  Outcome: Progressing Towards Goal     Problem: Patient Education: Go to Patient Education Activity  Goal: Patient/Family Education  Outcome: Progressing Towards Goal     Problem: Risk for Fluid Volume Deficit  Goal: Maintain normal heart rhythm  Outcome: Progressing Towards Goal  Goal: Maintain absence of muscle cramping  Outcome: Progressing Towards Goal  Goal: Maintain normal serum potassium, sodium, calcium, phosphorus, and pH  Outcome: Progressing Towards Goal     Problem: Loneliness or Risk for Loneliness  Goal: Demonstrate positive use of time alone when socialization is not possible  Outcome: Progressing Towards Goal     Problem: Fatigue  Goal: Verbalize increase energy and improved vitality  Outcome: Progressing Towards Goal     Problem: Patient Education: Go to Patient Education Activity  Goal: Patient/Family Education  Outcome: Progressing Towards Goal     Problem: Discharge Planning  Goal: *Discharge to safe environment  Outcome: Progressing Towards Goal

## 2020-10-24 NOTE — PROGRESS NOTES
Transition of Care Plan  RUR 15%    Patient being discharged today. Son is transporting. She is going to her son's home  [address]    Order for bariatric two wheel rolling walker noted. .. OLGA talked with Pal Cheung  [phone] liaison at [place] [phone] and as  advised OLGA faxed order, demographics, H/P and PT evaluation to  [phone]. He informed CM that the company does accept patient's insurance but the walker cannot be delivered until the insurance is verified. ([employer]- [facility]) This will be Monday 10/26/20    CM placed the number of AVS and advised patient to call [facility] Monday 10/26/2020 to inquire as to the status of the rolling walker. Patient informed OLGA that her son will transport to hemodialysis treatment on 10/26/20  She will call [facility] on Monday.

## 2020-10-24 NOTE — PROGRESS NOTES
[facility] Adult  Hospitalist Group                                                                                          Hospitalist Progress Note  Lucy Arreaga MD  Answering service: [phone] from in house phone        Date of Service:  10/24/2020  NAME:  Ratna Dexter  :  1960  MRN:  [id number]      Admission Summary:     Ratna Dexter is a 61 y.o. female past medical history significant for asthma, end-stage renal disease on hemodialysis, hypertension, CLEMENTINA and obesity presented to the emergency room for evaluation of hypoxia. Patient said for the last several days she has been having intermittent shortness of breath worsened with exertion. She went to dialysis and she was found to be intermittently hypoxic dropping to the low 80s for which she was sent to the emergency room for evaluation. Patient also reports having diarrhea, intermittent nausea and few episodes of vomitings. No vomiting in the last 24 hours or diarrhea since she is been in the emergency room. 2 days ago she went to her medical hospital for the same complaints. As per patient report nothing was done she was discharged home with follow-up but her shortness of breath has persisted. Today on arrival to the emergency room patient has been found to be hemodynamically stable. O2 sats are in the 90s on room air however noticed to drop to the 80s mostly with movement and exertion. She denies any other symptoms like chest pain, fever, chills, lightheadedness, abdominal pain. Given persistent shortness of breath and risk factors admission was requested.     Interval history / Subjective:     She said she feels better, no left side chest pain     Assessment & Plan:     Shortness of breath, with hx of chronic respiratory failure home oxygen dependent 2 l/m during day time and 4 l/m at night, she uses BiPAP q hs  -on 2 l/m nasal canula, BiPAP q hs, prn albuterol inhaler, monitor pulse ox  -V/Q scan low probability for pulmonary embolism.    -chest x ray no acute process or change in the lung fields compared to the prior exam.  -elevated ferritin and CRP  -troponin, LD and procalcitonin normal  -elevated D Dimer  -afebrile, no leukocytosis  -Echo LV EF 55-60%  -Repeat chest x ray   -COVID 19 test negative     Diarrhea possible due to viral gastroenteritis   -improved    ESRD DD  -HD per nephrologist    Anemia of chronic disease   -stable, Hgb 10.7  -monitor H/H  -nephrology is consulted    HTN  -BP fluctuates but normal, not on meds, monitor BP    Hx of TIA  -continue aspirin and lipitor     CLEMENTINA  -BiPAP q hs    Obesity  -diet and weight loss program     Code status: Full Code  DVT prophylaxis:heparin     Care Plan discussed with: Patient/Family, Nurse and   Anticipated Disposition: Home w/Family  Anticipated Discharge: 24 hours to 48 hours     Hospital Problems  Date Reviewed: 9/10/2020          Codes Class Noted POA    Shortness of breath ICD-10-CM: R06.02  ICD-9-CM: 786.05  10/21/2020 Unknown                Vital Signs:    Last 24hrs VS reviewed since prior progress note. Most recent are:  Visit Vitals  BP (!) 115/41   Pulse 73   Temp 98.6 °F (37 °C)   Resp 17   Ht 5' 5\" (1.651 m)   Wt 145.2 kg (320 lb)   SpO2 97%   BMI 53.25 kg/m²         Intake/Output Summary (Last 24 hours) at 10/24/2020 1136  Last data filed at 10/24/2020 0400  Gross per 24 hour   Intake 940 ml   Output 2044 ml   Net -1104 ml        Physical Examination:             Constitutional:  No acute distress, cooperative, pleasant    ENT:  Oral mucosa moist, oropharynx benign. Resp:  Decrease bronchial breath sound bilaterally. No wheezing/rhonchi/rales. No accessory muscle use   CV:  Regular rhythm, normal rate, no murmurs, gallops, rubs    GI:  Soft, non distended, non tender. normoactive bowel sounds, no hepatosplenomegaly     Musculoskeletal:  No edema,     Neurologic:  Moves all extremities.   AAOx3, CN II-XII reviewed     Skin: Good turgor, no rashes or ulcers       Data Review:    Review and/or order of clinical lab test  Review and/or order of tests in the radiology section of CPT  Review and/or order of tests in the medicine section of CPT      Labs:     Recent Labs     10/24/20  0550 10/23/20  0352   WBC 7.1 7.1   HGB 8.9* 10.7*   HCT 28.5* 33.7*   * 179     Recent Labs     10/24/20  0550 10/23/20  0352 10/22/20  0511    132* 136   K 3.7 4.4 4.0   CL 99 95* 97   CO2 29 29 29   BUN 25* 40* 30*   CREA 7.75* 9.90* 7.66*   GLU 77 79 78   CA 9.8 10.0 9.5     Recent Labs     10/24/20  0550 10/23/20  0352 10/22/20  0511   ALT 8* 12 10*   AP 62 71 75   TBILI 0.4 0.6 0.6   TP 6.9 7.4 7.0   ALB 3.5 3.3* 3.2*   GLOB 3.4 4.1* 3.8     Recent Labs     10/24/20  0550 10/23/20  0352 10/22/20  0513   INR 1.1 1.0 1.1   PTP 11.4* 10.9 11.2*   APTT 35.0* 28.1 31.3      Recent Labs     10/22/20  0511   FERR 861*      No results found for: FOL, RBCF   No results for input(s): PH, PCO2, PO2 in the last 72 hours.   Recent Labs     10/22/20  0511   TROIQ <0.05     Lab Results   Component Value Date/Time    Cholesterol, total 195 10/01/2015 12:18 PM    HDL Cholesterol 69 10/01/2015 12:18 PM    LDL, calculated 111 (H) 10/01/2015 12:18 PM    Triglyceride 73 10/01/2015 12:18 PM     Lab Results   Component Value Date/Time    Glucose (POC) 100 10/01/2019 09:08 AM    Glucose (POC) 84 01/31/2019 12:36 PM    Glucose (POC) 86 03/10/2016 07:21 AM     No results found for: COLOR, APPRN, SPGRU, REFSG, DARREL, PROTU, GLUCU, KETU, BILU, UROU, SHANNON, LEUKU, GLUKE, EPSU, BACTU, WBCU, RBCU, CASTS, UCRY      Medications Reviewed:     Current Facility-Administered Medications   Medication Dose Route Frequency    HYDROcodone-acetaminophen (NORCO) 5-325 mg per tablet 1 Tab  1 Tab Oral Q6H PRN    midodrine (PROAMATINE) tablet 5 mg  5 mg Oral TID WITH MEALS    albuterol (PROVENTIL HFA, VENTOLIN HFA, PROAIR HFA) inhaler 1 Puff  1 Puff Inhalation Q4H PRN    sevelamer carbonate (RENVELA) oral powder 2.4 g  2.4 g Oral TID WITH MEALS    pantoprazole (PROTONIX) tablet 20 mg  20 mg Oral ACB    montelukast (SINGULAIR) tablet 10 mg  10 mg Oral QHS    gabapentin (NEURONTIN) capsule 300 mg  300 mg Oral DAILY    aspirin delayed-release tablet 81 mg  81 mg Oral DAILY    atorvastatin (LIPITOR) tablet 10 mg  10 mg Oral QHS    sodium chloride (NS) flush 5-40 mL  5-40 mL IntraVENous Q8H    sodium chloride (NS) flush 5-40 mL  5-40 mL IntraVENous PRN    acetaminophen (TYLENOL) tablet 650 mg  650 mg Oral Q6H PRN    Or    acetaminophen (TYLENOL) suppository 650 mg  650 mg Rectal Q6H PRN    polyethylene glycol (MIRALAX) packet 17 g  17 g Oral DAILY PRN    promethazine (PHENERGAN) tablet 12.5 mg  12.5 mg Oral Q6H PRN    Or    ondansetron (ZOFRAN) injection 4 mg  4 mg IntraVENous Q6H PRN    heparin (porcine) injection 5,000 Units  5,000 Units SubCUTAneous Q8H     ______________________________________________________________________  EXPECTED LENGTH OF STAY: 2d 4h  ACTUAL LENGTH OF STAY:          3                 Lakisha Neal MD

## 2020-10-25 VITALS
BODY MASS INDEX: 48.82 KG/M2 | DIASTOLIC BLOOD PRESSURE: 86 MMHG | HEIGHT: 65 IN | HEART RATE: 71 BPM | WEIGHT: 293 LBS | TEMPERATURE: 98 F | OXYGEN SATURATION: 100 % | RESPIRATION RATE: 23 BRPM | SYSTOLIC BLOOD PRESSURE: 138 MMHG

## 2020-10-25 PROCEDURE — 74011250637 HC RX REV CODE- 250/637: Performed by: INTERNAL MEDICINE

## 2020-10-25 PROCEDURE — 74011250637 HC RX REV CODE- 250/637: Performed by: HOSPITALIST

## 2020-10-25 PROCEDURE — 74011250636 HC RX REV CODE- 250/636: Performed by: INTERNAL MEDICINE

## 2020-10-25 PROCEDURE — 74011250636 HC RX REV CODE- 250/636: Performed by: NURSE PRACTITIONER

## 2020-10-25 PROCEDURE — P9047 ALBUMIN (HUMAN), 25%, 50ML: HCPCS | Performed by: NURSE PRACTITIONER

## 2020-10-25 RX ORDER — ALBUMIN HUMAN 250 G/1000ML
25 SOLUTION INTRAVENOUS ONCE
Status: DISCONTINUED | OUTPATIENT
Start: 2020-10-25 | End: 2020-10-25 | Stop reason: HOSPADM

## 2020-10-25 RX ORDER — ALBUMIN HUMAN 250 G/1000ML
25 SOLUTION INTRAVENOUS ONCE
Status: COMPLETED | OUTPATIENT
Start: 2020-10-25 | End: 2020-10-25

## 2020-10-25 RX ORDER — ALBUMIN HUMAN 250 G/1000ML
25 SOLUTION INTRAVENOUS ONCE
Status: DISCONTINUED | OUTPATIENT
Start: 2020-10-25 | End: 2020-10-25

## 2020-10-25 RX ORDER — ALBUMIN HUMAN 250 G/1000ML
SOLUTION INTRAVENOUS
Status: DISPENSED
Start: 2020-10-25 | End: 2020-10-25

## 2020-10-25 RX ADMIN — ASPIRIN 81 MG: 81 TABLET, COATED ORAL at 08:31

## 2020-10-25 RX ADMIN — ALBUMIN (HUMAN) 25 G: 0.25 INJECTION, SOLUTION INTRAVENOUS at 01:44

## 2020-10-25 RX ADMIN — GABAPENTIN 300 MG: 300 CAPSULE ORAL at 08:31

## 2020-10-25 RX ADMIN — MIDODRINE HYDROCHLORIDE 5 MG: 5 TABLET ORAL at 12:04

## 2020-10-25 RX ADMIN — SEVELAMER CARBONATE 2.4 G: 2400 POWDER, FOR SUSPENSION ORAL at 08:30

## 2020-10-25 RX ADMIN — PANTOPRAZOLE SODIUM 20 MG: 20 TABLET, DELAYED RELEASE ORAL at 06:48

## 2020-10-25 RX ADMIN — MIDODRINE HYDROCHLORIDE 5 MG: 5 TABLET ORAL at 08:31

## 2020-10-25 RX ADMIN — SEVELAMER CARBONATE 2.4 G: 2400 POWDER, FOR SUSPENSION ORAL at 12:04

## 2020-10-25 RX ADMIN — HEPARIN SODIUM 5000 UNITS: 5000 INJECTION INTRAVENOUS; SUBCUTANEOUS at 06:48

## 2020-10-25 NOTE — PROGRESS NOTES
Pt d/c after all lines removed and d/c education given. Opportunities provided for pt to ask questions. Pt sent home with new prescription midodrine. Problem: Falls - Risk of  Goal: *Absence of Falls  Description: Document Ilana Batista Fall Risk and appropriate interventions in the flowsheet.   Outcome: Resolved/Met     Problem: Patient Education: Go to Patient Education Activity  Goal: Patient/Family Education  Outcome: Resolved/Met

## 2020-10-25 NOTE — PROGRESS NOTES
[address] [facility] Adult  Hospitalist Group                                                                                          Hospitalist Progress Note  Kenyatta Graves MD  Answering service: [phone] OR [phone] from in house phone        Date of Service:  10/25/2020  NAME:  Katrina Bolanos  :  1960  MRN:  [id number]      ADDENDUM:  Repeat /86 manuallyl. No need for midodrine and discussed with RN. Will proceed with DC. Admission Summary:     Katrina Bolanos is a 61 y.o. female past medical history significant for asthma, end-stage renal disease on hemodialysis, hypertension, CLEMENTINA and obesity presented to the emergency room for evaluation of hypoxia. Patient said for the last several days she has been having intermittent shortness of breath worsened with exertion. She went to dialysis and she was found to be intermittently hypoxic dropping to the low 80s for which she was sent to the emergency room for evaluation. Patient also reports having diarrhea, intermittent nausea and few episodes of vomitings. No vomiting in the last 24 hours or diarrhea since she is been in the emergency room. 2 days ago she went to her medical hospital for the same complaints. As per patient report nothing was done she was discharged home with follow-up but her shortness of breath has persisted. Today on arrival to the emergency room patient has been found to be hemodynamically stable. O2 sats are in the 90s on room air however noticed to drop to the 80s mostly with movement and exertion. She denies any other symptoms like chest pain, fever, chills, lightheadedness, abdominal pain. Given persistent shortness of breath and risk factors admission was requested. Interval history / Subjective:     CC:  I am ready to go. She denies any CP or diarrhea. No SOB and feels much better. Had HD last night holding her DC. Her walker is to be delivered to home tomorrow.      Assessment & Plan: Shortness of breath, with hx of chronic respiratory failure home oxygen dependent 2 l/m during day time and 4 l/m at night, she uses BiPAP q hs  -on 2 l/m nasal canula, BiPAP q hs, prn albuterol inhaler, monitor pulse ox  -V/Q scan low probability for pulmonary embolism.    -chest x ray no acute process or change in the lung fields compared to the prior exam.  -elevated ferritin and CRP  -troponin, LD and procalcitonin normal  -elevated D Dimer  -afebrile, no leukocytosis  -Echo LV EF 55-60%  -Repeat chest x ray   -COVID 19 test negative   -patient has been stable since admit on her usual O2. Diarrhea possible due to viral gastroenteritis   -resolved. ESRD DD  -HD per nephrologist   -Had HD last night    Anemia of chronic disease   -stable, Hgb 10.7  -monitor H/H  -nephrology is consulted    BP Lability  -I have asked her RN to check BP manually. If SBP less than 80 will give albumin and reassess for DC.  -Dr. Lokesh Carmona has ordered prn midodrine    Hx of TIA  -continue aspirin and lipitor     CLEMENTINA  -BiPAP q hs    Obesity  -diet and weight loss program     Code status: Full Code  DVT prophylaxis:heparin     Care Plan discussed with: Patient/Family, Nurse and   Anticipated Disposition: Home w/Family  Anticipated Discharge: 24 hours to 48 hours     Hospital Problems  Date Reviewed: 9/10/2020          Codes Class Noted POA    Shortness of breath ICD-10-CM: R06.02  ICD-9-CM: 786.05  10/21/2020 Unknown                Vital Signs:    Last 24hrs VS reviewed since prior progress note.  Most recent are:  Visit Vitals  BP (!) (P) 70/40 (BP 1 Location: Right arm, BP Patient Position: At rest)   Pulse 71   Temp (P) 98 °F (36.7 °C)   Resp (P) 23   Ht 5' 5\" (1.651 m)   Wt 144 kg (317 lb 8 oz)   SpO2 (P) 100%   BMI 52.83 kg/m²         Intake/Output Summary (Last 24 hours) at 10/25/2020 1224  Last data filed at 10/25/2020 0400  Gross per 24 hour   Intake 340 ml   Output 2500 ml   Net -2160 ml        Physical Examination:             Constitutional:  No acute distress, cooperative, pleasant, obese   ENT:  Oral mucosa moist, oropharynx benign. Resp:  Decrease bronchial breath sound bilaterally. No wheezing/rhonchi/rales. No accessory muscle use, clear bilaterally   CV:  Regular rhythm, normal rate, no murmurs, gallops, rubs    GI:  Soft, non distended, non tender. normoactive bowel sounds, no hepatosplenomegaly     Musculoskeletal:  No edema, Dominik, good strenght    Neurologic:  Moves all extremities. AAOx3, CN II-XII reviewed     Skin:  Good turgor, no rashes or ulcers       Data Review:    Review and/or order of clinical lab test  Review and/or order of tests in the radiology section of CPT  Review and/or order of tests in the medicine section of CPT      Labs:     Recent Labs     10/24/20  0550 10/23/20  0352   WBC 7.1 7.1   HGB 8.9* 10.7*   HCT 28.5* 33.7*   * 179     Recent Labs     10/24/20  0550 10/23/20  0352    132*   K 3.7 4.4   CL 99 95*   CO2 29 29   BUN 25* 40*   CREA 7.75* 9.90*   GLU 77 79   CA 9.8 10.0     Recent Labs     10/24/20  0550 10/23/20  0352   ALT 8* 12   AP 62 71   TBILI 0.4 0.6   TP 6.9 7.4   ALB 3.5 3.3*   GLOB 3.4 4.1*     Recent Labs     10/24/20  0550 10/23/20  0352   INR 1.1 1.0   PTP 11.4* 10.9   APTT 35.0* 28.1      No results for input(s): FE, TIBC, PSAT, FERR in the last 72 hours. No results found for: FOL, RBCF   No results for input(s): PH, PCO2, PO2 in the last 72 hours. No results for input(s): CPK, CKNDX, TROIQ in the last 72 hours.     No lab exists for component: CPKMB  Lab Results   Component Value Date/Time    Cholesterol, total 195 10/01/2015 12:18 PM    HDL Cholesterol 69 10/01/2015 12:18 PM    LDL, calculated 111 (H) 10/01/2015 12:18 PM    Triglyceride 73 10/01/2015 12:18 PM     Lab Results   Component Value Date/Time    Glucose (POC) 100 10/01/2019 09:08 AM    Glucose (POC) 84 01/31/2019 12:36 PM    Glucose (POC) 86 03/10/2016 07:21 AM     No results found for: COLOR, APPRN, SPGRU, REFSG, DARREL, PROTU, GLUCU, KETU, BILU, UROU, SHANNON, LEUKU, GLUKE, EPSU, BACTU, WBCU, RBCU, CASTS, UCRY      Medications Reviewed:     Current Facility-Administered Medications   Medication Dose Route Frequency    albumin human 25% (BUMINATE) 25 % solution        albumin human 25% (BUMINATE) solution 25 g  25 g IntraVENous ONCE    HYDROcodone-acetaminophen (NORCO) 5-325 mg per tablet 1 Tab  1 Tab Oral Q6H PRN    midodrine (PROAMATINE) tablet 5 mg  5 mg Oral TID WITH MEALS    albuterol (PROVENTIL HFA, VENTOLIN HFA, PROAIR HFA) inhaler 1 Puff  1 Puff Inhalation Q4H PRN    sevelamer carbonate (RENVELA) oral powder 2.4 g  2.4 g Oral TID WITH MEALS    pantoprazole (PROTONIX) tablet 20 mg  20 mg Oral ACB    montelukast (SINGULAIR) tablet 10 mg  10 mg Oral QHS    gabapentin (NEURONTIN) capsule 300 mg  300 mg Oral DAILY    aspirin delayed-release tablet 81 mg  81 mg Oral DAILY    atorvastatin (LIPITOR) tablet 10 mg  10 mg Oral QHS    sodium chloride (NS) flush 5-40 mL  5-40 mL IntraVENous Q8H    sodium chloride (NS) flush 5-40 mL  5-40 mL IntraVENous PRN    acetaminophen (TYLENOL) tablet 650 mg  650 mg Oral Q6H PRN    Or    acetaminophen (TYLENOL) suppository 650 mg  650 mg Rectal Q6H PRN    polyethylene glycol (MIRALAX) packet 17 g  17 g Oral DAILY PRN    promethazine (PHENERGAN) tablet 12.5 mg  12.5 mg Oral Q6H PRN    Or    ondansetron (ZOFRAN) injection 4 mg  4 mg IntraVENous Q6H PRN    heparin (porcine) injection 5,000 Units  5,000 Units SubCUTAneous Q8H     ______________________________________________________________________  EXPECTED LENGTH OF STAY: 2d 4h  ACTUAL LENGTH OF STAY:          4                 Chioma Infante MD

## 2020-10-25 NOTE — DISCHARGE INSTRUCTIONS
Discharge Instructions       PATIENT ID: Mannie Mesa  MRN: [id number]   YOB: 1960    DATE OF ADMISSION: 10/21/2020  5:51 PM    DATE OF DISCHARGE: 10/24/2020    PRIMARY CARE PROVIDER: Marlys Ma MD     ATTENDING PHYSICIAN: Julia Butterfield MD  DISCHARGING PROVIDER: Destinee Duarte MD    To contact this individual call [phone] and ask the  to page. If unavailable ask to be transferred the Adult Hospitalist Department. DISCHARGE DIAGNOSES   Shortness of breath, with hx of chronic respiratory failure home oxygen dependent 2 l/m during day time and 4 l/m at night, BiPAP q hs  Diarrhea possible due to viral gastroenteritis   ESRD DD  Anemia of chronic disease   HTN  Hx of TIA   CLEMENTINA  Obesity    CONSULTATIONS: IP CONSULT TO NEPHROLOGY    PROCEDURES/SURGERIES: * No surgery found *    PENDING TEST RESULTS:   At the time of discharge the following test results are still pending:      FOLLOW UP APPOINTMENTS:   Follow-up Information     Follow up With Specialties Details Why Contact Info   1. Marlys Ma MD Family Medicine In one week  [address]  [phone]       2. Make appointment for sleep study as an outpatient    ADDITIONAL CARE RECOMMENDATIONS:      DIET: Renal Diet    ACTIVITY: Activity as tolerated    WOUND CARE: None    EQUIPMENT needed: ***      Radiology      DISCHARGE MEDICATIONS:   See Medication Reconciliation Form    · It is important that you take the medication exactly as they are prescribed. · Keep your medication in the bottles provided by the pharmacist and keep a list of the medication names, dosages, and times to be taken in your wallet. · Do not take other medications without consulting your doctor. NOTIFY YOUR PHYSICIAN FOR ANY OF THE FOLLOWING:   Fever over 101 degrees for 24 hours.    Chest pain, shortness of breath, fever, chills, nausea, vomiting, diarrhea, change in mentation, falling, weakness, bleeding. Severe pain or pain not relieved by medications. Or, any other signs or symptoms that you may have questions about.       DISPOSITION:    Home With:   OT  PT  HH  RN      x SNF/Inpatient Rehab/LTAC    Independent/assisted living    Hospice    Other:     CDMP Checked:   Yes x     PROBLEM LIST Updated:  Yes x       Signed:   Debbie Rebollar MD  10/24/2020  11:51 AM

## 2020-10-25 NOTE — PROGRESS NOTES
Bedside shift change report given to [employer] (oncoming nurse) by Jaydon Roberts (offgoing nurse). Report included the following information SBAR. Problem: Pressure Injury - Risk of  Goal: *Prevention of pressure injury  Description: Document Anthony Scale and appropriate interventions in the flowsheet. Outcome: Progressing Towards Goal  Note: Pressure Injury Interventions:  Sensory Interventions: Assess changes in LOC, Assess need for specialty bed, Avoid rigorous massage over bony prominences, Check visual cues for pain, Discuss PT/OT consult with provider, Float heels, Keep linens dry and wrinkle-free, Maintain/enhance activity level, Minimize linen layers, Monitor skin under medical devices, Pressure redistribution bed/mattress (bed type), Turn and reposition approx. every two hours (pillows and wedges if needed)         Activity Interventions: Pressure redistribution bed/mattress(bed type)    Mobility Interventions: Pressure redistribution bed/mattress (bed type)                          Problem: Patient Education: Go to Patient Education Activity  Goal: Patient/Family Education  Outcome: Progressing Towards Goal     Problem: Falls - Risk of  Goal: *Absence of Falls  Description: Document Ceci Fall Risk and appropriate interventions in the flowsheet. Outcome: Progressing Towards Goal  Note: Fall Risk Interventions:  Mobility Interventions: Patient to call before getting OOB         Medication Interventions: Patient to call before getting OOB    Elimination Interventions: Call light in reach              Problem: Patient Education: Go to Patient Education Activity  Goal: Patient/Family Education  Outcome: Progressing Towards Goal     Problem:  Activity Intolerance  Goal: *Oxygen saturation during activity within specified parameters  Outcome: Progressing Towards Goal  Goal: *Able to remain out of bed as prescribed  Outcome: Progressing Towards Goal     Problem: Patient Education: Go to Patient Education Activity  Goal: Patient/Family Education  Outcome: Progressing Towards Goal     Problem: Hypertension  Goal: *Blood pressure within specified parameters  Outcome: Progressing Towards Goal  Goal: *Fluid volume balance  Outcome: Progressing Towards Goal  Goal: *Labs within defined limits  Outcome: Progressing Towards Goal     Problem: Patient Education: Go to Patient Education Activity  Goal: Patient/Family Education  Outcome: Progressing Towards Goal     Problem: Breathing Pattern - Ineffective  Goal: Ability to achieve and maintain a regular respiratory rate  Outcome: Progressing Towards Goal     Problem:  Body Temperature -  Risk of, Imbalanced  Goal: Ability to maintain a body temperature within defined limits  Outcome: Progressing Towards Goal  Goal: Will regain or maintain usual level of consciousness  Outcome: Progressing Towards Goal  Goal: Complications related to the disease process, condition or treatment will be avoided or minimized  Outcome: Progressing Towards Goal     Problem: Nutrition Deficits  Goal: Optimize nutrtional status  Outcome: Progressing Towards Goal     Problem: Risk for Fluid Volume Deficit  Goal: Maintain normal heart rhythm  Outcome: Progressing Towards Goal  Goal: Maintain absence of muscle cramping  Outcome: Progressing Towards Goal  Goal: Maintain normal serum potassium, sodium, calcium, phosphorus, and pH  Outcome: Progressing Towards Goal     Problem: Loneliness or Risk for Loneliness  Goal: Demonstrate positive use of time alone when socialization is not possible  Outcome: Progressing Towards Goal     Problem: Fatigue  Goal: Verbalize increase energy and improved vitality  Outcome: Progressing Towards Goal     Problem: Patient Education: Go to Patient Education Activity  Goal: Patient/Family Education  Outcome: Progressing Towards Goal     Problem: Discharge Planning  Goal: *Discharge to safe environment  Outcome: Progressing Towards Goal     Problem: Breathing Pattern - Ineffective  Goal: *Absence of hypoxia  Outcome: Progressing Towards Goal  Goal: *Use of effective breathing techniques  Outcome: Progressing Towards Goal     Problem: Patient Education: Go to Patient Education Activity  Goal: Patient/Family Education  Outcome: Progressing Towards Goal

## 2020-10-25 NOTE — DISCHARGE SUMMARY
Discharge Summary       PATIENT ID: Jayde King  MRN: [id number]   YOB: 1960    DATE OF ADMISSION: 10/21/2020  5:51 PM    DATE OF DISCHARGE: 10/25/2020   PRIMARY CARE PROVIDER: Walden Bamberger, MD     ATTENDING PHYSICIAN: Porfirio Castle  DISCHARGING PROVIDER: Porfirio Castle MD    To contact this individual call [phone] and ask the  to page. If unavailable ask to be transferred the Adult Hospitalist Department. CONSULTATIONS: IP CONSULT TO NEPHROLOGY    PROCEDURES/SURGERIES: dialysis  ADMITTING DIAGNOSES & HOSPITAL COURSE:   Patient was admitted for SOB and diarrhea. Patient said for the last several days she has been having intermittent shortness of breath worsened with exertion.  She went to dialysis and she was found to be intermittently hypoxic dropping to the low 80s for which she was sent to the emergency room for evaluation.  Patient also reports having diarrhea, intermittent nausea and few episodes of vomitings. She was found to have sats in 90s on RA which drop to 80s with exertion. She was admitted and seen by nephrology. She was resumed on her HD and placed on her usual oxygen of 2L/m day and 4L/m at night. Workup for unremarkable with negative cardiac markers, VQ low prob, CXR without acute findings, EF with EF 55-60%, COVID negative. Her BPs fluctuate at baseline and she has required albumin/IVFs at times. Patient has no symptoms when low. Dr. Codi Kasper added prn Midodrine.        DISCHARGE DIAGNOSES / PLAN:       Shortness of breath, with hx of chronic respiratory failure home oxygen dependent 2 l/m during day time and 4 l/m at night, she uses BiPAP q hs  -on 2 l/m nasal canula, BiPAP q hs, prn albuterol inhaler, monitor pulse ox  -V/Q scan low probability for pulmonary embolism.    -chest x ray no acute process or change in the lung fields compared to the prior exam.  -elevated ferritin and CRP  -troponin, LD and procalcitonin normal  -elevated D Dimer  -afebrile, no leukocytosis  -Echo LV EF 55-60%  -Repeat chest x ray   -COVID 19 test negative      Diarrhea possible due to viral gastroenteritis   -improved     ESRD DD  -HD per nephrologist    BP lability  -BP fluctuates but normal, not on meds, monitor BP  -repeat BP today was good when checked manually. Labile BPs may be due to difficulty for machine to get BP due to size.     Anemia of chronic disease   -stable, Hgb 10.7  -monitor H/H  -nephrology is consulted        Hx of TIA  -continue aspirin and lipitor      CLEMENTINA  -BiPAP q hs     Obesity  1. -diet and weight loss program      ADDITIONAL CARE RECOMMENDATIONS:   Son to transport to dialysis tomorrow. Walker to be delivered tomorrow. PENDING TEST RESULTS:   At the time of discharge the following test results are still pending: none. FOLLOW UP APPOINTMENTS:    Follow-up Information     Follow up With Specialties Details Why Contact Info    Read, Mary Clark MD Family Medicine   [address] [place] [address] Rd [phone]      [address] Services  Please call agency Monday morning to inquire as to the status of the RW. [address] [place] [address] [facility]  [phone]             DIET: Renal    ACTIVITY: as tolerated    EQUIPMENT needed: bariatric two wheel walker to be delivered to home on Monday 10/26      DISCHARGE MEDICATIONS:  Current Discharge Medication List      START taking these medications    Details   midodrine (PROAMATINE) 2.5 mg tablet Take 1 Tab by mouth as needed for Other (for BP <100/60) for up to 30 days. Qty: 30 Tab, Refills: 0         CONTINUE these medications which have NOT CHANGED    Details   gabapentin (NEURONTIN) 300 mg capsule Take 300 mg by mouth daily. montelukast (SINGULAIR) 10 mg tablet TAKE 1 TABLET BY MOUTH DAILY  Qty: 90 Tab, Refills: 1    Associated Diagnoses:  Moderate persistent asthma without complication      buPROPion XL (WELLBUTRIN XL) 150 mg tablet take 1 tablet by mouth every morning  Qty: 90 Tab, Refills: 0    Associated Diagnoses: Recurrent depression ([facility])      aspirin delayed-release 81 mg tablet Take 1 Tab by mouth daily. cholecalciferol (VITAMIN D3) (1000 Units /25 mcg) tablet Take 2 Tabs by mouth daily. Qty: 180 Tab, Refills: 3      omeprazole (PRILOSEC) 20 mg capsule TAKE 1 CAPSULE BY MOUTH TWICE DAILY  Qty: 180 Cap, Refills: 3    Associated Diagnoses: Gastroesophageal reflux disease without esophagitis      magnesium oxide (MAG-OX) 400 mg tablet take 1 tablet by mouth daily  Qty: 90 Tab, Refills: 3    Associated Diagnoses: Kidney disease, chronic, end stage on dialysis ([facility])      RENVELA 2.4 gram pwpk oral powder Take 1 Packet by mouth three (3) times daily (with meals). Qty: 180 Packet, Refills: 0    Associated Diagnoses: Kidney disease, chronic, end stage on dialysis ([facility])      albuterol (PROVENTIL VENTOLIN) 2.5 mg /3 mL (0.083 %) nebulizer solution 3 mL by Nebulization route every four (4) hours as needed for Wheezing or Shortness of Breath. Qty: 1 Package, Refills: 4    Associated Diagnoses: Uncomplicated asthma, unspecified asthma severity, unspecified whether persistent      AQUAPHOR HEALING 41 % ointment Apply  to affected area as needed for Dry Skin. Qty: 56 g, Refills: 11    Associated Diagnoses: Dry skin      SENSIPAR 60 mg tab Refills: 0      letrozole (FEMARA) 2.5 mg tablet take 1 tablet by mouth once daily  Qty: 30 Tab, Refills: 5      atorvastatin (LIPITOR) 10 mg tablet take 1 tablet by mouth NIGHTLY. Qty: 90 Tab, Refills: 3      lidocaine 4 % gel 1 Inch by Apply Externally route two (2) times daily as needed. To ankle for pain  Qty: 30 g, Refills: 1    Associated Diagnoses: Chronic ankle pain, unspecified laterality      inhalational spacing device 1 Each by Does Not Apply route as needed.   Qty: 1 Device, Refills: 1    Associated Diagnoses: Uncomplicated asthma, unspecified asthma severity               NOTIFY [address] OF THE FOLLOWING:   Fever over 101 degrees for 24 hours. Chest pain, shortness of breath, fever, chills, nausea, vomiting, diarrhea, change in mentation, falling, weakness, bleeding. Severe pain or pain not relieved by medications. Or, any other signs or symptoms that you may have questions about.     DISPOSITION:  X  Home With:   OT  PT  HH  RN       Long term SNF/Inpatient Rehab    Independent/assisted living    Hospice    Other:       PATIENT CONDITION AT DISCHARGE:     Functional status    Poor    X Deconditioned     Independent      Cognition   X  Lucid     Forgetful     Dementia      Catheters/lines (plus indication)    [place]     PICC     PEG    X None      Code status   X  Full code     DNR      PHYSICAL EXAMINATION AT DISCHARGE:  See progress note         CHRONIC MEDICAL DIAGNOSES:  Problem List as of 10/25/2020 Date Reviewed: 9/10/2020          Codes Class Noted - Resolved    Shortness of breath ICD-10-CM: R06.02  ICD-9-CM: 786.05  10/21/2020 - Present        Aneurysm of arteriovenous dialysis fistula ([facility] ICD-10-CM: L99.506X  ICD-9-CM: 996.73  9/10/2020 - Present        Weakness of both legs ICD-10-CM: R29.898  ICD-9-CM: 729.89  9/10/2020 - Present        New onset seizure ([facility] ICD-10-CM: R56.9  ICD-9-CM: 780.39  5/28/2020 - Present        ESRD (end stage renal disease) ([facility] ICD-10-CM: N18.6  ICD-9-CM: 585.6  5/19/2020 - Present        Right wrist pain ICD-10-CM: M25.531  ICD-9-CM: 719.43  9/12/2019 - Present        Hypomagnesemia ICD-10-CM: E83.42  ICD-9-CM: 275.2  9/12/2019 - Present        Functional fecal incontinence ICD-10-CM: R15.9  ICD-9-CM: 787.60  10/31/2018 - Present        Chronic prescription opiate use ICD-10-CM: Z79.891  ICD-9-CM: V58.69  10/16/2018 - Present        Morbid obesity with BMI of 50.0-59.9, adult ([facility] ICD-10-CM: E66.01, Z68.43  ICD-9-CM: 278.01, V85.43  5/3/2018 - Present        Recurrent depression ([facility] ICD-10-CM: F33.9  ICD-9-CM: 296.30  12/28/2017 - Present        DCIS (ductal carcinoma in situ) of breast ICD-10-CM: D05.10  ICD-9-CM: 233.0  12/18/2014 - Present    Overview Signed 12/18/2014  9:29 AM by Ronnell FARRELL     Grade 3 - diagnosed 4/2014. Not a candidate for radiation due to poor health and lack of anticipated survival benefit (Dr. Sonido Carroll). Dr. Shanti Collins ([address]) has started her on Femara 8/7/14. If not tolerated will not pursue further treatment as tamoxifen is contraindicated with stroke history. Obesity hypoventilation syndrome ([facility] ICD-10-CM: X35.5  ICD-9-CM: 278.03  12/12/2014 - Present        Chronic ankle pain ICD-10-CM: M25.579, G89.29  ICD-9-CM: 719.47, 338.29  12/11/2014 - Present        Asthma ICD-10-CM: J45.909  ICD-9-CM: 493.90  Unknown - Present    Overview Addendum 12/12/2014  5:05 PM by Demario [facility], also labeled as COPD             Kidney disease, chronic, end stage on dialysis [facility]) ICD-10-CM: N18.6, Z99.2  ICD-9-CM: 585.6, V45.11  Unknown - Present    Overview Addendum 12/12/2014  5:08 PM by Demario Miller [address]. With proteinuria >500 on urine dip.               GERD (gastroesophageal reflux disease) ICD-10-CM: K21.9  ICD-9-CM: 530.81  Unknown - Present        Unspecified sleep apnea ICD-10-CM: G47.30  ICD-9-CM: 780.57  Unknown - Present    Overview Signed 10/21/2013  8:22 PM by Demario Boucher     cpap with oxygen             Vitamin D deficiency ICD-10-CM: E55.9  ICD-9-CM: 268.9  12/1/2011 - Present    Overview Signed 11/9/2013  7:03 PM by Demario Boucher     10.4 [facility] labs             RESOLVED: Depression ICD-10-CM: F32.9  ICD-9-CM: 629  10/22/2013 - 10/11/2018        RESOLVED: Heart failure ([facility] ICD-10-CM: I50.9  ICD-9-CM: 428.9  Unknown - 10/11/2018    Overview Addendum 12/12/2014  5:09 PM by Demario Boucher     h/o chf.  LV function good on 7/2014 [facility] echo             RESOLVED: Hypertension ICD-10-CM: I10  ICD-9-CM: 401.9  Unknown - 7/9/2016              Greater than 40 minutes were spent with the patient on counseling and coordination of care    Signed:   Gwen Velasco MD  10/25/2020  12:07 PM

## 2020-10-26 ENCOUNTER — PATIENT OUTREACH (OUTPATIENT)
Dept: CASE MANAGEMENT | Age: 60
End: 2020-10-26

## 2020-11-09 ENCOUNTER — PATIENT OUTREACH (OUTPATIENT)
Dept: CASE MANAGEMENT | Age: 60
End: 2020-11-09

## 2020-11-09 NOTE — PROGRESS NOTES
Patient resolved from Transition of Care episode on 11/9/2020. ACM/CTN was unsuccessful at contacting this patient today. Patient/family was provided the following resources and education related to COVID-19 during the initial call:                         Signs, symptoms and red flags related to COVID-19            CDC exposure and quarantine guidelines            Con[facility]it exposure contact - [phone]            Contact for their local Department of Health                 Patient has not had any additional ED or hospital visits. No further outreach scheduled with this CTN/ACM. Episode of Care resolved. Patient has this CTN/ACM contact information if future needs arise.

## 2020-12-21 ENCOUNTER — VIRTUAL VISIT (OUTPATIENT)
Dept: FAMILY MEDICINE CLINIC | Age: 60
End: 2020-12-21
Payer: MEDICAID

## 2020-12-21 DIAGNOSIS — Z79.891 CHRONIC PRESCRIPTION OPIATE USE: ICD-10-CM

## 2020-12-21 DIAGNOSIS — M25.571 CHRONIC PAIN OF RIGHT ANKLE: ICD-10-CM

## 2020-12-21 DIAGNOSIS — G89.29 CHRONIC PAIN OF RIGHT ANKLE: ICD-10-CM

## 2020-12-21 DIAGNOSIS — M54.41 CHRONIC MIDLINE LOW BACK PAIN WITH BILATERAL SCIATICA: ICD-10-CM

## 2020-12-21 DIAGNOSIS — G89.29 CHRONIC MIDLINE LOW BACK PAIN WITH BILATERAL SCIATICA: ICD-10-CM

## 2020-12-21 DIAGNOSIS — M54.42 CHRONIC MIDLINE LOW BACK PAIN WITH BILATERAL SCIATICA: ICD-10-CM

## 2020-12-21 DIAGNOSIS — G89.29 OTHER CHRONIC PAIN: ICD-10-CM

## 2020-12-21 PROCEDURE — 99441 PR PHYS/QHP TELEPHONE EVALUATION 5-10 MIN: CPT | Performed by: FAMILY MEDICINE

## 2020-12-21 RX ORDER — HYDROCODONE BITARTRATE AND ACETAMINOPHEN 7.5; 325 MG/1; MG/1
1 TABLET ORAL
Qty: 60 TAB | Refills: 0 | Status: SHIPPED | OUTPATIENT
Start: 2020-12-21 | End: 2021-01-20

## 2020-12-21 RX ORDER — HYDROCODONE BITARTRATE AND ACETAMINOPHEN 7.5; 325 MG/1; MG/1
1 TABLET ORAL
Qty: 60 TAB | Refills: 0 | Status: SHIPPED | OUTPATIENT
Start: 2021-02-21 | End: 2021-03-23

## 2020-12-21 RX ORDER — HYDROCODONE BITARTRATE AND ACETAMINOPHEN 7.5; 325 MG/1; MG/1
TABLET ORAL
COMMUNITY
Start: 2020-10-28 | End: 2020-12-21 | Stop reason: SDUPTHER

## 2020-12-21 RX ORDER — HYDROCODONE BITARTRATE AND ACETAMINOPHEN 7.5; 325 MG/1; MG/1
1 TABLET ORAL
Qty: 60 TAB | Refills: 0 | Status: SHIPPED | OUTPATIENT
Start: 2021-01-21 | End: 2021-02-20

## 2020-12-21 NOTE — PROGRESS NOTES
Francisco Kraft is a 61 y.o. female, evaluated via audio-only technology on 12/21/2020 for Medication Refill (hydrocodone // Phone call only [phone])  . Assessment & Plan:   Diagnoses and all orders for this visit:    1. Other chronic pain  -     HYDROcodone-acetaminophen (NORCO) 7.5-325 mg per tablet; Take 1 Tab by mouth two (2) times daily as needed for Pain for up to 30 days. Max Daily Amount: 2 Tabs. -     HYDROcodone-acetaminophen (NORCO) 7.5-325 mg per tablet; Take 1 Tab by mouth two (2) times daily as needed for Pain for up to 30 days. Max Daily Amount: 2 Tabs. -     HYDROcodone-acetaminophen (NORCO) 7.5-325 mg per tablet; Take 1 Tab by mouth two (2) times daily as needed for Pain for up to 30 days. Max Daily Amount: 2 Tabs. 2. Chronic pain of right ankle  -     HYDROcodone-acetaminophen (NORCO) 7.5-325 mg per tablet; Take 1 Tab by mouth two (2) times daily as needed for Pain for up to 30 days. Max Daily Amount: 2 Tabs. -     HYDROcodone-acetaminophen (NORCO) 7.5-325 mg per tablet; Take 1 Tab by mouth two (2) times daily as needed for Pain for up to 30 days. Max Daily Amount: 2 Tabs. -     HYDROcodone-acetaminophen (NORCO) 7.5-325 mg per tablet; Take 1 Tab by mouth two (2) times daily as needed for Pain for up to 30 days. Max Daily Amount: 2 Tabs. 3. Chronic prescription opiate use  -     HYDROcodone-acetaminophen (NORCO) 7.5-325 mg per tablet; Take 1 Tab by mouth two (2) times daily as needed for Pain for up to 30 days. Max Daily Amount: 2 Tabs. -     HYDROcodone-acetaminophen (NORCO) 7.5-325 mg per tablet; Take 1 Tab by mouth two (2) times daily as needed for Pain for up to 30 days. Max Daily Amount: 2 Tabs. -     HYDROcodone-acetaminophen (NORCO) 7.5-325 mg per tablet; Take 1 Tab by mouth two (2) times daily as needed for Pain for up to 30 days. Max Daily Amount: 2 Tabs.     4. Chronic midline low back pain with bilateral sciatica  -     HYDROcodone-acetaminophen (NORCO) 7.5-325 mg per tablet; Take 1 Tab by mouth two (2) times daily as needed for Pain for up to 30 days. Max Daily Amount: 2 Tabs. -     HYDROcodone-acetaminophen (NORCO) 7.5-325 mg per tablet; Take 1 Tab by mouth two (2) times daily as needed for Pain for up to 30 days. Max Daily Amount: 2 Tabs. -     HYDROcodone-acetaminophen (NORCO) 7.5-325 mg per tablet; Take 1 Tab by mouth two (2) times daily as needed for Pain for up to 30 days. Max Daily Amount: 2 Tabs. 12  Subjective:     Jayde King RTC today to follow up on chronic pain diagnosis. We discussed her osteoarthritis that is affecting her right ankle and back. Significant changes since last visit: none. She is  able to do her normal daily activities. She reports the following adverse side effects: none. Least pain over the last week has been 8/10. Worst pain over the last week has been 10/10. Opioid Risk Tool Reviewed: YES    Aberrant behaviors: None. Urine Drug Screen: due - order placed. Controlled substance agreement on file: YES.  reviewed:yes and n/a    Pill count is consistent with her prescription: n/a    Concomitant use of a benzodiazepine: no    NA    NA     Also,  abstinence syndrome was reviewed and discussed with her today N/A    Prior to Admission medications    Medication Sig Start Date End Date Taking? Authorizing Provider   HYDROcodone-acetaminophen [facility]) 7.5-325 mg per tablet  10/28/20  Yes Provider, Historical   gabapentin (NEURONTIN) 300 mg capsule Take 300 mg by mouth daily. Yes Provider, Historical   montelukast (SINGULAIR) 10 mg tablet TAKE 1 TABLET BY MOUTH DAILY 20  Yes Dagmar Espinal NP   buPROPion XL (WELLBUTRIN XL) 150 mg tablet take 1 tablet by mouth every morning 9/10/20  Yes Mary Green MD   aspirin delayed-release 81 mg tablet Take 1 Tab by mouth daily. 19  Yes Kya Montesinos MD   cholecalciferol (VITAMIN D3) (1000 Units /25 mcg) tablet Take 2 Tabs by mouth daily.  10/10/19 Yes Khoi Green MD   omeprazole (PRILOSEC) 20 mg capsule TAKE 1 CAPSULE BY MOUTH TWICE DAILY 9/12/19  Yes Khoi Green MD   magnesium oxide (MAG-OX) 400 mg tablet take 1 tablet by mouth daily 9/12/19  Yes Radha Guzman MD   RENVELA 2.4 gram pwpk oral powder Take 1 Packet by mouth three (3) times daily (with meals). 2/7/19  Yes Vandana Orellana NP   albuterol (PROVENTIL VENTOLIN) 2.5 mg /3 mL (0.083 %) nebulizer solution 3 mL by Nebulization route every four (4) hours as needed for Wheezing or Shortness of Breath. 1/10/19  Yes Khoi Green MD   AQUAPHOR HEALING 41 % ointment Apply  to affected area as needed for Dry Skin. 1/10/19  Yes Khoi Green MD   SENSIPAR 60 mg tab  11/21/18  Yes Provider, Historical   letrozole [facility]) 2.5 mg tablet take 1 tablet by mouth once daily 1/19/18  Yes Tonny Kumar MD   atorvastatin (LIPITOR) 10 mg tablet take 1 tablet by mouth NIGHTLY. 7/5/17  Yes Tonny Kumar MD   lidocaine 4 % gel 1 Inch by Apply Externally route two (2) times daily as needed. To ankle for pain 12/12/16  Yes Tonny Kumar MD   inhalational spacing device 1 Each by Does Not Apply route as needed.  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    Recurrent depression ([facility] F33.9    Morbid obesity with BMI of 50.0-59.9, adult (HCC) E66.01, Z68.43    Chronic prescription opiate use Z79.891    Functional fecal incontinence R15.9    Right wrist pain M25.531    Hypomagnesemia E83.42    ESRD (end stage renal disease) (HCC) N18.6    New onset seizure (HCC) R56.9    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  montelukast (SINGULAIR) 10 mg tablet TAKE 1 TABLET BY MOUTH DAILY 90 Tab 1    buPROPion XL (WELLBUTRIN XL) 150 mg tablet take 1 tablet by mouth every morning 90 Tab 0    aspirin delayed-release 81 mg tablet Take 1 Tab by mouth daily.  cholecalciferol (VITAMIN D3) (1000 Units /25 mcg) tablet Take 2 Tabs by mouth daily. 180 Tab 3    omeprazole (PRILOSEC) 20 mg capsule TAKE 1 CAPSULE BY MOUTH TWICE DAILY 180 Cap 3    magnesium oxide (MAG-OX) 400 mg tablet take 1 tablet by mouth daily 90 Tab 3    RENVELA 2.4 gram pwpk oral powder Take 1 Packet by mouth three (3) times daily (with meals). 180 Packet 0    albuterol (PROVENTIL VENTOLIN) 2.5 mg /3 mL (0.083 %) nebulizer solution 3 mL by Nebulization route every four (4) hours as needed for Wheezing or Shortness of Breath. 1 Package 4    AQUAPHOR HEALING 41 % ointment Apply  to affected area as needed for Dry Skin. 56 g 11    SENSIPAR 60 mg tab   0    letrozole (FEMARA) 2.5 mg tablet take 1 tablet by mouth once daily 30 Tab 5    atorvastatin (LIPITOR) 10 mg tablet take 1 tablet by mouth NIGHTLY. 90 Tab 3    lidocaine 4 % gel 1 Inch by Apply Externally route two (2) times daily as needed. To ankle for pain 30 g 1    inhalational spacing device 1 Each by Does Not Apply route as needed.  1 Device 1     Allergies   Allergen Reactions    Iodine Anaphylaxis    Shellfish Containing Products Anaphylaxis    Other Medication Other (comments)     Metal causes numbness in hands,arms all over    Sulfa (Sulfonamide Antibiotics) Itching     Past Medical History:   Diagnosis Date    Advanced care planning/counseling discussion 4/7/16    Arthritis     back    Arthritis of ankle or foot, right     Asthma     Dr. Estephania Rowley [facility]    Breast cancer [facility])     Right Lumpectomy 2014 - DCIS    Chronic kidney disease     STAGE IV/ dialysis [facility]    Chronic obstructive pulmonary disease ([facility]     Chronic pain 1989    ANKLE-right- h/o fx ankle    CKD (chronic kidney disease) stage V requiring chronic dialysis ([facility]     Dr. Muna Hernandez [facility]    Depression     DEPRESSION AND ANXIETY    GERD (gastroesophageal reflux disease)     Heart failure ([address]     h/o chf - Normal EF, likely due to volume overload    History of abdominal hernia     History of MRSA infection 01/2018    h/o MRSA leg     Hypertension     Morbid obesity ([facility]     Respiratory failure, chronic ([facility] 10/07/14    Dr. Yohana Levin Stroke [facility]) 2009    ? TIA @ age 52    Unspecified sleep apnea 10/07/2014    BIPAP with oxygen    Vitamin D deficiency 12/2011    10.4 [facility] labs     Past Surgical History:   Procedure Laterality Date    HX BREAST BIOPSY Right 4/7/14    DCIS    HX CHOLECYSTECTOMY  2009    choleystectomy    HX COLONOSCOPY  8/18/11    Dr. Darleen Soto HX GYN      tuabl ligation    HX ORTHOPAEDIC  1989    r ankle and leg plate and screw    HX ORTHOPAEDIC  2019    bone taken out of right pink toe    HX OTHER SURGICAL  3/10/16    Graft in left arm     HX VASCULAR ACCESS Right 08/2019    DIALYSIS CATHETER-upper chest    VASCULAR SURGERY PROCEDURE UNLIST      4 surgeries on left upper arm grafts stopping up.  VASCULAR SURGERY PROCEDURE UNLIST  05/19/2020    Insertion of AV graft left arm     Family History   Problem Relation Age of Onset    Diabetes Mother     Heart Disease Mother     Heart Disease Father     Hypertension Sister     Breast Cancer Paternal Aunt 28    No Known Problems Son     Anesth Problems Neg Hx      Social History     Tobacco Use    Smoking status: Never Smoker    Smokeless tobacco: Never Used   Substance Use Topics    Alcohol use:  Yes Comment: Occ on holidays       ROS    Patient-Reported Vitals 12/21/2020   Patient-Reported Weight 330 lb   Patient-Reported Pulse 68   Patient-Reported Temperature 97.8   Patient-Reported SpO2 97   Patient-Reported Systolic  141   Patient-Reported Diastolic 87        Deonte Parks, who was evaluated through a patient-initiated, synchronous (real-time) audio only encounter, and/or her healthcare decision maker, is aware that it is a billable service, with coverage as determined by her insurance carrier. She provided verbal consent to proceed: Yes. She has not had a related appointment within my department in the past 7 days or scheduled within the next 24 hours.       Total Time: minutes: 5-10 minutes    Shelbie Jorgensen MD

## 2020-12-21 NOTE — PROGRESS NOTES
VV:     Identified pt with two pt identifiers(name and ). Reviewed record in preparation for visit and have obtained necessary documentation. All patient medications has been reviewed. Chief Complaint   Patient presents with    Medication Refill     hydrocodone // Phone call only [phone]       Health Maintenance Due   Topic    DTaP/Tdap/Td series (1 - Tdap)    Shingrix Vaccine Age 49> (1 of 2)    Pneumococcal 0-64 years (2 of 3 - PPSV23)    PAP AKA CERVICAL CYTOLOGY     Lipid Screen     Breast Cancer Screen Mammogram     Flu Vaccine (1)     Flu: 2020     Mammo: overdue     Shingrix: overdue     Pneumonia: pt allergic     Patient-Reported Vitals 2020   Patient-Reported Weight 330 lb   Patient-Reported Pulse 68   Patient-Reported Temperature 97.8   Patient-Reported SpO2 97   Patient-Reported Systolic  982   Patient-Reported Diastolic 87        4. Have you been to the ER, urgent care clinic since your last visit? Hospitalized since your last visit? No    5. Have you seen or consulted any other health care providers outside of the [address] [place] [address] since your last visit? Include any pap smears or colon screening. No      Patient is accompanied by self I have received verbal consent from Benton Luna to discuss any/all medical information while they are present in the room.

## 2021-03-25 DIAGNOSIS — R52 PAIN: Primary | ICD-10-CM

## 2021-03-25 DIAGNOSIS — J45.909 UNCOMPLICATED ASTHMA, UNSPECIFIED ASTHMA SEVERITY, UNSPECIFIED WHETHER PERSISTENT: ICD-10-CM

## 2021-03-25 NOTE — TELEPHONE ENCOUNTER
Pt left a voice message requesting a refill. Pt has an office visit scheduled with Dr. Janet Dejesus on 05/06/2021. No access to      Last visit 12/21/2020 Virtual visit MD Read   Next appointment 05/06/2021 MD Burke Isaacs   Previous refill encounter(s)   01/10/2019 Albuterol Neb Sol #1 with 4 refills,   02/21/2021 Star #60. Requested Prescriptions     Pending Prescriptions Disp Refills    albuterol (PROVENTIL VENTOLIN) 2.5 mg /3 mL (0.083 %) nebu 75 mL 0     Sig: 3 mL by Nebulization route every four (4) hours as needed for Wheezing or Shortness of Breath.  HYDROcodone-acetaminophen (NORCO) 7.5-325 mg per tablet 60 Tab 0     Sig: Take 1 Tab by mouth two (2) times daily as needed for Pain for up to 30 days. Max Daily Amount: 2 Tabs.

## 2021-03-26 RX ORDER — ALBUTEROL SULFATE 0.83 MG/ML
2.5 SOLUTION RESPIRATORY (INHALATION)
Qty: 75 ML | Refills: 0 | Status: SHIPPED | OUTPATIENT
Start: 2021-03-26 | End: 2021-09-23

## 2021-03-26 RX ORDER — HYDROCODONE BITARTRATE AND ACETAMINOPHEN 7.5; 325 MG/1; MG/1
1 TABLET ORAL
Qty: 30 TAB | Refills: 0 | Status: SHIPPED | OUTPATIENT
Start: 2021-03-26 | End: 2021-04-25

## 2021-06-02 PROBLEM — N18.6 ESRD (END STAGE RENAL DISEASE) (HCC): Status: RESOLVED | Noted: 2020-05-19 | Resolved: 2021-06-02

## 2021-06-02 PROBLEM — R15.9 FUNCTIONAL FECAL INCONTINENCE: Status: RESOLVED | Noted: 2018-10-31 | Resolved: 2021-06-02

## 2021-06-03 ENCOUNTER — OFFICE VISIT (OUTPATIENT)
Dept: INTERNAL MEDICINE CLINIC | Age: 61
End: 2021-06-03
Payer: MEDICAID

## 2021-06-03 VITALS
RESPIRATION RATE: 18 BRPM | SYSTOLIC BLOOD PRESSURE: 108 MMHG | OXYGEN SATURATION: 98 % | DIASTOLIC BLOOD PRESSURE: 66 MMHG | TEMPERATURE: 98.4 F | HEART RATE: 72 BPM

## 2021-06-03 DIAGNOSIS — M54.41 CHRONIC BILATERAL LOW BACK PAIN WITH BILATERAL SCIATICA: ICD-10-CM

## 2021-06-03 DIAGNOSIS — J96.10 CHRONIC RESPIRATORY FAILURE, UNSPECIFIED WHETHER WITH HYPOXIA OR HYPERCAPNIA (HCC): ICD-10-CM

## 2021-06-03 DIAGNOSIS — J44.9 ASTHMA-COPD OVERLAP SYNDROME (HCC): ICD-10-CM

## 2021-06-03 DIAGNOSIS — Z99.2 KIDNEY DISEASE, CHRONIC, END STAGE ON DIALYSIS (HCC): ICD-10-CM

## 2021-06-03 DIAGNOSIS — E78.2 MIXED HYPERLIPIDEMIA: ICD-10-CM

## 2021-06-03 DIAGNOSIS — T82.898A ANEURYSM OF ARTERIOVENOUS DIALYSIS FISTULA, INITIAL ENCOUNTER (HCC): ICD-10-CM

## 2021-06-03 DIAGNOSIS — M48.061 SPINAL STENOSIS OF LUMBAR REGION WITHOUT NEUROGENIC CLAUDICATION: ICD-10-CM

## 2021-06-03 DIAGNOSIS — Z00.00 WELL WOMAN EXAM (NO GYNECOLOGICAL EXAM): ICD-10-CM

## 2021-06-03 DIAGNOSIS — F33.9 RECURRENT DEPRESSION (HCC): ICD-10-CM

## 2021-06-03 DIAGNOSIS — N18.6 KIDNEY DISEASE, CHRONIC, END STAGE ON DIALYSIS (HCC): ICD-10-CM

## 2021-06-03 DIAGNOSIS — E66.01 MORBID OBESITY WITH BMI OF 50.0-59.9, ADULT (HCC): ICD-10-CM

## 2021-06-03 DIAGNOSIS — Z85.3 HX OF BREAST CANCER: ICD-10-CM

## 2021-06-03 DIAGNOSIS — D05.11 DUCTAL CARCINOMA IN SITU (DCIS) OF RIGHT BREAST: ICD-10-CM

## 2021-06-03 DIAGNOSIS — G89.29 CHRONIC BILATERAL LOW BACK PAIN WITH BILATERAL SCIATICA: ICD-10-CM

## 2021-06-03 DIAGNOSIS — K21.9 GASTROESOPHAGEAL REFLUX DISEASE WITHOUT ESOPHAGITIS: ICD-10-CM

## 2021-06-03 DIAGNOSIS — E66.2 OBESITY HYPOVENTILATION SYNDROME (HCC): Primary | ICD-10-CM

## 2021-06-03 DIAGNOSIS — M54.42 CHRONIC BILATERAL LOW BACK PAIN WITH BILATERAL SCIATICA: ICD-10-CM

## 2021-06-03 PROCEDURE — 99204 OFFICE O/P NEW MOD 45 MIN: CPT | Performed by: INTERNAL MEDICINE

## 2021-06-03 RX ORDER — HYDROCODONE BITARTRATE AND ACETAMINOPHEN 7.5; 325 MG/1; MG/1
1 TABLET ORAL DAILY
COMMUNITY
End: 2022-01-21 | Stop reason: SDUPTHER

## 2021-06-03 RX ORDER — BUPROPION HYDROCHLORIDE 150 MG/1
150 TABLET ORAL
Qty: 90 TABLET | Refills: 1 | Status: SHIPPED | OUTPATIENT
Start: 2021-06-03 | End: 2021-09-01

## 2021-06-03 RX ORDER — GABAPENTIN 300 MG/1
300 CAPSULE ORAL
Qty: 90 CAPSULE | Refills: 0 | Status: SHIPPED | OUTPATIENT
Start: 2021-06-03 | End: 2021-08-24 | Stop reason: SDUPTHER

## 2021-06-03 NOTE — PROGRESS NOTES
Emiliano Cardona is a 64 y.o. female presenting for Establish Care  . 1. Have you been to the ER, urgent care clinic since your last visit? Hospitalized since your last visit? Yes When: 5/2021 Where: [facility] [address] Reason for visit: dizzy    2. Have you seen or consulted any other health care providers outside of the [address] [place] [address] since your last visit? Include any pap smears or colon screening. No    Fall Risk Assessment, last 12 mths 6/3/2021   Able to walk? Yes   Fall in past 12 months? 0   Do you feel unsteady? 0   Are you worried about falling 0   Is TUG test greater than 12 seconds? 0   Is the gait abnormal? 0   Number of falls in past 12 months 0   Fall with injury? 0         Abuse Screening Questionnaire 6/3/2021   Do you ever feel afraid of your partner? N   Are you in a relationship with someone who physically or mentally threatens you? N   Is it safe for you to go home? Y       3 most recent PHQ Screens 6/3/2021   Little interest or pleasure in doing things Several days   Feeling down, depressed, irritable, or hopeless Several days   Total Score PHQ 2 2   Trouble falling or staying asleep, or sleeping too much -   Feeling tired or having little energy -   Poor appetite, weight loss, or overeating -   Feeling bad about yourself - or that you are a failure or have let yourself or your family down -   Trouble concentrating on things such as school, work, reading, or watching TV -   Moving or speaking so slowly that other people could have noticed; or the opposite being so fidgety that others notice -   Thoughts of being better off dead, or hurting yourself in some way -   PHQ 9 Score -   How difficult have these problems made it for you to do your work, take care of your home and get along with others -       There are no discontinued medications.

## 2021-06-03 NOTE — PROGRESS NOTES
Raza Vaz is a 64 y.o. female and presents with Establish Care      Subjective:  Patient comes in today to establish care. She is a new patient to our practice. Former patient of Dr. Faizan Payne. Patient has a past medical history of hypertension, hyperlipidemia chronic respiratory failure on home oxygen 2 L during daytime and 4 L at night, she uses BiPAP nightly, obesity hypoventilation syndrome/CLEMENTINA, asthma and COPD overlap syndrome, end-stage renal disease on hemodialysis, depression anxiety and chronic low back pain. During initial visit patient did not carry oxygen to the office today and was a little lightheaded dizzy. Her oxygen saturation was 94% however she felt slightly short of breath. Oxygen was started in office at 2 L and she felt better after 10 to 15 minutes. Blood pressure well controlled on current meds. Denies headache or blurred vision. Hyperlipidemiaon statin. Denies muscle cramps or myalgia. Chronic back painwas on Norco prescribed by Dr. Faizan Payne. She reports that gabapentin helps with her symptoms. She has lumbar spinal stenosis, lumbar degenerative disc disease. She is work with therapy in the past which significantly helped her symptoms. She denies any falls but does have some gait impairment. Asthma/COPD overlap syndrome, obesity hypoventilation syndrome on oxygen at home. End-stage renal disease on hemodialysisfollows up with Dr. Dylon Leyva. Anxiety/depressionon Wellbutrin.       Past Medical History:   Diagnosis Date    Advanced care planning/counseling discussion 4/7/16    Arthritis     back    Arthritis of ankle or foot, right     Asthma     Dr. Cali Drew [facility]    Breast cancer [facility])     Right Lumpectomy 2014 - DCIS    Chronic kidney disease     STAGE IV/ dialysis [facility] mw    Chronic obstructive pulmonary disease ([address]     Chronic pain 1989    ANKLE-right- h/o fx ankle    CKD (chronic kidney disease) stage V requiring chronic dialysis ([facility])     Dr. Prosper Perez [facility]    Depression     DEPRESSION AND ANXIETY    GERD (gastroesophageal reflux disease)     Heart failure (HCC)     h/o chf - Normal EF, likely due to volume overload    History of abdominal hernia     History of MRSA infection 01/2018    h/o MRSA leg     Hypertension     [facility]     Respiratory failure, chronic ([facility] 10/07/14    Dr. Branden Correa Stroke [facility]) 2009    ? TIA @ age 52    Unspecified sleep apnea 10/07/2014    BIPAP with oxygen    Vitamin D deficiency 12/2011    10.4 [facility] labs     Past Surgical History:   Procedure Laterality Date    HX BREAST BIOPSY Right 4/7/14    DCIS    HX CHOLECYSTECTOMY  2009    choleystectomy    HX COLONOSCOPY  8/18/11    Dr. Infante Early HX GYN      tuabl ligation    HX ORTHOPAEDIC  1989    r ankle and leg plate and screw    HX ORTHOPAEDIC  2019    bone taken out of right pink toe    HX OTHER SURGICAL  3/10/16    Graft in left arm     HX VASCULAR ACCESS Right 08/2019    DIALYSIS CATHETER-upper chest    VASCULAR SURGERY PROCEDURE UNLIST      4 surgeries on left upper arm grafts stopping up.  VASCULAR SURGERY PROCEDURE UNLIST  05/19/2020    Insertion of AV graft left arm     Allergies   Allergen Reactions    Iodine Anaphylaxis    Shellfish Containing Products Anaphylaxis    Other Medication Other (comments)     Metal causes numbness in hands,arms all over    Sulfa (Sulfonamide Antibiotics) Itching     Current Outpatient Medications   Medication Sig Dispense Refill    ferric citrate (AURYXIA PO) Take  by mouth.  HYDROcodone-acetaminophen (NORCO) 7.5-325 mg per tablet Take 1 Tablet by mouth daily. Daily PRN      buPROPion XL (WELLBUTRIN XL) 150 mg tablet Take 1 Tablet by mouth every morning for 90 days. 90 Tablet 1    gabapentin (NEURONTIN) 300 mg capsule Take 1 Capsule by mouth nightly for 90 days.  Max Daily Amount: 300 mg. 90 Capsule 0    albuterol (PROVENTIL VENTOLIN) 2.5 mg /3 mL (0.083 %) nebu 3 mL by Nebulization route every four (4) hours as needed for Wheezing or Shortness of Breath. 75 mL 0    montelukast (SINGULAIR) 10 mg tablet TAKE 1 TABLET BY MOUTH DAILY 90 Tab 1    aspirin delayed-release 81 mg tablet Take 1 Tab by mouth daily.  cholecalciferol (VITAMIN D3) (1000 Units /25 mcg) tablet Take 2 Tabs by mouth daily. 180 Tab 3    omeprazole (PRILOSEC) 20 mg capsule TAKE 1 CAPSULE BY MOUTH TWICE DAILY 180 Cap 3    magnesium oxide (MAG-OX) 400 mg tablet take 1 tablet by mouth daily 90 Tab 3    AQUAPHOR HEALING 41 % ointment Apply  to affected area as needed for Dry Skin. 56 g 11    SENSIPAR 60 mg tab   0    atorvastatin (LIPITOR) 10 mg tablet take 1 tablet by mouth NIGHTLY. 90 Tab 3    lidocaine 4 % gel 1 Inch by Apply Externally route two (2) times daily as needed. To ankle for pain 30 g 1    inhalational spacing device 1 Each by Does Not Apply route as needed. 1 Device 1     Social History     Socioeconomic History    Marital status: SINGLE     Spouse name: Not on file    Number of children: Not on file    Years of education: Not on file    Highest education level: Not on file   Occupational History    Occupation: prior retail work     Comment: disabled   Tobacco Use    Smoking status: Never Smoker    Smokeless tobacco: Never Used   Substance and Sexual Activity    Alcohol use: Yes     Comment: Occ on holidays    Drug use: No    Sexual activity: Not Currently   Social History Narrative    L[facility] in [facility] alone     Social Determinants of Health     Financial Resource Strain:     Difficulty of Paying Living Expenses:    Food Insecurity:     Worried About Running Out of Food in the Last Year:     [address] in the Last Year:    Transportation Needs:     Lack of Transportation (Medical):      Lack of Transportation (Non-Medical):    Physical Activity:     Days of Exercise per Week:     Minutes of Exercise per Session:    Stress:     Feeling of Stress :    Social Connections:  Frequency of Communication with Friends and Family:     Frequency of Social Gatherings with Friends and Family:     Attends Mosque Services:     Active Member of Clubs or Organizations:     Attends Club or Organization Meetings:     Marital Status:      Family History   Problem Relation Age of Onset    Diabetes Mother     Heart Disease Mother     Heart Disease Father     Hypertension Sister     Breast Cancer Paternal Aunt 28    No Known Problems Son     Anesth Problems Neg Hx        Objective:  Visit Vitals  /66 (BP 1 Location: Left upper arm, BP Patient Position: Sitting, BP Cuff Size: Adult)   Pulse 72   Temp 98.4 °F (36.9 °C)   Resp 18   LMP  (LMP Unknown)   SpO2 98%     Physical Exam:   General appearance - alert, well appearing, and in no distress  Mental status - alert, oriented to person, place, and time  EYE-MANFRED, EOMI, fundi normal, corneas normal, no foreign bodies  ENT-ENT exam normal, no neck nodes or sinus tenderness  Nose - normal and patent, no erythema, discharge or polyps  Mouth - mucous membranes moist, pharynx normal without lesions  Neck - supple, no significant adenopathy   Chest - clear to auscultation, no wheezes, rales or rhonchi, symmetric air entry   Heart - normal rate, regular rhythm, normal S1, S2, no murmurs, rubs, clicks or gallops   Abdomen - soft, nontender, nondistended, no masses or organomegaly  Lymph- no adenopathy palpable  Ext-peripheral pulses normal, no pedal edema, no clubbing or cyanosis, left arm AV fistula  Skin-Warm and dry.  no hyperpigmentation, vitiligo, or suspicious lesions  Neuro -alert, oriented, normal speech, no focal findings or movement disorder noted  Musculoskeletal- FROM, no bony abnormalities, no point tenderness    Lab Review:  Results for orders placed or performed during the hospital encounter of 10/21/20   CBC WITH AUTOMATED DIFF   Result Value Ref Range    WBC 8.0 3.6 - 11.0 K/uL    RBC 3.66 (L) 3.80 - 5.20 M/uL    HGB 11.7 11.5 - 16.0 g/dL    HCT 37.6 35.0 - 47.0 %    .7 (H) 80.0 - 99.0 FL    MCH 32.0 26.0 - 34.0 PG    MCHC 31.1 30.0 - 36.5 g/dL    RDW 14.5 11.5 - 14.5 %    PLATELET 028 965 - 956 K/uL    MPV 12.2 8.9 - 12.9 FL    NRBC 0.0 0  WBC    ABSOLUTE NRBC 0.00 0.00 - 0.01 K/uL    NEUTROPHILS 70 32 - 75 %    LYMPHOCYTES 21 12 - 49 %    MONOCYTES 7 5 - 13 %    EOSINOPHILS 1 0 - 7 %    BASOPHILS 0 0 - 1 %    IMMATURE GRANULOCYTES 1 (H) 0.0 - 0.5 %    ABS. NEUTROPHILS 5.7 1.8 - 8.0 K/UL    ABS. LYMPHOCYTES 1.6 0.8 - 3.5 K/UL    ABS. MONOCYTES 0.6 0.0 - 1.0 K/UL    ABS. EOSINOPHILS 0.1 0.0 - 0.4 K/UL    ABS. BASOPHILS 0.0 0.0 - 0.1 K/UL    ABS. IMM. GRANS. 0.0 0.00 - 0.04 K/UL    DF AUTOMATED     METABOLIC PANEL, COMPREHENSIVE   Result Value Ref Range    Sodium 135 (L) 136 - 145 mmol/L    Potassium 3.5 3.5 - 5.1 mmol/L    Chloride 94 (L) 97 - 108 mmol/L    CO2 30 21 - 32 mmol/L    Anion gap 11 5 - 15 mmol/L    Glucose 87 65 - 100 mg/dL    BUN 22 (H) 6 - 20 MG/DL    Creatinine 6.37 (H) 0.55 - 1.02 MG/DL    BUN/Creatinine ratio 3 (L) 12 - 20      GFR est AA 8 (L) >60 ml/min/1.73m2    GFR est non-AA 7 (L) >60 ml/min/1.73m2    Calcium 10.1 8.5 - 10.1 MG/DL    Bilirubin, total 0.7 0.2 - 1.0 MG/DL    ALT (SGPT) 16 12 - 78 U/L    AST (SGOT) 14 (L) 15 - 37 U/L    Alk. phosphatase 86 45 - 117 U/L    Protein, total 9.0 (H) 6.4 - 8.2 g/dL    Albumin 3.9 3.5 - 5.0 g/dL    Globulin 5.1 (H) 2.0 - 4.0 g/dL    A-G Ratio 0.8 (L) 1.1 - 2.2     SAMPLES BEING HELD   Result Value Ref Range    SAMPLES BEING HELD 1RED, 1BLU     COMMENT        Add-on orders for these samples will be processed based on acceptable specimen integrity and analyte stability, which may vary by analyte.    D DIMER   Result Value Ref Range    D-dimer 4.27 (H) 0.00 - 0.65 mg/L FEU   NT-PRO BNP   Result Value Ref Range    NT pro-BNP 27,887 (H) <125 PG/ML   SARS-COV-2   Result Value Ref Range    Specimen source Nasopharyngeal      SARS-CoV-2 Not detected NOTD Specimen source Nasopharyngeal      Specimen type NP Swab      Health status Symptomatic Testing     METABOLIC PANEL, COMPREHENSIVE   Result Value Ref Range    Sodium 136 136 - 145 mmol/L    Potassium 4.0 3.5 - 5.1 mmol/L    Chloride 97 97 - 108 mmol/L    CO2 29 21 - 32 mmol/L    Anion gap 10 5 - 15 mmol/L    Glucose 78 65 - 100 mg/dL    BUN 30 (H) 6 - 20 MG/DL    Creatinine 7.66 (H) 0.55 - 1.02 MG/DL    BUN/Creatinine ratio 4 (L) 12 - 20      GFR est AA 7 (L) >60 ml/min/1.73m2    GFR est non-AA 5 (L) >60 ml/min/1.73m2    Calcium 9.5 8.5 - 10.1 MG/DL    Bilirubin, total 0.6 0.2 - 1.0 MG/DL    ALT (SGPT) 10 (L) 12 - 78 U/L    AST (SGOT) 10 (L) 15 - 37 U/L    Alk. phosphatase 75 45 - 117 U/L    Protein, total 7.0 6.4 - 8.2 g/dL    Albumin 3.2 (L) 3.5 - 5.0 g/dL    Globulin 3.8 2.0 - 4.0 g/dL    A-G Ratio 0.8 (L) 1.1 - 2.2     PROTHROMBIN TIME + INR   Result Value Ref Range    INR 1.1 0.9 - 1.1      Prothrombin time 11.2 (H) 9.0 - 11.1 sec   PTT   Result Value Ref Range    aPTT 31.3 22.1 - 32.0 sec    aPTT, therapeutic range     58.0 - 77.0 SECS   FIBRINOGEN   Result Value Ref Range    Fibrinogen 314 200 - 475 mg/dL   LD   Result Value Ref Range     81 - 246 U/L   FERRITIN   Result Value Ref Range    Ferritin 861 (H) 26 - 388 NG/ML   PROCALCITONIN   Result Value Ref Range    Procalcitonin 0.19 ng/mL   C REACTIVE PROTEIN, QT   Result Value Ref Range    C-Reactive protein 0.87 (H) 0.00 - 0.60 mg/dL   TROPONIN I   Result Value Ref Range    Troponin-I, Qt. <0.05 <0.05 ng/mL   SAMPLES BEING HELD   Result Value Ref Range    SAMPLES BEING HELD 1LAV,1PST     COMMENT        Add-on orders for these samples will be processed based on acceptable specimen integrity and analyte stability, which may vary by analyte.    CBC WITH AUTOMATED DIFF   Result Value Ref Range    WBC 7.0 3.6 - 11.0 K/uL    RBC 3.22 (L) 3.80 - 5.20 M/uL    HGB 10.2 (L) 11.5 - 16.0 g/dL    HCT 33.1 (L) 35.0 - 47.0 %    .8 (H) 80.0 - 99.0 FL    MCH 31.7 26.0 - 34.0 PG    MCHC 30.8 30.0 - 36.5 g/dL    RDW 14.4 11.5 - 14.5 %    PLATELET 336 171 - 556 K/uL    MPV 12.0 8.9 - 12.9 FL    NRBC 0.0 0  WBC    ABSOLUTE NRBC 0.00 0.00 - 0.01 K/uL    NEUTROPHILS 67 32 - 75 %    LYMPHOCYTES 20 12 - 49 %    MONOCYTES 11 5 - 13 %    EOSINOPHILS 1 0 - 7 %    BASOPHILS 1 0 - 1 %    IMMATURE GRANULOCYTES 0 0.0 - 0.5 %    ABS. NEUTROPHILS 4.7 1.8 - 8.0 K/UL    ABS. LYMPHOCYTES 1.4 0.8 - 3.5 K/UL    ABS. MONOCYTES 0.8 0.0 - 1.0 K/UL    ABS. EOSINOPHILS 0.1 0.0 - 0.4 K/UL    ABS. BASOPHILS 0.0 0.0 - 0.1 K/UL    ABS. IMM. GRANS. 0.0 0.00 - 0.04 K/UL    DF AUTOMATED     PROTHROMBIN TIME + INR   Result Value Ref Range    INR 1.0 0.9 - 1.1      Prothrombin time 10.9 9.0 - 11.1 sec   PTT   Result Value Ref Range    aPTT 28.1 22.1 - 32.0 sec    aPTT, therapeutic range     58.0 - 77.0 SECS   FIBRINOGEN   Result Value Ref Range    Fibrinogen 330 200 - 475 mg/dL   CBC WITH AUTOMATED DIFF   Result Value Ref Range    WBC 7.1 3.6 - 11.0 K/uL    RBC 3.30 (L) 3.80 - 5.20 M/uL    HGB 10.7 (L) 11.5 - 16.0 g/dL    HCT 33.7 (L) 35.0 - 47.0 %    .1 (H) 80.0 - 99.0 FL    MCH 32.4 26.0 - 34.0 PG    MCHC 31.8 30.0 - 36.5 g/dL    RDW 14.3 11.5 - 14.5 %    PLATELET 373 675 - 291 K/uL    MPV 11.7 8.9 - 12.9 FL    NRBC 0.0 0  WBC    ABSOLUTE NRBC 0.00 0.00 - 0.01 K/uL    NEUTROPHILS 54 32 - 75 %    LYMPHOCYTES 30 12 - 49 %    MONOCYTES 12 5 - 13 %    EOSINOPHILS 3 0 - 7 %    BASOPHILS 1 0 - 1 %    IMMATURE GRANULOCYTES 0 0.0 - 0.5 %    ABS. NEUTROPHILS 3.9 1.8 - 8.0 K/UL    ABS. LYMPHOCYTES 2.1 0.8 - 3.5 K/UL    ABS. MONOCYTES 0.9 0.0 - 1.0 K/UL    ABS. EOSINOPHILS 0.2 0.0 - 0.4 K/UL    ABS. BASOPHILS 0.0 0.0 - 0.1 K/UL    ABS. IMM.  GRANS. 0.0 0.00 - 0.04 K/UL    DF AUTOMATED     METABOLIC PANEL, COMPREHENSIVE   Result Value Ref Range    Sodium 132 (L) 136 - 145 mmol/L    Potassium 4.4 3.5 - 5.1 mmol/L    Chloride 95 (L) 97 - 108 mmol/L    CO2 29 21 - 32 mmol/L    Anion gap 8 5 - 15 mmol/L    Glucose 79 65 - 100 mg/dL    BUN 40 (H) 6 - 20 MG/DL    Creatinine 9.90 (H) 0.55 - 1.02 MG/DL    BUN/Creatinine ratio 4 (L) 12 - 20      GFR est AA 5 (L) >60 ml/min/1.73m2    GFR est non-AA 4 (L) >60 ml/min/1.73m2    Calcium 10.0 8.5 - 10.1 MG/DL    Bilirubin, total 0.6 0.2 - 1.0 MG/DL    ALT (SGPT) 12 12 - 78 U/L    AST (SGOT) 12 (L) 15 - 37 U/L    Alk. phosphatase 71 45 - 117 U/L    Protein, total 7.4 6.4 - 8.2 g/dL    Albumin 3.3 (L) 3.5 - 5.0 g/dL    Globulin 4.1 (H) 2.0 - 4.0 g/dL    A-G Ratio 0.8 (L) 1.1 - 2.2     HEP B SURFACE AG   Result Value Ref Range    Hepatitis B surface Ag <0.10 Index    Hep B surface Ag Interp. Negative NEG     HEP B SURFACE AB   Result Value Ref Range    Hepatitis B surface Ab 49.42 mIU/mL    Hep B surface Ab Interp.  REACTIVE (A) NR     PROTHROMBIN TIME + INR   Result Value Ref Range    INR 1.1 0.9 - 1.1      Prothrombin time 11.4 (H) 9.0 - 11.1 sec   PTT   Result Value Ref Range    aPTT 35.0 (H) 22.1 - 32.0 sec    aPTT, therapeutic range     58.0 - 77.0 SECS   FIBRINOGEN   Result Value Ref Range    Fibrinogen 270 200 - 475 mg/dL   CBC W/O DIFF   Result Value Ref Range    WBC 7.1 3.6 - 11.0 K/uL    RBC 2.72 (L) 3.80 - 5.20 M/uL    HGB 8.9 (L) 11.5 - 16.0 g/dL    HCT 28.5 (L) 35.0 - 47.0 %    .8 (H) 80.0 - 99.0 FL    MCH 32.7 26.0 - 34.0 PG    MCHC 31.2 30.0 - 36.5 g/dL    RDW 14.2 11.5 - 14.5 %    PLATELET 924 (L) 657 - 400 K/uL    MPV 11.5 8.9 - 12.9 FL    NRBC 0.0 0  WBC    ABSOLUTE NRBC 0.00 0.00 - 9.06 K/uL   METABOLIC PANEL, COMPREHENSIVE   Result Value Ref Range    Sodium 136 136 - 145 mmol/L    Potassium 3.7 3.5 - 5.1 mmol/L    Chloride 99 97 - 108 mmol/L    CO2 29 21 - 32 mmol/L    Anion gap 8 5 - 15 mmol/L    Glucose 77 65 - 100 mg/dL    BUN 25 (H) 6 - 20 MG/DL    Creatinine 7.75 (H) 0.55 - 1.02 MG/DL    BUN/Creatinine ratio 3 (L) 12 - 20      GFR est AA 6 (L) >60 ml/min/1.73m2    GFR est non-AA 5 (L) >60 ml/min/1.73m2    Calcium 9.8 8.5 - 10.1 MG/DL Bilirubin, total 0.4 0.2 - 1.0 MG/DL    ALT (SGPT) 8 (L) 12 - 78 U/L    AST (SGOT) 6 (L) 15 - 37 U/L    Alk.  phosphatase 62 45 - 117 U/L    Protein, total 6.9 6.4 - 8.2 g/dL    Albumin 3.5 3.5 - 5.0 g/dL    Globulin 3.4 2.0 - 4.0 g/dL    A-G Ratio 1.0 (L) 1.1 - 2.2     EKG, 12 LEAD, INITIAL   Result Value Ref Range    Ventricular Rate 73 BPM    Atrial Rate 73 BPM    P-R Interval 166 ms    QRS Duration 90 ms    Q-T Interval 416 ms    QTC Calculation (Bezet) 458 ms    Calculated P Axis 75 degrees    Calculated R Axis 83 degrees    Calculated T Axis 63 degrees    Diagnosis       Sinus rhythm with premature ventricular complexes or fusion complexes  When compared with ECG of 05-SEP-2019 11:29,  fusion complexes are now present    Confirmed by Erendira Porras M.D., Cathne Art ([id number]) on 10/22/2020 7:35:22 AM     TYPE & SCREEN   Result Value Ref Range    Crossmatch Expiration 10/25/2020     ABO/Rh(D) O POSITIVE     Antibody screen NEG    ECHO ADULT COMPLETE   Result Value Ref Range    IVSd 0.78 0.60 - 0.90 cm    LVIDd 5.41 (A) 3.90 - 5.30 cm    LVIDs 3.37 cm    LVOT d 2.09 cm    LVPWd 0.81 0.60 - 0.90 cm    LVOT Peak Gradient 5.30 mmHg    LVOT SV 91.5 mL    LVOT Peak Velocity 115.08 cm/s    LVOT VTI 26.60 cm    Left Atrium Major Axis 4.39 cm    Aortic Valve Area by Continuity of Peak Velocity 1.97 cm2    Aortic Valve Area by Continuity of VTI 2.08 cm2    AoV PG 16.12 mmHg    Aortic Valve Systolic Mean Gradient 2.01 mmHg    Aortic Valve Systolic Peak Velocity 220.61 cm/s    AoV VTI 43.93 cm    MVA VTI 2.92 cm2    MV Peak Gradient 6.24 mmHg    MV Mean Gradient 2.34 mmHg    Mitral Valve Max Velocity 124.88 cm/s    Mitral Valve Annulus Velocity Time Integral 31.35 cm    Pulmonic Valve Systolic Peak Instantaneous Gradient 5.97 mmHg    Pulmonic Valve Max Velocity 122.15 cm/s    Tapse 2.50 (A) 1.50 - 2.00 cm    Triscuspid Valve Regurgitation Peak Gradient 29.52 mmHg    TR Max Velocity 271.68 cm/s    LV Mass .2 67.0 - 162.0 g LV Mass AL Index 63.9 43.0 - 95.0 g/m2    Left Atrium Minor Axis 1.82 cm    SADIA/BSA Pk Prosper 0.8 cm2/m2    SADIA/BSA VTI 0.9 cm2/m2        Documenation Review:    Assessment/Plan:    Diagnoses and all orders for this visit:    1. Obesity hypoventilation syndrome ([facility]    2. Recurrent depression (HCC)  -     buPROPion XL (WELLBUTRIN XL) 150 mg tablet; Take 1 Tablet by mouth every morning for 90 days. 3. Morbid obesity with BMI of 50.0-59.9, [facility] ([facility]    4. Kidney disease, chronic, end stage on dialysis ([email or web address]    5. Gastroesophageal reflux disease without esophagitis    6. Mixed hyperlipidemia    7. Asthma-COPD overlap syndrome ([email or web address]    8. Chronic respiratory failure, unspecified whether with hypoxia or hypercapnia ([facility]    9. Chronic bilateral low back pain with bilateral sciatica  -     REFERRAL TO PHYSICIAL MEDICINE REHAB  -     gabapentin (NEURONTIN) 300 mg capsule; Take 1 Capsule by mouth nightly for 90 days. Max Daily Amount: 300 mg.  -     REFERRAL TO PHYSICAL THERAPY    10. Spinal stenosis of lumbar region without neurogenic claudication  -     REFERRAL TO PHYSICIAL MEDICINE REHAB  -     gabapentin (NEURONTIN) 300 mg capsule; Take 1 Capsule by mouth nightly for 90 days. Max Daily Amount: 300 mg.  -     REFERRAL TO PHYSICAL THERAPY    11. Hx of breast cancer  -     [facility] MAMMO BI SCREENING INCL CAD; Future    12. Well woman exam (no gynecological exam)  -     REFERRAL TO OBSTETRICS AND GYNECOLOGY    13. Ductal carcinoma in situ (DCIS) of right breast    14. Aneurysm of arteriovenous dialysis fistula, initial encounter [facility])      Will refer for physical therapy. Gabapentin prescribed. Referral for Dr. Art Peers placed. Continue current meds. I will call with lab results and make further recommendations or adjustments if necessary. Discussed lifestyle modifications including Na restriction, low carb/fat diet, weight reduction and exercise (at least a walking program).         ICD-10-CM ICD-9-CM    1. Obesity hypoventilation syndrome (HCC)  E66.2 278.03    2. Recurrent depression ([facility])  F33.9 296.30 buPROPion XL (WELLBUTRIN XL) 150 mg tablet   3. Morbid obesity with BMI of 50.0-59.9, adult ([facility])  E66.01 278.01     Z68.43 V85.43    4. Kidney disease, chronic, end stage on dialysis ([facility]  N18.6 585.6     Z99.2 V45.11    5. Gastroesophageal reflux disease without esophagitis  K21.9 530.81    6. Mixed hyperlipidemia  E78.2 272.2    7. Asthma-COPD overlap syndrome ([facility]  J44.9 493.20    8. Chronic respiratory failure, unspecified whether with hypoxia or hypercapnia ([facility])  J96.10 518.83    9. Chronic bilateral low back pain with bilateral sciatica  M54.42 724.2 REFERRAL TO PHYSICIAL MEDICINE REHAB    M54.41 724.3 gabapentin (NEURONTIN) 300 mg capsule    G89.29 338.29 REFERRAL TO PHYSICAL THERAPY   10. Spinal stenosis of lumbar region without neurogenic claudication  M48.061 724.02 REFERRAL TO PHYSICIAL MEDICINE REHAB      gabapentin (NEURONTIN) 300 mg capsule      REFERRAL TO PHYSICAL THERAPY   11. Hx of breast cancer  Z85.3 V10.3 [facility] MAMMO BI SCREENING INCL CAD   15. Well woman exam (no gynecological exam)  Z00.00 V70.0 REFERRAL TO OBSTETRICS AND GYNECOLOGY   13. Ductal carcinoma in situ (DCIS) of right breast  D05.11 233.0    14. Aneurysm of arteriovenous dialysis fistula, initial encounter [facility])  Z78.440L 996.73            Follow-up and Dispositions    · Return in about 3 months (around 9/3/2021) for follow up. I have reviewed with the patient details of the assessment and plan and all questions were answered. Relevent patient education was performed. Verbal and/or written instructions (see AVS) provided. The most recent lab findings were reviewed with the patient. Plan was discussed with patient who verbally expressed understanding. An After Visit Summary was printed and given to the patient.     Shon Dancer, MD

## 2021-06-03 NOTE — PATIENT INSTRUCTIONS
High Blood Pressure: Care Instructions Overview It's normal for blood pressure to go up and down throughout the day. But if it stays up, you have high blood pressure. Another name for high blood pressure is hypertension. Despite what a lot of people think, high blood pressure usually doesn't cause headaches or make you feel dizzy or lightheaded. It usually has no symptoms. But it does increase your risk of stroke, heart attack, and other problems. You and your doctor will talk about your risks of these problems based on your blood pressure. Your doctor will give you a goal for your blood pressure. Your goal will be based on your health and your age. Lifestyle changes, such as eating healthy and being active, are always important to help lower blood pressure. You might also take medicine to reach your blood pressure goal. 
Follow-up care is a key part of your treatment and safety. Be sure to make and go to all appointments, and call your doctor if you are having problems. It's also a good idea to know your test results and keep a list of the medicines you take. How can you care for yourself at home? Medical treatment · If you stop taking your medicine, your blood pressure will go back up. You may take one or more types of medicine to lower your blood pressure. Be safe with medicines. Take your medicine exactly as prescribed. Call your doctor if you think you are having a problem with your medicine. · Talk to your doctor before you start taking aspirin every day. Aspirin can help certain people lower their risk of a heart attack or stroke. But taking aspirin isn't right for everyone, because it can cause serious bleeding. · See your doctor regularly. You may need to see the doctor more often at first or until your blood pressure comes down. · If you are taking blood pressure medicine, talk to your doctor before you take decongestants or anti-inflammatory medicine, such as ibuprofen.  Some of these medicines can raise blood pressure. · Learn how to check your blood pressure at home. Lifestyle changes · Stay at a healthy weight. This is especially important if you put on weight around the waist. Losing even 10 pounds can help you lower your blood pressure. · If your doctor recommends it, get more exercise. Walking is a good choice. Bit by bit, increase the amount you walk every day. Try for at least 30 minutes on most days of the week. You also may want to swim, bike, or do other activities. · Avoid or limit alcohol. Talk to your doctor about whether you can drink any alcohol. · Try to limit how much sodium you eat to less than 2,300 milligrams (mg) a day. Your doctor may ask you to try to eat less than 1,500 mg a day. · Eat plenty of fruits (such as bananas and oranges), vegetables, legumes, whole grains, and low-fat dairy products. · Lower the amount of saturated fat in your diet. Saturated fat is found in animal products such as milk, cheese, and meat. Limiting these foods may help you lose weight and also lower your risk for heart disease. · Do not smoke. Smoking increases your risk for heart attack and stroke. If you need help quitting, talk to your doctor about stop-smoking programs and medicines. These can increase your chances of quitting for good. When should you call for help? Call  911 anytime you think you may need emergency care. This may mean having symptoms that suggest that your blood pressure is causing a serious heart or blood vessel problem. Your blood pressure may be over 180/120. For example, call 911 if: 
  · You have symptoms of a heart attack. These may include: 
? Chest pain or pressure, or a strange feeling in the chest. 
? Sweating. ? Shortness of breath. ? Nausea or vomiting. ? Pain, pressure, or a strange feeling in the back, neck, jaw, or upper belly or in one or both shoulders or arms. ? Lightheadedness or sudden weakness.  
? A fast or irregular heartbeat.  
  · You have symptoms of a stroke. These may include: 
? Sudden numbness, tingling, weakness, or loss of movement in your face, arm, or leg, especially on only one side of your body. ? Sudden vision changes. ? Sudden trouble speaking. ? Sudden confusion or trouble understanding simple statements. ? Sudden problems with walking or balance. ? A sudden, severe headache that is different from past headaches.  
  · You have severe back or belly pain. Do not wait until your blood pressure comes down on its own. Get help right away. Call your doctor now or seek immediate care if: 
  · Your blood pressure is much higher than normal (such as 180/120 or higher), but you don't have symptoms.  
  · You think high blood pressure is causing symptoms, such as: 
? Severe headache. 
? Blurry vision. Watch closely for changes in your health, and be sure to contact your doctor if: 
  · Your blood pressure measures higher than your doctor recommends at least 2 times. That means the top number is higher or the bottom number is higher, or both.  
  · You think you may be having side effects from your blood pressure medicine. Where can you learn more? Go to http://www.gray.com/ Enter S828 in the search box to learn more about \"High Blood Pressure: Care Instructions. \" Current as of: August 31, 2020               Content Version: 12.8 © 2006-2021 [employer]. Care instructions adapted under license by [employer] (which disclaims liability or warranty for this information). If you have questions about a medical condition or this instruction, always ask your healthcare professional. [place] [address] any warranty or liability for your use of this information.

## 2021-08-24 DIAGNOSIS — G89.29 CHRONIC BILATERAL LOW BACK PAIN WITH BILATERAL SCIATICA: ICD-10-CM

## 2021-08-24 DIAGNOSIS — N18.6 KIDNEY DISEASE, CHRONIC, END STAGE ON DIALYSIS (HCC): ICD-10-CM

## 2021-08-24 DIAGNOSIS — M48.061 SPINAL STENOSIS OF LUMBAR REGION WITHOUT NEUROGENIC CLAUDICATION: ICD-10-CM

## 2021-08-24 DIAGNOSIS — M54.41 CHRONIC BILATERAL LOW BACK PAIN WITH BILATERAL SCIATICA: ICD-10-CM

## 2021-08-24 DIAGNOSIS — M54.42 CHRONIC BILATERAL LOW BACK PAIN WITH BILATERAL SCIATICA: ICD-10-CM

## 2021-08-24 DIAGNOSIS — Z99.2 KIDNEY DISEASE, CHRONIC, END STAGE ON DIALYSIS (HCC): ICD-10-CM

## 2021-08-24 RX ORDER — ATORVASTATIN CALCIUM 10 MG/1
10 TABLET, FILM COATED ORAL
Qty: 90 TABLET | Refills: 3 | Status: SHIPPED | OUTPATIENT
Start: 2021-08-24 | End: 2022-01-21 | Stop reason: SDUPTHER

## 2021-08-24 RX ORDER — LANOLIN ALCOHOL/MO/W.PET/CERES
CREAM (GRAM) TOPICAL
Qty: 90 TABLET | Refills: 3 | Status: SHIPPED | OUTPATIENT
Start: 2021-08-24

## 2021-08-24 RX ORDER — GABAPENTIN 300 MG/1
300 CAPSULE ORAL
Qty: 90 CAPSULE | Refills: 0 | Status: SHIPPED | OUTPATIENT
Start: 2021-08-24 | End: 2021-12-21 | Stop reason: SDUPTHER

## 2021-08-24 NOTE — TELEPHONE ENCOUNTER
Last Refill: 06/03//21  Last Visit: 6/3/2021   Next Visit: 9/16/2021    Requested Prescriptions     Pending Prescriptions Disp Refills    gabapentin (NEURONTIN) 300 mg capsule 90 Capsule 0     Sig: Take 1 Capsule by mouth nightly for 90 days. Max Daily Amount: 300 mg.    magnesium oxide (MAG-OX) 400 mg tablet 90 Tablet 3     Sig: take 1 tablet by mouth daily    atorvastatin (LIPITOR) 10 mg tablet 90 Tablet 3     Sig: Take 1 Tablet by mouth nightly.

## 2021-09-20 DIAGNOSIS — J45.909 UNCOMPLICATED ASTHMA, UNSPECIFIED ASTHMA SEVERITY, UNSPECIFIED WHETHER PERSISTENT: ICD-10-CM

## 2021-09-23 RX ORDER — ALBUTEROL SULFATE 0.83 MG/ML
SOLUTION RESPIRATORY (INHALATION)
Qty: 75 ML | Refills: 0 | Status: SHIPPED | OUTPATIENT
Start: 2021-09-23 | End: 2021-12-08

## 2021-10-21 ENCOUNTER — TRANSCRIBE ORDER (OUTPATIENT)
Dept: REGISTRATION | Age: 61
End: 2021-10-21

## 2021-10-21 DIAGNOSIS — Z01.812 PRE-PROCEDURE LAB EXAM: Primary | ICD-10-CM

## 2021-10-28 ENCOUNTER — HOSPITAL ENCOUNTER (OUTPATIENT)
Dept: GENERAL RADIOLOGY | Age: 61
Discharge: HOME | End: 2021-10-28
Payer: MEDICAID

## 2021-10-28 ENCOUNTER — HOSPITAL ENCOUNTER (OUTPATIENT)
Dept: PREADMISSION TESTING | Age: 61
Discharge: HOME | End: 2021-10-28
Payer: MEDICAID

## 2021-10-28 VITALS
OXYGEN SATURATION: 91 % | WEIGHT: 293 LBS | TEMPERATURE: 98.5 F | HEIGHT: 65 IN | BODY MASS INDEX: 48.82 KG/M2 | DIASTOLIC BLOOD PRESSURE: 48 MMHG | HEART RATE: 76 BPM | SYSTOLIC BLOOD PRESSURE: 135 MMHG

## 2021-10-28 DIAGNOSIS — Z01.812 PRE-PROCEDURE LAB EXAM: ICD-10-CM

## 2021-10-28 LAB
ALBUMIN SERPL-MCNC: 3.5 G/DL (ref 3.5–5)
ALBUMIN/GLOB SERPL: 0.7 {RATIO} (ref 1.1–2.2)
ALP SERPL-CCNC: 86 U/L (ref 45–117)
ALT SERPL-CCNC: 21 U/L (ref 12–78)
ANION GAP SERPL CALC-SCNC: 5 MMOL/L (ref 5–15)
APTT PPP: 34.2 SEC (ref 22.1–31)
AST SERPL-CCNC: 15 U/L (ref 15–37)
ATRIAL RATE: 71 BPM
BASOPHILS # BLD: 0.1 K/UL (ref 0–0.1)
BASOPHILS NFR BLD: 1 % (ref 0–1)
BILIRUB SERPL-MCNC: 0.4 MG/DL (ref 0.2–1)
BUN SERPL-MCNC: 26 MG/DL (ref 6–20)
BUN/CREAT SERPL: 4 (ref 12–20)
CALCIUM SERPL-MCNC: 10.7 MG/DL (ref 8.5–10.1)
CALCULATED P AXIS, ECG09: 40 DEGREES
CALCULATED R AXIS, ECG10: 96 DEGREES
CALCULATED T AXIS, ECG11: 15 DEGREES
CHLORIDE SERPL-SCNC: 100 MMOL/L (ref 97–108)
CO2 SERPL-SCNC: 29 MMOL/L (ref 21–32)
CREAT SERPL-MCNC: 6.05 MG/DL (ref 0.55–1.02)
DIAGNOSIS, 93000: NORMAL
DIFFERENTIAL METHOD BLD: ABNORMAL
EOSINOPHIL # BLD: 0.1 K/UL (ref 0–0.4)
EOSINOPHIL NFR BLD: 2 % (ref 0–7)
ERYTHROCYTE [DISTWIDTH] IN BLOOD BY AUTOMATED COUNT: 14.6 % (ref 11.5–14.5)
GLOBULIN SER CALC-MCNC: 4.7 G/DL (ref 2–4)
GLUCOSE SERPL-MCNC: 78 MG/DL (ref 65–100)
HCT VFR BLD AUTO: 38.4 % (ref 35–47)
HGB BLD-MCNC: 11.6 G/DL (ref 11.5–16)
IMM GRANULOCYTES # BLD AUTO: 0 K/UL (ref 0–0.04)
IMM GRANULOCYTES NFR BLD AUTO: 0 % (ref 0–0.5)
INR PPP: 1 (ref 0.9–1.1)
LYMPHOCYTES # BLD: 1.3 K/UL (ref 0.8–3.5)
LYMPHOCYTES NFR BLD: 25 % (ref 12–49)
MCH RBC QN AUTO: 32.7 PG (ref 26–34)
MCHC RBC AUTO-ENTMCNC: 30.2 G/DL (ref 30–36.5)
MCV RBC AUTO: 108.2 FL (ref 80–99)
MONOCYTES # BLD: 0.6 K/UL (ref 0–1)
MONOCYTES NFR BLD: 11 % (ref 5–13)
NEUTS SEG # BLD: 3 K/UL (ref 1.8–8)
NEUTS SEG NFR BLD: 61 % (ref 32–75)
NRBC # BLD: 0 K/UL (ref 0–0.01)
NRBC BLD-RTO: 0 PER 100 WBC
P-R INTERVAL, ECG05: 254 MS
PLATELET # BLD AUTO: 163 K/UL (ref 150–400)
PMV BLD AUTO: 11.6 FL (ref 8.9–12.9)
POTASSIUM SERPL-SCNC: 3.9 MMOL/L (ref 3.5–5.1)
PROT SERPL-MCNC: 8.2 G/DL (ref 6.4–8.2)
PROTHROMBIN TIME: 10.9 SEC (ref 9–11.1)
Q-T INTERVAL, ECG07: 414 MS
QRS DURATION, ECG06: 88 MS
QTC CALCULATION (BEZET), ECG08: 449 MS
RBC # BLD AUTO: 3.55 M/UL (ref 3.8–5.2)
RBC MORPH BLD: ABNORMAL
RBC MORPH BLD: ABNORMAL
SODIUM SERPL-SCNC: 134 MMOL/L (ref 136–145)
THERAPEUTIC RANGE,PTTT: ABNORMAL SECS (ref 58–77)
VENTRICULAR RATE, ECG03: 71 BPM
WBC # BLD AUTO: 5.1 K/UL (ref 3.6–11)

## 2021-10-28 PROCEDURE — 71046 X-RAY EXAM CHEST 2 VIEWS: CPT

## 2021-10-28 PROCEDURE — 93005 ELECTROCARDIOGRAM TRACING: CPT

## 2021-10-28 PROCEDURE — U0005 INFEC AGEN DETEC AMPLI PROBE: HCPCS

## 2021-10-28 PROCEDURE — 85610 PROTHROMBIN TIME: CPT

## 2021-10-28 PROCEDURE — 36415 COLL VENOUS BLD VENIPUNCTURE: CPT

## 2021-10-28 PROCEDURE — 80053 COMPREHEN METABOLIC PANEL: CPT

## 2021-10-28 PROCEDURE — 85025 COMPLETE CBC W/AUTO DIFF WBC: CPT

## 2021-10-28 PROCEDURE — 85730 THROMBOPLASTIN TIME PARTIAL: CPT

## 2021-10-28 RX ORDER — BUPROPION HYDROCHLORIDE 150 MG/1
150 TABLET ORAL DAILY
COMMUNITY
End: 2022-01-21 | Stop reason: SDUPTHER

## 2021-10-28 NOTE — PERIOP NOTES
Preoperative instructions reviewed with patient. Patient given SSI infection FAQS sheet. Given two bottles of skin prep chlorhexidine soap; given written and verbal instructions on use. Patient was given the opportunity to ask questions on the information provided. Patient instructed to obtain chest X-ray at [address] [place] [address] upon leaving PAT department. INFORMATION REGARDING COVID 19 TESTING PRIOR TO SURGERY WAS PROVIDED TO THE PATIENT WITH THE OPPORTUNITY FOR QUESTIONS. PATIENT VERBALIZED UNDERSTANDING.

## 2021-10-29 LAB
SARS-COV-2, XPLCVT: NOT DETECTED
SOURCE, COVRS: NORMAL

## 2021-10-29 NOTE — PERIOP NOTES
EKG done 10/28/21 reviewed by Dr. Alexia Rodriguez - Anesthesia and approved for surgery scheduled 11/2/21.

## 2021-11-01 ENCOUNTER — ANESTHESIA EVENT (OUTPATIENT)
Dept: CARDIOTHORACIC SURGERY | Age: 61
End: 2021-11-01
Payer: MEDICAID

## 2021-11-02 ENCOUNTER — HOSPITAL ENCOUNTER (OUTPATIENT)
Age: 61
Setting detail: OUTPATIENT SURGERY
Discharge: HOME | End: 2021-11-02
Payer: MEDICAID

## 2021-11-02 ENCOUNTER — ANESTHESIA (OUTPATIENT)
Dept: CARDIOTHORACIC SURGERY | Age: 61
End: 2021-11-02
Payer: MEDICAID

## 2021-11-02 VITALS
HEIGHT: 65 IN | DIASTOLIC BLOOD PRESSURE: 67 MMHG | TEMPERATURE: 97.9 F | WEIGHT: 293 LBS | RESPIRATION RATE: 21 BRPM | BODY MASS INDEX: 48.82 KG/M2 | OXYGEN SATURATION: 98 % | HEART RATE: 79 BPM | SYSTOLIC BLOOD PRESSURE: 101 MMHG

## 2021-11-02 DIAGNOSIS — Z99.2 KIDNEY DISEASE, CHRONIC, END STAGE ON DIALYSIS (HCC): Primary | ICD-10-CM

## 2021-11-02 DIAGNOSIS — N18.6 KIDNEY DISEASE, CHRONIC, END STAGE ON DIALYSIS (HCC): Primary | ICD-10-CM

## 2021-11-02 PROCEDURE — 77030008462 HC STPLR SKN PROX J&J -A

## 2021-11-02 PROCEDURE — 77030031139 HC SUT VCRL2 J&J -A

## 2021-11-02 PROCEDURE — 74011250636 HC RX REV CODE- 250/636

## 2021-11-02 PROCEDURE — 77030002924 HC SUT GORTX WLGO -B

## 2021-11-02 PROCEDURE — 76010000149 HC OR TIME 1 TO 1.5 HR

## 2021-11-02 PROCEDURE — 76060000033 HC ANESTHESIA 1 TO 1.5 HR

## 2021-11-02 PROCEDURE — 74011000250 HC RX REV CODE- 250

## 2021-11-02 PROCEDURE — 74011000250 HC RX REV CODE- 250: Performed by: NURSE ANESTHETIST, CERTIFIED REGISTERED

## 2021-11-02 PROCEDURE — 76210000000 HC OR PH I REC 2 TO 2.5 HR

## 2021-11-02 PROCEDURE — 77030042556 HC PNCL CAUT -B

## 2021-11-02 PROCEDURE — 76210000020 HC REC RM PH II FIRST 0.5 HR

## 2021-11-02 PROCEDURE — A4565 SLINGS: HCPCS

## 2021-11-02 PROCEDURE — 74011250636 HC RX REV CODE- 250/636: Performed by: ANESTHESIOLOGY

## 2021-11-02 PROCEDURE — 2709999900 HC NON-CHARGEABLE SUPPLY

## 2021-11-02 PROCEDURE — 74011250636 HC RX REV CODE- 250/636: Performed by: NURSE ANESTHETIST, CERTIFIED REGISTERED

## 2021-11-02 PROCEDURE — C1768 GRAFT, VASCULAR: HCPCS

## 2021-11-02 DEVICE — PROPATEN VASCULAR GRAFT TW 7MMX80CM HEPARIN
Type: IMPLANTABLE DEVICE | Site: ARM | Status: FUNCTIONAL
Brand: GORE PROPATEN VASCULAR GRAFT

## 2021-11-02 RX ORDER — PROPOFOL 10 MG/ML
INJECTION, EMULSION INTRAVENOUS
Status: DISCONTINUED | OUTPATIENT
Start: 2021-11-02 | End: 2021-11-02

## 2021-11-02 RX ORDER — LIDOCAINE HYDROCHLORIDE 20 MG/ML
INJECTION, SOLUTION EPIDURAL; INFILTRATION; INTRACAUDAL; PERINEURAL AS NEEDED
Status: DISCONTINUED | OUTPATIENT
Start: 2021-11-02 | End: 2021-11-02 | Stop reason: HOSPADM

## 2021-11-02 RX ORDER — PROPOFOL 10 MG/ML
INJECTION, EMULSION INTRAVENOUS AS NEEDED
Status: DISCONTINUED | OUTPATIENT
Start: 2021-11-02 | End: 2021-11-02 | Stop reason: HOSPADM

## 2021-11-02 RX ORDER — MIDAZOLAM HYDROCHLORIDE 1 MG/ML
1 INJECTION, SOLUTION INTRAMUSCULAR; INTRAVENOUS AS NEEDED
Status: DISCONTINUED | OUTPATIENT
Start: 2021-11-02 | End: 2021-11-02 | Stop reason: HOSPADM

## 2021-11-02 RX ORDER — FENTANYL CITRATE 50 UG/ML
50 INJECTION, SOLUTION INTRAMUSCULAR; INTRAVENOUS AS NEEDED
Status: DISCONTINUED | OUTPATIENT
Start: 2021-11-02 | End: 2021-11-02 | Stop reason: HOSPADM

## 2021-11-02 RX ORDER — SODIUM CHLORIDE, SODIUM LACTATE, POTASSIUM CHLORIDE, CALCIUM CHLORIDE 600; 310; 30; 20 MG/100ML; MG/100ML; MG/100ML; MG/100ML
50 INJECTION, SOLUTION INTRAVENOUS CONTINUOUS
Status: DISCONTINUED | OUTPATIENT
Start: 2021-11-02 | End: 2021-11-02 | Stop reason: HOSPADM

## 2021-11-02 RX ORDER — DEXMEDETOMIDINE HYDROCHLORIDE 100 UG/ML
INJECTION, SOLUTION INTRAVENOUS AS NEEDED
Status: DISCONTINUED | OUTPATIENT
Start: 2021-11-02 | End: 2021-11-02 | Stop reason: HOSPADM

## 2021-11-02 RX ORDER — LIDOCAINE HYDROCHLORIDE 10 MG/ML
INJECTION INFILTRATION; PERINEURAL AS NEEDED
Status: DISCONTINUED | OUTPATIENT
Start: 2021-11-02 | End: 2021-11-02 | Stop reason: HOSPADM

## 2021-11-02 RX ORDER — ROPIVACAINE HYDROCHLORIDE 5 MG/ML
10 INJECTION, SOLUTION EPIDURAL; INFILTRATION; PERINEURAL AS NEEDED
Status: DISCONTINUED | OUTPATIENT
Start: 2021-11-02 | End: 2021-11-02 | Stop reason: HOSPADM

## 2021-11-02 RX ORDER — ONDANSETRON 2 MG/ML
4 INJECTION INTRAMUSCULAR; INTRAVENOUS AS NEEDED
Status: DISCONTINUED | OUTPATIENT
Start: 2021-11-02 | End: 2021-11-02 | Stop reason: HOSPADM

## 2021-11-02 RX ORDER — PROPOFOL 10 MG/ML
INJECTION, EMULSION INTRAVENOUS
Status: DISCONTINUED | OUTPATIENT
Start: 2021-11-02 | End: 2021-11-02 | Stop reason: HOSPADM

## 2021-11-02 RX ORDER — HYDROCODONE BITARTRATE AND ACETAMINOPHEN 7.5; 325 MG/1; MG/1
1 TABLET ORAL
Qty: 20 TABLET | Refills: 0 | Status: SHIPPED | OUTPATIENT
Start: 2021-11-02 | End: 2021-11-05

## 2021-11-02 RX ORDER — SODIUM CHLORIDE 0.9 % (FLUSH) 0.9 %
5-40 SYRINGE (ML) INJECTION EVERY 8 HOURS
Status: DISCONTINUED | OUTPATIENT
Start: 2021-11-02 | End: 2021-11-02 | Stop reason: HOSPADM

## 2021-11-02 RX ORDER — MORPHINE SULFATE 2 MG/ML
2 INJECTION, SOLUTION INTRAMUSCULAR; INTRAVENOUS
Status: DISCONTINUED | OUTPATIENT
Start: 2021-11-02 | End: 2021-11-02 | Stop reason: HOSPADM

## 2021-11-02 RX ORDER — SODIUM CHLORIDE 0.9 % (FLUSH) 0.9 %
5-40 SYRINGE (ML) INJECTION AS NEEDED
Status: DISCONTINUED | OUTPATIENT
Start: 2021-11-02 | End: 2021-11-02 | Stop reason: HOSPADM

## 2021-11-02 RX ORDER — LIDOCAINE HYDROCHLORIDE 10 MG/ML
0.1 INJECTION, SOLUTION EPIDURAL; INFILTRATION; INTRACAUDAL; PERINEURAL AS NEEDED
Status: DISCONTINUED | OUTPATIENT
Start: 2021-11-02 | End: 2021-11-02 | Stop reason: HOSPADM

## 2021-11-02 RX ORDER — HYDROMORPHONE HYDROCHLORIDE 1 MG/ML
0.2 INJECTION, SOLUTION INTRAMUSCULAR; INTRAVENOUS; SUBCUTANEOUS
Status: DISCONTINUED | OUTPATIENT
Start: 2021-11-02 | End: 2021-11-02 | Stop reason: HOSPADM

## 2021-11-02 RX ORDER — FENTANYL CITRATE 50 UG/ML
25 INJECTION, SOLUTION INTRAMUSCULAR; INTRAVENOUS
Status: DISCONTINUED | OUTPATIENT
Start: 2021-11-02 | End: 2021-11-02 | Stop reason: HOSPADM

## 2021-11-02 RX ORDER — SODIUM CHLORIDE 9 MG/ML
100 INJECTION, SOLUTION INTRAVENOUS CONTINUOUS
Status: CANCELLED | OUTPATIENT
Start: 2021-11-02

## 2021-11-02 RX ORDER — PHENYLEPHRINE HCL IN 0.9% NACL 0.4MG/10ML
SYRINGE (ML) INTRAVENOUS AS NEEDED
Status: DISCONTINUED | OUTPATIENT
Start: 2021-11-02 | End: 2021-11-02 | Stop reason: HOSPADM

## 2021-11-02 RX ORDER — SODIUM CHLORIDE 9 MG/ML
INJECTION, SOLUTION INTRAVENOUS
Status: DISCONTINUED | OUTPATIENT
Start: 2021-11-02 | End: 2021-11-02 | Stop reason: HOSPADM

## 2021-11-02 RX ADMIN — PROPOFOL 20 MG: 10 INJECTION, EMULSION INTRAVENOUS at 07:49

## 2021-11-02 RX ADMIN — WATER 2 G: 1 INJECTION INTRAMUSCULAR; INTRAVENOUS; SUBCUTANEOUS at 07:40

## 2021-11-02 RX ADMIN — PHENYLEPHRINE HYDROCHLORIDE 40 MCG: 10 INJECTION INTRAVENOUS at 07:53

## 2021-11-02 RX ADMIN — SODIUM CHLORIDE: 900 INJECTION, SOLUTION INTRAVENOUS at 06:58

## 2021-11-02 RX ADMIN — PHENYLEPHRINE HYDROCHLORIDE 40 MCG: 10 INJECTION INTRAVENOUS at 07:57

## 2021-11-02 RX ADMIN — FENTANYL CITRATE 100 MCG: 50 INJECTION, SOLUTION INTRAMUSCULAR; INTRAVENOUS at 07:17

## 2021-11-02 RX ADMIN — MIDAZOLAM 2 MG: 1 INJECTION INTRAMUSCULAR; INTRAVENOUS at 07:17

## 2021-11-02 RX ADMIN — PHENYLEPHRINE HYDROCHLORIDE 100 MCG: 10 INJECTION INTRAVENOUS at 07:51

## 2021-11-02 RX ADMIN — PHENYLEPHRINE HYDROCHLORIDE 40 MCG: 10 INJECTION INTRAVENOUS at 07:55

## 2021-11-02 RX ADMIN — PROPOFOL 40 MCG/KG/MIN: 10 INJECTION, EMULSION INTRAVENOUS at 07:39

## 2021-11-02 RX ADMIN — LIDOCAINE HYDROCHLORIDE 40 MG: 20 INJECTION, SOLUTION EPIDURAL; INFILTRATION; INTRACAUDAL; PERINEURAL at 07:48

## 2021-11-02 RX ADMIN — DEXMEDETOMIDINE HYDROCHLORIDE 6 MCG: 100 INJECTION, SOLUTION, CONCENTRATE INTRAVENOUS at 07:49

## 2021-11-02 NOTE — DISCHARGE INSTRUCTIONS
Patient Discharge Instructions    Patricio Hooper / [id number] : 1960    Admitted 2021 Discharged: 2021     Take Home Medications            ARTERIOVENOUS FISTULA, GRAFT ACCESS, REVISION, OR DECLOT    ACTIVITY:  Limited activity today. Keep access arm straight and raised above the level of your heart for 24 hours or until the swelling goes away. DIET:  May have your usual food, unless told otherwise by your doctor. PAIN:  Use the prescription for pain medicine your doctor gave you. If you do not have one, usual your normal pain medicine. If this does not control your pain, call your doctor. DO NOT TAKE ASPIRIN OR MEDICINE WITH ASPIRIN IN IT, unless your doctor says you may. DRESSING CARE:   Keep your dressing clean and dry. Leave your dressing on until the Dialysis Nurse or your doctor takes it off. BLEEDING:  If you have any bleeding put pressure over the bleeding area and call your doctor. CARE OF YOUR ACCESS ARM:  You may have some bruising, swelling, and discoloration for several weeks. After the swelling has gone down, you will be able to see the outline of the graft. By placing your fingertips over the graft you will be able to feel a vibration (thrill) that means blood is flowing and the graft is working. DO:  1. Make sure your arm is washed with soap and water every day. 2. Call your Kidney doctor if you do not feel the [address] or for any problems like swelling, redness, tingling, or numbness in access arm, pus, or fever over 101. DO NOT:  1. Wear tight sleeves, watches, belts, or bracelets over the graft. 2. Carry heavy bags across the graft or fistula. 3. Sleep on your graft side. 4. Let your blood pressure be taken in your access arm. 5. Let blood be drawn from your access arm. For the next 24 hours, DO NOT Drive, Drink Alcoholic Beverages, or Make IMPORTANT Decisions.     Follow-Up Instructions:  Please see your ordering doctor as he/she has requested. · It is important that you take the medication exactly as they are prescribed. · Keep your medication in the bottles provided by the pharmacist and keep a list of the medication names, dosages, and times to be taken in your wallet. · Do not take other medications without consulting your doctor. What to do at Home    Recommended diet: Renal Diet,     Recommended activity: Activity as tolerated,     Additional Instructions: remove left arm dressing on Thurs and leave open,     Follow-up with Dr Nazanin Varela in 2 weeks, call [phone] for appt        Information obtained by :  I understand that if any problems occur once I am at home I am to contact my physician. I understand and acknowledge receipt of the instructions indicated above. Physician's or R.N.'s Signature                                                                  Date/Time                                                                                                                                              Patient or Representative Signature                                                          Date/Time    ______________________________________________________________________    Anesthesia Discharge Instructions    After general anesthesia or intervenous sedation, for 24 hours or while taking prescription Narcotics:  · Limit your activities  · Do not drive or operate hazardous machinery  · If you have not urinated within 8 hours after discharge, please contact your surgeon on call.   · Do not make important personal or business decisions  · Do not drink alcoholic beverages    Report the following to your surgeon:  · Excessive pain, swelling, redness or odor of or around the surgical area  · Temperature over 100.5 degrees  · Nausea and vomiting lasting longer than 4 hours or if unable to take medication  · Any signs of decreased circulation or nerve impairment to extremity:  Change in color, persistent numbness, tingling, coldness or increased pain.   · Any questions

## 2021-11-02 NOTE — ANESTHESIA PREPROCEDURE EVALUATION
Relevant Problems   No relevant active problems       Anesthetic History               Review of Systems / Medical History  Patient summary reviewed, nursing notes reviewed and pertinent labs reviewed    Pulmonary    COPD    Sleep apnea    Asthma        Neuro/Psych       CVA  TIA and psychiatric history     Cardiovascular    Hypertension              Exercise tolerance: >4 METS     GI/Hepatic/Renal     GERD    Renal disease       Endo/Other        Morbid obesity and arthritis     Other Findings              Physical Exam    Airway  Mallampati: II  TM Distance: > 6 cm  Neck ROM: normal range of motion   Mouth opening: Normal     Cardiovascular    Rhythm: regular           Dental  No notable dental hx       Pulmonary  Breath sounds clear to auscultation               Abdominal         Other Findings            Anesthetic Plan    ASA: 3  Anesthesia type: MAC, regional and general - backup          Induction: Intravenous  Anesthetic plan and risks discussed with: Patient

## 2021-11-02 NOTE — ROUTINE PROCESS
Patient: Patricio Hooper MRN: [id number]  SSN: [id number] YOB: 1960  Age: 64 y.o. Sex: female Patient is status post Procedure(s): 
INSERTION RIGHT UPPER ARM AV GRAFT. Surgeon(s) and Role: 
   Johan Vora MD - Primary Local/Dose/Irrigation:  XYLOCAINE 1%  PLAIN 20 ML INJECTED LEFT UPPER ARM Peripheral IV 11/02/21 Left Antecubital (Active) Site Assessment Clean, dry, & intact 11/02/21 2452 Phlebitis Assessment 0 11/02/21 6529 Infiltration Assessment 0 11/02/21 2845 Dressing Status Clean, dry, & intact 11/02/21 3022 Dressing Type Tape;Transparent 11/02/21 4725 Hub Color/Line Status Pink; Infusing 11/02/21 0138 Dressing/Packing:  Incision 11/02/21 Arm Right-Dressing/Treatment: Gauze dressing/dressing sponge;Tape/Soft cloth adhesive tape (11/02/21 0700) Splint/Cast:  ] Other:

## 2021-11-02 NOTE — ANESTHESIA POSTPROCEDURE EVALUATION
Post-Anesthesia Evaluation and Assessment    Patient: Justus Graf MRN: [id number]  SSN: [id number]    YOB: 1960  Age: 64 y.o. Sex: female      I have evaluated the patient and they are stable and ready for discharge from the PACU. Cardiovascular Function/Vital Signs  Visit Vitals  /76 (BP 1 Location: Left lower arm)   Pulse 78   Temp 36.6 °C (97.9 °F)   Resp 19   Ht 5' 5\" (1.651 m)   Wt 135.8 kg (299 lb 6.2 oz)   SpO2 94%   BMI 49.82 kg/m²       Patient is status post Regional anesthesia for Procedure(s):  INSERTION RIGHT UPPER ARM AV GRAFT. Nausea/Vomiting: None    Postoperative hydration reviewed and adequate. Pain:  Pain Scale 1: Numeric (0 - 10) (11/02/21 0935)  Pain Intensity 1: 0 (11/02/21 0935)   Managed    Neurological Status:   Neuro (WDL): Exceptions to WDL (11/02/21 0935)  Neuro  Neurologic State: Drowsy (11/02/21 0935)  LUE Motor Response: Purposeful (11/02/21 0935)  LLE Motor Response: Purposeful (11/02/21 0935)  RUE Motor Response: Numbness (11/02/21 0935)  RLE Motor Response: Purposeful (11/02/21 0935)   At baseline    Mental Status, Level of Consciousness: Alert and  oriented to person, place, and time    Pulmonary Status:   O2 Device: Nasal cannula (11/02/21 0935)   Adequate oxygenation and airway patent    Complications related to anesthesia: None    Post-anesthesia assessment completed. No concerns    Signed By: Eh Rodriguez MD     November 2, 2021              Procedure(s):  INSERTION RIGHT UPPER ARM AV GRAFT.    MAC, regional, general - backup    <BSHSIANPOST>    INITIAL Post-op Vital signs:   Vitals Value Taken Time   /76 11/02/21 0935   Temp 36.6 °C (97.9 °F) 11/02/21 0935   Pulse 81 11/02/21 0942   Resp 22 11/02/21 0942   SpO2 96 % 11/02/21 0942   Vitals shown include unvalidated device data.

## 2021-11-02 NOTE — BRIEF OP NOTE
Brief Postoperative Note    Patient: Cheko Benson  YOB: 1960  MRN: [id number]    Date of Procedure: 11/2/2021     Pre-Op Diagnosis: END STAGE RENAL DISEASE    Post-Op Diagnosis: Same as preoperative diagnosis. Procedure(s):  INSERTION RIGHT UPPER ARM AV GRAFT    Surgeon(s):  Estefania Warren MD    Surgical Assistant: Surg Asst-1: Mir Villanueva    Anesthesia: Regional     Estimated Blood Loss (mL): Minimal    Complications: None    Specimens: * No specimens in log *     Implants:   Implant Name Type Inv.  Item Serial No.  Lot No. LRB No. Used Action   GRAFT VASC L80CM ID7MM EPTFE THN W[employer]ED [employer] N - [id number]  GRAFT VASC L80CM ID7MM EPTFE THN WALLED [employer] N [id number]  [employer] N/A Right 1 Implanted       Drains: * No LDAs found *    Findings: none    Electronically Signed by Niki Burgos MD on 11/2/2021 at 8:37 AM

## 2021-11-02 NOTE — OP NOTES
[address]   OPERATIVE REPORT    Name:  Breanna Brar  MR#:  [id number]  :  1960  ACCOUNT #:  [de-identified]  DATE OF SERVICE:  2021      PREOPERATIVE DIAGNOSIS:  Renal failure. POSTOPERATIVE DIAGNOSIS:  Renal failure. PROCEDURE PERFORMED:  Insertion of right upper arm Plymouth-Talha arteriovenous dialysis graft. SURGEON:  Mickey Mihcelle MD    ASSISTANT:  Roxanna Lombardi    ANESTHESIA:  Regional block. COMPLICATIONS:  None. SPECIMENS REMOVED:  None. IMPLANTS:  7-mm [place]-Talha graft. ESTIMATED BLOOD LOSS:  Minimal.    INDICATION:  The patient is a 19-year-old female with end-stage renal disease, on hemodialysis. She has had two previous left upper arm grafts beginning in 2017. Both have now failed. She has inadequate veins for native fistula and will have a right upper arm graft placed. PROCEDURE:  The patient's right arm was prepped and draped. A longitudinal incision was made in the proximal medial upper arm. The basilic vein and brachial artery were dissected free. A 7-mm thin-walled [place]-Talha graft was tunneled in a loop configuration in the upper arm using three counterincisions. The arterial side was lateral.  The graft was sewn end-to-side to the brachial artery and end-to-side to the basilic vein using running CV5 Plymouth-Talha suture. Following this, there was an augmentable pulse in the graft. There was also good flow in the outflow vein by Doppler. The incisions were closed with Vicryl subcutaneous suture and skin staples. Dressings were applied and the patient was returned to the recovery room in stable condition.       Linda Ruzi MD      [id number]  D:  2021 8:41  T:  2021 9:13  JOB #:  [id number]

## 2021-12-07 DIAGNOSIS — J45.909 UNCOMPLICATED ASTHMA, UNSPECIFIED ASTHMA SEVERITY, UNSPECIFIED WHETHER PERSISTENT: ICD-10-CM

## 2021-12-08 RX ORDER — ALBUTEROL SULFATE 0.83 MG/ML
SOLUTION RESPIRATORY (INHALATION)
Qty: 75 ML | Refills: 0 | Status: SHIPPED | OUTPATIENT
Start: 2021-12-08 | End: 2022-01-12

## 2021-12-21 DIAGNOSIS — M54.42 CHRONIC BILATERAL LOW BACK PAIN WITH BILATERAL SCIATICA: ICD-10-CM

## 2021-12-21 DIAGNOSIS — G89.29 CHRONIC BILATERAL LOW BACK PAIN WITH BILATERAL SCIATICA: ICD-10-CM

## 2021-12-21 DIAGNOSIS — M54.41 CHRONIC BILATERAL LOW BACK PAIN WITH BILATERAL SCIATICA: ICD-10-CM

## 2021-12-21 DIAGNOSIS — M48.061 SPINAL STENOSIS OF LUMBAR REGION WITHOUT NEUROGENIC CLAUDICATION: ICD-10-CM

## 2021-12-21 NOTE — TELEPHONE ENCOUNTER
Last Refill: 08/24/21  Last Visit: Visit date not found   Next Visit: Visit date not found    Requested Prescriptions     Pending Prescriptions Disp Refills    gabapentin (NEURONTIN) 300 mg capsule 90 Capsule 0     Sig: Take 1 Capsule by mouth nightly for 90 days. Max Daily Amount: 300 mg.

## 2021-12-22 ENCOUNTER — TRANSCRIBE ORDER (OUTPATIENT)
Dept: SCHEDULING | Age: 61
End: 2021-12-22

## 2021-12-22 DIAGNOSIS — Z12.31 SCREENING MAMMOGRAM FOR HIGH-RISK PATIENT: Primary | ICD-10-CM

## 2021-12-22 RX ORDER — GABAPENTIN 300 MG/1
300 CAPSULE ORAL
Qty: 90 CAPSULE | Refills: 0 | Status: SHIPPED | OUTPATIENT
Start: 2021-12-22 | End: 2022-01-21 | Stop reason: SDUPTHER

## 2022-01-12 DIAGNOSIS — J45.909 UNCOMPLICATED ASTHMA, UNSPECIFIED ASTHMA SEVERITY, UNSPECIFIED WHETHER PERSISTENT: ICD-10-CM

## 2022-01-12 RX ORDER — ALBUTEROL SULFATE 0.83 MG/ML
SOLUTION RESPIRATORY (INHALATION)
Qty: 75 ML | Refills: 0 | Status: SHIPPED | OUTPATIENT
Start: 2022-01-12 | End: 2022-01-21 | Stop reason: SDUPTHER

## 2022-01-20 ENCOUNTER — OFFICE VISIT (OUTPATIENT)
Dept: INTERNAL MEDICINE CLINIC | Age: 62
End: 2022-01-20
Payer: MEDICAID

## 2022-01-20 VITALS
DIASTOLIC BLOOD PRESSURE: 64 MMHG | HEART RATE: 73 BPM | BODY MASS INDEX: 48.82 KG/M2 | SYSTOLIC BLOOD PRESSURE: 96 MMHG | HEIGHT: 65 IN | RESPIRATION RATE: 16 BRPM | OXYGEN SATURATION: 100 % | TEMPERATURE: 98.4 F | WEIGHT: 293 LBS

## 2022-01-20 DIAGNOSIS — E66.01 MORBID OBESITY WITH BODY MASS INDEX (BMI) OF 40.0 TO 49.9 (HCC): ICD-10-CM

## 2022-01-20 DIAGNOSIS — M54.42 CHRONIC BILATERAL LOW BACK PAIN WITH BILATERAL SCIATICA: ICD-10-CM

## 2022-01-20 DIAGNOSIS — Z99.2 KIDNEY DISEASE, CHRONIC, END STAGE ON DIALYSIS (HCC): ICD-10-CM

## 2022-01-20 DIAGNOSIS — E03.9 ACQUIRED HYPOTHYROIDISM: ICD-10-CM

## 2022-01-20 DIAGNOSIS — G89.29 CHRONIC LOW BACK PAIN WITH SCIATICA, SCIATICA LATERALITY UNSPECIFIED, UNSPECIFIED BACK PAIN LATERALITY: ICD-10-CM

## 2022-01-20 DIAGNOSIS — N63.0 SWOLLEN BREAST: ICD-10-CM

## 2022-01-20 DIAGNOSIS — J45.909 UNCOMPLICATED ASTHMA, UNSPECIFIED ASTHMA SEVERITY, UNSPECIFIED WHETHER PERSISTENT: ICD-10-CM

## 2022-01-20 DIAGNOSIS — M48.061 SPINAL STENOSIS OF LUMBAR REGION WITHOUT NEUROGENIC CLAUDICATION: ICD-10-CM

## 2022-01-20 DIAGNOSIS — F33.9 RECURRENT DEPRESSION (HCC): ICD-10-CM

## 2022-01-20 DIAGNOSIS — M54.41 CHRONIC BILATERAL LOW BACK PAIN WITH BILATERAL SCIATICA: ICD-10-CM

## 2022-01-20 DIAGNOSIS — E78.2 MIXED HYPERLIPIDEMIA: Primary | ICD-10-CM

## 2022-01-20 DIAGNOSIS — M25.579 CHRONIC ANKLE PAIN, UNSPECIFIED LATERALITY: ICD-10-CM

## 2022-01-20 DIAGNOSIS — Z99.81 OXYGEN DEPENDENT: ICD-10-CM

## 2022-01-20 DIAGNOSIS — G89.29 CHRONIC ANKLE PAIN, UNSPECIFIED LATERALITY: ICD-10-CM

## 2022-01-20 DIAGNOSIS — E66.2 OBESITY HYPOVENTILATION SYNDROME (HCC): ICD-10-CM

## 2022-01-20 DIAGNOSIS — K21.9 GASTROESOPHAGEAL REFLUX DISEASE WITHOUT ESOPHAGITIS: ICD-10-CM

## 2022-01-20 DIAGNOSIS — N18.6 KIDNEY DISEASE, CHRONIC, END STAGE ON DIALYSIS (HCC): ICD-10-CM

## 2022-01-20 DIAGNOSIS — G89.29 CHRONIC BILATERAL LOW BACK PAIN WITH BILATERAL SCIATICA: ICD-10-CM

## 2022-01-20 DIAGNOSIS — Z13.29 SCREENING FOR THYROID DISORDER: ICD-10-CM

## 2022-01-20 DIAGNOSIS — N64.59 PEAU D'ORANGE OVER BREAST: ICD-10-CM

## 2022-01-20 DIAGNOSIS — M54.40 CHRONIC LOW BACK PAIN WITH SCIATICA, SCIATICA LATERALITY UNSPECIFIED, UNSPECIFIED BACK PAIN LATERALITY: ICD-10-CM

## 2022-01-20 LAB
ALBUMIN SERPL-MCNC: 3.6 G/DL (ref 3.5–5)
ALBUMIN/GLOB SERPL: 0.9 {RATIO} (ref 1.1–2.2)
ALP SERPL-CCNC: 85 U/L (ref 45–117)
ALT SERPL-CCNC: 18 U/L (ref 12–78)
ANION GAP SERPL CALC-SCNC: 5 MMOL/L (ref 5–15)
AST SERPL-CCNC: 16 U/L (ref 15–37)
BILIRUB SERPL-MCNC: 0.6 MG/DL (ref 0.2–1)
BUN SERPL-MCNC: 29 MG/DL (ref 6–20)
BUN/CREAT SERPL: 4 (ref 12–20)
CALCIUM SERPL-MCNC: 9.8 MG/DL (ref 8.5–10.1)
CHLORIDE SERPL-SCNC: 97 MMOL/L (ref 97–108)
CHOLEST SERPL-MCNC: 153 MG/DL
CO2 SERPL-SCNC: 32 MMOL/L (ref 21–32)
CREAT SERPL-MCNC: 6.75 MG/DL (ref 0.55–1.02)
ERYTHROCYTE [DISTWIDTH] IN BLOOD BY AUTOMATED COUNT: 14.9 % (ref 11.5–14.5)
GLOBULIN SER CALC-MCNC: 4 G/DL (ref 2–4)
GLUCOSE SERPL-MCNC: 75 MG/DL (ref 65–100)
HCT VFR BLD AUTO: 37.6 % (ref 35–47)
HDLC SERPL-MCNC: 72 MG/DL
HDLC SERPL: 2.1 {RATIO} (ref 0–5)
HGB BLD-MCNC: 11.3 G/DL (ref 11.5–16)
LDLC SERPL CALC-MCNC: 70.6 MG/DL (ref 0–100)
MCH RBC QN AUTO: 33 PG (ref 26–34)
MCHC RBC AUTO-ENTMCNC: 30.1 G/DL (ref 30–36.5)
MCV RBC AUTO: 109.9 FL (ref 80–99)
NRBC # BLD: 0 K/UL (ref 0–0.01)
NRBC BLD-RTO: 0 PER 100 WBC
PLATELET # BLD AUTO: 114 K/UL (ref 150–400)
PMV BLD AUTO: 11.6 FL (ref 8.9–12.9)
POTASSIUM SERPL-SCNC: 4.4 MMOL/L (ref 3.5–5.1)
PROT SERPL-MCNC: 7.6 G/DL (ref 6.4–8.2)
RBC # BLD AUTO: 3.42 M/UL (ref 3.8–5.2)
SODIUM SERPL-SCNC: 134 MMOL/L (ref 136–145)
TRIGL SERPL-MCNC: 52 MG/DL (ref ?–150)
TSH SERPL DL<=0.05 MIU/L-ACNC: 6.31 UIU/ML (ref 0.36–3.74)
VLDLC SERPL CALC-MCNC: 10.4 MG/DL
WBC # BLD AUTO: 4.6 K/UL (ref 3.6–11)

## 2022-01-20 PROCEDURE — 99214 OFFICE O/P EST MOD 30 MIN: CPT | Performed by: NURSE PRACTITIONER

## 2022-01-20 RX ORDER — SEVELAMER CARBONATE FOR ORAL SUSPENSION 2400 MG/1
2.4 POWDER, FOR SUSPENSION ORAL
COMMUNITY

## 2022-01-20 NOTE — PROGRESS NOTES
Chief Complaint   Patient presents with    [facility]     new patient       SUBJECTIVE:    Sharon Mejia is a 64 y.o. female who is new to me, and here today for a follow up appointment regarding longstanding medical conditions which include: Chronic renal insufficiency stage V with dialysis, chronic low back pain with sciatica, GERD, hyperlipidemia, chronic use of opiates, chronic hypoventilation syndrome secondary to morbid obesity, and complaints of excess swelling to her right breast (without pain) for the past 1 to 2 months. She is currently taking several medications for management of her chronic low back pain including gabapentin and hydrocodone. She states she takes 1 of each daily to help with her pain. She is currently attending dialysis 3 times a week on Monday, Wednesday, and Fridays. She has an AV shunt to her right upper arm which is being used presently. She is compliant with her medications, but states she has been \"out of them because I had no PCP. \"         Current Outpatient Medications   Medication Sig Dispense Refill    albuterol (PROVENTIL VENTOLIN) 2.5 mg /3 mL (0.083 %) nebu INHALE THE CONTENTS OF 1 VIAL VIA NEBULIZER EVERY 4 HOURS AS NEEDED FOR WHEEZING OR SHORTNESS OF BREATH 75 mL 0    gabapentin (NEURONTIN) 300 mg capsule Take 1 Capsule by mouth nightly for 90 days. Max Daily Amount: 300 mg. 90 Capsule 0    sod phos di, mono/K phos mono (PHOSPHOROUS PO) Take  by mouth.  buPROPion XL (Wellbutrin XL) 150 mg tablet Take 150 mg by mouth daily.  magnesium oxide (MAG-OX) 400 mg tablet take 1 tablet by mouth daily 90 Tablet 3    atorvastatin (LIPITOR) 10 mg tablet Take 1 Tablet by mouth nightly. 90 Tablet 3    HYDROcodone-acetaminophen (NORCO) 7.5-325 mg per tablet Take 1 Tablet by mouth daily. Daily PRN      montelukast (SINGULAIR) 10 mg tablet TAKE 1 TABLET BY MOUTH DAILY 90 Tab 1    aspirin delayed-release 81 mg tablet Take 1 Tab by mouth daily.       omeprazole (PRILOSEC) 20 mg capsule TAKE 1 CAPSULE BY MOUTH TWICE DAILY 180 Cap 3    AQUAPHOR HEALING 41 % ointment Apply  to affected area as needed for Dry Skin. 56 g 11    SENSIPAR 60 mg tab   0    lidocaine 4 % gel 1 Inch by Apply Externally route two (2) times daily as needed. To ankle for pain 30 g 1    inhalational spacing device 1 Each by Does Not Apply route as needed. 1 Device 1     Past Medical History:   Diagnosis Date    Advanced care planning/counseling discussion 4/7/16    Arthritis     back    Arthritis of ankle or foot, right     [place] [facility] [place]    [facility])     Right Lumpectomy 2014 - DCIS    Chronic kidney disease     STAGE IV/ dialysis [facility]    Chronic obstructive [facility] [address] [address]     Chronic pain 1989    ANKLE-right- h/o fx ankle    CKD (chronic kidney disease) stage V requiring chronic dialysis ([facility] [address]     Dr. Jono Zacarias [facility]    Depression     DEPRESSION AND ANXIETY    GERD (gastroesophageal reflux disease)     Heart failure ([address]     h/o chf - Normal EF, likely due to volume overload    History of abdominal hernia     History of MRSA infection 01/2018    h/o MRSA leg     Hypertension     Morbid obesity ([address]     Neuropathy     Respiratory failure, chronic ([address] 10/07/14    Dr. Marlena Vergara Stroke [facility]) 2009    ?  TIA @ age 52    Unspecified sleep apnea 10/07/2014    BIPAP with oxygen    Vitamin D deficiency 12/2011    10[facility] labs     Past Surgical History:   Procedure Laterality Date    HX BREAST BIOPSY Right 4/7/14    DCIS    HX CHOLECYSTECTOMY  2009    choleystectomy    HX COLONOSCOPY  8/18/11    Dr. Stacey Weber HX GYN      tuabl ligation    HX ORTHOPAEDIC  1989    r ankle and leg plate and screw    HX ORTHOPAEDIC  2019    bone taken out of right pink toe    HX OTHER SURGICAL  3/10/16    Graft in left arm     HX VASCULAR ACCESS Right 08/2019    DIALYSIS CATHETER-upper chest    VASCULAR SURGERY PROCEDURE UNLIST      4 surgeries on left upper arm grafts stopping up.  VASCULAR SURGERY PROCEDURE UNLIST  05/19/2020    Insertion of AV graft left arm     Allergies   Allergen Reactions    Iodine Anaphylaxis    Shellfish Containing Products Anaphylaxis    Other Medication Other (comments)     Metal causes numbness in hands,arms all over    Sulfa (Sulfonamide Antibiotics) Itching       REVIEW OF SYSTEMS:                                        POSITIVE= bold text  Negative = regular text    General:                     fever, chills, sweats, generalized weakness, weight loss/gain,                                       loss of appetite   Eyes:                           blurred vision, eye pain, loss of vision, double vision  ENT:                            rhinorrhea, pharyngitis   Respiratory:               cough, sputum production, SOB, FLOR, wheezing, pleuritic pain   Cardiology:                chest pain, palpitations, orthopnea, PND, edema, syncope   Gastrointestinal:       abdominal pain , N/V, diarrhea, dysphagia, constipation, bleeding   Genitourinary:           frequency, urgency, dysuria, hematuria, incontinence   Muskuloskeletal :      arthralgia, myalgia, back pain  Hematology:              easy bruising, nose or gum bleeding, lymphadenopathy   Dermatological:         rash, ulceration, pruritis, color change / jaundice  Endocrine:                 hot flashes or polydipsia   Neurological:             headache, dizziness, confusion, focal weakness, paresthesia,                                      Speech difficulties, memory loss, gait difficulty  Psychological:          Feelings of anxiety, depression, agitation        Social History     Socioeconomic History    Marital status: SINGLE   Occupational History    Occupation: prior retail work     Comment: disabled   Tobacco Use    Smoking status: Never Smoker    Smokeless tobacco: Never Used   Substance and Sexual Activity    Alcohol use:  Yes Comment: Occ on holidays    Drug use: No    Sexual activity: Not Currently   Social History Narrative    Lives in [place] alone     Family History   Problem Relation Age of Onset    Diabetes Mother     Heart Disease Mother     Kidney Disease Mother     Heart Disease Father     No Known Problems Sister     No Known Problems Brother     No Known Problems Sister     No Known Problems Brother     Breast Cancer Paternal Aunt 28    No Known Problems Son     Anesth Problems Neg Hx        OBJECTIVE:     Visit Vitals  LMP  (LMP Unknown)       Constitutional: She appears well nourished, of stated age, and dressed appropriately. Eyes: Sclera anicteric, PERRLA, EOMI  ENT: Nares clear, moist mucous membranes, pharynx clear  Neck: Supple without lymphadenopathy. Thyroid normal, No JVD or bruits  Respiratory: Clear to ascultation X5, normal inspiratory effort, no adventitious breath sounds. Cardiovascular: Regular rate and rhythm, no rubs or gallops, PMI not displaced, No thrills. Hematologic: No purpura, petechiae or unexplained bruising  Lymphatic: No lymph node enlargemant. Breasts: Normal symmetry with no discrete masses, scars, or nipple discharge present. There is no dimpling, tenderness, or introversion present. Patient has peau d'orange appearance to left upper quadrant of right breast.  Musculoskeletal: Paraspinal tenderness to lower back. Integumentary: (See breast exam). Neuro: Non-focal exam, A & O X 3.   Psychiatric: Appropriate affect and demeanor, pleasant and cooperative. Patient's thought content and thought processing appear to be within normal limits. ASSESSMENT/PLAN:     ICD-10-CM ICD-9-CM    1. Mixed hyperlipidemia  E78.2 272.2 CBC W/O DIFF      METABOLIC PANEL, COMPREHENSIVE      LIPID PANEL      LIPID PANEL      METABOLIC PANEL, COMPREHENSIVE      CBC W/O DIFF   2.  Chronic low back pain with sciatica, sciatica laterality unspecified, unspecified back pain laterality  M54.40 724.2 G89.29 724.3      338.29    3. Obesity hypoventilation syndrome (HCC)  E66.2 278.03    4. Morbid obesity with body mass index (BMI) of 40.0 to 49.9 (HCC)  E66.01 278.01 CBC W/O DIFF      TSH 3RD GENERATION      TSH 3RD GENERATION      CBC W/O DIFF   5. Kidney disease, chronic, end stage on dialysis (HCC)  N18.6 585.6 CBC W/O DIFF    N05.4 G78.83 METABOLIC PANEL, COMPREHENSIVE      METABOLIC PANEL, COMPREHENSIVE      CBC W/O DIFF   6. Peau d'orange over breast  N64.59 611.79 MARY MAMMO BI SCREENING INCL CAD   7. Swollen breast  N63.0 611.72 MARY MAMMO BI SCREENING INCL CAD   8. Oxygen dependent  Z99.81 V46.2    9. Screening for thyroid disorder  Z13.29 V77.0 TSH 3RD GENERATION      TSH 3RD GENERATION   10. Acquired hypothyroidism  E03.9 244.9 levothyroxine (SYNTHROID) 25 mcg tablet     1: We will repeat labs today including: CBC, CMP, TSH, and lipid panel. 2: Medications refilled today per patient request.  3: Patient to follow-up with nephrology and neurology as planned. Continue dialysis 3 times weekly as scheduled. 4: Patient to continue healthy lifestyle management including: Low-fat/low-cholesterol diet, avoidance of concentrated sweets, adequate amounts of fiber water, and exercise as tolerated. 5: Patient will be referred for mammography due to abnormal presentation and stated swelling of right breast.  6: Patient to continue oxygen use due to hypoventilation and body habitus. 7: Patient to follow-up with me in approximately 4 months, or sooner as needed. Patient states understanding and agrees with plan. ATTENTION:   This medical record was transcribed using an electronic medical records system. Although proofread, it may and can contain electronic and spelling errors. Other human spelling and other errors may be present. Corrections may be executed at a later time. Please feel free to contact us for any clarifications as needed. Signed,  oJno Robbins.  Aubree Zuleta, MSN APRN FNP-BC

## 2022-01-20 NOTE — PROGRESS NOTES
Ning Weinstein is a 64 y.o. female    Chief Complaint   Patient presents with   [facility]     new patient       Visit Vitals  BP 96/64 (BP 1 Location: Left upper arm, BP Patient Position: Sitting, BP Cuff Size: Large adult)   Pulse 73   Temp 98.4 °F (36.9 °C)   Resp 16   Ht 5' 5\" (1.651 m)   Wt 314 lb 6.4 oz (142.6 kg)   LMP  (LMP Unknown)   SpO2 100%   BMI 52.32 kg/m²           1. Have you been to the ER, urgent care clinic since your last visit? Hospitalized since your last visit? NO    2. Have you seen or consulted any other health care providers outside of the [address] [place] [address] since your last visit? Include any pap smears or colon screening.  NO

## 2022-01-21 RX ORDER — ALBUTEROL SULFATE 0.83 MG/ML
2.5 SOLUTION RESPIRATORY (INHALATION)
Qty: 120 ML | Refills: 5 | Status: SHIPPED | OUTPATIENT
Start: 2022-01-21 | End: 2022-05-19 | Stop reason: SDUPTHER

## 2022-01-21 RX ORDER — LEVOTHYROXINE SODIUM 25 UG/1
25 TABLET ORAL
Qty: 30 TABLET | Refills: 3 | Status: SHIPPED | OUTPATIENT
Start: 2022-01-21 | End: 2022-02-11 | Stop reason: SDUPTHER

## 2022-01-21 RX ORDER — GABAPENTIN 300 MG/1
300 CAPSULE ORAL
Qty: 90 CAPSULE | Refills: 0 | Status: SHIPPED | OUTPATIENT
Start: 2022-01-21 | End: 2022-02-11 | Stop reason: SDUPTHER

## 2022-01-21 RX ORDER — OMEPRAZOLE 20 MG/1
CAPSULE, DELAYED RELEASE ORAL
Qty: 180 CAPSULE | Refills: 1 | Status: SHIPPED | OUTPATIENT
Start: 2022-01-21 | End: 2022-02-11 | Stop reason: SDUPTHER

## 2022-01-21 RX ORDER — HYDROCODONE BITARTRATE AND ACETAMINOPHEN 7.5; 325 MG/1; MG/1
1 TABLET ORAL DAILY
Qty: 30 TABLET | Refills: 0 | Status: SHIPPED | OUTPATIENT
Start: 2022-01-21 | End: 2022-02-11 | Stop reason: SDUPTHER

## 2022-01-21 RX ORDER — BUPROPION HYDROCHLORIDE 150 MG/1
150 TABLET ORAL DAILY
Qty: 90 TABLET | Refills: 1 | Status: SHIPPED | OUTPATIENT
Start: 2022-01-21 | End: 2022-02-11 | Stop reason: SDUPTHER

## 2022-01-21 RX ORDER — ATORVASTATIN CALCIUM 10 MG/1
10 TABLET, FILM COATED ORAL
Qty: 90 TABLET | Refills: 1 | Status: SHIPPED | OUTPATIENT
Start: 2022-01-21 | End: 2022-02-11 | Stop reason: SDUPTHER

## 2022-01-21 NOTE — PROGRESS NOTES
Your thyroid shows to be slightly underfunctioning at this time. Therefore, I am going to start you on a medication to help with this. This will be sent to your pharmacy. Cholesterol levels are in good range. Liver functions are normal.  Electrolytes are within normal limits. Blood count is marginally lower than normal and likely due to chronic renal insufficiency. We will need to recheck your thyroid level in 3 months.

## 2022-02-10 ENCOUNTER — HOSPITAL ENCOUNTER (OUTPATIENT)
Dept: MAMMOGRAPHY | Age: 62
Discharge: HOME | End: 2022-02-10
Attending: NURSE PRACTITIONER
Payer: MEDICAID

## 2022-02-10 DIAGNOSIS — N64.59 PEAU D'ORANGE OVER BREAST: ICD-10-CM

## 2022-02-10 DIAGNOSIS — N63.0 SWOLLEN BREAST: ICD-10-CM

## 2022-02-10 PROCEDURE — 77067 SCR MAMMO BI INCL CAD: CPT

## 2022-02-11 DIAGNOSIS — F33.9 RECURRENT DEPRESSION (HCC): ICD-10-CM

## 2022-02-11 DIAGNOSIS — M48.061 SPINAL STENOSIS OF LUMBAR REGION WITHOUT NEUROGENIC CLAUDICATION: ICD-10-CM

## 2022-02-11 DIAGNOSIS — K21.9 GASTROESOPHAGEAL REFLUX DISEASE WITHOUT ESOPHAGITIS: ICD-10-CM

## 2022-02-11 DIAGNOSIS — E78.2 MIXED HYPERLIPIDEMIA: ICD-10-CM

## 2022-02-11 DIAGNOSIS — G89.29 CHRONIC ANKLE PAIN, UNSPECIFIED LATERALITY: ICD-10-CM

## 2022-02-11 DIAGNOSIS — M54.42 CHRONIC BILATERAL LOW BACK PAIN WITH BILATERAL SCIATICA: ICD-10-CM

## 2022-02-11 DIAGNOSIS — G89.29 CHRONIC BILATERAL LOW BACK PAIN WITH BILATERAL SCIATICA: ICD-10-CM

## 2022-02-11 DIAGNOSIS — E03.9 ACQUIRED HYPOTHYROIDISM: ICD-10-CM

## 2022-02-11 DIAGNOSIS — M54.41 CHRONIC BILATERAL LOW BACK PAIN WITH BILATERAL SCIATICA: ICD-10-CM

## 2022-02-11 DIAGNOSIS — M25.579 CHRONIC ANKLE PAIN, UNSPECIFIED LATERALITY: ICD-10-CM

## 2022-02-11 RX ORDER — OMEPRAZOLE 20 MG/1
CAPSULE, DELAYED RELEASE ORAL
Qty: 180 CAPSULE | Refills: 1 | Status: SHIPPED | OUTPATIENT
Start: 2022-02-11

## 2022-02-11 RX ORDER — LEVOTHYROXINE SODIUM 25 UG/1
25 TABLET ORAL
Qty: 30 TABLET | Refills: 3 | Status: SHIPPED | OUTPATIENT
Start: 2022-02-11

## 2022-02-11 RX ORDER — HYDROCODONE BITARTRATE AND ACETAMINOPHEN 7.5; 325 MG/1; MG/1
1 TABLET ORAL DAILY
Qty: 30 TABLET | Refills: 0 | Status: SHIPPED | OUTPATIENT
Start: 2022-02-11 | End: 2022-08-29 | Stop reason: SDUPTHER

## 2022-02-11 RX ORDER — BUPROPION HYDROCHLORIDE 150 MG/1
150 TABLET ORAL DAILY
Qty: 90 TABLET | Refills: 1 | Status: SHIPPED | OUTPATIENT
Start: 2022-02-11

## 2022-02-11 RX ORDER — GABAPENTIN 300 MG/1
300 CAPSULE ORAL
Qty: 90 CAPSULE | Refills: 0 | Status: SHIPPED | OUTPATIENT
Start: 2022-02-11 | End: 2022-05-12

## 2022-02-11 RX ORDER — ATORVASTATIN CALCIUM 10 MG/1
10 TABLET, FILM COATED ORAL
Qty: 90 TABLET | Refills: 1 | Status: SHIPPED | OUTPATIENT
Start: 2022-02-11

## 2022-02-14 ENCOUNTER — TELEPHONE (OUTPATIENT)
Dept: INTERNAL MEDICINE CLINIC | Age: 62
End: 2022-02-14

## 2022-02-14 DIAGNOSIS — M25.571 CHRONIC PAIN OF BOTH ANKLES: Primary | ICD-10-CM

## 2022-02-14 DIAGNOSIS — M25.572 CHRONIC PAIN OF BOTH ANKLES: Primary | ICD-10-CM

## 2022-02-14 DIAGNOSIS — G89.29 CHRONIC PAIN OF BOTH ANKLES: Primary | ICD-10-CM

## 2022-02-16 RX ORDER — LIDOCAINE 40 MG/G
CREAM TOPICAL
Qty: 30 G | Refills: 5 | Status: SHIPPED | OUTPATIENT
Start: 2022-02-16

## 2022-03-18 PROBLEM — G89.29 CHRONIC LOW BACK PAIN WITH SCIATICA: Status: ACTIVE | Noted: 2022-01-20

## 2022-03-18 PROBLEM — F33.9 RECURRENT DEPRESSION (HCC): Status: ACTIVE | Noted: 2017-12-28

## 2022-03-18 PROBLEM — T82.898A ANEURYSM OF ARTERIOVENOUS DIALYSIS FISTULA (HCC): Status: ACTIVE | Noted: 2020-09-10

## 2022-03-18 PROBLEM — M54.40 CHRONIC LOW BACK PAIN WITH SCIATICA: Status: ACTIVE | Noted: 2022-01-20

## 2022-03-18 PROBLEM — R06.02 SHORTNESS OF BREATH: Status: ACTIVE | Noted: 2020-10-21

## 2022-03-18 PROBLEM — E66.01 MORBID OBESITY WITH BODY MASS INDEX (BMI) OF 40.0 TO 49.9 (HCC): Status: ACTIVE | Noted: 2018-05-03

## 2022-03-19 PROBLEM — M25.531 RIGHT WRIST PAIN: Status: ACTIVE | Noted: 2019-09-12

## 2022-03-19 PROBLEM — Z79.891 CHRONIC PRESCRIPTION OPIATE USE: Status: ACTIVE | Noted: 2018-10-16

## 2022-03-19 PROBLEM — Z99.81 OXYGEN DEPENDENT: Status: ACTIVE | Noted: 2022-01-20

## 2022-03-19 PROBLEM — R56.9 NEW ONSET SEIZURE (HCC): Status: ACTIVE | Noted: 2020-05-28

## 2022-03-19 PROBLEM — R29.898 WEAKNESS OF BOTH LEGS: Status: ACTIVE | Noted: 2020-09-10

## 2022-03-19 PROBLEM — E78.2 MIXED HYPERLIPIDEMIA: Status: ACTIVE | Noted: 2022-01-20

## 2022-03-20 PROBLEM — E83.42 HYPOMAGNESEMIA: Status: ACTIVE | Noted: 2019-09-12

## 2022-04-26 ENCOUNTER — TELEPHONE (OUTPATIENT)
Dept: INTERNAL MEDICINE CLINIC | Age: 62
End: 2022-04-26

## 2022-04-26 NOTE — TELEPHONE ENCOUNTER
Discussed issues with patient. Patient states that she was seen at [facility] on 04/19/2022. She received a notice on her discharge instructions that she was \"COVID-positive. \"  She had no idea until she had left that this was the condition. She states she continues to have some wheezing, productive cough, and occasional shortness of breath. She is using her nebulizers as well as CPAP at home. I have recommended that she get over-the-counter Mucinex to help with her chest congestion. Patient states she will do this. ER warnings given for worsening symptoms. Patient states understanding and agrees with plan.

## 2022-04-26 NOTE — TELEPHONE ENCOUNTER
Patient left a message stating she would like a call back from her pcp about her hospital situation. She went to [address] [place] [address] on 4/22/22 for asthma. They did a chest xray and on her paperwork it said she is positive for covid. Please call.     LALY [phone]

## 2022-05-10 ENCOUNTER — TELEPHONE (OUTPATIENT)
Dept: INTERNAL MEDICINE CLINIC | Age: 62
End: 2022-05-10

## 2022-05-10 NOTE — TELEPHONE ENCOUNTER
Needs paperwork for [employer] to be filled out by PCP. It's to verify PCP status. Advised pt to drop off paperwork or fax it in.

## 2022-05-19 ENCOUNTER — OFFICE VISIT (OUTPATIENT)
Dept: INTERNAL MEDICINE CLINIC | Age: 62
End: 2022-05-19
Payer: MEDICAID

## 2022-05-19 DIAGNOSIS — R34 ANURIA: ICD-10-CM

## 2022-05-19 DIAGNOSIS — J45.909 UNCOMPLICATED ASTHMA, UNSPECIFIED ASTHMA SEVERITY, UNSPECIFIED WHETHER PERSISTENT: ICD-10-CM

## 2022-05-19 DIAGNOSIS — E78.2 MIXED HYPERLIPIDEMIA: ICD-10-CM

## 2022-05-19 DIAGNOSIS — R06.02 PERSISTENT SHORTNESS OF BREATH AFTER COVID-19: ICD-10-CM

## 2022-05-19 DIAGNOSIS — N34.2 URETHRITIS: ICD-10-CM

## 2022-05-19 DIAGNOSIS — E03.9 ACQUIRED HYPOTHYROIDISM: ICD-10-CM

## 2022-05-19 DIAGNOSIS — Z99.2 KIDNEY DISEASE, CHRONIC, END STAGE ON DIALYSIS (HCC): ICD-10-CM

## 2022-05-19 DIAGNOSIS — N18.6 KIDNEY DISEASE, CHRONIC, END STAGE ON DIALYSIS (HCC): ICD-10-CM

## 2022-05-19 DIAGNOSIS — E66.2 OBESITY HYPOVENTILATION SYNDROME (HCC): Primary | ICD-10-CM

## 2022-05-19 DIAGNOSIS — N35.919 URETHRAL SPASM: ICD-10-CM

## 2022-05-19 DIAGNOSIS — U09.9 PERSISTENT SHORTNESS OF BREATH AFTER COVID-19: ICD-10-CM

## 2022-05-19 PROCEDURE — 99214 OFFICE O/P EST MOD 30 MIN: CPT | Performed by: NURSE PRACTITIONER

## 2022-05-19 RX ORDER — ALBUTEROL SULFATE 0.83 MG/ML
2.5 SOLUTION RESPIRATORY (INHALATION)
Qty: 120 ML | Refills: 5 | Status: SHIPPED | OUTPATIENT
Start: 2022-05-19

## 2022-05-19 NOTE — PROGRESS NOTES
Chief Complaint   Patient presents with    Follow-up     follow up       SUBJECTIVE:    Hailey Bernstein is a 58 y.o. female who is here today for a follow up appointment regarding her continued shortness of breath secondary to obesity hypoventilation syndrome and the result of COVID-19 infection which she sustained earlier this year. She is not currently using oxygen today, but states she has a continued need for it periodically. She had a portable oxygen unit which she was using, but states that it stopped working. She is requesting a referral to pulmonology for evaluation and for the need. She continues to take dialysis 3 times a week, and is compliant with her visits. She has had a history of anuria for quite some time, but has more recently started having a sensation of needing to go with urethral like spasms and some discomfort without passing urine or discharge. She denies any vaginal pain or vaginal discharge. Additionally, she continues to take medication for management of her mixed lipidemia and hypothyroidism. She said she is compliant with the use of the medication, and denies any adverse side effects. She is fasting today for labs. She has lost approximately 12 pounds since her last encounter. Current Outpatient Medications   Medication Sig Dispense Refill    albuterol (PROVENTIL VENTOLIN) 2.5 mg /3 mL (0.083 %) nebu Take 3 mL by inhalation every six (6) hours as needed for Wheezing. 120 mL 5    lidocaine (XYLOCAINE) 4 % topical cream Apply  to affected area two (2) times daily as needed for Pain. 30 g 5    levothyroxine (SYNTHROID) 25 mcg tablet Take 1 Tablet by mouth Daily (before breakfast). Indications: a condition with low thyroid hormone levels 30 Tablet 3    buPROPion XL (Wellbutrin XL) 150 mg tablet Take 1 Tablet by mouth daily. 90 Tablet 1    atorvastatin (LIPITOR) 10 mg tablet Take 1 Tablet by mouth nightly.  90 Tablet 1    omeprazole (PRILOSEC) 20 mg capsule TAKE 1 CAPSULE BY MOUTH TWICE DAILY 180 Capsule 1    sevelamer carbonate (Renvela) 2.4 gram pwpk oral powder Take 2.4 g by mouth three (3) times daily (with meals).  sod phos di, mono/K phos mono (PHOSPHOROUS PO) Take  by mouth.  magnesium oxide (MAG-OX) 400 mg tablet take 1 tablet by mouth daily 90 Tablet 3    montelukast (SINGULAIR) 10 mg tablet TAKE 1 TABLET BY MOUTH DAILY 90 Tab 1    aspirin delayed-release 81 mg tablet Take 1 Tab by mouth daily.  AQUAPHOR HEALING 41 % ointment Apply  to affected area as needed for Dry Skin. 56 g 11    SENSIPAR 60 mg tab 90 mg tab  0    inhalational spacing device 1 Each by Does Not Apply route as needed. 1 Device 1     Past Medical History:   Diagnosis Date    Advanced care planning/counseling discussion 4/7/16    Arthritis     back    Arthritis of ankle or foot, right     [place]     [facility] Chicho Joiner [facility]    Breast cancer [facility])     Right Lumpectomy 2014 - DCIS    Chronic kidney disease     STAGE IV/ dialysis [facility]    Chronic obstructive pulmonary disease ([facility] [address]     Chronic pain 1989    ANKLE-right- h/o fx ankle    CKD (chronic kidney disease) stage V requiring chronic dialysis ([facility] [address]     Dr. [facility]op [facility]    Depression     DEPRESSION AND ANXIETY    GERD (gastroesophageal reflux disease)     Heart failure ([address]     h/o chf - Normal EF, likely due to volume overload    History of abdominal hernia     History of MRSA infection 01/2018    h/o MRSA leg     Hypertension     Morbid obesity ([address]     Neuropathy     Respiratory failure, chronic ([address] 10/07/14    Dr. Deng Hogan Stroke [facility]) 2009    ?  TIA @ age 52    Unspecified sleep apnea 10/07/2014    BIPAP with oxygen    Vitamin D deficiency 12/2011    10[facility]     Past Surgical History:   Procedure Laterality Date    HX BREAST BIOPSY Right 4/7/14    DCIS    HX CHOLECYSTECTOMY  2009    choleystectomy    HX COLONOSCOPY  8/18/11    Dr. Lisa Duarte  GYN tuabl ligation    HX ORTHOPAEDIC  1989    r ankle and leg plate and screw    HX ORTHOPAEDIC  2019    bone taken out of right pink toe    HX OTHER SURGICAL  3/10/16    Graft in left arm     HX VASCULAR ACCESS Right 08/2019    DIALYSIS CATHETER-upper chest    VASCULAR SURGERY PROCEDURE UNLIST      4 surgeries on left upper arm grafts stopping up.  VASCULAR SURGERY PROCEDURE UNLIST  05/19/2020    Insertion of AV graft left arm     Allergies   Allergen Reactions    Iodine Anaphylaxis     Other reaction(s):  Anaphylaxis (severe)      Shellfish Containing Products Anaphylaxis    Shellfish Derived Anaphylaxis    Other Medication Other (comments)     Metal causes numbness in hands,arms all over    Sulfa (Sulfonamide Antibiotics) Itching       REVIEW OF SYSTEMS:                                        POSITIVE= bold text  Negative = regular text    General:                     fever, chills, sweats, generalized weakness, weight loss/gain,                                       loss of appetite   Eyes:                           blurred vision, eye pain, loss of vision, double vision  ENT:                            rhinorrhea, pharyngitis   Respiratory:               cough, sputum production, SOB, FLOR, wheezing, pleuritic pain   Cardiology:                chest pain, palpitations, orthopnea, PND, edema, syncope   Gastrointestinal:       abdominal pain , N/V, diarrhea, dysphagia, constipation, bleeding   Genitourinary:           frequency, urgency, dysuria, hematuria, incontinence   Muskuloskeletal :      arthralgia, myalgia, back pain  Hematology:              easy bruising, nose or gum bleeding, lymphadenopathy   Dermatological:         rash, ulceration, pruritis, color change / jaundice  Endocrine:                 hot flashes or polydipsia   Neurological:             headache, dizziness, confusion, focal weakness, paresthesia,                                      Speech difficulties, memory loss, gait difficulty  Psychological:          Feelings of anxiety, depression, agitation        Social History     Socioeconomic History    Marital status: SINGLE   Occupational History    Occupation: prior retail work     Comment: disabled   Tobacco Use    Smoking status: Never Smoker    Smokeless tobacco: Never Used   Vaping Use    Vaping Use: Never used   Substance and Sexual Activity    Alcohol use: Yes     Comment: Occ on holidays    Drug use: No    Sexual activity: Not Currently   Social History Narrative    L[facility] in [facility]     Family History   Problem Relation Age of Onset    Diabetes Mother     Heart Disease Mother     Kidney Disease Mother     Heart Disease Father     No Known Problems Sister     No Known Problems Brother     No Known Problems Sister     No Known Problems Brother     Breast Cancer Paternal Aunt 28    No Known Problems Son     Anesth Problems Neg Hx        OBJECTIVE:     Visit Vitals  /71 (BP 1 Location: Left upper arm, BP Patient Position: Sitting, BP Cuff Size: Large adult)   Pulse 79   Temp 98.4 °F (36.9 °C)   Resp 16   Ht 5' 5\" (1.651 m)   Wt 302 lb 9.6 oz (137.3 kg)   LMP  (LMP Unknown)   SpO2 92%   BMI 50.36 kg/m²       Constitutional: She appears well nourished, of stated age, and dressed appropriately. Eyes: Sclera anicteric, PERRLA, EOMI  Neck: Supple without lymphadenopathy. Thyroid normal, No JVD or bruits  Respiratory: Clear to ascultation X5, normal inspiratory effort, no adventitious breath sounds. Cardiovascular: Regular rate and rhythm, no murmurs, rubs or gallops, PMI not displaced, No thrills, no peripheral edema  Neuro: Non-focal exam, A & O X 3.   Psychiatric: Appropriate affect and demeanor, pleasant and cooperative. Patient's thought content and thought processing appear to be within normal limits. ASSESSMENT/PLAN:     ICD-10-CM ICD-9-CM    1. Obesity hypoventilation syndrome ([facility] [address]  E66.2 278.03    2.  Kidney disease, chronic, end stage on dialysis (HCC)  N18.6 585.6 CBC W/O DIFF    O27.2 X25.08 METABOLIC PANEL, COMPREHENSIVE      METABOLIC PANEL, COMPREHENSIVE      CBC W/O DIFF   3. Persistent shortness of breath after COVID-19  R06.02 786.05     U09.9 139.8    4. Uncomplicated asthma, unspecified asthma severity, unspecified whether persistent  J45.909 493.90 albuterol (PROVENTIL VENTOLIN) 2.5 mg /3 mL (0.083 %) nebu   5. Acquired hypothyroidism  E03.9 244.9 TSH 3RD GENERATION      T4, FREE      T4, FREE      TSH 3RD GENERATION   6. Mixed hyperlipidemia  E78.2 272.2 LIPID PANEL      LIPID PANEL   7. Urethral spasm  N35.919 599.84    8. Urethritis  N34.2 597.80    9. Anuria  R34 788. 5      1: We will repeat labs today including: CBC, CMP, TSH, free T4, and lipid panel. 2: Due to urethral like discomfort, I will refer patient to urology for further evaluation. 3: Continue dialysis treatments as recommended for management of end-stage renal disease. 4: Continue current medications for management of mixed hyperlipidemia and acquired hypothyroidism. 5: Continue healthy lifestyle management. 6: Patient to contact me regarding referral to pulmonologist and continued use of oxygen when needed. 7: Patient to follow-up with me in approximately 4 months, or sooner as needed. Patient states understanding and agrees with plan. Orders Placed This Encounter    CBC W/O DIFF    METABOLIC PANEL, COMPREHENSIVE    LIPID PANEL    TSH 3RD GENERATION    T4, FREE    albuterol (PROVENTIL VENTOLIN) 2.5 mg /3 mL (0.083 %) nebu         ATTENTION:   This medical record was transcribed using an electronic medical records system. Although proofread, it may and can contain electronic and spelling errors. Other human spelling and other errors may be present. Corrections may be executed at a later time. Please feel free to contact us for any clarifications as needed.       Follow-up and Dispositions    · Return in about 4 months (around 9/19/2022) for Follow up with fasting labs. Signed,  Binh Austin.  Peg Lovett, MSN APRN FNP-BC

## 2022-05-19 NOTE — PROGRESS NOTES
Mary Soldanna is a 58 y.o. female    Chief Complaint   Patient presents with    Follow-up     follow up       Visit Vitals  /71 (BP 1 Location: Left upper arm, BP Patient Position: Sitting, BP Cuff Size: Large adult)   Pulse 79   Temp 98.4 °F (36.9 °C)   Resp 16   Ht 5' 5\" (1.651 m)   Wt 302 lb 9.6 oz (137.3 kg)   LMP  (LMP Unknown)   SpO2 92%   BMI 50.36 kg/m²           1. Have you been to the ER, urgent care clinic since your last visit? Hospitalized since your last visit? NO    2. Have you seen or consulted any other health care providers outside of the [address] [place] [address] since your last visit? Include any pap smears or colon screening.  NO

## 2022-05-20 VITALS
TEMPERATURE: 98.4 F | WEIGHT: 293 LBS | HEART RATE: 79 BPM | HEIGHT: 65 IN | DIASTOLIC BLOOD PRESSURE: 71 MMHG | OXYGEN SATURATION: 92 % | SYSTOLIC BLOOD PRESSURE: 111 MMHG | BODY MASS INDEX: 48.82 KG/M2 | RESPIRATION RATE: 16 BRPM

## 2022-05-21 LAB
ALBUMIN SERPL-MCNC: 4.2 G/DL (ref 3.8–4.8)
ALBUMIN/GLOB SERPL: 1.4 {RATIO} (ref 1.2–2.2)
ALP SERPL-CCNC: 98 IU/L (ref 44–121)
ALT SERPL-CCNC: 7 IU/L (ref 0–32)
AST SERPL-CCNC: 9 IU/L (ref 0–40)
BILIRUB SERPL-MCNC: 0.3 MG/DL (ref 0–1.2)
BUN SERPL-MCNC: 34 MG/DL (ref 8–27)
BUN/CREAT SERPL: 5 (ref 12–28)
CALCIUM SERPL-MCNC: 9.4 MG/DL (ref 8.7–10.3)
CHLORIDE SERPL-SCNC: 96 MMOL/L (ref 96–106)
CHOLEST SERPL-MCNC: 146 MG/DL (ref 100–199)
CO2 SERPL-SCNC: 24 MMOL/L (ref 20–29)
CREAT SERPL-MCNC: 7.01 MG/DL (ref 0.57–1)
EGFR: 6 ML/MIN/1.73
ERYTHROCYTE [DISTWIDTH] IN BLOOD BY AUTOMATED COUNT: 13 % (ref 11.7–15.4)
GLOBULIN SER CALC-MCNC: 3 G/DL (ref 1.5–4.5)
GLUCOSE SERPL-MCNC: 74 MG/DL (ref 65–99)
HCT VFR BLD AUTO: 32.3 % (ref 34–46.6)
HDLC SERPL-MCNC: 67 MG/DL
HGB BLD-MCNC: 11.6 G/DL (ref 11.1–15.9)
LDLC SERPL CALC-MCNC: 68 MG/DL (ref 0–99)
MCH RBC QN AUTO: 37.9 PG (ref 26.6–33)
MCHC RBC AUTO-ENTMCNC: 35.9 G/DL (ref 31.5–35.7)
MCV RBC AUTO: 106 FL (ref 79–97)
PLATELET # BLD AUTO: 134 X10E3/UL (ref 150–450)
POTASSIUM SERPL-SCNC: 4.6 MMOL/L (ref 3.5–5.2)
PROT SERPL-MCNC: 7.2 G/DL (ref 6–8.5)
RBC # BLD AUTO: 3.06 X10E6/UL (ref 3.77–5.28)
SODIUM SERPL-SCNC: 139 MMOL/L (ref 134–144)
T4 FREE SERPL-MCNC: 1.4 NG/DL (ref 0.82–1.77)
TRIGL SERPL-MCNC: 50 MG/DL (ref 0–149)
TSH SERPL DL<=0.005 MIU/L-ACNC: 1.93 UIU/ML (ref 0.45–4.5)
VLDLC SERPL CALC-MCNC: 11 MG/DL (ref 5–40)
WBC # BLD AUTO: 5.1 X10E3/UL (ref 3.4–10.8)

## 2022-05-23 NOTE — PROGRESS NOTES
Thyroid shows to be in normal functioning range now. Good. Continue levothyroxine 25 mcg daily. Cholesterol levels are in good shape. Continue atorvastatin 10 mg nightly. Electrolytes and liver functions are normal.    Blood counts are stable.

## 2022-05-26 ENCOUNTER — TELEPHONE (OUTPATIENT)
Dept: INTERNAL MEDICINE CLINIC | Age: 62
End: 2022-05-26

## 2022-05-26 DIAGNOSIS — E66.2 OBESITY HYPOVENTILATION SYNDROME (HCC): Primary | ICD-10-CM

## 2022-05-26 DIAGNOSIS — R06.09 DYSPNEA ON EXERTION: ICD-10-CM

## 2022-05-26 DIAGNOSIS — U09.9 PERSISTENT SHORTNESS OF BREATH AFTER COVID-19: ICD-10-CM

## 2022-05-26 DIAGNOSIS — R06.02 PERSISTENT SHORTNESS OF BREATH AFTER COVID-19: ICD-10-CM

## 2022-05-26 NOTE — TELEPHONE ENCOUNTER
Pt says she needs the hand held breathing machine, not the Bipap. She says it's good to have two Bipaps, but she also needs an rx for the hand held machine so she can have oxygen in the car. She's having difficulty getting her dialysis because she doesn't have oxygen in the car anymore. Rx needs to be called in to [facility] [sp].

## 2022-06-16 ENCOUNTER — TELEPHONE (OUTPATIENT)
Dept: INTERNAL MEDICINE CLINIC | Age: 62
End: 2022-06-16

## 2022-06-16 NOTE — TELEPHONE ENCOUNTER
----- Message from Marni Galaviz sent at 6/16/2022 12:41 PM EDT -----  Subject: Message to Provider    QUESTIONS  Information for Provider? pt would like a call back about her portable   oxygen machine that she has not been able to get. it is messing with her   health not having it. she would like call back ASAP  ---------------------------------------------------------------------------  --------------  CALL BACK INFO  What is the best way for the office to contact you? OK to leave message on   voicemail  Preferred Call Back Phone Number? [phone]  ---------------------------------------------------------------------------  --------------  SCRIPT ANSWERS  Relationship to Patient?  Self

## 2022-06-16 NOTE — TELEPHONE ENCOUNTER
Pt called stating [facility] told her that they never received the order we sent on 05/26. Refaxed the order.

## 2022-08-29 DIAGNOSIS — M54.42 CHRONIC BILATERAL LOW BACK PAIN WITH BILATERAL SCIATICA: ICD-10-CM

## 2022-08-29 DIAGNOSIS — M54.41 CHRONIC BILATERAL LOW BACK PAIN WITH BILATERAL SCIATICA: ICD-10-CM

## 2022-08-29 DIAGNOSIS — G89.29 CHRONIC BILATERAL LOW BACK PAIN WITH BILATERAL SCIATICA: ICD-10-CM

## 2022-08-29 RX ORDER — HYDROCODONE BITARTRATE AND ACETAMINOPHEN 7.5; 325 MG/1; MG/1
1 TABLET ORAL DAILY
Qty: 30 TABLET | Refills: 0 | Status: SHIPPED | OUTPATIENT
Start: 2022-08-29 | End: 2022-09-28

## 2022-08-29 NOTE — TELEPHONE ENCOUNTER
PCP: Nuria Reyna NP    Last appt: 5/19/2022  Future Appointments   Date Time Provider Vincenzo Licea   9/29/2022  9:30 AM Nuria Reyna NP [facility] AMB       Requested Prescriptions     Pending Prescriptions Disp Refills    HYDROcodone-acetaminophen (NORCO) 7.5-325 mg per tablet 30 Tablet 0     Sig: Take 1 Tablet by mouth daily for 30 days. Max Daily Amount: 1 Tablet.  Daily PRN       Prior labs and Blood pressures:  BP Readings from Last 3 Encounters:   05/19/22 111/71   01/20/22 96/64   11/02/21 101/67     Lab Results   Component Value Date/Time    Sodium 139 05/19/2022 12:23 PM    Potassium 4.6 05/19/2022 12:23 PM    Chloride 96 05/19/2022 12:23 PM    CO2 24 05/19/2022 12:23 PM    Anion gap 5 01/20/2022 12:23 PM    Glucose 74 05/19/2022 12:23 PM    BUN 34 (H) 05/19/2022 12:23 PM    Creatinine 7.01 (H) 05/19/2022 12:23 PM    BUN/Creatinine ratio 5 (L) 05/19/2022 12:23 PM    GFR est AA 8 (L) 01/20/2022 12:23 PM    GFR est non-AA 6 (L) 01/20/2022 12:23 PM    Calcium 9.4 05/19/2022 12:23 PM

## 2022-12-01 ENCOUNTER — OFFICE VISIT (OUTPATIENT)
Dept: INTERNAL MEDICINE CLINIC | Age: 62
End: 2022-12-01
Payer: MEDICAID

## 2022-12-01 VITALS
OXYGEN SATURATION: 97 % | DIASTOLIC BLOOD PRESSURE: 68 MMHG | RESPIRATION RATE: 18 BRPM | HEART RATE: 70 BPM | SYSTOLIC BLOOD PRESSURE: 110 MMHG | WEIGHT: 293 LBS | BODY MASS INDEX: 48.82 KG/M2 | TEMPERATURE: 97.9 F | HEIGHT: 65 IN

## 2022-12-01 DIAGNOSIS — M62.830 MUSCLE SPASM OF BACK: ICD-10-CM

## 2022-12-01 DIAGNOSIS — R01.1 MURMUR, HEART: ICD-10-CM

## 2022-12-01 DIAGNOSIS — G89.29 CHRONIC LOW BACK PAIN WITH SCIATICA, SCIATICA LATERALITY UNSPECIFIED, UNSPECIFIED BACK PAIN LATERALITY: ICD-10-CM

## 2022-12-01 DIAGNOSIS — E66.01 MORBID OBESITY WITH BMI OF 50.0-59.9, ADULT (HCC): ICD-10-CM

## 2022-12-01 DIAGNOSIS — M54.41 CHRONIC BILATERAL LOW BACK PAIN WITH BILATERAL SCIATICA: ICD-10-CM

## 2022-12-01 DIAGNOSIS — E03.9 ACQUIRED HYPOTHYROIDISM: ICD-10-CM

## 2022-12-01 DIAGNOSIS — N18.6 KIDNEY DISEASE, CHRONIC, END STAGE ON DIALYSIS (HCC): Primary | ICD-10-CM

## 2022-12-01 DIAGNOSIS — I95.3 HEMODIALYSIS-ASSOCIATED HYPOTENSION: ICD-10-CM

## 2022-12-01 DIAGNOSIS — M54.40 CHRONIC LOW BACK PAIN WITH SCIATICA, SCIATICA LATERALITY UNSPECIFIED, UNSPECIFIED BACK PAIN LATERALITY: ICD-10-CM

## 2022-12-01 DIAGNOSIS — M54.42 CHRONIC BILATERAL LOW BACK PAIN WITH BILATERAL SCIATICA: ICD-10-CM

## 2022-12-01 DIAGNOSIS — G89.29 CHRONIC BILATERAL LOW BACK PAIN WITH BILATERAL SCIATICA: ICD-10-CM

## 2022-12-01 DIAGNOSIS — Z79.899 ON STATIN THERAPY: ICD-10-CM

## 2022-12-01 DIAGNOSIS — R06.09 DYSPNEA ON EXERTION: ICD-10-CM

## 2022-12-01 DIAGNOSIS — Z99.2 KIDNEY DISEASE, CHRONIC, END STAGE ON DIALYSIS (HCC): Primary | ICD-10-CM

## 2022-12-01 DIAGNOSIS — E66.2 OBESITY HYPOVENTILATION SYNDROME (HCC): ICD-10-CM

## 2022-12-01 DIAGNOSIS — E78.2 MIXED HYPERLIPIDEMIA: ICD-10-CM

## 2022-12-01 RX ORDER — HYDROCODONE BITARTRATE AND ACETAMINOPHEN 7.5; 325 MG/1; MG/1
1 TABLET ORAL DAILY
Qty: 30 TABLET | Refills: 0 | Status: SHIPPED | OUTPATIENT
Start: 2022-12-01 | End: 2022-12-31

## 2022-12-01 RX ORDER — MIDODRINE HYDROCHLORIDE 5 MG/1
TABLET ORAL
COMMUNITY
Start: 2022-09-13

## 2022-12-01 RX ORDER — CYCLOBENZAPRINE HCL 10 MG
10 TABLET ORAL
Qty: 30 TABLET | Refills: 1 | Status: SHIPPED | OUTPATIENT
Start: 2022-12-01

## 2022-12-01 NOTE — PROGRESS NOTES
Kelton Son is a 58 y.o. female     Chief Complaint   Patient presents with    Thyroid Problem     4M follow up       Visit Vitals  /68 (BP 1 Location: Left arm, BP Patient Position: Sitting, BP Cuff Size: Adult)   Pulse 70   Temp 97.9 °F (36.6 °C) (Oral)   Resp 18   Ht 5' 5\" (1.651 m)   Wt 303 lb 9.6 oz (137.7 kg)   LMP  (LMP Unknown)   SpO2 97%   BMI 50.52 kg/m²       Health Maintenance Due   Topic Date Due    Shingrix Vaccine Age 49> (1 of 2) Never done    DTaP/Tdap/Td series (1 - Tdap) Never done    Pneumococcal 0-64 years (2 - PPSV23 if available, else PCV20) 02/16/2016    Cervical cancer screen  09/03/2019    COVID-19 Vaccine (3 - Booster for Moderna series) 07/08/2021    Colorectal Cancer Screening Combo  08/18/2021    Flu Vaccine (1) 08/01/2022         1. \"Have you been to the ER, urgent care clinic since your last visit? Hospitalized since your last visit? \" No    2. \"Have you seen or consulted any other health care providers outside of the [address] [place] [address] since your last visit? \" No     3. For patients aged 39-70: Has the patient had a colonoscopy / FIT/ Cologuard? No      If the patient is female:    4. For patients aged 41-77: Has the patient had a mammogram within the past 2 years? Yes - no Care Gap present      5. For patients aged 21-65: Has the patient had a pap smear?  No

## 2022-12-01 NOTE — PROGRESS NOTES
Chief Complaint   Patient presents with    Thyroid Problem     4M follow up       SUBJECTIVE:    Lesley Graham is a 58 y.o. female who is here today for a follow up appointment regarding her current medical conditions which include: Stage V chronic kidney disease with dialysis, obesity with hypoventilation syndrome, morbid obesity, mixed hyperlipidemia, hemodialysis associated hypertension, acquired hypothyroidism, chronic low back pain with sciatica muscle spasms, and oxygen dependence. Patient undergoes dialysis treatments 3 times weekly for management of her stage V chronic renal insufficiency. She states she was recently resumed on midodrine 5 mg to take 30 minutes prior to her dialysis treatments due to hypotension. She states she has been using the medication and that it is effective. She continues to suffer from severe obesity with hypoventilation. She is currently using a BiPAP at night for sleep. She is on oxygen during the day. She has oxygen today via nasal cannula at 2 L/min. She states she still becomes quite short of breath with any and all exertion. She is currently on levothyroxine daily for management of her hypothyroidism. She is tolerating this well without any adverse side effects. She continues to have chronic low back pain with muscle spasms and sciatica radiating to her lower extremities. She is requesting a refill on her cyclobenzaprine as well as her hydrocodone today. She states that she takes these medications quite sparingly, and only when she \"it really needs to. \"  She denies any misuse or abuse of the medication. Current Outpatient Medications   Medication Sig Dispense Refill    albuterol (PROVENTIL VENTOLIN) 2.5 mg /3 mL (0.083 %) nebu Take 3 mL by inhalation every six (6) hours as needed for Wheezing. 120 mL 5    lidocaine (XYLOCAINE) 4 % topical cream Apply  to affected area two (2) times daily as needed for Pain.  30 g 5    levothyroxine (SYNTHROID) 25 mcg tablet Take 1 Tablet by mouth Daily (before breakfast). Indications: a condition with low thyroid hormone levels 30 Tablet 3    buPROPion XL (Wellbutrin XL) 150 mg tablet Take 1 Tablet by mouth daily. 90 Tablet 1    atorvastatin (LIPITOR) 10 mg tablet Take 1 Tablet by mouth nightly. 90 Tablet 1    omeprazole (PRILOSEC) 20 mg capsule TAKE 1 CAPSULE BY MOUTH TWICE DAILY 180 Capsule 1    sevelamer carbonate (RENVELA) 2.4 gram pwpk oral powder Take 2.4 g by mouth three (3) times daily (with meals). sod phos di, mono/K phos mono (PHOSPHOROUS PO) Take  by mouth.      magnesium oxide (MAG-OX) 400 mg tablet take 1 tablet by mouth daily 90 Tablet 3    montelukast (SINGULAIR) 10 mg tablet TAKE 1 TABLET BY MOUTH DAILY 90 Tab 1    aspirin delayed-release 81 mg tablet Take 1 Tab by mouth daily. AQUAPHOR HEALING 41 % ointment Apply  to affected area as needed for Dry Skin. 56 g 11    SENSIPAR 60 mg tab 90 mg tab  0    inhalational spacing device 1 Each by Does Not Apply route as needed.  1 Device 1    midodrine (PROAMATINE) 5 mg tablet TAKE 1 TABLET BY MOUTH 30 MINUTES PRIOR TO DIALYSIS TREATMENT       Past Medical History:   Diagnosis Date    Advanced care planning/counseling discussion 4/7/16    Arthritis     back    Arthritis of ankle or foot, right     Asthma     Dr. Jenelle Hartman [facility]    Breast cancer [facility])     Right Lumpectomy 2014 - DCIS    Chronic kidney disease     STAGE IV/ dialysis [facility]    Chronic obstructive pulmonary disease ([facility] [address]     Chronic pain 1989    ANKLE-right- h/o fx ankle    CKD (chronic kidney disease) stage V requiring chronic dialysis ([facility] [address] [facility]     Dr. Lexie Perdomo [facility]    Depression     DEPRESSION AND ANXIETY    GERD (gastroesophageal reflux disease)     Heart failure ([facility] [address] [facility]     h/o chf - Normal EF, likely due to volume overload    History of abdominal hernia     History of MRSA infection 01/2018    h/o MRSA leg     Hypertension     Morbid obesity ([facility]     Neuropathy     Respiratory failure, chronic ([facility] [address] 10/07/14    Dr. Olga Reynaga    Stroke [facility]) 2009    ? TIA @ age 52    Unspecified sleep apnea 10/07/2014    BIPAP with oxygen    Vitamin D deficiency 12/2011    10.4 [facility] labs     Past Surgical History:   Procedure Laterality Date    HX BREAST BIOPSY Right 4/7/14    DCIS    HX CHOLECYSTECTOMY  2009    choleystectomy    HX COLONOSCOPY  8/18/11    Dr. Harvey [place]    HX GYN      tuabl ligation    HX ORTHOPAEDIC  1989    r ankle and leg plate and screw    HX ORTHOPAEDIC  2019    bone taken out of right pink toe    HX OTHER SURGICAL  3/10/16    Graft in left arm     HX VASCULAR ACCESS Right 08/2019    DIALYSIS CATHETER-upper chest    VASCULAR SURGERY PROCEDURE UNLIST      4 surgeries on left upper arm grafts stopping up. VASCULAR SURGERY PROCEDURE UNLIST  05/19/2020    Insertion of AV graft left arm     Allergies   Allergen Reactions    Iodine Anaphylaxis     Other reaction(s):  Anaphylaxis (severe)      Shellfish Containing Products Anaphylaxis    Shellfish Derived Anaphylaxis    Other Medication Other (comments)     Metal causes numbness in hands,arms all over    Sulfa (Sulfonamide Antibiotics) Itching       REVIEW OF SYSTEMS:                                        POSITIVE= bold text  Negative = regular text    General:                     fever, chills, sweats, generalized weakness, weight loss/gain,                                       loss of appetite   Eyes:                           blurred vision, eye pain, loss of vision, double vision  ENT:                            rhinorrhea, pharyngitis   Respiratory:               cough, sputum production, SOB, FLOR, wheezing, pleuritic pain   Cardiology:                chest pain, palpitations, orthopnea, PND, edema, syncope   Gastrointestinal:       abdominal pain , N/V, diarrhea, dysphagia, constipation, bleeding   Genitourinary:           frequency, urgency, dysuria, hematuria, incontinence   Muskuloskeletal :      arthralgia, myalgia, back pain  Hematology:              easy bruising, nose or gum bleeding, lymphadenopathy   Dermatological:         rash, ulceration, pruritis, color change / jaundice  Endocrine:                 hot flashes or polydipsia   Neurological:             headache, dizziness, confusion, focal weakness, paresthesia,                                      Speech difficulties, memory loss, gait difficulty  Psychological:          Feelings of anxiety, depression, agitation        Social History     Socioeconomic History    Marital status: SINGLE   Occupational History    Occupation: prior retail work     Comment: disabled   Tobacco Use    Smoking status: Never    Smokeless tobacco: Never   Vaping Use    Vaping Use: Never used   Substance and Sexual Activity    Alcohol use: Yes     Comment: Occ on holidays    Drug use: No    Sexual activity: Not Currently   Social History Narrative    Lives in [facility]     Social Determinants of Health     Financial Resource Strain: Low Risk     Difficulty of Paying Living Expenses: Not hard at all   Food Insecurity: No Food Insecurity    Worried About Running Out of Food in the Last Year: Never true    Ran Out of Food in the Last Year: Never true     Family History   Problem Relation Age of Onset    Diabetes Mother     Heart Disease Mother     Kidney Disease Mother     Heart Disease Father     No Known Problems Sister     No Known Problems Brother     No Known Problems Sister     No Known Problems Brother     Breast Cancer Paternal Aunt 28    No Known Problems Son     Anesth Problems Neg Hx        OBJECTIVE:     Visit Vitals  /68 (BP 1 Location: Left arm, BP Patient Position: Sitting, BP Cuff Size: Adult)   Pulse 70   Temp 97.9 °F (36.6 °C) (Oral)   Resp 18   Ht 5' 5\" (1.651 m)   Wt 303 lb 9.6 oz (137.7 kg)   LMP  (LMP Unknown)   SpO2 97%   BMI 50.52 kg/m²       Constitutional: She appears well nourished, of stated age, and dressed appropriately.   Eyes: Sclera anicteric, PERRLA, EOMI  Neck: Supple without lymphadenopathy. Respiratory: Clear to ascultation X5, normal inspiratory effort, no adventitious breath sounds. Cardiovascular: Regular rate and rhythm, moderate 3/6 murmur with rumble. No rubs or gallops, PMI not displaced, No thrills. Neuro: Non-focal exam, A & O X 3.   Psychiatric: She is tearful when discussing her health. Appropriate affect and demeanor, pleasant and cooperative. Patient's thought content and thought processing appear to be within normal limits. ASSESSMENT/PLAN:     ICD-10-CM ICD-9-CM    1. Kidney disease, chronic, end stage on dialysis ([facility]  N18.6 585.6     Z99.2 V45.11       2. Hemodialysis-associated hypotension  I95.3 458.21 REFERRAL TO CARDIOLOGY      3. Obesity hypoventilation syndrome (HCC)  E66.2 278.03       4. Dyspnea on exertion  R06.09 786.09 REFERRAL TO CARDIOLOGY      5. Morbid obesity with BMI of 50.0-59.9, adult (HCC)  E66.01 278.01     Z68.43 V85.43       6. Mixed hyperlipidemia  E78.2 272.2 LIPID PANEL      LIPID PANEL      7. Acquired hypothyroidism  E03.9 244.9 TSH 3RD GENERATION      T4, FREE      T4, FREE      TSH 3RD GENERATION      8. Muscle spasm of back  M62.830 724.8 cyclobenzaprine (FLEXERIL) 10 mg tablet      9. Chronic low back pain with sciatica, sciatica laterality unspecified, unspecified back pain laterality  M54.40 724.2     G89.29 724.3      338.29       10. On statin therapy  Z79.899 V58.69 CK      CK      11. Murmur, heart  R01.1 785.2 REFERRAL TO CARDIOLOGY      12. Chronic bilateral low back pain with bilateral sciatica  M54.42 724.2 HYDROcodone-acetaminophen (NORCO) 7.5-325 mg per tablet    M54.41 724.3     G89.29 338.29         1: Patient to continue dialysis for management of stage V chronic renal insufficiency. 2: Labs ordered today including: Lipid panel, CK, TSH, and free T4.  3: Patient to continue midodrine prior to dialysis treatments to prevent hypotensive event.   4: Patient to continue atorvastatin 10 mg daily for management of mixed hyperlipidemia. 5: Continue levothyroxine for management of acquired hypothyroidism. 6: I will refill patient's cyclobenzaprine and hydrocodone for back pain and muscle spasm management. 7: Patient will be referred to cardiology for evaluation of cardiac murmur. I am concerned about this. 8: Continue oxygen use at home as well as BiPAP.  9: Patient to follow-up with me in approximately 4 months, or sooner as needed. Patient states understanding and agrees with plan. Orders Placed This Encounter    CK    LIPID PANEL    TSH 3RD GENERATION    T4, FREE    midodrine (PROAMATINE) 5 mg tablet         ATTENTION:   This medical record was transcribed using an electronic medical records system. Although proofread, it may and can contain electronic and spelling errors. Other human spelling and other errors may be present. Corrections may be executed at a later time. Please feel free to contact us for any clarifications as needed. Signed,  Bethany Osborne.  Gina Frances, DAWIT APRN FNP-BC

## 2022-12-03 LAB
CHOLEST SERPL-MCNC: 144 MG/DL (ref 100–199)
CK SERPL-CCNC: 46 U/L (ref 32–182)
HDLC SERPL-MCNC: 71 MG/DL
LDLC SERPL CALC-MCNC: 61 MG/DL (ref 0–99)
T4 FREE SERPL-MCNC: 1.29 NG/DL (ref 0.82–1.77)
TRIGL SERPL-MCNC: 54 MG/DL (ref 0–149)
TSH SERPL DL<=0.005 MIU/L-ACNC: 2.8 UIU/ML (ref 0.45–4.5)
VLDLC SERPL CALC-MCNC: 12 MG/DL (ref 5–40)

## 2022-12-05 NOTE — PROGRESS NOTES
Thyroid level remains in normal range. Cholesterol levels are in good range. CK level is normal.    No changes at this time.

## 2022-12-15 ENCOUNTER — TELEPHONE (OUTPATIENT)
Dept: INTERNAL MEDICINE CLINIC | Age: 62
End: 2022-12-15

## 2022-12-15 DIAGNOSIS — R06.09 DYSPNEA ON EXERTION: ICD-10-CM

## 2022-12-15 DIAGNOSIS — R01.1 MURMUR, HEART: ICD-10-CM

## 2022-12-15 DIAGNOSIS — E66.2 OBESITY HYPOVENTILATION SYNDROME (HCC): Primary | ICD-10-CM

## 2022-12-15 NOTE — TELEPHONE ENCOUNTER
----- Message from Shashi Restrepo sent at 12/13/2022  1:57 PM EST -----  Subject: Referral Request    Reason for referral request? patient was given a Cardiologist referral for   a Dr. Oanh Dunbar and is now asking if she can get that changed to Dr. Andrew Carbone on [address] in [facility] [place] they gave her an   appt already Dec 22 at 11 am . She did not have a phone or fax for the   practice  Provider patient wants to be referred to(if known):     Provider Phone Number(if known):     Additional Information for Provider?   ---------------------------------------------------------------------------  --------------  [facility]    [phone]; OK to leave message on voicemail  ---------------------------------------------------------------------------  --------------

## 2023-01-19 NOTE — PROGRESS NOTES
Occupational Therapy    Patient chart reviewed in prep for OT evaluation. Discussed with evaluating PT who reports patient is modified independent with no rehab needs. Spoke with patient who confirms no concerns about ability to complete self-care tasks. Patient declining OT evaluation. Will sign off at this time. Please re-consult if patient status should change in regards to functional performance. Thank you.   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 NSR 65 bpm. Will get stress test and echo\par HTN; At home pressures controlled. \par HLD: Will send for fasting labs. LDL goal <100mg%\par continue crestor for HLD; states he urinates a lot\par stop HCTZ- start diovan \par rv for TTE

## 2023-04-27 ENCOUNTER — OFFICE VISIT (OUTPATIENT)
Dept: INTERNAL MEDICINE CLINIC | Age: 63
End: 2023-04-27
Payer: MEDICAID

## 2023-04-27 VITALS
WEIGHT: 293 LBS | DIASTOLIC BLOOD PRESSURE: 70 MMHG | HEART RATE: 62 BPM | HEIGHT: 65 IN | RESPIRATION RATE: 18 BRPM | BODY MASS INDEX: 48.82 KG/M2 | OXYGEN SATURATION: 96 % | SYSTOLIC BLOOD PRESSURE: 122 MMHG | TEMPERATURE: 98.1 F

## 2023-04-27 DIAGNOSIS — Z00.00 ROUTINE PHYSICAL EXAMINATION: Primary | ICD-10-CM

## 2023-04-27 DIAGNOSIS — J45.40 MODERATE PERSISTENT ASTHMA WITHOUT COMPLICATION: ICD-10-CM

## 2023-04-27 DIAGNOSIS — F33.9 RECURRENT DEPRESSION (HCC): ICD-10-CM

## 2023-04-27 DIAGNOSIS — R01.1 MURMUR, HEART: ICD-10-CM

## 2023-04-27 DIAGNOSIS — E66.01 CLASS 3 SEVERE OBESITY DUE TO EXCESS CALORIES WITH SERIOUS COMORBIDITY AND BODY MASS INDEX (BMI) OF 45.0 TO 49.9 IN ADULT (HCC): ICD-10-CM

## 2023-04-27 DIAGNOSIS — R05.8 PRODUCTIVE COUGH: ICD-10-CM

## 2023-04-27 DIAGNOSIS — R22.33: ICD-10-CM

## 2023-04-27 DIAGNOSIS — E03.9 ACQUIRED HYPOTHYROIDISM: ICD-10-CM

## 2023-04-27 DIAGNOSIS — G89.29 CHRONIC BILATERAL LOW BACK PAIN WITH BILATERAL SCIATICA: ICD-10-CM

## 2023-04-27 DIAGNOSIS — M25.571 CHRONIC PAIN OF BOTH ANKLES: ICD-10-CM

## 2023-04-27 DIAGNOSIS — M54.41 CHRONIC BILATERAL LOW BACK PAIN WITH BILATERAL SCIATICA: ICD-10-CM

## 2023-04-27 DIAGNOSIS — N18.6 KIDNEY DISEASE, CHRONIC, END STAGE ON DIALYSIS (HCC): ICD-10-CM

## 2023-04-27 DIAGNOSIS — L85.3 DRY SKIN: ICD-10-CM

## 2023-04-27 DIAGNOSIS — Z12.31 ENCOUNTER FOR SCREENING MAMMOGRAM FOR MALIGNANT NEOPLASM OF BREAST: ICD-10-CM

## 2023-04-27 DIAGNOSIS — E78.2 MIXED HYPERLIPIDEMIA: ICD-10-CM

## 2023-04-27 DIAGNOSIS — G89.29 CHRONIC PAIN OF BOTH ANKLES: ICD-10-CM

## 2023-04-27 DIAGNOSIS — M25.572 CHRONIC PAIN OF BOTH ANKLES: ICD-10-CM

## 2023-04-27 DIAGNOSIS — Z99.2 KIDNEY DISEASE, CHRONIC, END STAGE ON DIALYSIS (HCC): ICD-10-CM

## 2023-04-27 DIAGNOSIS — K59.01 SLOW TRANSIT CONSTIPATION: ICD-10-CM

## 2023-04-27 DIAGNOSIS — M54.42 CHRONIC BILATERAL LOW BACK PAIN WITH BILATERAL SCIATICA: ICD-10-CM

## 2023-04-27 PROBLEM — E66.813 CLASS 3 SEVERE OBESITY DUE TO EXCESS CALORIES WITH SERIOUS COMORBIDITY AND BODY MASS INDEX (BMI) OF 45.0 TO 49.9 IN ADULT: Status: ACTIVE | Noted: 2018-05-03

## 2023-04-27 PROCEDURE — 99396 PREV VISIT EST AGE 40-64: CPT | Performed by: NURSE PRACTITIONER

## 2023-04-27 RX ORDER — BUPROPION HYDROCHLORIDE 150 MG/1
150 TABLET ORAL DAILY
Qty: 90 TABLET | Refills: 1 | Status: SHIPPED | OUTPATIENT
Start: 2023-04-27

## 2023-04-27 RX ORDER — MONTELUKAST SODIUM 10 MG/1
10 TABLET ORAL DAILY
Qty: 90 TABLET | Refills: 1 | Status: SHIPPED | OUTPATIENT
Start: 2023-04-27

## 2023-04-27 RX ORDER — PETROLATUM,WHITE 41 %
OINTMENT (GRAM) TOPICAL AS NEEDED
Qty: 56 G | Refills: 11 | Status: SHIPPED | OUTPATIENT
Start: 2023-04-27

## 2023-04-27 RX ORDER — POLYETHYLENE GLYCOL 3350 17 G/17G
17 POWDER, FOR SOLUTION ORAL
Qty: 595 G | Refills: 1 | Status: SHIPPED | OUTPATIENT
Start: 2023-04-27

## 2023-04-27 RX ORDER — LIDOCAINE 40 MG/G
CREAM TOPICAL
Qty: 30 G | Refills: 5 | Status: SHIPPED | OUTPATIENT
Start: 2023-04-27

## 2023-04-27 RX ORDER — ALBUTEROL SULFATE 0.83 MG/ML
2.5 SOLUTION RESPIRATORY (INHALATION)
Qty: 120 ML | Refills: 5 | Status: SHIPPED | OUTPATIENT
Start: 2023-04-27

## 2023-04-27 RX ORDER — HYDROCODONE BITARTRATE AND ACETAMINOPHEN 7.5; 325 MG/1; MG/1
1 TABLET ORAL DAILY
Qty: 30 TABLET | Refills: 0 | Status: SHIPPED | OUTPATIENT
Start: 2023-04-27 | End: 2023-05-27

## 2023-04-27 RX ORDER — GUAIFENESIN 600 MG/1
600 TABLET, EXTENDED RELEASE ORAL 2 TIMES DAILY
Qty: 20 TABLET | Refills: 0 | Status: SHIPPED | OUTPATIENT
Start: 2023-04-27

## 2023-04-27 RX ORDER — ASPIRIN 81 MG/1
81 TABLET ORAL DAILY
Qty: 90 TABLET | Refills: 3 | Status: SHIPPED | OUTPATIENT
Start: 2023-04-27

## 2023-04-27 NOTE — PROGRESS NOTES
Chief Complaint   Patient presents with    Complete Physical       SUBJECTIVE:    Sabra Nuñez is a 58 y.o. female who is here today for a routine physical exam as well as follow up appointment regarding current medical conditions including: End-stage chronic kidney disease with dialysis, recurrent depression, severe obesity, mixed hyperlipidemia, acquired hypothyroidism, asthma, chronic low back pain and ankle pain, and complaints of ongoing constipation issues. She has also developed a somewhat productive cough, and would like something to help loosen up her secretions. The patient is currently in dialysis 3 times weekly for management of her end-stage CKD. She has been tolerating this well without any significant incident. She has an AV shunt to her right upper arm which is being accessed during these times. She denies any issues with the device. She continues to have symptoms of depression, and had previously been given a prescription for Wellbutrin to start. However, she states she did not start the medication due to concerns of potential adverse side effects. She would like to get another prescription if possible. She continues to take atorvastatin daily for management of her mixed hyperlipidemia. She has been tolerating this well without adverse side effects. She denies any recent episodes of chest pain, chest pressure, headaches, dizziness, blurred vision, palpitations, or syncope episodes. She continues to take levothyroxine 25 mcg daily due to hypothyroidism and tolerating this well. She continues to use albuterol as needed for her asthma. She states she uses this once or twice a week when needed due to shortness of breath or wheezing. She continues to suffer from low back pain and ankle pain which is mostly due to body habitus and inability to tolerate regular exercise. She states she continues to use her hydrocodone on an as-needed basis, and quite sparingly.   She tries to get through the pain and uses a muscle relaxer on an as-needed basis when needed. She continues to use a rollator for ambulatory assistance due to her risk for falls and injury. She states she has ongoing issues with constipation. She has hard small pieces of stool that passed with straining. She denies any blood or black tarry stools. She would like advice on something that she could use to help with this. Additionally, she states she has developed some nodules that have become more painful over the past year or so to the posterior aspects of her forearms near her elbows. She states the areas are tender with pressure, and mobile. She denies any trauma by history. She would like these removed if possible. Current Outpatient Medications   Medication Sig Dispense Refill    albuterol (PROVENTIL VENTOLIN) 2.5 mg /3 mL (0.083 %) nebu Take 3 mL by inhalation every six (6) hours as needed for Wheezing. 120 mL 5    lidocaine (XYLOCAINE) 4 % topical cream Apply  to affected area two (2) times daily as needed for Pain. 30 g 5    montelukast (SINGULAIR) 10 mg tablet Take 1 Tablet by mouth daily. 90 Tablet 1    Aquaphor Healing 41 % ointment Apply  to affected area as needed for Dry Skin. 56 g 11    buPROPion XL (Wellbutrin XL) 150 mg tablet Take 1 Tablet by mouth daily. 90 Tablet 1    aspirin delayed-release 81 mg tablet Take 1 Tablet by mouth daily. 90 Tablet 3    HYDROcodone-acetaminophen (NORCO) 7.5-325 mg per tablet Take 1 Tablet by mouth daily for 30 days. Max Daily Amount: 1 Tablet. Daily PRN 30 Tablet 0    guaiFENesin ER (MUCINEX) 600 mg ER tablet Take 1 Tablet by mouth two (2) times a day. 20 Tablet 0    polyethylene glycol (MIRALAX) 17 gram/dose powder Take 17 g by mouth daily as needed for Constipation. 595 g 1    cyclobenzaprine (FLEXERIL) 10 mg tablet Take 1 Tablet by mouth three (3) times daily as needed for Muscle Spasm(s).  30 Tablet 1    levothyroxine (SYNTHROID) 25 mcg tablet Take 1 Tablet by mouth Daily (before breakfast). Indications: a condition with low thyroid hormone levels 30 Tablet 3    atorvastatin (LIPITOR) 10 mg tablet Take 1 Tablet by mouth nightly. 90 Tablet 1    sevelamer carbonate (RENVELA) 2.4 gram pwpk oral powder Take 1 Packet by mouth three (3) times daily (with meals). sod phos di, mono/K phos mono (PHOSPHOROUS PO) Take  by mouth. SENSIPAR 60 mg tab 90 mg tab  0    inhalational spacing device 1 Each by Does Not Apply route as needed. 1 Device 1    midodrine (PROAMATINE) 5 mg tablet TAKE 1 TABLET BY MOUTH 30 MINUTES PRIOR TO DIALYSIS TREATMENT (Patient not taking: Reported on 4/27/2023)      omeprazole (PRILOSEC) 20 mg capsule TAKE 1 CAPSULE BY MOUTH TWICE DAILY (Patient not taking: Reported on 4/27/2023) 180 Capsule 1    magnesium oxide (MAG-OX) 400 mg tablet take 1 tablet by mouth daily (Patient not taking: Reported on 4/27/2023) 90 Tablet 3     Past Medical History:   Diagnosis Date    Advanced care planning/counseling discussion 4/7/16    Arthritis     back    Arthritis of ankle or foot, right     Asthma     Dr. Shreya Marie [facility] [place]    Breast cancer [facility])     Right Lumpectomy 2014 - DCIS    Chronic kidney disease     STAGE IV/ dialysis [facility]    Chronic obstructive pulmonary disease ([address]     Chronic pain 1989    ANKLE-right- h/o fx ankle    CKD (chronic kidney disease) stage V requiring chronic dialysis ([address]     Dr. Jose Alejandro Vance [facility]    Depression     DEPRESSION AND ANXIETY    GERD (gastroesophageal reflux disease)     Heart failure ([address]     h/o chf - Normal EF, likely due to volume overload    History of abdominal hernia     History of MRSA infection 01/2018    h/o MRSA leg     Hypertension     Morbid obesity ([address]     Neuropathy     Respiratory failure, chronic ([address] 10/07/14    Dr. Xochilt Hilario    Stroke [facility]) 2009    ?  TIA @ age 52    Unspecified sleep apnea 10/07/2014    BIPAP with oxygen    Vitamin D deficiency 12/2011    10.4 [facility] labs Past Surgical History:   Procedure Laterality Date    HX BREAST BIOPSY Right 4/7/14    DCIS    HX CHOLECYSTECTOMY  2009    choleystectomy    HX COLONOSCOPY  8/18/11    Dr. Richard Batista    HX GYN      tuabl ligation    HX ORTHOPAEDIC  1989    r ankle and leg plate and screw    HX ORTHOPAEDIC  2019    bone taken out of right pink toe    HX OTHER SURGICAL  3/10/16    Graft in left arm     HX VASCULAR ACCESS Right 08/2019    DIALYSIS CATHETER-upper chest    DE UNLISTED PROCEDURE VASCULAR SURGERY      4 surgeries on left upper arm grafts stopping up. DE UNLISTED PROCEDURE VASCULAR SURGERY  05/19/2020    Insertion of AV graft left arm     Allergies   Allergen Reactions    Iodine Anaphylaxis     Other reaction(s):  Anaphylaxis (severe)      Shellfish Containing Products Anaphylaxis    Shellfish Derived Anaphylaxis    Other Medication Other (comments)     Metal causes numbness in hands,arms all over    Sulfa (Sulfonamide Antibiotics) Itching       REVIEW OF SYSTEMS:                                        POSITIVE= bold text  Negative = regular text    General:                     fever, chills, sweats, generalized fatigue, weight loss/gain,                                       loss of appetite   Eyes:                           blurred vision, eye pain, loss of vision, double vision  ENT:                            rhinorrhea, pharyngitis   Respiratory:               cough, sputum production, SOB, FLOR, wheezing, pleuritic pain   Cardiology:                chest pain, palpitations, orthopnea, PND, edema, syncope   Gastrointestinal:       abdominal pain , N/V, diarrhea, dysphagia, constipation, bleeding   Genitourinary:           frequency, urgency, dysuria, hematuria, incontinence   Muskuloskeletal :      arthralgia, myalgia, back pain  Hematology:              easy bruising, nose or gum bleeding, lymphadenopathy   Dermatological:         rash, ulceration, pruritis, color change / jaundice, nodules to forearms  Endocrine:                 hot flashes or polydipsia   Neurological:             headache, dizziness, confusion, focal weakness, paresthesia,                                      Speech difficulties, memory loss, gait difficulty  Psychological:          Feelings of anxiety, depression, agitation        Social History     Socioeconomic History    Marital status: SINGLE   Occupational History    Occupation: prior retail work     Comment: disabled   Tobacco Use    Smoking status: Never    Smokeless tobacco: Never   Vaping Use    Vaping Use: Never used   Substance and Sexual Activity    Alcohol use: Yes     Comment: Occ on holidays    Drug use: No    Sexual activity: Not Currently   Social History Narrative    L[facility] in [facility]     Social Determinants of Health     Financial Resource Strain: Low Risk     Difficulty of Paying Living Expenses: Not hard at all   Food Insecurity: No Food Insecurity    Worried About Running Out of Food in the Last Year: Never true    Ran Out of Food in the Last Year: Never true     Family History   Problem Relation Age of Onset    Diabetes Mother     Heart Disease Mother     Kidney Disease Mother     Heart Disease Father     No Known Problems Sister     No Known Problems Brother     No Known Problems Sister     No Known Problems Brother     Breast Cancer Paternal Aunt 28    No Known Problems Son     Anesth Problems Neg Hx        OBJECTIVE:     Visit Vitals  /70 (BP 1 Location: Left upper arm, BP Patient Position: Sitting, BP Cuff Size: Adult)   Pulse 62   Temp 98.1 °F (36.7 °C) (Oral)   Resp 18   Ht 5' 5\" (1.651 m)   Wt 294 lb 9.6 oz (133.6 kg)   LMP  (LMP Unknown)   SpO2 96%   BMI 49.02 kg/m²       Constitutional: She appears well nourished, of stated age, and dressed appropriately. Eyes: Sclera anicteric, PERRLA, EOMI  ENT: Nares clear, moist mucous membranes, pharynx clear  Neck: Supple without lymphadenopathy.  Thyroid normal, No JVD or bruits  Respiratory: Clear to ascultation X5, normal inspiratory effort, no adventitious breath sounds. Cardiovascular: Regular rate and rhythm, no rubs or gallops, PMI not displaced, No thrills, no peripheral edema  Gastrointestinal: Abdomen non-distended, soft, non-tender, bowel sounds normal and active X4. Hematologic: No purpura, petechiae or unexplained bruising  Lymphatic: No lymph node enlargemant. Musculoskeletal: Paraspinal tenderness to low back. Integumentary: There are large palpable semimobile and rubbery nodules to elbow areas as shown on photos. Nodules varying in size. Neuro: Non-focal exam, A [facility] [address] and gait. Uses rollator for mobility. Psychiatric: Appropriate affect and demeanor, pleasant and cooperative. Patient's thought content and thought processing appear to be within normal limits. LEFT ELBOW      RIGHT ELBOW    ASSESSMENT/PLAN:   1. Routine physical examination  Patient to work on healthy lifestyle management including: Low-fat/low-cholesterol diet with no added salt, adequate amounts of fiber water, and avoidance of concentrated sweets and excess carbohydrates in diet. 2. Kidney disease, chronic, end stage on dialysis [facility])  Maintain visits to dialysis 3 times weekly. 3. Recurrent depression ([facility]  Patient will be started on medication as discussed. - buPROPion XL (Wellbutrin XL) 150 mg tablet; Take 1 Tablet by mouth daily. Dispense: 90 Tablet; Refill: 1    4. Class 3 severe obesity due to excess calories with serious comorbidity and body mass index (BMI) of 45.0 to 49.9 in adult ([facility]      5. Murmur, heart      6. Mixed hyperlipidemia  Continue atorvastatin 10 mg daily. 7. Acquired hypothyroidism  Continue levothyroxine 25 mcg daily. 8. Moderate persistent asthma without complication    - albuterol (PROVENTIL VENTOLIN) 2.5 mg /3 mL (0.083 %) nebu; Take 3 mL by inhalation every six (6) hours as needed for Wheezing.   Dispense: 120 mL; Refill: 5  - montelukast (SINGULAIR) 10 mg tablet; Take 1 Tablet by mouth daily. Dispense: 90 Tablet; Refill: 1    9. Chronic bilateral low back pain with bilateral sciatica    - HYDROcodone-acetaminophen (NORCO) 7.5-325 mg per tablet; Take 1 Tablet by mouth daily for 30 days. Max Daily Amount: 1 Tablet. Daily PRN  Dispense: 30 Tablet; Refill: 0    10. Chronic pain of both ankles    - lidocaine (XYLOCAINE) 4 % topical cream; Apply  to affected area two (2) times daily as needed for Pain. Dispense: 30 g; Refill: 5    11. Dry skin    - Aquaphor Healing 41 % ointment; Apply  to affected area as needed for Dry Skin. Dispense: 56 g; Refill: 11    12. Productive cough    - guaiFENesin ER (MUCINEX) 600 mg ER tablet; Take 1 Tablet by mouth two (2) times a day. Dispense: 20 Tablet; Refill: 0    13. Slow transit constipation    - polyethylene glycol (MIRALAX) 17 gram/dose powder; Take 17 g by mouth daily as needed for Constipation. Dispense: 595 g; Refill: 1    14. Nodule of skin of both upper extremities    - REFERRAL TO GENERAL SURGERY    15. Encounter for screening mammogram for malignant neoplasm of breast    - [facility] MAMMO BI SCREENING INCL CAD; Future        Orders Placed This Encounter    [facility] MAMMO BI SCREENING INCL CAD    [facility]    albuterol (PROVENTIL VENTOLIN) 2.5 mg /3 mL (0.083 %) nebu    lidocaine (XYLOCAINE) 4 % topical cream    montelukast (SINGULAIR) 10 mg tablet    Aquaphor Healing 41 % ointment    buPROPion XL (Wellbutrin XL) 150 mg tablet    aspirin delayed-release 81 mg tablet    HYDROcodone-acetaminophen (NORCO) 7.5-325 mg per tablet    guaiFENesin ER (MUCINEX) 600 mg ER tablet    polyethylene glycol (MIRALAX) 17 gram/dose powder         ATTENTION:   This medical record was transcribed using an electronic medical records system. Although proofread, it may and can contain electronic and spelling errors. Other human spelling and other errors may be present. Corrections may be executed at a later time.   Please feel free to contact us for any clarifications as needed. Follow-up and Dispositions    Return in about 4 months (around 8/27/2023) for Follow up. Signed,  Ariella Casey.  Kia Mcclain, MSN APRN FNP-BC

## 2023-04-27 NOTE — PROGRESS NOTES
Maranda Rahman is a 58 y.o. female     Chief Complaint   Patient presents with    Complete Physical       Visit Vitals  /70 (BP 1 Location: Left upper arm, BP Patient Position: Sitting, BP Cuff Size: Adult)   Pulse 62   Temp 98.1 °F (36.7 °C) (Oral)   Resp 18   Ht 5' 5\" (1.651 m)   Wt 294 lb 9.6 oz (133.6 kg)   LMP  (LMP Unknown)   SpO2 96%   BMI 49.02 kg/m²       Health Maintenance Due   Topic Date Due    Shingles Vaccine (1 of 2) Never done    DTaP/Tdap/Td series (1 - Tdap) Never done    Pneumococcal 0-64 years (2 - PPSV23 if available, else PCV20) 04/13/2015    Cervical cancer screen  09/03/2019    COVID-19 Vaccine (3 - Booster for Moderna series) 07/08/2021    Colorectal Cancer Screening Combo  08/18/2021    Breast Cancer Screen Mammogram  02/10/2023         1. \"Have you been to the ER, urgent care clinic since your last visit? Hospitalized since your last visit? \" No    2. \"Have you seen or consulted any other health care providers outside of the [address] [place] [address] since your last visit? \" No     3. For patients aged 39-70: Has the patient had a colonoscopy / FIT/ Cologuard? No      If the patient is female:    4. For patients aged 41-77: Has the patient had a mammogram within the past 2 years? No      5. For patients aged 21-65: Has the patient had a pap smear?  No

## 2023-05-17 ENCOUNTER — TRANSCRIBE ORDERS (OUTPATIENT)
Facility: HOSPITAL | Age: 63
End: 2023-05-17

## 2023-05-17 DIAGNOSIS — Z12.31 ENCOUNTER FOR MAMMOGRAM TO ESTABLISH BASELINE MAMMOGRAM: Primary | ICD-10-CM

## 2023-06-27 ENCOUNTER — OFFICE VISIT (OUTPATIENT)
Age: 63
End: 2023-06-27
Payer: MEDICAID

## 2023-06-27 VITALS
DIASTOLIC BLOOD PRESSURE: 71 MMHG | WEIGHT: 293 LBS | HEIGHT: 65 IN | HEART RATE: 77 BPM | TEMPERATURE: 97.5 F | SYSTOLIC BLOOD PRESSURE: 131 MMHG | BODY MASS INDEX: 48.82 KG/M2 | RESPIRATION RATE: 24 BRPM

## 2023-06-27 DIAGNOSIS — E83.59 CALCIPHYLAXIS: Primary | ICD-10-CM

## 2023-06-27 PROCEDURE — 99203 OFFICE O/P NEW LOW 30 MIN: CPT | Performed by: SURGERY

## 2023-06-27 RX ORDER — CLOTRIMAZOLE 1 %
CREAM WITH APPLICATOR VAGINAL 2 TIMES DAILY
COMMUNITY

## 2023-06-27 RX ORDER — GABAPENTIN 300 MG/1
CAPSULE ORAL
COMMUNITY
Start: 2021-06-03

## 2023-06-27 RX ORDER — HYDROCODONE BITARTRATE AND ACETAMINOPHEN 7.5; 325 MG/1; MG/1
TABLET ORAL
COMMUNITY

## 2023-06-27 ASSESSMENT — PATIENT HEALTH QUESTIONNAIRE - PHQ9
SUM OF ALL RESPONSES TO PHQ QUESTIONS 1-9: 0
SUM OF ALL RESPONSES TO PHQ QUESTIONS 1-9: 0
1. LITTLE INTEREST OR PLEASURE IN DOING THINGS: 0
SUM OF ALL RESPONSES TO PHQ QUESTIONS 1-9: 0
2. FEELING DOWN, DEPRESSED OR HOPELESS: 0
SUM OF ALL RESPONSES TO PHQ9 QUESTIONS 1 & 2: 0
SUM OF ALL RESPONSES TO PHQ QUESTIONS 1-9: 0

## 2023-07-20 NOTE — PROGRESS NOTES
To: EULALIA Francisco NP    From: Ines Adrian MD    Thank you for sending Daina Public to see us. Please note that this dictation was completed with [employer], the computer voice recognition software. Quite often unanticipated grammatical, syntax, homophones, and other interpretive errors are inadvertently transcribed by the software. Please disregard these errors. Please excuse any errors that have escaped final proofreading. Encounter Date: 6/27/2023  History and Physical    Assessment:   Subcutaneous nodules consistent with calcinosis cutis, secondary to her chronic renal failure. Repeated mild trauma (pressure) likely played a role as well given the location over the olecranons. On the left elbow, there is a small skin opening without infection, but visible calcium. Plan: This can be a debilitating disease process for chronic renal failure patients. Typically these are treated medically first but oftentimes the disease process continues. There is significant risk associated with surgical excision. Because of the calcification of the soft tissues there are frequently problems with wound healing and infection. There is also a high rate of recurrence due to surgical trauma to the soft tissues. She and I discussed these risks. Given the fact that there is almost entirely intact skin I would recommend leaving them alone unless she thinks the symptoms are too much to bear. She will give this some thought and get back with us. HPI:   Santos Rapp is a 61 y.o. female who is seen in consultation at the request of EULALIA Francisco NP for evaluation of nodules on both elbows. She has had chronic renal failure patient on dialysis and she does hemodialysis 3 times weekly. She has developed bumps on both elbows over the last year and they have become more painful as they have gotten bigger especially when she leans on them.   She says that one of them expresses like shocking

## 2023-08-31 ENCOUNTER — OFFICE VISIT (OUTPATIENT)
Facility: CLINIC | Age: 63
End: 2023-08-31
Payer: MEDICAID

## 2023-08-31 VITALS
OXYGEN SATURATION: 95 % | DIASTOLIC BLOOD PRESSURE: 78 MMHG | SYSTOLIC BLOOD PRESSURE: 126 MMHG | TEMPERATURE: 97.8 F | RESPIRATION RATE: 18 BRPM | WEIGHT: 290.4 LBS | HEIGHT: 65 IN | HEART RATE: 64 BPM | BODY MASS INDEX: 48.38 KG/M2

## 2023-08-31 DIAGNOSIS — R29.898 WEAKNESS OF BOTH LOWER EXTREMITIES: ICD-10-CM

## 2023-08-31 DIAGNOSIS — Z99.89 DOES MOBILIZE USING WALKER: ICD-10-CM

## 2023-08-31 DIAGNOSIS — N18.6 END STAGE RENAL DISEASE (HCC): ICD-10-CM

## 2023-08-31 DIAGNOSIS — R06.02 SHORTNESS OF BREATH: ICD-10-CM

## 2023-08-31 DIAGNOSIS — Z91.81 AT HIGH RISK FOR FALLS: ICD-10-CM

## 2023-08-31 DIAGNOSIS — Z12.4 CERVICAL CANCER SCREENING: ICD-10-CM

## 2023-08-31 DIAGNOSIS — J45.30 MILD PERSISTENT ASTHMA WITHOUT COMPLICATION: ICD-10-CM

## 2023-08-31 DIAGNOSIS — E78.2 MIXED HYPERLIPIDEMIA: ICD-10-CM

## 2023-08-31 DIAGNOSIS — M62.830 MUSCLE SPASM OF BACK: ICD-10-CM

## 2023-08-31 DIAGNOSIS — R11.0 NAUSEA: ICD-10-CM

## 2023-08-31 DIAGNOSIS — Z99.2 DEPENDENCE ON RENAL DIALYSIS (HCC): ICD-10-CM

## 2023-08-31 DIAGNOSIS — R26.81 UNSTEADY GAIT WHEN WALKING: ICD-10-CM

## 2023-08-31 DIAGNOSIS — Z99.81 SUPPLEMENTAL OXYGEN DEPENDENT: ICD-10-CM

## 2023-08-31 DIAGNOSIS — E03.9 ACQUIRED HYPOTHYROIDISM: ICD-10-CM

## 2023-08-31 DIAGNOSIS — L85.3 XEROSIS CUTIS: ICD-10-CM

## 2023-08-31 DIAGNOSIS — E66.01 MORBID (SEVERE) OBESITY DUE TO EXCESS CALORIES (HCC): Primary | ICD-10-CM

## 2023-08-31 PROCEDURE — 99214 OFFICE O/P EST MOD 30 MIN: CPT | Performed by: NURSE PRACTITIONER

## 2023-08-31 RX ORDER — CYCLOBENZAPRINE HCL 10 MG
10 TABLET ORAL 3 TIMES DAILY PRN
Qty: 90 TABLET | Refills: 0 | Status: SHIPPED | OUTPATIENT
Start: 2023-08-31

## 2023-08-31 RX ORDER — ONDANSETRON 4 MG/1
TABLET, FILM COATED ORAL
Qty: 90 TABLET | Refills: 1 | Status: SHIPPED | OUTPATIENT
Start: 2023-08-31

## 2023-08-31 RX ORDER — ONDANSETRON 4 MG/1
TABLET, FILM COATED ORAL
COMMUNITY
Start: 2023-07-13 | End: 2023-08-31 | Stop reason: SDUPTHER

## 2023-08-31 RX ORDER — MONTELUKAST SODIUM 10 MG/1
10 TABLET ORAL DAILY
Qty: 90 TABLET | Refills: 1 | Status: SHIPPED | OUTPATIENT
Start: 2023-08-31

## 2023-08-31 SDOH — ECONOMIC STABILITY: HOUSING INSECURITY
IN THE LAST 12 MONTHS, WAS THERE A TIME WHEN YOU DID NOT HAVE A STEADY PLACE TO SLEEP OR SLEPT IN A SHELTER (INCLUDING NOW)?: NO

## 2023-08-31 SDOH — ECONOMIC STABILITY: FOOD INSECURITY: WITHIN THE PAST 12 MONTHS, THE FOOD YOU BOUGHT JUST DIDN'T LAST AND YOU DIDN'T HAVE MONEY TO GET MORE.: NEVER TRUE

## 2023-08-31 SDOH — ECONOMIC STABILITY: FOOD INSECURITY: WITHIN THE PAST 12 MONTHS, YOU WORRIED THAT YOUR FOOD WOULD RUN OUT BEFORE YOU GOT MONEY TO BUY MORE.: NEVER TRUE

## 2023-08-31 SDOH — ECONOMIC STABILITY: INCOME INSECURITY: HOW HARD IS IT FOR YOU TO PAY FOR THE VERY BASICS LIKE FOOD, HOUSING, MEDICAL CARE, AND HEATING?: NOT HARD AT ALL

## 2023-08-31 NOTE — PROGRESS NOTES
Chief Complaint   Patient presents with    Thyroid Problem     4M       SUBJECTIVE:    Katja Meier is a 61 y.o. female who is here today for a follow up appointment regarding current medical conditions including: Morbid obesity, end-stage renal disease with dialysis, acquired hypothyroidism, mixed hyperlipidemia, nodules to arms, as well as continued low back pain with muscle spasms. She is accompanied by her son today. She is seated in a wheelchair for our examination today. The patient continues to receive dialysis for her ESRD 3 times a week on Mondays, Wednesdays, and Fridays. She has had no significant changes in her condition and no incidents related to her dialysis. She feels she is doing relatively well overall. She continues to have a slow and steady weight loss as well. She has been working on this. She had a 7 pound weight loss since her last appointment in June. She is happy about her results. The patient previously was on levothyroxine due to hypothyroidism as seen on prior labs. However, the patient states that she has not been on the medication, and does not require having ever uses it. We will reassess this again today. She continues to take atorvastatin for management of her mixed hyperlipidemia. She has been tolerating this well without issue. She was evaluated by general surgeon (Dr. Naomi De La Torre) for the cutaneous nodules of her bilateral upper extremities (forearms). At the time, the surgeon did not feel the patient's condition warranted risk for infection from surgery and was told to try to manage it on her own as best as possible. She states the areas continue to be quite tender and painful, especially when pressure is directly applied. She continues to suffer from chronic intermittent low back pain. She has been using Norco 7.5/325 for this upon severe flares, and uses it sparingly. She has had no signs of misuse or abuse of the medication.   She also uses

## 2023-09-14 NOTE — ANESTHESIA PREPROCEDURE EVALUATION
----- Message from Maureen Cruz sent at 9/14/2023  3:53 PM CDT -----  Contact: [phone]  Ca Barallon calling regarding Patient Advice (message) Pt return a miss call from Niki Pt asking for a call back      Anesthetic History No history of anesthetic complications Review of Systems / Medical History Patient summary reviewed, nursing notes reviewed and pertinent labs reviewed Pulmonary Sleep apnea: CPAP Shortness of breath Asthma Comments: On home O2 - 2L during the day, 4L at night Neuro/Psych  
 
 
 
TIA and psychiatric history Cardiovascular Hypertension CHF 
 
 
 
 
  
GI/Hepatic/Renal 
  
GERD Renal disease: ESRD and dialysis Endo/Other Morbid obesity and arthritis Other Findings Physical Exam 
 
Airway Mallampati: II 
TM Distance: > 6 cm Neck ROM: normal range of motion Mouth opening: Normal 
 
 Cardiovascular Regular rate and rhythm,  S1 and S2 normal,  no murmur, click, rub, or gallop Dental 
No notable dental hx Pulmonary Breath sounds clear to auscultation Abdominal 
GI exam deferred Other Findings Anesthetic Plan ASA: 4 Anesthesia type: MAC Induction: Intravenous Anesthetic plan and risks discussed with: Patient

## 2023-09-26 DIAGNOSIS — M62.830 MUSCLE SPASM OF BACK: ICD-10-CM

## 2023-09-27 RX ORDER — CYCLOBENZAPRINE HCL 10 MG
10 TABLET ORAL 3 TIMES DAILY PRN
Qty: 90 TABLET | Refills: 0 | Status: SHIPPED | OUTPATIENT
Start: 2023-09-27

## 2023-09-27 NOTE — TELEPHONE ENCOUNTER
PCP: EULALIA Joseph NP    Last appt: 8/31/2023    Future Appointments   Date Time Provider [address]   10/19/2023 12:15  [address] [facility] 2 [facility] [address]   1/4/2024  2:00 PM EULALIA Joseph NP [facility] BS AMB       Requested Prescriptions     Pending Prescriptions Disp Refills    cyclobenzaprine (FLEXERIL) 10 MG tablet [Pharmacy Med Name: CYCLOBENZAPRINE 10MG TABLETS] 90 tablet 0     Sig: TAKE 1 TABLET BY MOUTH THREE TIMES DAILY AS NEEDED FOR MUSCLE SPASMS

## 2023-10-12 DIAGNOSIS — M62.830 MUSCLE SPASM OF BACK: ICD-10-CM

## 2023-10-12 DIAGNOSIS — G89.29 CHRONIC BILATERAL LOW BACK PAIN WITH BILATERAL SCIATICA: Primary | ICD-10-CM

## 2023-10-12 DIAGNOSIS — M54.41 CHRONIC BILATERAL LOW BACK PAIN WITH BILATERAL SCIATICA: Primary | ICD-10-CM

## 2023-10-12 DIAGNOSIS — R11.0 NAUSEA: ICD-10-CM

## 2023-10-12 DIAGNOSIS — M54.42 CHRONIC BILATERAL LOW BACK PAIN WITH BILATERAL SCIATICA: Primary | ICD-10-CM

## 2023-10-12 DIAGNOSIS — J45.30 MILD PERSISTENT ASTHMA WITHOUT COMPLICATION: ICD-10-CM

## 2023-10-12 DIAGNOSIS — L85.3 XEROSIS CUTIS: ICD-10-CM

## 2023-10-12 RX ORDER — MONTELUKAST SODIUM 10 MG/1
10 TABLET ORAL DAILY
Qty: 90 TABLET | Refills: 1 | Status: SHIPPED | OUTPATIENT
Start: 2023-10-12

## 2023-10-12 RX ORDER — CINACALCET 60 MG/1
TABLET, FILM COATED ORAL
Qty: 90 TABLET | Refills: 1 | OUTPATIENT
Start: 2023-10-12

## 2023-10-12 RX ORDER — ONDANSETRON 4 MG/1
TABLET, FILM COATED ORAL
Qty: 90 TABLET | Refills: 1 | Status: SHIPPED | OUTPATIENT
Start: 2023-10-12

## 2023-10-12 RX ORDER — CYCLOBENZAPRINE HCL 10 MG
10 TABLET ORAL 3 TIMES DAILY PRN
Qty: 90 TABLET | Refills: 0 | Status: SHIPPED | OUTPATIENT
Start: 2023-10-12

## 2023-10-12 RX ORDER — HYDROCODONE BITARTRATE AND ACETAMINOPHEN 7.5; 325 MG/1; MG/1
1 TABLET ORAL AS NEEDED
Qty: 30 TABLET | Refills: 0 | Status: SHIPPED | OUTPATIENT
Start: 2023-10-12 | End: 2023-11-11

## 2023-10-12 RX ORDER — ALBUTEROL SULFATE 2.5 MG/3ML
2.5 SOLUTION RESPIRATORY (INHALATION) EVERY 6 HOURS PRN
Qty: 120 EACH | Refills: 1 | Status: SHIPPED | OUTPATIENT
Start: 2023-10-12

## 2023-10-12 RX ORDER — ATORVASTATIN CALCIUM 10 MG/1
10 TABLET, FILM COATED ORAL NIGHTLY
Qty: 90 TABLET | Refills: 1 | Status: SHIPPED | OUTPATIENT
Start: 2023-10-12

## 2023-10-12 RX ORDER — MIDODRINE HYDROCHLORIDE 5 MG/1
TABLET ORAL
Qty: 90 TABLET | Refills: 1 | OUTPATIENT
Start: 2023-10-12

## 2023-10-12 RX ORDER — SEVELAMER CARBONATE FOR ORAL SUSPENSION 2400 MG/1
2.4 POWDER, FOR SUSPENSION ORAL
Qty: 90 EACH | Refills: 1 | OUTPATIENT
Start: 2023-10-12

## 2023-10-12 RX ORDER — MAGNESIUM OXIDE 400 MG/1
1 TABLET ORAL DAILY
Qty: 90 TABLET | Refills: 1 | OUTPATIENT
Start: 2023-10-12

## 2023-10-12 NOTE — TELEPHONE ENCOUNTER
Cony Allen, EULALIA - NP  Francis Castañeda, LPN  Caller: Unspecified (Today, 11:30 AM)  Some of these medications are prescribed by her nephrologist.  Those will need to be refilled by them.        Patient notified

## 2023-10-12 NOTE — TELEPHONE ENCOUNTER
PCP: Blanco Ferreira, EULALIA Zuniga NP    Last appt: 2023    Future Appointments   Date Time Provider [address]   10/19/2023 12:15 PM [facility] 2 [facility]   2024  2:00 PM Tania BELLE, APRN - NP [facility] BS AMB       Requested Prescriptions     Pending Prescriptions Disp Refills    albuterol (PROVENTIL) (2.5 MG/3ML) 0.083% nebulizer solution 120 each 1     Sig: Take 3 mLs by nebulization every 6 hours as needed for Wheezing or Shortness of Breath    atorvastatin (LIPITOR) 10 MG tablet 90 tablet 1     Sig: Take 1 tablet by mouth nightly    cyclobenzaprine (FLEXERIL) 10 MG tablet 90 tablet 0     Sig: Take 1 tablet by mouth 3 times daily as needed for Muscle spasms    montelukast (SINGULAIR) 10 MG tablet 90 tablet 1     Sig: Take 1 tablet by mouth daily    mineral oil-hydrophilic petrolatum (AQUAPHOR) ointment 396 g 1     Sig: Apply topically as needed for Dry Skin    ondansetron (ZOFRAN) 4 MG tablet 90 tablet 1     Sig: TAKE 1 TABLET BY MOUTH 30 MINUTES PRIOR TO TAKING CINACALCET    cinacalcet (SENSIPAR) 60 MG tablet 90 tablet 1     Si mg tab    magnesium oxide (MAG-OX) 400 MG tablet 90 tablet 1     Sig: Take 1 tablet by mouth daily    midodrine (PROAMATINE) 5 MG tablet 90 tablet 1     Sig: TAKE 1 TABLET BY MOUTH 30 MINUTES PRIOR TO DIALYSIS TREATMENT    sevelamer (RENVELA) 2.4 g PACK packet 90 each 1     Sig: Take 2.4 g by mouth 3 times daily (with meals)    HYDROcodone-acetaminophen (NORCO) 7.5-325 MG per tablet 30 tablet 0     Sig: Take 1 tablet by mouth as needed for Pain for up to 30 days.  Max Daily Amount: 1 tablet

## 2023-10-19 ENCOUNTER — HOSPITAL ENCOUNTER (OUTPATIENT)
Facility: HOSPITAL | Age: 63
Discharge: HOME | End: 2023-10-19
Payer: MEDICAID

## 2023-10-19 VITALS — HEIGHT: 65 IN | WEIGHT: 290 LBS | BODY MASS INDEX: 48.32 KG/M2

## 2023-10-19 DIAGNOSIS — Z12.31 ENCOUNTER FOR MAMMOGRAM TO ESTABLISH BASELINE MAMMOGRAM: ICD-10-CM

## 2023-10-19 PROCEDURE — 77063 BREAST TOMOSYNTHESIS BI: CPT

## 2023-10-31 DIAGNOSIS — M54.42 CHRONIC BILATERAL LOW BACK PAIN WITH BILATERAL SCIATICA: ICD-10-CM

## 2023-10-31 DIAGNOSIS — M54.41 CHRONIC BILATERAL LOW BACK PAIN WITH BILATERAL SCIATICA: ICD-10-CM

## 2023-10-31 DIAGNOSIS — G89.29 CHRONIC BILATERAL LOW BACK PAIN WITH BILATERAL SCIATICA: ICD-10-CM

## 2023-10-31 RX ORDER — HYDROCODONE BITARTRATE AND ACETAMINOPHEN 7.5; 325 MG/1; MG/1
1 TABLET ORAL EVERY 8 HOURS PRN
Qty: 30 TABLET | Refills: 0 | Status: SHIPPED | OUTPATIENT
Start: 2023-10-31 | End: 2023-11-30

## 2023-10-31 NOTE — TELEPHONE ENCOUNTER
----- Message from [facility] sent at 10/31/2023  9:19 AM EDT -----  Subject: Refill Request    QUESTIONS  Name of Medication? HYDROcodone-acetaminophen (NORCO) 7.5-325 MG per   tablet  Patient-reported dosage and instructions? 7.5-325 MG take one a day or   twice a day as needed for pain   How many days do you have left? 8  Preferred Pharmacy? [address] phone number (if available)? [phone]  ---------------------------------------------------------------------------  --------------  CALL BACK INFO  What is the best way for the office to contact you? OK to leave message on   voicemail  Preferred Call Back Phone Number? [phone]  ---------------------------------------------------------------------------  --------------  SCRIPT ANSWERS  Relationship to Patient?  Self

## 2023-11-13 DIAGNOSIS — M62.830 MUSCLE SPASM OF BACK: ICD-10-CM

## 2023-11-14 RX ORDER — CYCLOBENZAPRINE HCL 10 MG
10 TABLET ORAL 3 TIMES DAILY PRN
Qty: 90 TABLET | Refills: 1 | Status: SHIPPED | OUTPATIENT
Start: 2023-11-14

## 2023-11-14 NOTE — TELEPHONE ENCOUNTER
PCP: EULALIA Parker NP    Last appt: 8/31/2023    Future Appointments   Date Time Provider [facility] [address]   1/4/2024  2:00 PM EULALIA Parker NP [facility] BS AMB       Requested Prescriptions     Pending Prescriptions Disp Refills    cyclobenzaprine (FLEXERIL) 10 MG tablet [Pharmacy Med Name: CYCLOBENZAPRINE 10MG TAB 10 Tablet] 90 tablet 1     Sig: TAKE 1 TABLET BY MOUTH THREE TIMES DAILY AS NEEDED FOR MUSCLE SPASMS

## 2023-11-28 ENCOUNTER — TELEPHONE (OUTPATIENT)
Facility: CLINIC | Age: 63
End: 2023-11-28

## 2023-11-28 NOTE — TELEPHONE ENCOUNTER
Pt requesting a new rx for a nebulizer for as soon as possible as she had surgery yesterday and was given two medications to be used with the nebulizer but has not had a new one. She states her current nebulizer was supposed to have been replaced five years ago.        Fax: [phone] Oliverio Keys

## 2023-12-05 NOTE — PROGRESS NOTES
Immunization/s administered on 9/12/2019 by Broderick Hart per Yamileth Mortensen MD with patients consent signed. Patient tolerated procedure well. No reactions noted.   	  72F with ALS transferred to MICU For AHHcRF and intubated 12/1/23, and IP c/f perc trach placement.    #AHHcRF  #ALS    - percutaneous tracheostomy placed 12/5  - c/w AVAPS ATC  - sp cx + stenotrophomonas and MERE, c/w current abx Zosyn/Levaquin  - c/w ACT duonebs and hypersal per pulm and frequent suctioning  - s/p Gtube repair by IR  - c/w GOC discussions with family    Above recommendations are preliminary - please follow up attending attestation.    Edmund Pizano MD  Fellow, Pulmonary-Critical Care

## 2024-01-06 DIAGNOSIS — I95.1 ORTHOSTATIC HYPOTENSION: Primary | ICD-10-CM

## 2024-01-08 RX ORDER — MIDODRINE HYDROCHLORIDE 5 MG/1
TABLET ORAL
Qty: 90 TABLET | Refills: 0 | Status: SHIPPED | OUTPATIENT
Start: 2024-01-08

## 2024-01-08 RX ORDER — MIDODRINE HYDROCHLORIDE 5 MG/1
TABLET ORAL
Qty: 20 TABLET | Refills: 0 | OUTPATIENT
Start: 2024-01-08

## 2024-01-08 NOTE — TELEPHONE ENCOUNTER
PCP: Maged Yates, APRN - NP    Last appt: 8/31/2023    No future appointments.    Requested Prescriptions     Pending Prescriptions Disp Refills    midodrine (PROAMATINE) 5 MG tablet [Pharmacy Med Name: MIDODRINE 5MG TABLETS] 20 tablet      Sig: TAKE 1 TABLET BY MOUTH 30 MINUTES PRIOR TO DIALYSIS TREATMENT

## 2024-01-12 DIAGNOSIS — M62.830 MUSCLE SPASM OF BACK: ICD-10-CM

## 2024-01-15 RX ORDER — CYCLOBENZAPRINE HCL 10 MG
10 TABLET ORAL 3 TIMES DAILY PRN
Qty: 90 TABLET | Refills: 10 | OUTPATIENT
Start: 2024-01-15

## 2024-01-15 NOTE — TELEPHONE ENCOUNTER
PCP: Maged Yates, APRN - NP    Last appt: 8/31/2023    No future appointments.    Requested Prescriptions     Pending Prescriptions Disp Refills    cyclobenzaprine (FLEXERIL) 10 MG tablet [Pharmacy Med Name: CYCLOBENZAPRINE 10MG TAB 10 Tablet] 90 tablet 10     Sig: TAKE 1 TABLET BY MOUTH 3 TIMES DAILY AS NEEDED FOR MUSCLE SPASMS

## 2024-02-13 DIAGNOSIS — E78.2 MIXED HYPERLIPIDEMIA: Primary | ICD-10-CM

## 2024-02-14 NOTE — TELEPHONE ENCOUNTER
PCP: Maged Yates, APRN - NP    Last appt: 8/31/2023    No future appointments.    Requested Prescriptions     Pending Prescriptions Disp Refills    atorvastatin (LIPITOR) 10 MG tablet [Pharmacy Med Name: ATORVASTATIN 10MG TAB 10 Tablet] 30 tablet 0     Sig: TAKE 1 TABLET BY MOUTH NIGHTLY

## 2024-02-16 RX ORDER — ATORVASTATIN CALCIUM 10 MG/1
10 TABLET, FILM COATED ORAL NIGHTLY
Qty: 30 TABLET | Refills: 0 | OUTPATIENT
Start: 2024-02-16

## 2024-02-16 RX ORDER — ATORVASTATIN CALCIUM 10 MG/1
10 TABLET, FILM COATED ORAL NIGHTLY
Qty: 90 TABLET | Refills: 0 | Status: SHIPPED | OUTPATIENT
Start: 2024-02-16

## 2024-02-20 DIAGNOSIS — M62.830 MUSCLE SPASM OF BACK: ICD-10-CM

## 2024-02-20 RX ORDER — CYCLOBENZAPRINE HCL 10 MG
10 TABLET ORAL 3 TIMES DAILY PRN
Qty: 90 TABLET | Refills: 1 | Status: SHIPPED | OUTPATIENT
Start: 2024-02-20

## 2024-02-20 NOTE — TELEPHONE ENCOUNTER
PCP: Maged Yates APRN - NP    Last appt: 8/31/2023    Future Appointments   Date Time Provider Department Center   3/5/2024  8:00 AM Maged Yates APRN - NP [facility] BS AMB       Requested Prescriptions     Pending Prescriptions Disp Refills    cyclobenzaprine (FLEXERIL) 10 MG tablet [Pharmacy Med Name: CYCLOBENZAPRINE 10MG TABLETS] 90 tablet 1     Sig: TAKE 1 TABLET BY MOUTH THREE TIMES DAILY AS NEEDED FOR MUSCLE SPASMS

## 2024-03-26 ENCOUNTER — OFFICE VISIT (OUTPATIENT)
Facility: CLINIC | Age: 64
End: 2024-03-26
Payer: MEDICAID

## 2024-03-26 VITALS
TEMPERATURE: 97.8 F | HEART RATE: 70 BPM | OXYGEN SATURATION: 99 % | DIASTOLIC BLOOD PRESSURE: 70 MMHG | WEIGHT: 273.2 LBS | SYSTOLIC BLOOD PRESSURE: 128 MMHG | BODY MASS INDEX: 45.52 KG/M2 | RESPIRATION RATE: 18 BRPM | HEIGHT: 65 IN

## 2024-03-26 DIAGNOSIS — Z99.89 USES ROLLER WALKER: ICD-10-CM

## 2024-03-26 DIAGNOSIS — N18.6 ESRD (END STAGE RENAL DISEASE) ON DIALYSIS (HCC): Primary | ICD-10-CM

## 2024-03-26 DIAGNOSIS — R79.89 ELEVATED TSH: ICD-10-CM

## 2024-03-26 DIAGNOSIS — I95.1 ORTHOSTATIC HYPOTENSION: ICD-10-CM

## 2024-03-26 DIAGNOSIS — E78.2 MIXED HYPERLIPIDEMIA: ICD-10-CM

## 2024-03-26 DIAGNOSIS — Z99.81 DEPENDENCE ON CONTINUOUS SUPPLEMENTAL OXYGEN: ICD-10-CM

## 2024-03-26 DIAGNOSIS — R25.2 NOCTURNAL MUSCLE CRAMP: ICD-10-CM

## 2024-03-26 DIAGNOSIS — Z99.2 ESRD (END STAGE RENAL DISEASE) ON DIALYSIS (HCC): Primary | ICD-10-CM

## 2024-03-26 DIAGNOSIS — M54.42 CHRONIC BILATERAL LOW BACK PAIN WITH BILATERAL SCIATICA: ICD-10-CM

## 2024-03-26 DIAGNOSIS — Z79.899 ON STATIN THERAPY: ICD-10-CM

## 2024-03-26 DIAGNOSIS — Z95.0 PRESENCE OF CARDIAC PACEMAKER: ICD-10-CM

## 2024-03-26 DIAGNOSIS — M54.41 CHRONIC BILATERAL LOW BACK PAIN WITH BILATERAL SCIATICA: ICD-10-CM

## 2024-03-26 DIAGNOSIS — G89.29 CHRONIC BILATERAL LOW BACK PAIN WITH BILATERAL SCIATICA: ICD-10-CM

## 2024-03-26 DIAGNOSIS — R26.81 UNSTABLE GAIT: ICD-10-CM

## 2024-03-26 PROCEDURE — 99214 OFFICE O/P EST MOD 30 MIN: CPT | Performed by: NURSE PRACTITIONER

## 2024-03-26 RX ORDER — MAGNESIUM OXIDE 400 MG/1
1 TABLET ORAL DAILY
Qty: 30 TABLET | Refills: 0 | Status: SHIPPED | OUTPATIENT
Start: 2024-03-26

## 2024-03-26 RX ORDER — HYDROCODONE BITARTRATE AND ACETAMINOPHEN 7.5; 325 MG/1; MG/1
1 TABLET ORAL EVERY 8 HOURS PRN
Qty: 30 TABLET | Refills: 0 | Status: SHIPPED | OUTPATIENT
Start: 2024-03-26 | End: 2024-04-25

## 2024-03-26 RX ORDER — MIDODRINE HYDROCHLORIDE 5 MG/1
TABLET ORAL
Qty: 90 TABLET | Refills: 0 | Status: SHIPPED | OUTPATIENT
Start: 2024-03-26

## 2024-03-26 RX ORDER — ATORVASTATIN CALCIUM 10 MG/1
10 TABLET, FILM COATED ORAL NIGHTLY
Qty: 90 TABLET | Refills: 0 | Status: SHIPPED | OUTPATIENT
Start: 2024-03-26

## 2024-03-26 ASSESSMENT — PATIENT HEALTH QUESTIONNAIRE - PHQ9
4. FEELING TIRED OR HAVING LITTLE ENERGY: NOT AT ALL
10. IF YOU CHECKED OFF ANY PROBLEMS, HOW DIFFICULT HAVE THESE PROBLEMS MADE IT FOR YOU TO DO YOUR WORK, TAKE CARE OF THINGS AT HOME, OR GET ALONG WITH OTHER PEOPLE: NOT DIFFICULT AT ALL
2. FEELING DOWN, DEPRESSED OR HOPELESS: NOT AT ALL
3. TROUBLE FALLING OR STAYING ASLEEP: NOT AT ALL
1. LITTLE INTEREST OR PLEASURE IN DOING THINGS: NOT AT ALL
SUM OF ALL RESPONSES TO PHQ QUESTIONS 1-9: 0
5. POOR APPETITE OR OVEREATING: NOT AT ALL
6. FEELING BAD ABOUT YOURSELF - OR THAT YOU ARE A FAILURE OR HAVE LET YOURSELF OR YOUR FAMILY DOWN: NOT AT ALL
SUM OF ALL RESPONSES TO PHQ QUESTIONS 1-9: 0
7. TROUBLE CONCENTRATING ON THINGS, SUCH AS READING THE NEWSPAPER OR WATCHING TELEVISION: NOT AT ALL
9. THOUGHTS THAT YOU WOULD BE BETTER OFF DEAD, OR OF HURTING YOURSELF: NOT AT ALL
8. MOVING OR SPEAKING SO SLOWLY THAT OTHER PEOPLE COULD HAVE NOTICED. OR THE OPPOSITE, BEING SO FIGETY OR RESTLESS THAT YOU HAVE BEEN MOVING AROUND A LOT MORE THAN USUAL: NOT AT ALL
SUM OF ALL RESPONSES TO PHQ9 QUESTIONS 1 & 2: 0

## 2024-03-26 NOTE — PROGRESS NOTES
Yuki Escobar is a 63 y.o. female     Chief Complaint   Patient presents with    Cholesterol Problem       /70 (Site: Left Upper Arm, Position: Sitting, Cuff Size: Medium Adult)   Pulse 70   Temp 97.8 °F (36.6 °C) (Oral)   Resp 18   Ht 1.651 m (5' 5\")   Wt 123.9 kg (273 lb 3.2 oz)   SpO2 99%   BMI 45.46 kg/m²     Health Maintenance Due   Topic Date Due    DTaP/Tdap/Td vaccine (1 - Tdap) Never done    Hepatitis B vaccine (1 of 3 - Risk Dialysis 4-dose series) Never done    Shingles vaccine (1 of 2) Never done    Pneumococcal 0-64 years Vaccine (2 of 2 - PPSV23 or PCV20) 04/13/2015    Cervical cancer screen  09/03/2019    Respiratory Syncytial Virus (RSV) Pregnant or age 60 yrs+ (1 - 1-dose 60+ series) Never done    Colorectal Cancer Screen  08/18/2021    Flu vaccine (1) 08/01/2023    COVID-19 Vaccine (3 - 2023-24 season) 09/01/2023    Lipids  12/01/2023         \"Have you been to the ER, urgent care clinic since your last visit?  Hospitalized since your last visit?\"    NO    “Have you seen or consulted any other health care providers outside of [facility] since your last visit?”    NO    “Have you had a colorectal cancer screening such as a colonoscopy/FIT/Cologuard?    NO    Date of last Colonoscopy: 8/18/2011  No cologuard on file  No FIT/FOBT on file   No flexible sigmoidoscopy on file         “Have you had a pap smear?”    YES - Where: [facility] Nurse/CMA to request most recent records if not in the chart    Date of last Cervical Cancer screen (HPV or PAP): 9/3/2014                   
 Pulse 70   Temp 97.8 °F (36.6 °C) (Oral)   Resp 18   Ht 1.651 m (5' 5\")   Wt 123.9 kg (273 lb 3.2 oz)   SpO2 99%   BMI 45.46 kg/m²     Constitutional: She appears well nourished, of stated age, and dressed appropriately.  Eyes: Sclera anicteric, PERRLA, EOMI  Neck: Supple without lymphadenopathy. Thyroid normal, No JVD or bruits  Respiratory: Clear to ascultation X5, normal inspiratory effort, no adventitious breath sounds.  Cardiovascular: Regular rate and rhythm, 3/6 systolic murmur heard loudest at the aortic landmark.  No rubs or gallops, PMI not displaced, No thrills.  Musculoskeletal: Paraspinal tenderness to lower back over lumbar area.    Integumentary: No unusual rashes or suspicious skin lesions noted.   Neuro: Non-focal exam, A & O X 3.  Station and gait slow and guarded.  Uses rollator for ambulation assistance.  Psychiatric: Appropriate affect and demeanor, pleasant and cooperative.    ASSESSMENT/PLAN:      Diagnosis Orders   1. ESRD (end stage renal disease) on dialysis ([facility])  CBC    Comprehensive Metabolic Panel      2. Mixed hyperlipidemia  atorvastatin (LIPITOR) 10 MG tablet    Lipid Panel      3. Orthostatic hypotension  midodrine (PROAMATINE) 5 MG tablet      4. Chronic bilateral low back pain with bilateral sciatica  HYDROcodone-acetaminophen (NORCO) 7.5-325 MG per tablet      5. Elevated TSH  TSH      6. Nocturnal muscle cramp  magnesium oxide (MAG-OX) 400 MG tablet      7. Unstable gait        8. Dependence on continuous supplemental oxygen        9. Uses roller walker        10. On statin therapy        11. Presence of cardiac pacemaker          1: Labs ordered today include: CBC, CMP, lipid panel, and TSH.  2: Patient to maintain dialysis for management of ESRD.  Follow-up with nephrology as planned.  3: Continue atorvastatin daily for management of mixed hyperlipidemia.  Patient tolerating well.  4: Continue predialysis use of midodrine to prevent orthostatic hypotension.  5: I will

## 2024-03-27 LAB
ALBUMIN SERPL-MCNC: 3.9 G/DL (ref 3.5–5)
ALBUMIN/GLOB SERPL: 0.9 (ref 1.1–2.2)
ALP SERPL-CCNC: 132 U/L (ref 45–117)
ALT SERPL-CCNC: 18 U/L (ref 12–78)
ANION GAP SERPL CALC-SCNC: 5 MMOL/L (ref 5–15)
AST SERPL-CCNC: 16 U/L (ref 15–37)
BILIRUB SERPL-MCNC: 0.5 MG/DL (ref 0.2–1)
BUN SERPL-MCNC: 38 MG/DL (ref 6–20)
BUN/CREAT SERPL: 7 (ref 12–20)
CALCIUM SERPL-MCNC: 10.1 MG/DL (ref 8.5–10.1)
CHLORIDE SERPL-SCNC: 101 MMOL/L (ref 97–108)
CHOLEST SERPL-MCNC: 196 MG/DL
CO2 SERPL-SCNC: 30 MMOL/L (ref 21–32)
CREAT SERPL-MCNC: 5.83 MG/DL (ref 0.55–1.02)
ERYTHROCYTE [DISTWIDTH] IN BLOOD BY AUTOMATED COUNT: 14.6 % (ref 11.5–14.5)
GLOBULIN SER CALC-MCNC: 4.4 G/DL (ref 2–4)
GLUCOSE SERPL-MCNC: 82 MG/DL (ref 65–100)
HCT VFR BLD AUTO: 38.3 % (ref 35–47)
HDLC SERPL-MCNC: 89 MG/DL
HDLC SERPL: 2.2 (ref 0–5)
HGB BLD-MCNC: 12 G/DL (ref 11.5–16)
LDLC SERPL CALC-MCNC: 93 MG/DL (ref 0–100)
MCH RBC QN AUTO: 35.2 PG (ref 26–34)
MCHC RBC AUTO-ENTMCNC: 31.3 G/DL (ref 30–36.5)
MCV RBC AUTO: 112.3 FL (ref 80–99)
NRBC # BLD: 0 K/UL (ref 0–0.01)
NRBC BLD-RTO: 0 PER 100 WBC
PLATELET # BLD AUTO: 120 K/UL (ref 150–400)
PMV BLD AUTO: 11.6 FL (ref 8.9–12.9)
POTASSIUM SERPL-SCNC: 4.5 MMOL/L (ref 3.5–5.1)
PROT SERPL-MCNC: 8.3 G/DL (ref 6.4–8.2)
RBC # BLD AUTO: 3.41 M/UL (ref 3.8–5.2)
SODIUM SERPL-SCNC: 136 MMOL/L (ref 136–145)
TRIGL SERPL-MCNC: 70 MG/DL
TSH SERPL DL<=0.05 MIU/L-ACNC: 3.26 UIU/ML (ref 0.36–3.74)
VLDLC SERPL CALC-MCNC: 14 MG/DL
WBC # BLD AUTO: 6.2 K/UL (ref 3.6–11)

## 2024-05-10 DIAGNOSIS — E78.2 MIXED HYPERLIPIDEMIA: ICD-10-CM

## 2024-05-13 RX ORDER — ATORVASTATIN CALCIUM 10 MG/1
10 TABLET, FILM COATED ORAL NIGHTLY
Qty: 90 TABLET | Refills: 0 | Status: SHIPPED | OUTPATIENT
Start: 2024-05-13

## 2024-05-13 NOTE — TELEPHONE ENCOUNTER
PCP: Maged Yates APRN - NP    Last appt: 3/26/2024    Future Appointments   Date Time Provider Department Center   10/3/2024 11:00 AM Maged Yates APRN - NP [facility] BS AMB       Requested Prescriptions     Pending Prescriptions Disp Refills    atorvastatin (LIPITOR) 10 MG tablet [Pharmacy Med Name: ATORVASTATIN 10MG TAB 10 Tablet] 30 tablet 10     Sig: TAKE 1 TABLET BY MOUTH NIGHTLY

## 2024-06-11 DIAGNOSIS — J45.30 MILD PERSISTENT ASTHMA WITHOUT COMPLICATION: ICD-10-CM

## 2024-06-12 RX ORDER — MONTELUKAST SODIUM 10 MG/1
10 TABLET ORAL DAILY
Qty: 90 TABLET | Refills: 1 | Status: SHIPPED | OUTPATIENT
Start: 2024-06-12

## 2024-06-12 NOTE — TELEPHONE ENCOUNTER
PCP: Maged Yates APRN - NP    Last appt: 3/26/2024    Future Appointments   Date Time Provider Department Center   10/3/2024 11:00 AM Maged Yates APRN - NP [facility] BS AMB       Requested Prescriptions     Pending Prescriptions Disp Refills    montelukast (SINGULAIR) 10 MG tablet [Pharmacy Med Name: MONTELUKAST 10 MG TAB 10 Tablet] 30 tablet 10     Sig: TAKE 1 TABLET BY MOUTH DAILY

## 2024-06-27 ENCOUNTER — TELEPHONE (OUTPATIENT)
Facility: CLINIC | Age: 64
End: 2024-06-27

## 2024-06-27 DIAGNOSIS — R26.81 UNSTABLE GAIT: Primary | ICD-10-CM

## 2024-06-27 DIAGNOSIS — E66.01 MORBID OBESITY (HCC): ICD-10-CM

## 2024-06-27 DIAGNOSIS — Z91.81 RISK FOR FALLS: ICD-10-CM

## 2024-06-27 NOTE — TELEPHONE ENCOUNTER
Patient called to request a Bariatric rollator to be faxed to [facility] fax #[phone] Phone #[phone].  Patient states her rollator is five years old and she needs a new one ASAP.

## 2024-08-09 DIAGNOSIS — E78.2 MIXED HYPERLIPIDEMIA: ICD-10-CM

## 2024-08-12 RX ORDER — ATORVASTATIN CALCIUM 10 MG/1
10 TABLET, FILM COATED ORAL NIGHTLY
Qty: 90 TABLET | Refills: 1 | Status: SHIPPED | OUTPATIENT
Start: 2024-08-12

## 2024-08-12 NOTE — TELEPHONE ENCOUNTER
PCP: Maged Yates APRN - NP    Last appt: 3/26/2024    Future Appointments   Date Time Provider Department Center   10/3/2024 11:00 AM Maged Yates APRN - NP [facility] E[facility] DEP       Requested Prescriptions     Pending Prescriptions Disp Refills    atorvastatin (LIPITOR) 10 MG tablet [Pharmacy Med Name: ATORVASTATIN 10MG TAB 10 Tablet] 30 tablet 10     Sig: TAKE 1 TABLET BY MOUTH NIGHTLY

## 2024-09-20 DIAGNOSIS — I95.1 ORTHOSTATIC HYPOTENSION: ICD-10-CM

## 2024-09-20 RX ORDER — MIDODRINE HYDROCHLORIDE 5 MG/1
TABLET ORAL
Qty: 30 TABLET | Refills: 0 | Status: SHIPPED | OUTPATIENT
Start: 2024-09-20

## 2024-10-03 ENCOUNTER — OFFICE VISIT (OUTPATIENT)
Facility: CLINIC | Age: 64
End: 2024-10-03
Payer: MEDICAID

## 2024-10-03 VITALS
TEMPERATURE: 98 F | WEIGHT: 267.8 LBS | RESPIRATION RATE: 18 BRPM | OXYGEN SATURATION: 99 % | HEART RATE: 60 BPM | SYSTOLIC BLOOD PRESSURE: 126 MMHG | HEIGHT: 65 IN | BODY MASS INDEX: 44.62 KG/M2 | DIASTOLIC BLOOD PRESSURE: 74 MMHG

## 2024-10-03 DIAGNOSIS — Z99.81 SUPPLEMENTAL OXYGEN DEPENDENT: ICD-10-CM

## 2024-10-03 DIAGNOSIS — I27.20 PULMONARY HYPERTENSION (HCC): ICD-10-CM

## 2024-10-03 DIAGNOSIS — R06.2 WHEEZING: ICD-10-CM

## 2024-10-03 DIAGNOSIS — M25.571 CHRONIC PAIN OF RIGHT ANKLE: ICD-10-CM

## 2024-10-03 DIAGNOSIS — E78.2 MIXED HYPERLIPIDEMIA: ICD-10-CM

## 2024-10-03 DIAGNOSIS — L85.3 XEROSIS CUTIS: ICD-10-CM

## 2024-10-03 DIAGNOSIS — N18.6 ESRD (END STAGE RENAL DISEASE) ON DIALYSIS (HCC): Primary | ICD-10-CM

## 2024-10-03 DIAGNOSIS — E66.01 MORBID OBESITY: ICD-10-CM

## 2024-10-03 DIAGNOSIS — R26.81 UNSTABLE GAIT: ICD-10-CM

## 2024-10-03 DIAGNOSIS — G89.29 CHRONIC PAIN OF RIGHT ANKLE: ICD-10-CM

## 2024-10-03 DIAGNOSIS — Z99.2 ESRD (END STAGE RENAL DISEASE) ON DIALYSIS (HCC): Primary | ICD-10-CM

## 2024-10-03 DIAGNOSIS — R29.898 WEAKNESS OF BOTH LOWER EXTREMITIES: ICD-10-CM

## 2024-10-03 DIAGNOSIS — R06.02 SHORTNESS OF BREATH: ICD-10-CM

## 2024-10-03 PROCEDURE — 99214 OFFICE O/P EST MOD 30 MIN: CPT | Performed by: NURSE PRACTITIONER

## 2024-10-03 RX ORDER — HYDROXYZINE HYDROCHLORIDE 25 MG/1
TABLET, FILM COATED ORAL
COMMUNITY
Start: 2024-08-09

## 2024-10-03 RX ORDER — MINERAL OIL/HYDROPHIL PETROLAT
OINTMENT (GRAM) TOPICAL PRN
Qty: 396 G | Refills: 1 | Status: SHIPPED | OUTPATIENT
Start: 2024-10-03

## 2024-10-03 RX ORDER — ALBUTEROL SULFATE 0.83 MG/ML
2.5 SOLUTION RESPIRATORY (INHALATION) EVERY 6 HOURS PRN
Qty: 120 EACH | Refills: 1 | Status: SHIPPED | OUTPATIENT
Start: 2024-10-03 | End: 2024-10-03

## 2024-10-03 RX ORDER — ERGOCALCIFEROL 1.25 MG/1
50000 CAPSULE, LIQUID FILLED ORAL WEEKLY
COMMUNITY
Start: 2024-08-14

## 2024-10-03 RX ORDER — ALBUTEROL SULFATE 0.83 MG/ML
2.5 SOLUTION RESPIRATORY (INHALATION) EVERY 6 HOURS PRN
Qty: 120 EACH | Refills: 1 | Status: SHIPPED | OUTPATIENT
Start: 2024-10-03

## 2024-10-03 RX ORDER — SUCROFERRIC OXYHYDROXIDE 500 MG/1
TABLET, CHEWABLE ORAL
COMMUNITY
Start: 2024-09-10

## 2024-10-03 RX ORDER — MINERAL OIL/HYDROPHIL PETROLAT
OINTMENT (GRAM) TOPICAL PRN
Qty: 396 G | Refills: 1 | Status: SHIPPED | OUTPATIENT
Start: 2024-10-03 | End: 2024-10-03

## 2024-10-03 SDOH — ECONOMIC STABILITY: INCOME INSECURITY: HOW HARD IS IT FOR YOU TO PAY FOR THE VERY BASICS LIKE FOOD, HOUSING, MEDICAL CARE, AND HEATING?: NOT HARD AT ALL

## 2024-10-03 SDOH — ECONOMIC STABILITY: FOOD INSECURITY: WITHIN THE PAST 12 MONTHS, THE FOOD YOU BOUGHT JUST DIDN'T LAST AND YOU DIDN'T HAVE MONEY TO GET MORE.: NEVER TRUE

## 2024-10-03 SDOH — ECONOMIC STABILITY: FOOD INSECURITY: WITHIN THE PAST 12 MONTHS, YOU WORRIED THAT YOUR FOOD WOULD RUN OUT BEFORE YOU GOT MONEY TO BUY MORE.: NEVER TRUE

## 2024-10-03 NOTE — PROGRESS NOTES
Yuki Escobar is a 64 y.o. female     Chief Complaint   Patient presents with    6 Month Follow-Up       /74 (Site: Left Upper Arm, Position: Sitting, Cuff Size: Medium Adult)   Pulse 60   Temp 98 °F (36.7 °C) (Oral)   Resp 18   Ht 1.651 m (5' 5\")   Wt 121.5 kg (267 lb 12.8 oz)   SpO2 99%   BMI 44.56 kg/m²     Health Maintenance Due   Topic Date Due    DTaP/Tdap/Td vaccine (1 - Tdap) Never done    Hepatitis B vaccine (1 of 3 - Risk Dialysis 4-dose series) Never done    Shingles vaccine (1 of 2) Never done    Pneumococcal 0-64 years Vaccine (2 of 2 - PPSV23 or PCV20) 04/13/2015    Respiratory Syncytial Virus (RSV) Pregnant or age 60 yrs+ (1 - 1-dose 60+ series) Never done    Colorectal Cancer Screen  08/18/2021    Flu vaccine (1) 08/01/2024    COVID-19 Vaccine (3 - 2023-24 season) 09/01/2024    Breast cancer screen  10/19/2024         \"Have you been to the ER, urgent care clinic since your last visit?  Hospitalized since your last visit?\"    NO    “Have you seen or consulted any other health care providers outside of [facility] since your last visit?”    NO    “Have you had a colorectal cancer screening such as a colonoscopy/FIT/Cologuard?    NO    Date of last Colonoscopy: 8/18/2011  No cologuard on file  No FIT/FOBT on file   No flexible sigmoidoscopy on file                      
albuterol (PROVENTIL) (2.5 MG/3ML) 0.083% nebulizer solution    DISCONTINUED: albuterol (PROVENTIL) (2.5 MG/3ML) 0.083% nebulizer solution      10. Wheezing  albuterol (PROVENTIL) (2.5 MG/3ML) 0.083% nebulizer solution    DISCONTINUED: albuterol (PROVENTIL) (2.5 MG/3ML) 0.083% nebulizer solution      11. Supplemental oxygen dependent          1: Labs ordered today include: CBC with differential, CMP, and lipid panel.  2: Patient to maintain dialysis appointments as scheduled.  Continue midodrine use prior to dialysis to avoid hypotension.  3: Continue oxygen at current therapy level.  Follow-up with pulmonology and cardiology as planned.  4: Continue atorvastatin daily for management of mixed hyperlipidemia.  5: Patient encouraged to focus on healthy lifestyle management with appropriate dietary intake.  Low-fat/low-cholesterol diet recommended with no added salt.  Maintain adequate amounts of fiber and water.  6: Order sent for right ankle brace to medical supply.  7: Continue all other medications as prescribed.  8: Patient to follow-up with me for recheck in approximately 6 months, or sooner as needed.  Patient agrees and states understanding.      ATTENTION:   This medical record was transcribed using an electronic medical records system.  Although proofread, it may and can contain electronic and spelling errors.  Other human spelling and other errors may be present.  Corrections may be executed at a later time.  Please feel free to contact us for any clarifications as needed.    Signed,  Maged Yates, MSN APRN FNP-BC

## 2024-10-04 LAB
ALBUMIN SERPL-MCNC: 3.7 G/DL (ref 3.5–5)
ALBUMIN/GLOB SERPL: 0.9 (ref 1.1–2.2)
ALP SERPL-CCNC: 240 U/L (ref 45–117)
ALT SERPL-CCNC: 21 U/L (ref 12–78)
ANION GAP SERPL CALC-SCNC: 5 MMOL/L (ref 2–12)
AST SERPL-CCNC: 21 U/L (ref 15–37)
BASOPHILS # BLD: 0.1 K/UL (ref 0–0.1)
BASOPHILS NFR BLD: 1 % (ref 0–1)
BILIRUB SERPL-MCNC: 0.5 MG/DL (ref 0.2–1)
BUN SERPL-MCNC: 14 MG/DL (ref 6–20)
BUN/CREAT SERPL: 3 (ref 12–20)
CALCIUM SERPL-MCNC: 9.9 MG/DL (ref 8.5–10.1)
CHLORIDE SERPL-SCNC: 102 MMOL/L (ref 97–108)
CHOLEST SERPL-MCNC: 199 MG/DL
CO2 SERPL-SCNC: 30 MMOL/L (ref 21–32)
CREAT SERPL-MCNC: 5.14 MG/DL (ref 0.55–1.02)
DIFFERENTIAL METHOD BLD: ABNORMAL
EOSINOPHIL # BLD: 0.2 K/UL (ref 0–0.4)
EOSINOPHIL NFR BLD: 3 % (ref 0–7)
ERYTHROCYTE [DISTWIDTH] IN BLOOD BY AUTOMATED COUNT: 15 % (ref 11.5–14.5)
GLOBULIN SER CALC-MCNC: 4.1 G/DL (ref 2–4)
GLUCOSE SERPL-MCNC: 81 MG/DL (ref 65–100)
HCT VFR BLD AUTO: 40.3 % (ref 35–47)
HDLC SERPL-MCNC: 85 MG/DL
HDLC SERPL: 2.3 (ref 0–5)
HGB BLD-MCNC: 11.9 G/DL (ref 11.5–16)
IMM GRANULOCYTES # BLD AUTO: 0 K/UL
IMM GRANULOCYTES NFR BLD AUTO: 0 %
LDLC SERPL CALC-MCNC: 92.8 MG/DL (ref 0–100)
LYMPHOCYTES # BLD: 2.1 K/UL (ref 0.8–3.5)
LYMPHOCYTES NFR BLD: 39 % (ref 12–49)
MCH RBC QN AUTO: 33.8 PG (ref 26–34)
MCHC RBC AUTO-ENTMCNC: 29.5 G/DL (ref 30–36.5)
MCV RBC AUTO: 114.5 FL (ref 80–99)
MONOCYTES # BLD: 0.7 K/UL (ref 0–1)
MONOCYTES NFR BLD: 13 % (ref 5–13)
NEUTS BAND NFR BLD MANUAL: 2 % (ref 0–6)
NEUTS SEG # BLD: 2.4 K/UL (ref 1.8–8)
NEUTS SEG NFR BLD: 42 % (ref 32–75)
NRBC # BLD: 0 K/UL (ref 0–0.01)
NRBC BLD-RTO: 0 PER 100 WBC
PLATELET # BLD AUTO: 117 K/UL (ref 150–400)
PMV BLD AUTO: 12.6 FL (ref 8.9–12.9)
POTASSIUM SERPL-SCNC: 3.9 MMOL/L (ref 3.5–5.1)
PROT SERPL-MCNC: 7.8 G/DL (ref 6.4–8.2)
RBC # BLD AUTO: 3.52 M/UL (ref 3.8–5.2)
RBC MORPH BLD: ABNORMAL
RBC MORPH BLD: ABNORMAL
SODIUM SERPL-SCNC: 137 MMOL/L (ref 136–145)
TRIGL SERPL-MCNC: 106 MG/DL
VLDLC SERPL CALC-MCNC: 21.2 MG/DL
WBC # BLD AUTO: 5.5 K/UL (ref 3.6–11)
WBC MORPH BLD: ABNORMAL

## 2025-01-18 DIAGNOSIS — E78.2 MIXED HYPERLIPIDEMIA: ICD-10-CM

## 2025-01-20 RX ORDER — ATORVASTATIN CALCIUM 10 MG/1
10 TABLET, FILM COATED ORAL NIGHTLY
Qty: 90 TABLET | Refills: 1 | Status: SHIPPED | OUTPATIENT
Start: 2025-01-20

## 2025-01-20 NOTE — TELEPHONE ENCOUNTER
RX refill request from the patient/pharmacy. Patient last seen 10- with labs, and next appt. scheduled for 04-  Requested Prescriptions     Pending Prescriptions Disp Refills    atorvastatin (LIPITOR) 10 MG tablet [Pharmacy Med Name: ATORVASTATIN 10 MG TABLET] 90 tablet 1     Sig: TAKE 1 TABLET BY MOUTH EVERY NIGHT    .

## 2025-01-22 DIAGNOSIS — R25.2 NOCTURNAL MUSCLE CRAMP: ICD-10-CM

## 2025-01-22 RX ORDER — MAGNESIUM OXIDE 400 MG/1
1 TABLET ORAL DAILY
Qty: 90 TABLET | Refills: 0 | Status: SHIPPED | OUTPATIENT
Start: 2025-01-22

## 2025-01-22 NOTE — TELEPHONE ENCOUNTER
PCP: Maged Yates APRN - NP    Last appt: 10/3/2024    Future Appointments   Date Time Provider Department Center   4/3/2025 10:30 AM Maged Yates APRN - NP PC[facility] ECC DEP       Requested Prescriptions     Pending Prescriptions Disp Refills    magnesium oxide (MAG-OX) 400 MG tablet 30 tablet 0     Sig: Take 1 tablet by mouth daily

## 2025-01-22 NOTE — TELEPHONE ENCOUNTER
magnesium oxide (MAG-OX) 400 MG tablet 30 tablet 0 3/26/2024 --    Sig - Route: Take 1 tablet by mouth daily - Oral      Last visit 10/3/24  Future appt 4/3/25    Pharmacy [facility]

## 2025-01-23 DIAGNOSIS — R06.2 WHEEZING: ICD-10-CM

## 2025-01-23 DIAGNOSIS — R06.02 SHORTNESS OF BREATH: ICD-10-CM

## 2025-01-24 RX ORDER — ALBUTEROL SULFATE 0.83 MG/ML
SOLUTION RESPIRATORY (INHALATION)
Qty: 300 ML | Refills: 1 | Status: SHIPPED | OUTPATIENT
Start: 2025-01-24

## 2025-01-24 NOTE — TELEPHONE ENCOUNTER
PCP: Maged Yates APRN - NP    Last appt: 10/3/2024  Future Appointments   Date Time Provider Department Center   4/3/2025 10:30 AM Maged Yates APRN - NP [facility] ECC DEP       Requested Prescriptions     Pending Prescriptions Disp Refills    albuterol (PROVENTIL) (2.5 MG/3ML) 0.083% nebulizer solution [Pharmacy Med Name: ALBUTEROL SUL 2.5 MG/3 ML SOLN] 300 mL 1     Sig: TAKE 3 ML BY NEBULIZER EVERY 6 HOURS AS NEEDED FOR WHEEZE OR FOR SHORTNESS OF BREATH       Prior labs and Blood pressures:  BP Readings from Last 3 Encounters:   10/03/24 126/74   03/26/24 128/70   08/31/23 126/78     Lab Results   Component Value Date/Time     10/03/2024 11:50 AM    K 3.9 10/03/2024 11:50 AM     10/03/2024 11:50 AM    CO2 30 10/03/2024 11:50 AM    BUN 14 10/03/2024 11:50 AM    GFRAA 8 01/20/2022 12:23 PM     No results found for: \"HBA1C\", \"[id number]\"  Lab Results   Component Value Date/Time    CHOL 199 10/03/2024 11:50 AM    HDL 85 10/03/2024 11:50 AM    LDL 92.8 10/03/2024 11:50 AM    LDL 93 03/26/2024 01:30 PM    VLDL 21.2 10/03/2024 11:50 AM    VLDL 12 12/01/2022 05:00 PM     No results found for: \"VITD3\"        Lab Results   Component Value Date/Time    TSH 3.26 03/26/2024 01:30 PM

## 2025-02-07 ENCOUNTER — TRANSCRIBE ORDERS (OUTPATIENT)
Facility: HOSPITAL | Age: 65
End: 2025-02-07

## 2025-02-07 DIAGNOSIS — Z12.31 OTHER SCREENING MAMMOGRAM: Primary | ICD-10-CM

## 2025-02-13 ENCOUNTER — HOSPITAL ENCOUNTER (OUTPATIENT)
Facility: HOSPITAL | Age: 65
Discharge: HOME | End: 2025-02-16
Payer: MEDICAID

## 2025-02-13 VITALS — WEIGHT: 261 LBS | HEIGHT: 65 IN | BODY MASS INDEX: 43.49 KG/M2

## 2025-02-13 DIAGNOSIS — Z12.31 OTHER SCREENING MAMMOGRAM: ICD-10-CM

## 2025-02-13 PROCEDURE — 77063 BREAST TOMOSYNTHESIS BI: CPT

## 2025-02-20 NOTE — TELEPHONE ENCOUNTER
Lidocaine cream sent to pharmacy.
Pharmacist states that they dont have the lidocaine 4 % gel but they have the lidocaine 4% cream. They would like to know what GENARO Cornejo would like to do in regard to this. Please advise. Thank you.
Admission

## 2025-04-25 ENCOUNTER — APPOINTMENT (OUTPATIENT)
Facility: HOSPITAL | Age: 65
DRG: 720 | End: 2025-04-25
Payer: MEDICAID

## 2025-04-25 ENCOUNTER — HOSPITAL ENCOUNTER (INPATIENT)
Facility: HOSPITAL | Age: 65
LOS: 25 days | Discharge: HOSPICE | DRG: 720 | End: 2025-05-20
Attending: EMERGENCY MEDICINE | Admitting: ANESTHESIOLOGY
Payer: MEDICAID

## 2025-04-25 DIAGNOSIS — I95.9 HYPOTENSION, UNSPECIFIED HYPOTENSION TYPE: ICD-10-CM

## 2025-04-25 DIAGNOSIS — I50.9 CONGESTIVE HEART FAILURE, UNSPECIFIED HF CHRONICITY, UNSPECIFIED HEART FAILURE TYPE (HCC): ICD-10-CM

## 2025-04-25 DIAGNOSIS — N18.6 ESRD (END STAGE RENAL DISEASE) (HCC): ICD-10-CM

## 2025-04-25 DIAGNOSIS — I50.9 CHF (CONGESTIVE HEART FAILURE) (HCC): ICD-10-CM

## 2025-04-25 DIAGNOSIS — I95.89 OTHER SPECIFIED HYPOTENSION: ICD-10-CM

## 2025-04-25 DIAGNOSIS — R79.89 ELEVATED TROPONIN: ICD-10-CM

## 2025-04-25 DIAGNOSIS — E87.6 HYPOKALEMIA: ICD-10-CM

## 2025-04-25 DIAGNOSIS — I95.0 IDIOPATHIC HYPOTENSION: Primary | ICD-10-CM

## 2025-04-25 LAB
ALBUMIN SERPL-MCNC: 2.9 G/DL (ref 3.5–5)
ALBUMIN/GLOB SERPL: 0.9 (ref 1.1–2.2)
ALP SERPL-CCNC: 136 U/L (ref 45–117)
ALT SERPL-CCNC: 30 U/L (ref 12–78)
ANION GAP SERPL CALC-SCNC: 6 MMOL/L (ref 2–12)
AST SERPL-CCNC: 39 U/L (ref 15–37)
BASE EXCESS BLD CALC-SCNC: 6.3 MMOL/L
BASOPHILS # BLD: 0.05 K/UL (ref 0–0.1)
BASOPHILS NFR BLD: 0.6 % (ref 0–1)
BILIRUB SERPL-MCNC: 0.9 MG/DL (ref 0.2–1)
BUN SERPL-MCNC: 8 MG/DL (ref 6–20)
BUN/CREAT SERPL: 3 (ref 12–20)
CALCIUM SERPL-MCNC: 8.1 MG/DL (ref 8.5–10.1)
CHLORIDE SERPL-SCNC: 100 MMOL/L (ref 97–108)
CO2 SERPL-SCNC: 31 MMOL/L (ref 21–32)
COMMENT:: NORMAL
CREAT SERPL-MCNC: 2.64 MG/DL (ref 0.55–1.02)
DIFFERENTIAL METHOD BLD: ABNORMAL
EKG ATRIAL RATE: 45 BPM
EKG DIAGNOSIS: NORMAL
EKG Q-T INTERVAL: 512 MS
EKG QRS DURATION: 156 MS
EKG QTC CALCULATION (BAZETT): 552 MS
EKG R AXIS: 49 DEGREES
EKG T AXIS: 212 DEGREES
EKG VENTRICULAR RATE: 70 BPM
EOSINOPHIL # BLD: 0.22 K/UL (ref 0–0.4)
EOSINOPHIL NFR BLD: 2.8 % (ref 0–7)
ERYTHROCYTE [DISTWIDTH] IN BLOOD BY AUTOMATED COUNT: 15.4 % (ref 11.5–14.5)
FLUAV RNA SPEC QL NAA+PROBE: NOT DETECTED
FLUBV RNA SPEC QL NAA+PROBE: NOT DETECTED
GLOBULIN SER CALC-MCNC: 3.2 G/DL (ref 2–4)
GLUCOSE SERPL-MCNC: 86 MG/DL (ref 65–100)
HCO3 BLD-SCNC: 33.7 MMOL/L (ref 21–28)
HCT VFR BLD AUTO: 38.9 % (ref 35–47)
HGB BLD-MCNC: 12.5 G/DL (ref 11.5–16)
IMM GRANULOCYTES # BLD AUTO: 0.06 K/UL (ref 0–0.04)
IMM GRANULOCYTES NFR BLD AUTO: 0.8 % (ref 0–0.5)
LACTATE BLD-SCNC: 2.16 MMOL/L (ref 0.4–2)
LIPASE SERPL-CCNC: 92 U/L (ref 13–75)
LYMPHOCYTES # BLD: 1.9 K/UL (ref 0.8–3.5)
LYMPHOCYTES NFR BLD: 24.1 % (ref 12–49)
MCH RBC QN AUTO: 33.2 PG (ref 26–34)
MCHC RBC AUTO-ENTMCNC: 32.1 G/DL (ref 30–36.5)
MCV RBC AUTO: 103.2 FL (ref 80–99)
MONOCYTES # BLD: 0.53 K/UL (ref 0–1)
MONOCYTES NFR BLD: 6.7 % (ref 5–13)
NEUTS SEG # BLD: 5.14 K/UL (ref 1.8–8)
NEUTS SEG NFR BLD: 65 % (ref 32–75)
NRBC # BLD: 0.04 K/UL (ref 0–0.01)
NRBC BLD-RTO: 0.5 PER 100 WBC
NT PRO BNP: ABNORMAL PG/ML
PCO2 BLD: 57.3 MMHG (ref 35–48)
PH BLD: 7.38 (ref 7.35–7.45)
PLATELET # BLD AUTO: 52 K/UL (ref 150–400)
PMV BLD AUTO: 12.4 FL (ref 8.9–12.9)
PO2 BLD: 37 MMHG (ref 83–108)
POTASSIUM SERPL-SCNC: 2.8 MMOL/L (ref 3.5–5.1)
PROCALCITONIN SERPL-MCNC: 0.92 NG/ML
PROT SERPL-MCNC: 6.1 G/DL (ref 6.4–8.2)
RBC # BLD AUTO: 3.77 M/UL (ref 3.8–5.2)
RBC MORPH BLD: ABNORMAL
SAO2 % BLD: 67.4 % (ref 92–97)
SARS-COV-2 RNA RESP QL NAA+PROBE: NOT DETECTED
SERVICE CMNT-IMP: ABNORMAL
SERVICE CMNT-IMP: ABNORMAL
SODIUM SERPL-SCNC: 137 MMOL/L (ref 136–145)
SOURCE: NORMAL
SPECIMEN HOLD: NORMAL
SPECIMEN TYPE: ABNORMAL
TROPONIN I SERPL HS-MCNC: 316 NG/L (ref 0–51)
TROPONIN I SERPL HS-MCNC: 358 NG/L (ref 0–51)
WBC # BLD AUTO: 7.9 K/UL (ref 3.6–11)

## 2025-04-25 PROCEDURE — 2000000000 HC ICU R&B

## 2025-04-25 PROCEDURE — 83605 ASSAY OF LACTIC ACID: CPT

## 2025-04-25 PROCEDURE — 2580000003 HC RX 258: Performed by: EMERGENCY MEDICINE

## 2025-04-25 PROCEDURE — 74176 CT ABD & PELVIS W/O CONTRAST: CPT

## 2025-04-25 PROCEDURE — 83690 ASSAY OF LIPASE: CPT

## 2025-04-25 PROCEDURE — 87040 BLOOD CULTURE FOR BACTERIA: CPT

## 2025-04-25 PROCEDURE — 85025 COMPLETE CBC W/AUTO DIFF WBC: CPT

## 2025-04-25 PROCEDURE — 6360000002 HC RX W HCPCS: Performed by: EMERGENCY MEDICINE

## 2025-04-25 PROCEDURE — 93005 ELECTROCARDIOGRAM TRACING: CPT | Performed by: EMERGENCY MEDICINE

## 2025-04-25 PROCEDURE — 96375 TX/PRO/DX INJ NEW DRUG ADDON: CPT

## 2025-04-25 PROCEDURE — 96367 TX/PROPH/DG ADDL SEQ IV INF: CPT

## 2025-04-25 PROCEDURE — 71045 X-RAY EXAM CHEST 1 VIEW: CPT

## 2025-04-25 PROCEDURE — 84443 ASSAY THYROID STIM HORMONE: CPT

## 2025-04-25 PROCEDURE — 6370000000 HC RX 637 (ALT 250 FOR IP): Performed by: EMERGENCY MEDICINE

## 2025-04-25 PROCEDURE — 82803 BLOOD GASES ANY COMBINATION: CPT

## 2025-04-25 PROCEDURE — 2500000003 HC RX 250 WO HCPCS: Performed by: EMERGENCY MEDICINE

## 2025-04-25 PROCEDURE — 96366 THER/PROPH/DIAG IV INF ADDON: CPT

## 2025-04-25 PROCEDURE — P9045 ALBUMIN (HUMAN), 5%, 250 ML: HCPCS | Performed by: EMERGENCY MEDICINE

## 2025-04-25 PROCEDURE — 84145 PROCALCITONIN (PCT): CPT

## 2025-04-25 PROCEDURE — 83880 ASSAY OF NATRIURETIC PEPTIDE: CPT

## 2025-04-25 PROCEDURE — 84484 ASSAY OF TROPONIN QUANT: CPT

## 2025-04-25 PROCEDURE — 99285 EMERGENCY DEPT VISIT HI MDM: CPT

## 2025-04-25 PROCEDURE — 80053 COMPREHEN METABOLIC PANEL: CPT

## 2025-04-25 PROCEDURE — 96365 THER/PROPH/DIAG IV INF INIT: CPT

## 2025-04-25 PROCEDURE — 87636 SARSCOV2 & INF A&B AMP PRB: CPT

## 2025-04-25 RX ORDER — ACETAMINOPHEN 650 MG/1
650 SUPPOSITORY RECTAL EVERY 6 HOURS PRN
Status: DISCONTINUED | OUTPATIENT
Start: 2025-04-25 | End: 2025-05-20 | Stop reason: HOSPADM

## 2025-04-25 RX ORDER — FLUDROCORTISONE ACETATE 0.1 MG/1
0.1 TABLET ORAL DAILY
Status: DISCONTINUED | OUTPATIENT
Start: 2025-04-26 | End: 2025-04-27

## 2025-04-25 RX ORDER — MONTELUKAST SODIUM 10 MG/1
10 TABLET ORAL NIGHTLY
Status: DISCONTINUED | OUTPATIENT
Start: 2025-04-26 | End: 2025-05-20 | Stop reason: HOSPADM

## 2025-04-25 RX ORDER — POTASSIUM CHLORIDE 750 MG/1
40 TABLET, EXTENDED RELEASE ORAL ONCE
Status: COMPLETED | OUTPATIENT
Start: 2025-04-25 | End: 2025-04-25

## 2025-04-25 RX ORDER — ASPIRIN 81 MG/1
81 TABLET, CHEWABLE ORAL ONCE
Status: COMPLETED | OUTPATIENT
Start: 2025-04-25 | End: 2025-04-25

## 2025-04-25 RX ORDER — ONDANSETRON 2 MG/ML
4 INJECTION INTRAMUSCULAR; INTRAVENOUS EVERY 6 HOURS PRN
Status: DISCONTINUED | OUTPATIENT
Start: 2025-04-25 | End: 2025-05-20 | Stop reason: HOSPADM

## 2025-04-25 RX ORDER — SODIUM CHLORIDE 0.9 % (FLUSH) 0.9 %
5-40 SYRINGE (ML) INJECTION EVERY 12 HOURS SCHEDULED
Status: DISCONTINUED | OUTPATIENT
Start: 2025-04-25 | End: 2025-05-20 | Stop reason: HOSPADM

## 2025-04-25 RX ORDER — 0.9 % SODIUM CHLORIDE 0.9 %
1000 INTRAVENOUS SOLUTION INTRAVENOUS ONCE
Status: COMPLETED | OUTPATIENT
Start: 2025-04-25 | End: 2025-04-25

## 2025-04-25 RX ORDER — MIDODRINE HYDROCHLORIDE 5 MG/1
5 TABLET ORAL
Status: DISCONTINUED | OUTPATIENT
Start: 2025-04-26 | End: 2025-04-27

## 2025-04-25 RX ORDER — SODIUM CHLORIDE 0.9 % (FLUSH) 0.9 %
5-40 SYRINGE (ML) INJECTION PRN
Status: DISCONTINUED | OUTPATIENT
Start: 2025-04-25 | End: 2025-05-20 | Stop reason: HOSPADM

## 2025-04-25 RX ORDER — ALBUMIN HUMAN 50 G/1000ML
25 SOLUTION INTRAVENOUS ONCE
Status: COMPLETED | OUTPATIENT
Start: 2025-04-25 | End: 2025-04-25

## 2025-04-25 RX ORDER — 0.9 % SODIUM CHLORIDE 0.9 %
500 INTRAVENOUS SOLUTION INTRAVENOUS ONCE
Status: COMPLETED | OUTPATIENT
Start: 2025-04-25 | End: 2025-04-25

## 2025-04-25 RX ORDER — ALBUTEROL SULFATE 0.83 MG/ML
2.5 SOLUTION RESPIRATORY (INHALATION) EVERY 6 HOURS PRN
Status: DISCONTINUED | OUTPATIENT
Start: 2025-04-25 | End: 2025-05-20 | Stop reason: HOSPADM

## 2025-04-25 RX ORDER — MIDODRINE HYDROCHLORIDE 5 MG/1
5 TABLET ORAL
Status: COMPLETED | OUTPATIENT
Start: 2025-04-25 | End: 2025-04-25

## 2025-04-25 RX ORDER — MAGNESIUM SULFATE IN WATER 40 MG/ML
2000 INJECTION, SOLUTION INTRAVENOUS PRN
Status: DISCONTINUED | OUTPATIENT
Start: 2025-04-25 | End: 2025-05-20 | Stop reason: HOSPADM

## 2025-04-25 RX ORDER — ATORVASTATIN CALCIUM 20 MG/1
10 TABLET, FILM COATED ORAL NIGHTLY
Status: DISCONTINUED | OUTPATIENT
Start: 2025-04-26 | End: 2025-05-20 | Stop reason: HOSPADM

## 2025-04-25 RX ORDER — ONDANSETRON 4 MG/1
4 TABLET, ORALLY DISINTEGRATING ORAL EVERY 8 HOURS PRN
Status: DISCONTINUED | OUTPATIENT
Start: 2025-04-25 | End: 2025-05-20 | Stop reason: HOSPADM

## 2025-04-25 RX ORDER — SODIUM CHLORIDE 9 MG/ML
INJECTION, SOLUTION INTRAVENOUS PRN
Status: DISCONTINUED | OUTPATIENT
Start: 2025-04-25 | End: 2025-05-20 | Stop reason: HOSPADM

## 2025-04-25 RX ORDER — SENNOSIDES A AND B 8.6 MG/1
1 TABLET, FILM COATED ORAL NIGHTLY
Status: DISCONTINUED | OUTPATIENT
Start: 2025-04-25 | End: 2025-05-20 | Stop reason: HOSPADM

## 2025-04-25 RX ORDER — ACETAMINOPHEN 325 MG/1
650 TABLET ORAL EVERY 6 HOURS PRN
Status: DISCONTINUED | OUTPATIENT
Start: 2025-04-25 | End: 2025-05-20 | Stop reason: HOSPADM

## 2025-04-25 RX ORDER — PREDNISONE 10 MG/1
5 TABLET ORAL DAILY
Status: DISCONTINUED | OUTPATIENT
Start: 2025-04-25 | End: 2025-04-26

## 2025-04-25 RX ADMIN — Medication 2500 MG: at 16:34

## 2025-04-25 RX ADMIN — SODIUM CHLORIDE 500 ML: 0.9 INJECTION, SOLUTION INTRAVENOUS at 20:04

## 2025-04-25 RX ADMIN — POTASSIUM CHLORIDE 40 MEQ: 750 TABLET, FILM COATED, EXTENDED RELEASE ORAL at 19:04

## 2025-04-25 RX ADMIN — ASPIRIN 81 MG: 81 TABLET, CHEWABLE ORAL at 19:04

## 2025-04-25 RX ADMIN — WATER 2000 MG: 1 INJECTION INTRAMUSCULAR; INTRAVENOUS; SUBCUTANEOUS at 16:21

## 2025-04-25 RX ADMIN — MIDODRINE HYDROCHLORIDE 5 MG: 5 TABLET ORAL at 18:10

## 2025-04-25 RX ADMIN — SODIUM CHLORIDE 1000 ML: 0.9 INJECTION, SOLUTION INTRAVENOUS at 16:21

## 2025-04-25 RX ADMIN — ALBUMIN (HUMAN) 25 G: 12.5 INJECTION, SOLUTION INTRAVENOUS at 19:58

## 2025-04-25 ASSESSMENT — LIFESTYLE VARIABLES
HOW OFTEN DO YOU HAVE A DRINK CONTAINING ALCOHOL: NEVER
HOW MANY STANDARD DRINKS CONTAINING ALCOHOL DO YOU HAVE ON A TYPICAL DAY: PATIENT DOES NOT DRINK

## 2025-04-25 ASSESSMENT — PAIN - FUNCTIONAL ASSESSMENT: PAIN_FUNCTIONAL_ASSESSMENT: 0-10

## 2025-04-25 ASSESSMENT — PAIN SCALES - GENERAL
PAINLEVEL_OUTOF10: 0
PAINLEVEL_OUTOF10: 0

## 2025-04-25 NOTE — ED NOTES
Bedside shift change report given to Rehana (oncoming nurse) by Elyssa (offgoing nurse). Report included the following information Nurse Handoff Report, ED Encounter Summary, ED SBAR, Adult Overview, Surgery Report, Intake/Output, MAR, and Recent Results.

## 2025-04-25 NOTE — ED TRIAGE NOTES
Patient arrives from dialysis after experiencing hypotension. Prior to dialysis her BP was 70/50s. Patient completed treatment, no fluid pulled off. BP post treatment was 50s/30s so EMS was called. Patient was recently extubated at [facility]'s last weekend for acute resp failure.

## 2025-04-26 LAB
ABO + RH BLD: NORMAL
ANION GAP SERPL CALC-SCNC: 5 MMOL/L (ref 2–12)
BLD PROD TYP BPU: NORMAL
BLOOD BANK DISPENSE STATUS: NORMAL
BLOOD GROUP ANTIBODIES SERPL: NORMAL
BPU ID: NORMAL
BUN SERPL-MCNC: 12 MG/DL (ref 6–20)
BUN/CREAT SERPL: 4 (ref 12–20)
CALCIUM SERPL-MCNC: 7.9 MG/DL (ref 8.5–10.1)
CHLORIDE SERPL-SCNC: 105 MMOL/L (ref 97–108)
CO2 SERPL-SCNC: 27 MMOL/L (ref 21–32)
CREAT SERPL-MCNC: 3.29 MG/DL (ref 0.55–1.02)
ERYTHROCYTE [DISTWIDTH] IN BLOOD BY AUTOMATED COUNT: 15.6 % (ref 11.5–14.5)
GLUCOSE SERPL-MCNC: 76 MG/DL (ref 65–100)
HCT VFR BLD AUTO: 34.7 % (ref 35–47)
HGB BLD-MCNC: 11.1 G/DL (ref 11.5–16)
LACTATE SERPL-SCNC: 1.2 MMOL/L (ref 0.4–2)
MAGNESIUM SERPL-MCNC: 1.7 MG/DL (ref 1.6–2.4)
MCH RBC QN AUTO: 33.6 PG (ref 26–34)
MCHC RBC AUTO-ENTMCNC: 32 G/DL (ref 30–36.5)
MCV RBC AUTO: 105.2 FL (ref 80–99)
NRBC # BLD: 0.02 K/UL (ref 0–0.01)
NRBC BLD-RTO: 0.4 PER 100 WBC
PHOSPHATE SERPL-MCNC: 2.9 MG/DL (ref 2.6–4.7)
PLATELET # BLD AUTO: 43 K/UL (ref 150–400)
PMV BLD AUTO: 12.1 FL (ref 8.9–12.9)
POTASSIUM SERPL-SCNC: 3.2 MMOL/L (ref 3.5–5.1)
PROCALCITONIN SERPL-MCNC: 0.86 NG/ML
RBC # BLD AUTO: 3.3 M/UL (ref 3.8–5.2)
SODIUM SERPL-SCNC: 137 MMOL/L (ref 136–145)
SPECIMEN EXP DATE BLD: NORMAL
TSH SERPL DL<=0.05 MIU/L-ACNC: 2.1 UIU/ML (ref 0.36–3.74)
UNIT DIVISION: 0
VANCOMYCIN SERPL-MCNC: 25.6 UG/ML
WBC # BLD AUTO: 5.6 K/UL (ref 3.6–11)

## 2025-04-26 PROCEDURE — 36415 COLL VENOUS BLD VENIPUNCTURE: CPT

## 2025-04-26 PROCEDURE — 2500000003 HC RX 250 WO HCPCS: Performed by: INTERNAL MEDICINE

## 2025-04-26 PROCEDURE — 6370000000 HC RX 637 (ALT 250 FOR IP): Performed by: PHYSICIAN ASSISTANT

## 2025-04-26 PROCEDURE — 02HV33Z INSERTION OF INFUSION DEVICE INTO SUPERIOR VENA CAVA, PERCUTANEOUS APPROACH: ICD-10-PCS | Performed by: INTERNAL MEDICINE

## 2025-04-26 PROCEDURE — 85027 COMPLETE CBC AUTOMATED: CPT

## 2025-04-26 PROCEDURE — 82746 ASSAY OF FOLIC ACID SERUM: CPT

## 2025-04-26 PROCEDURE — 80048 BASIC METABOLIC PNL TOTAL CA: CPT

## 2025-04-26 PROCEDURE — 36556 INSERT NON-TUNNEL CV CATH: CPT

## 2025-04-26 PROCEDURE — 82607 VITAMIN B-12: CPT

## 2025-04-26 PROCEDURE — 83605 ASSAY OF LACTIC ACID: CPT

## 2025-04-26 PROCEDURE — 83735 ASSAY OF MAGNESIUM: CPT

## 2025-04-26 PROCEDURE — 6370000000 HC RX 637 (ALT 250 FOR IP): Performed by: ANESTHESIOLOGY

## 2025-04-26 PROCEDURE — 6360000002 HC RX W HCPCS: Performed by: PHYSICIAN ASSISTANT

## 2025-04-26 PROCEDURE — 6360000002 HC RX W HCPCS: Performed by: INTERNAL MEDICINE

## 2025-04-26 PROCEDURE — 6360000002 HC RX W HCPCS: Performed by: ANESTHESIOLOGY

## 2025-04-26 PROCEDURE — 84100 ASSAY OF PHOSPHORUS: CPT

## 2025-04-26 PROCEDURE — 86850 RBC ANTIBODY SCREEN: CPT

## 2025-04-26 PROCEDURE — 86900 BLOOD TYPING SEROLOGIC ABO: CPT

## 2025-04-26 PROCEDURE — 36620 INSERTION CATHETER ARTERY: CPT

## 2025-04-26 PROCEDURE — 03HY32Z INSERTION OF MONITORING DEVICE INTO UPPER ARTERY, PERCUTANEOUS APPROACH: ICD-10-PCS | Performed by: ANESTHESIOLOGY

## 2025-04-26 PROCEDURE — 80202 ASSAY OF VANCOMYCIN: CPT

## 2025-04-26 PROCEDURE — 94640 AIRWAY INHALATION TREATMENT: CPT

## 2025-04-26 PROCEDURE — 2500000003 HC RX 250 WO HCPCS: Performed by: PHYSICIAN ASSISTANT

## 2025-04-26 PROCEDURE — 84145 PROCALCITONIN (PCT): CPT

## 2025-04-26 PROCEDURE — 2580000003 HC RX 258: Performed by: INTERNAL MEDICINE

## 2025-04-26 PROCEDURE — 86901 BLOOD TYPING SEROLOGIC RH(D): CPT

## 2025-04-26 PROCEDURE — 2580000003 HC RX 258: Performed by: ANESTHESIOLOGY

## 2025-04-26 PROCEDURE — 2000000000 HC ICU R&B

## 2025-04-26 RX ORDER — HYDROCORTISONE SODIUM SUCCINATE 100 MG/2ML
50 INJECTION INTRAMUSCULAR; INTRAVENOUS EVERY 6 HOURS
Status: DISCONTINUED | OUTPATIENT
Start: 2025-04-26 | End: 2025-04-30

## 2025-04-26 RX ORDER — PREDNISONE 10 MG/1
10 TABLET ORAL DAILY
COMMUNITY
Start: 2025-04-24

## 2025-04-26 RX ORDER — PHENYLEPHRINE HYDROCHLORIDE 10 MG/ML
INJECTION INTRAVENOUS
Status: DISPENSED
Start: 2025-04-26 | End: 2025-04-26

## 2025-04-26 RX ORDER — APIXABAN 5 MG/1
5 TABLET, FILM COATED ORAL 2 TIMES DAILY
Status: ON HOLD | COMMUNITY
Start: 2024-10-15 | End: 2025-05-20 | Stop reason: HOSPADM

## 2025-04-26 RX ORDER — MIDODRINE HYDROCHLORIDE 5 MG/1
5 TABLET ORAL ONCE
Status: COMPLETED | OUTPATIENT
Start: 2025-04-26 | End: 2025-04-26

## 2025-04-26 RX ORDER — SODIUM CHLORIDE 9 MG/ML
INJECTION, SOLUTION INTRAVENOUS PRN
Status: DISCONTINUED | OUTPATIENT
Start: 2025-04-26 | End: 2025-04-26

## 2025-04-26 RX ORDER — NOREPINEPHRINE BITARTRATE 0.06 MG/ML
1-100 INJECTION, SOLUTION INTRAVENOUS CONTINUOUS
Status: DISCONTINUED | OUTPATIENT
Start: 2025-04-26 | End: 2025-05-20 | Stop reason: HOSPADM

## 2025-04-26 RX ORDER — FLUDROCORTISONE ACETATE 0.1 MG/1
0.1 TABLET ORAL DAILY
Status: ON HOLD | COMMUNITY
Start: 2025-04-20 | End: 2025-05-20 | Stop reason: HOSPADM

## 2025-04-26 RX ORDER — TENAPANOR 31.9 MG/1
30 TABLET, FILM COATED ORAL 2 TIMES DAILY WITH MEALS
COMMUNITY

## 2025-04-26 RX ADMIN — ARFORMOTEROL TARTRATE: 15 SOLUTION RESPIRATORY (INHALATION) at 19:46

## 2025-04-26 RX ADMIN — MAGNESIUM SULFATE HEPTAHYDRATE 2000 MG: 40 INJECTION, SOLUTION INTRAVENOUS at 05:48

## 2025-04-26 RX ADMIN — HYDROCORTISONE SODIUM SUCCINATE 50 MG: 100 INJECTION, POWDER, FOR SOLUTION INTRAMUSCULAR; INTRAVENOUS at 16:31

## 2025-04-26 RX ADMIN — FLUDROCORTISONE ACETATE 0.1 MG: 0.1 TABLET ORAL at 10:25

## 2025-04-26 RX ADMIN — SODIUM CHLORIDE, PRESERVATIVE FREE 10 ML: 5 INJECTION INTRAVENOUS at 11:24

## 2025-04-26 RX ADMIN — ARFORMOTEROL TARTRATE: 15 SOLUTION RESPIRATORY (INHALATION) at 07:22

## 2025-04-26 RX ADMIN — VASOPRESSIN 0.03 UNITS/MIN: 20 INJECTION INTRAVENOUS at 10:33

## 2025-04-26 RX ADMIN — NOREPINEPHRINE BITARTRATE 10 MCG/MIN: 1 INJECTION, SOLUTION, CONCENTRATE INTRAVENOUS at 09:30

## 2025-04-26 RX ADMIN — HYDROCORTISONE SODIUM SUCCINATE 50 MG: 100 INJECTION, POWDER, FOR SOLUTION INTRAMUSCULAR; INTRAVENOUS at 11:24

## 2025-04-26 RX ADMIN — CEFEPIME 1000 MG: 1 INJECTION, POWDER, FOR SOLUTION INTRAMUSCULAR; INTRAVENOUS at 16:31

## 2025-04-26 RX ADMIN — SODIUM CHLORIDE, PRESERVATIVE FREE 10 ML: 5 INJECTION INTRAVENOUS at 20:07

## 2025-04-26 RX ADMIN — MONTELUKAST 10 MG: 10 TABLET, FILM COATED ORAL at 20:07

## 2025-04-26 RX ADMIN — SENNOSIDES 8.6 MG: 8.6 TABLET, FILM COATED ORAL at 20:07

## 2025-04-26 RX ADMIN — PHENYLEPHRINE HYDROCHLORIDE 30 MCG/MIN: 10 INJECTION INTRAVENOUS at 04:35

## 2025-04-26 RX ADMIN — MIDODRINE HYDROCHLORIDE 5 MG: 5 TABLET ORAL at 04:08

## 2025-04-26 RX ADMIN — POTASSIUM BICARBONATE 20 MEQ: 782 TABLET, EFFERVESCENT ORAL at 10:25

## 2025-04-26 RX ADMIN — MIDODRINE HYDROCHLORIDE 5 MG: 5 TABLET ORAL at 10:25

## 2025-04-26 RX ADMIN — HYDROCORTISONE SODIUM SUCCINATE 50 MG: 100 INJECTION, POWDER, FOR SOLUTION INTRAMUSCULAR; INTRAVENOUS at 23:09

## 2025-04-26 RX ADMIN — ATORVASTATIN CALCIUM 10 MG: 20 TABLET, FILM COATED ORAL at 20:07

## 2025-04-26 RX ADMIN — NOREPINEPHRINE BITARTRATE 7 MCG/MIN: 1 INJECTION, SOLUTION, CONCENTRATE INTRAVENOUS at 23:11

## 2025-04-26 RX ADMIN — VASOPRESSIN 0.03 UNITS/MIN: 20 INJECTION INTRAVENOUS at 20:32

## 2025-04-26 ASSESSMENT — PAIN SCALES - GENERAL
PAINLEVEL_OUTOF10: 0

## 2025-04-26 NOTE — PROCEDURES
PROCEDURE NOTE  Date: 4/26/2025   Name: Yuki Escobar  YOB: 1960    CENTRAL LINE    Date/Time: 4/26/2025 11:15 AM    Performed by: Dave Porter MD  Authorized by: Dave Porter MD  Consent: Written consent obtained.  Risks and benefits: risks, benefits and alternatives were discussed  Consent given by: Son.  Patient identity confirmed: arm band  Time out: Immediately prior to procedure a \"time out\" was called to verify the correct patient, procedure, equipment, support staff and site/side marked as required.  Indications: vascular access    Anesthesia:  Local Anesthetic: lidocaine 1% without epinephrine    Sedation:  Patient sedated: no    Preparation: skin prepped with 2% chlorhexidine  Skin prep agent dried: skin prep agent completely dried prior to procedure  Sterile barriers: all five maximum sterile barriers used - cap, mask, sterile gown, sterile gloves, and large sterile sheet  Hand hygiene: hand hygiene performed prior to central venous catheter insertion  Location details: left femoral  Patient position: flat  Catheter type: triple lumen  Pre-procedure: landmarks identified  Ultrasound guidance: yes  Sterile ultrasound techniques: sterile gel and sterile probe covers were used  Number of attempts: 1  Successful placement: yes  Post-procedure: line sutured and dressing applied  Assessment: blood return through all ports and free fluid flow  Patient tolerance: patient tolerated the procedure well with no immediate complications  Comments: Initially tried rt IJ central line placement. Could not thread guidewire. Aborted the procedure. Placed left femoral line

## 2025-04-26 NOTE — ED PROVIDER NOTES
[facility] EMERGENCY DEPARTMENT  EMERGENCY DEPARTMENT ENCOUNTER      Pt Name: Yuki Escobar  MRN: [id number]  Birthdate 1960  Date of evaluation: 4/25/2025  Provider: Júnior Orellana MD      HISTORY OF PRESENT ILLNESS      [facility]  64-year-old female with a past medical history of end-stage renal disease on dialysis, hypertension, COPD on home oxygen, and sleep apnea presenting to the emergency department due to hypotension.  Patient arrived hypotensive for dialysis today.  She had a full run of dialysis reportedly but no fluid was taken off and she remained hypotensive so she was sent to the emergency department.  Son is at bedside and states she is actually appearing much better than she has over the last few days when he has seen her at her rehab facility.  Patient has no particular complaints.  She denies shortness of breath or chest pain.  No pain elsewhere.  No fevers or chills.  Son states she was recently started on metoprolol and also reportedly takes midodrine prior to getting dialysis but he is not sure if she got this today or if she has been taking her metoprolol before dialysis      Nursing Notes were reviewed.    REVIEW OF SYSTEMS         Review of Systems  All systems reviewed were negative less otherwise document in the HPI      PAST MEDICAL HISTORY     Past Medical History:   Diagnosis Date    Advanced care planning/counseling discussion 4/7/16    Arthritis     back    Arthritis of ankle or foot, right     Asthma     Dr. 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 Venegas [facility]    Depression     DEPRESSION AND ANXIETY    GERD (gastroesophageal reflux disease)     Heart failure (HCC)     h/o chf - Normal EF, likely

## 2025-04-26 NOTE — H&P
CRITICAL CARE ADMISSION NOTE      Name: Yuki Escobar   : 1960   MRN: [id number]   Date: 2025      Reason for ICU Admission: Hypotension    HISTORY OF PRESENT ILLNESS:     Yuki Escobar is a 64yoF with COPD (on home O2), JF/OHS on BiPAP, ESRD, chronic hypotension, complete AV block s/p pacemaker, Afib/flutter, DM, HFpEF, pulmonary HTN, who presented to ED  with hypotension. Per chart review, she went to HD as scheduled today and was found to be hypotensive prior to session. She was given midodrine and underwent HD without volume removal; she remained hypotensive and was sent to the ED. Labs notable for lactate 2.16, troponin 358, BNP >35K, albumin 2.9; no leukocytosis. CXR w/ cardiomegaly and hilar congestion. CT A/P w/o acute findings. She was given 1.5L IVF and IV albumin. ICU consulted for further management.     Pt is pleasantly confused, she has no complaints. Per son at bedside, she was recently hospitalized at OSH - for acute on chronic hypoxic hypercapnic respiratory failure requiring intubation. Since discharge, she has had waxing/waning mental status and has not been compliant with nightly bipap. She was also recently diagnosed with AF/flutter and was started on metoprolol. Due to chronic hypotension, she was instructed to hold this on HD days. Patient and son uncertain if she took metoprolol today; also uncertain if she took her midodrine. She currently denies fever, cough, congestion, chest pain, dyspnea, abdominal pain, NVD, LE edema. Son reports poor appetite with weight loss. They have been discussing goals of care and code status, but at this time would like all interventions.      ASSESSMENT & PLAN:     Hypotension  Uncertain etiology, no obvious infection or fever/leukocytosis to indicate sepsis, possibly medication induced from metoprolol vs secondary adrenal insufficiency though less likely given short steroid course  - Difficult to obtain accurate BP with

## 2025-04-26 NOTE — ED NOTES
Pt c/o pain to her \"backside\" Pt rolled and pillow placed under right buttock. Pt states she feels much better.

## 2025-04-27 LAB
ALBUMIN SERPL-MCNC: 3.2 G/DL (ref 3.5–5)
ALBUMIN/GLOB SERPL: 1.1 (ref 1.1–2.2)
ALP SERPL-CCNC: 108 U/L (ref 45–117)
ALT SERPL-CCNC: 28 U/L (ref 12–78)
ANION GAP SERPL CALC-SCNC: 9 MMOL/L (ref 2–12)
AST SERPL-CCNC: 26 U/L (ref 15–37)
BILIRUB DIRECT SERPL-MCNC: 0.3 MG/DL (ref 0–0.2)
BILIRUB SERPL-MCNC: 0.8 MG/DL (ref 0.2–1)
BUN SERPL-MCNC: 29 MG/DL (ref 6–20)
BUN/CREAT SERPL: 6 (ref 12–20)
CALCIUM SERPL-MCNC: 8.9 MG/DL (ref 8.5–10.1)
CHLORIDE SERPL-SCNC: 105 MMOL/L (ref 97–108)
CO2 SERPL-SCNC: 24 MMOL/L (ref 21–32)
CORTIS AM PEAK SERPL-MCNC: 126.5 UG/DL (ref 4.3–22.45)
CREAT SERPL-MCNC: 5.04 MG/DL (ref 0.55–1.02)
ERYTHROCYTE [DISTWIDTH] IN BLOOD BY AUTOMATED COUNT: 15.5 % (ref 11.5–14.5)
EST. AVERAGE GLUCOSE BLD GHB EST-MCNC: 91 MG/DL
FERRITIN SERPL-MCNC: 2794 NG/ML (ref 8–252)
FOLATE SERPL-MCNC: 4.2 NG/ML (ref 5–21)
GLOBULIN SER CALC-MCNC: 2.9 G/DL (ref 2–4)
GLUCOSE SERPL-MCNC: 150 MG/DL (ref 65–100)
HAPTOGLOB SERPL-MCNC: 28 MG/DL (ref 30–200)
HBA1C MFR BLD: 4.8 % (ref 4–5.6)
HCT VFR BLD AUTO: 36.5 % (ref 35–47)
HGB BLD-MCNC: 11.7 G/DL (ref 11.5–16)
IRON SATN MFR SERPL: 50 % (ref 20–50)
IRON SERPL-MCNC: 63 UG/DL (ref 35–150)
LDH SERPL L TO P-CCNC: 437 U/L (ref 81–246)
MAGNESIUM SERPL-MCNC: 2.5 MG/DL (ref 1.6–2.4)
MCH RBC QN AUTO: 33.7 PG (ref 26–34)
MCHC RBC AUTO-ENTMCNC: 32.1 G/DL (ref 30–36.5)
MCV RBC AUTO: 105.2 FL (ref 80–99)
NRBC # BLD: 0.02 K/UL (ref 0–0.01)
NRBC BLD-RTO: 0.3 PER 100 WBC
PHOSPHATE SERPL-MCNC: 4.9 MG/DL (ref 2.6–4.7)
PLATELET # BLD AUTO: 48 K/UL (ref 150–400)
PMV BLD AUTO: 12.4 FL (ref 8.9–12.9)
POTASSIUM SERPL-SCNC: 4.5 MMOL/L (ref 3.5–5.1)
PROCALCITONIN SERPL-MCNC: 1.42 NG/ML
PROT SERPL-MCNC: 6.1 G/DL (ref 6.4–8.2)
RBC # BLD AUTO: 3.47 M/UL (ref 3.8–5.2)
RETICS # AUTO: 0.08 M/UL (ref 0.02–0.08)
RETICS/RBC NFR AUTO: 2.2 % (ref 0.7–2.1)
SODIUM SERPL-SCNC: 138 MMOL/L (ref 136–145)
TIBC SERPL-MCNC: 125 UG/DL (ref 250–450)
VANCOMYCIN SERPL-MCNC: 25.3 UG/ML
VIT B12 SERPL-MCNC: 645 PG/ML (ref 193–986)
WBC # BLD AUTO: 7.7 K/UL (ref 3.6–11)

## 2025-04-27 PROCEDURE — 84466 ASSAY OF TRANSFERRIN: CPT

## 2025-04-27 PROCEDURE — 82746 ASSAY OF FOLIC ACID SERUM: CPT

## 2025-04-27 PROCEDURE — 2500000003 HC RX 250 WO HCPCS: Performed by: PHYSICIAN ASSISTANT

## 2025-04-27 PROCEDURE — 84145 PROCALCITONIN (PCT): CPT

## 2025-04-27 PROCEDURE — 2000000000 HC ICU R&B

## 2025-04-27 PROCEDURE — 80048 BASIC METABOLIC PNL TOTAL CA: CPT

## 2025-04-27 PROCEDURE — 2580000003 HC RX 258: Performed by: INTERNAL MEDICINE

## 2025-04-27 PROCEDURE — 85027 COMPLETE CBC AUTOMATED: CPT

## 2025-04-27 PROCEDURE — 83010 ASSAY OF HAPTOGLOBIN QUANT: CPT

## 2025-04-27 PROCEDURE — 82728 ASSAY OF FERRITIN: CPT

## 2025-04-27 PROCEDURE — 83036 HEMOGLOBIN GLYCOSYLATED A1C: CPT

## 2025-04-27 PROCEDURE — 2700000000 HC OXYGEN THERAPY PER DAY

## 2025-04-27 PROCEDURE — 6370000000 HC RX 637 (ALT 250 FOR IP): Performed by: STUDENT IN AN ORGANIZED HEALTH CARE EDUCATION/TRAINING PROGRAM

## 2025-04-27 PROCEDURE — 6360000002 HC RX W HCPCS: Performed by: INTERNAL MEDICINE

## 2025-04-27 PROCEDURE — 85045 AUTOMATED RETICULOCYTE COUNT: CPT

## 2025-04-27 PROCEDURE — 94760 N-INVAS EAR/PLS OXIMETRY 1: CPT

## 2025-04-27 PROCEDURE — 80076 HEPATIC FUNCTION PANEL: CPT

## 2025-04-27 PROCEDURE — 6360000002 HC RX W HCPCS: Performed by: STUDENT IN AN ORGANIZED HEALTH CARE EDUCATION/TRAINING PROGRAM

## 2025-04-27 PROCEDURE — 84100 ASSAY OF PHOSPHORUS: CPT

## 2025-04-27 PROCEDURE — 82607 VITAMIN B-12: CPT

## 2025-04-27 PROCEDURE — 6370000000 HC RX 637 (ALT 250 FOR IP): Performed by: PHYSICIAN ASSISTANT

## 2025-04-27 PROCEDURE — 94640 AIRWAY INHALATION TREATMENT: CPT

## 2025-04-27 PROCEDURE — 83615 LACTATE (LD) (LDH) ENZYME: CPT

## 2025-04-27 PROCEDURE — 82533 TOTAL CORTISOL: CPT

## 2025-04-27 PROCEDURE — 83735 ASSAY OF MAGNESIUM: CPT

## 2025-04-27 PROCEDURE — 83540 ASSAY OF IRON: CPT

## 2025-04-27 PROCEDURE — 80202 ASSAY OF VANCOMYCIN: CPT

## 2025-04-27 RX ORDER — FOLIC ACID 1 MG/1
1 TABLET ORAL DAILY
Status: DISCONTINUED | OUTPATIENT
Start: 2025-04-27 | End: 2025-05-20 | Stop reason: HOSPADM

## 2025-04-27 RX ORDER — HEPARIN SODIUM 5000 [USP'U]/ML
5000 INJECTION, SOLUTION INTRAVENOUS; SUBCUTANEOUS EVERY 8 HOURS SCHEDULED
Status: DISCONTINUED | OUTPATIENT
Start: 2025-04-27 | End: 2025-04-27

## 2025-04-27 RX ORDER — IPRATROPIUM BROMIDE AND ALBUTEROL SULFATE 2.5; .5 MG/3ML; MG/3ML
1 SOLUTION RESPIRATORY (INHALATION)
Status: DISCONTINUED | OUTPATIENT
Start: 2025-04-27 | End: 2025-05-03

## 2025-04-27 RX ORDER — BUDESONIDE 0.5 MG/2ML
0.5 INHALANT ORAL
Status: DISCONTINUED | OUTPATIENT
Start: 2025-04-27 | End: 2025-05-20 | Stop reason: HOSPADM

## 2025-04-27 RX ADMIN — VASOPRESSIN 0.03 UNITS/MIN: 20 INJECTION INTRAVENOUS at 19:42

## 2025-04-27 RX ADMIN — MONTELUKAST 10 MG: 10 TABLET, FILM COATED ORAL at 20:19

## 2025-04-27 RX ADMIN — HYDROCORTISONE SODIUM SUCCINATE 50 MG: 100 INJECTION, POWDER, FOR SOLUTION INTRAMUSCULAR; INTRAVENOUS at 11:14

## 2025-04-27 RX ADMIN — IPRATROPIUM BROMIDE AND ALBUTEROL SULFATE 1 DOSE: 2.5; .5 SOLUTION RESPIRATORY (INHALATION) at 16:11

## 2025-04-27 RX ADMIN — BUDESONIDE 500 MCG: 0.5 INHALANT RESPIRATORY (INHALATION) at 19:39

## 2025-04-27 RX ADMIN — IPRATROPIUM BROMIDE AND ALBUTEROL SULFATE 1 DOSE: 2.5; .5 SOLUTION RESPIRATORY (INHALATION) at 19:39

## 2025-04-27 RX ADMIN — IPRATROPIUM BROMIDE AND ALBUTEROL SULFATE 1 DOSE: 2.5; .5 SOLUTION RESPIRATORY (INHALATION) at 08:10

## 2025-04-27 RX ADMIN — Medication 1 MG: at 12:22

## 2025-04-27 RX ADMIN — IPRATROPIUM BROMIDE AND ALBUTEROL SULFATE 1 DOSE: 2.5; .5 SOLUTION RESPIRATORY (INHALATION) at 11:18

## 2025-04-27 RX ADMIN — HYDROCORTISONE SODIUM SUCCINATE 50 MG: 100 INJECTION, POWDER, FOR SOLUTION INTRAMUSCULAR; INTRAVENOUS at 22:53

## 2025-04-27 RX ADMIN — VASOPRESSIN 0.03 UNITS/MIN: 20 INJECTION INTRAVENOUS at 07:33

## 2025-04-27 RX ADMIN — SENNOSIDES 8.6 MG: 8.6 TABLET, FILM COATED ORAL at 20:19

## 2025-04-27 RX ADMIN — SODIUM CHLORIDE, PRESERVATIVE FREE 10 ML: 5 INJECTION INTRAVENOUS at 20:19

## 2025-04-27 RX ADMIN — BUDESONIDE 500 MCG: 0.5 INHALANT RESPIRATORY (INHALATION) at 08:10

## 2025-04-27 RX ADMIN — ATORVASTATIN CALCIUM 10 MG: 20 TABLET, FILM COATED ORAL at 20:19

## 2025-04-27 RX ADMIN — CEFEPIME 1000 MG: 1 INJECTION, POWDER, FOR SOLUTION INTRAMUSCULAR; INTRAVENOUS at 20:41

## 2025-04-27 RX ADMIN — HYDROCORTISONE SODIUM SUCCINATE 50 MG: 100 INJECTION, POWDER, FOR SOLUTION INTRAMUSCULAR; INTRAVENOUS at 17:34

## 2025-04-27 RX ADMIN — HYDROCORTISONE SODIUM SUCCINATE 50 MG: 100 INJECTION, POWDER, FOR SOLUTION INTRAMUSCULAR; INTRAVENOUS at 05:40

## 2025-04-27 ASSESSMENT — PAIN SCALES - GENERAL: PAINLEVEL_OUTOF10: 0

## 2025-04-27 NOTE — CARE COORDINATION
Care Management Initial Assessment       RUR: 18%  Readmission? No  1st IM letter given? No  1st  letter given: No       04/27/25 1147   Service Assessment   Patient Orientation Self   Cognition Other (see comment)  (Family has notived cognitive decline over the past month.)   History Provided By Child/Family;Medical Record  (Son, Ariel Escobar [phone])   Primary Caregiver   (Came from [facility])   Support Systems Children   Current Functional Level Assistance with the following:;Mobility   Can patient return to prior living arrangement Unknown at present   Ability to make needs known: Fair   Family able to assist with home care needs: Yes  (Ariel Escobar, Son. [phone])       CM contacted son, Ariel Escobar [phone], for clinical review and discharge planning.    Son states that his mother has a private apartment. She is not currently living there, as she was admitted to [facility] approximately one week ago. She had planned to stay at [facility] for at least 30 days, as her primary & only insurance coverage is [facility] [employer]. She was to receive PT and OT at [place].     She has HD MWF at [facility].  Son states she is pleasantly confused. She is on Home O2-- 3-4L/NC. Has JF and CPAP machine qhs.    Son states he would prefer she go back to [facility].   Referral placed in Epic.    Sylvie Sanchez RN-MSN  Care Management

## 2025-04-27 NOTE — PROCEDURES
PROCEDURE NOTE  Date: 4/27/2025   Name: Yuki Escobar  YOB: 1960    Insert Arterial Line    Date/Time: 4/27/2025 5:32 AM    Performed by: Kd Teixeira MD  Authorized by: Kd Teixeira MD  Consent: Verbal consent obtained. Written consent obtained.  Risks and benefits: risks, benefits and alternatives were discussed  Consent given by: power of   Patient identity confirmed: verbally with patient and arm band  Time out: Immediately prior to procedure a \"time out\" was called to verify the correct patient, procedure, equipment, support staff and site/side marked as required.  Preparation: Patient was prepped and draped in the usual sterile fashion.  Indications: multiple ABGs and hemodynamic monitoring  Location: right femoral    Anesthesia:  Local Anesthetic: lidocaine 1% without epinephrine    Sedation:  Patient sedated: no    Number of attempts: 2  Post-procedure: line sutured  Post-procedure CMS: normal  Patient tolerance: patient tolerated the procedure well with no immediate complications

## 2025-04-28 ENCOUNTER — APPOINTMENT (OUTPATIENT)
Facility: HOSPITAL | Age: 65
DRG: 720 | End: 2025-04-28
Attending: STUDENT IN AN ORGANIZED HEALTH CARE EDUCATION/TRAINING PROGRAM
Payer: MEDICAID

## 2025-04-28 LAB
AMMONIA PLAS-SCNC: <10 UMOL/L
ANION GAP SERPL CALC-SCNC: 8 MMOL/L (ref 2–12)
BUN SERPL-MCNC: 44 MG/DL (ref 6–20)
BUN/CREAT SERPL: 6 (ref 12–20)
CALCIUM SERPL-MCNC: 8.9 MG/DL (ref 8.5–10.1)
CHLORIDE SERPL-SCNC: 107 MMOL/L (ref 97–108)
CO2 SERPL-SCNC: 23 MMOL/L (ref 21–32)
CREAT SERPL-MCNC: 6.78 MG/DL (ref 0.55–1.02)
ECHO AV AREA PEAK VELOCITY: 1 CM2
ECHO AV AREA VTI: 1.1 CM2
ECHO AV AREA/BSA PEAK VELOCITY: 0.5 CM2/M2
ECHO AV AREA/BSA VTI: 0.5 CM2/M2
ECHO AV MEAN GRADIENT: 27 MMHG
ECHO AV MEAN VELOCITY: 2.4 M/S
ECHO AV PEAK GRADIENT: 45 MMHG
ECHO AV PEAK VELOCITY: 3.4 M/S
ECHO AV VELOCITY RATIO: 0.32
ECHO AV VTI: 59.6 CM
ECHO BSA: 2.24 M2
ECHO EST RA PRESSURE: 15 MMHG
ECHO LV EF PHYSICIAN: 50 %
ECHO LV FRACTIONAL SHORTENING: 47 % (ref 28–44)
ECHO LV INTERNAL DIMENSION DIASTOLE INDEX: 2.34 CM/M2
ECHO LV INTERNAL DIMENSION DIASTOLIC: 5.1 CM (ref 3.9–5.3)
ECHO LV INTERNAL DIMENSION SYSTOLIC INDEX: 1.24 CM/M2
ECHO LV INTERNAL DIMENSION SYSTOLIC: 2.7 CM
ECHO LV IVSD: 0.7 CM (ref 0.6–0.9)
ECHO LV MASS 2D: 118.7 G (ref 67–162)
ECHO LV MASS INDEX 2D: 54.5 G/M2 (ref 43–95)
ECHO LV POSTERIOR WALL DIASTOLIC: 0.7 CM (ref 0.6–0.9)
ECHO LV RELATIVE WALL THICKNESS RATIO: 0.27
ECHO LVOT AREA: 3.1 CM2
ECHO LVOT AV VTI INDEX: 0.33
ECHO LVOT DIAM: 2 CM
ECHO LVOT MEAN GRADIENT: 3 MMHG
ECHO LVOT PEAK GRADIENT: 5 MMHG
ECHO LVOT PEAK VELOCITY: 1.1 M/S
ECHO LVOT STROKE VOLUME INDEX: 28.7 ML/M2
ECHO LVOT SV: 62.5 ML
ECHO LVOT VTI: 19.9 CM
ECHO RIGHT VENTRICULAR SYSTOLIC PRESSURE (RVSP): 111 MMHG
ECHO RV INTERNAL DIMENSION: 5.3 CM
ECHO TV REGURGITANT MAX VELOCITY: 4.91 M/S
ECHO TV REGURGITANT PEAK GRADIENT: 96 MMHG
ERYTHROCYTE [DISTWIDTH] IN BLOOD BY AUTOMATED COUNT: 15.8 % (ref 11.5–14.5)
FOLATE SERPL-MCNC: 2.8 NG/ML (ref 5–21)
GLUCOSE SERPL-MCNC: 158 MG/DL (ref 65–100)
HBV SURFACE AB SER QL: NONREACTIVE
HBV SURFACE AB SER-ACNC: 6.16 MIU/ML
HBV SURFACE AG SER QL: <0.1 INDEX
HBV SURFACE AG SER QL: NEGATIVE
HCT VFR BLD AUTO: 34.5 % (ref 35–47)
HGB BLD-MCNC: 10.9 G/DL (ref 11.5–16)
MAGNESIUM SERPL-MCNC: 2.4 MG/DL (ref 1.6–2.4)
MAGNESIUM SERPL-MCNC: 2.4 MG/DL (ref 1.6–2.4)
MCH RBC QN AUTO: 33.1 PG (ref 26–34)
MCHC RBC AUTO-ENTMCNC: 31.6 G/DL (ref 30–36.5)
MCV RBC AUTO: 104.9 FL (ref 80–99)
NRBC # BLD: 0 K/UL (ref 0–0.01)
NRBC BLD-RTO: 0 PER 100 WBC
PERIPHERAL SMEAR, MD REVIEW: NORMAL
PHOSPHATE SERPL-MCNC: 5 MG/DL (ref 2.6–4.7)
PHOSPHATE SERPL-MCNC: 5.2 MG/DL (ref 2.6–4.7)
PLATELET # BLD AUTO: 53 K/UL (ref 150–400)
PMV BLD AUTO: 12.2 FL (ref 8.9–12.9)
POTASSIUM SERPL-SCNC: 4.2 MMOL/L (ref 3.5–5.1)
RBC # BLD AUTO: 3.29 M/UL (ref 3.8–5.2)
SODIUM SERPL-SCNC: 138 MMOL/L (ref 136–145)
VIT B12 SERPL-MCNC: 704 PG/ML (ref 193–986)
WBC # BLD AUTO: 9.4 K/UL (ref 3.6–11)

## 2025-04-28 PROCEDURE — 80048 BASIC METABOLIC PNL TOTAL CA: CPT

## 2025-04-28 PROCEDURE — 86706 HEP B SURFACE ANTIBODY: CPT

## 2025-04-28 PROCEDURE — 2580000003 HC RX 258: Performed by: INTERNAL MEDICINE

## 2025-04-28 PROCEDURE — 93306 TTE W/DOPPLER COMPLETE: CPT | Performed by: SPECIALIST

## 2025-04-28 PROCEDURE — 6370000000 HC RX 637 (ALT 250 FOR IP): Performed by: STUDENT IN AN ORGANIZED HEALTH CARE EDUCATION/TRAINING PROGRAM

## 2025-04-28 PROCEDURE — 82140 ASSAY OF AMMONIA: CPT

## 2025-04-28 PROCEDURE — 86704 HEP B CORE ANTIBODY TOTAL: CPT

## 2025-04-28 PROCEDURE — 94640 AIRWAY INHALATION TREATMENT: CPT

## 2025-04-28 PROCEDURE — C8929 TTE W OR WO FOL WCON,DOPPLER: HCPCS

## 2025-04-28 PROCEDURE — 6360000002 HC RX W HCPCS: Performed by: STUDENT IN AN ORGANIZED HEALTH CARE EDUCATION/TRAINING PROGRAM

## 2025-04-28 PROCEDURE — 84100 ASSAY OF PHOSPHORUS: CPT

## 2025-04-28 PROCEDURE — 6360000004 HC RX CONTRAST MEDICATION: Performed by: SPECIALIST

## 2025-04-28 PROCEDURE — 2700000000 HC OXYGEN THERAPY PER DAY

## 2025-04-28 PROCEDURE — 94760 N-INVAS EAR/PLS OXIMETRY 1: CPT

## 2025-04-28 PROCEDURE — 83735 ASSAY OF MAGNESIUM: CPT

## 2025-04-28 PROCEDURE — 6360000002 HC RX W HCPCS: Performed by: INTERNAL MEDICINE

## 2025-04-28 PROCEDURE — 87340 HEPATITIS B SURFACE AG IA: CPT

## 2025-04-28 PROCEDURE — 6360000002 HC RX W HCPCS: Performed by: PHYSICIAN ASSISTANT

## 2025-04-28 PROCEDURE — 90945 DIALYSIS ONE EVALUATION: CPT

## 2025-04-28 PROCEDURE — 5A1D90Z PERFORMANCE OF URINARY FILTRATION, CONTINUOUS, GREATER THAN 18 HOURS PER DAY: ICD-10-PCS | Performed by: INTERNAL MEDICINE

## 2025-04-28 PROCEDURE — 2500000003 HC RX 250 WO HCPCS: Performed by: INTERNAL MEDICINE

## 2025-04-28 PROCEDURE — 6370000000 HC RX 637 (ALT 250 FOR IP): Performed by: ANESTHESIOLOGY

## 2025-04-28 PROCEDURE — 2500000003 HC RX 250 WO HCPCS: Performed by: PHYSICIAN ASSISTANT

## 2025-04-28 PROCEDURE — 85027 COMPLETE CBC AUTOMATED: CPT

## 2025-04-28 PROCEDURE — 6370000000 HC RX 637 (ALT 250 FOR IP): Performed by: PHYSICIAN ASSISTANT

## 2025-04-28 PROCEDURE — 2000000000 HC ICU R&B

## 2025-04-28 RX ORDER — HEPARIN SODIUM 1000 [USP'U]/ML
INJECTION, SOLUTION INTRAVENOUS; SUBCUTANEOUS PRN
Status: DISCONTINUED | OUTPATIENT
Start: 2025-04-28 | End: 2025-05-07

## 2025-04-28 RX ORDER — CYCLOBENZAPRINE HCL 10 MG
10 TABLET ORAL 3 TIMES DAILY PRN
Status: DISCONTINUED | OUTPATIENT
Start: 2025-04-28 | End: 2025-05-20 | Stop reason: HOSPADM

## 2025-04-28 RX ORDER — MIDODRINE HYDROCHLORIDE 5 MG/1
10 TABLET ORAL
Status: DISCONTINUED | OUTPATIENT
Start: 2025-04-28 | End: 2025-04-30

## 2025-04-28 RX ADMIN — NOREPINEPHRINE BITARTRATE 4 MCG/MIN: 1 INJECTION, SOLUTION, CONCENTRATE INTRAVENOUS at 03:54

## 2025-04-28 RX ADMIN — CYCLOBENZAPRINE 10 MG: 10 TABLET, FILM COATED ORAL at 05:37

## 2025-04-28 RX ADMIN — ALBUTEROL SULFATE 2.5 MG: 2.5 SOLUTION RESPIRATORY (INHALATION) at 03:54

## 2025-04-28 RX ADMIN — MONTELUKAST 10 MG: 10 TABLET, FILM COATED ORAL at 21:32

## 2025-04-28 RX ADMIN — BUDESONIDE 500 MCG: 0.5 INHALANT RESPIRATORY (INHALATION) at 07:45

## 2025-04-28 RX ADMIN — Medication: at 23:05

## 2025-04-28 RX ADMIN — ATORVASTATIN CALCIUM 10 MG: 20 TABLET, FILM COATED ORAL at 21:32

## 2025-04-28 RX ADMIN — ACETAMINOPHEN 650 MG: 325 TABLET ORAL at 23:05

## 2025-04-28 RX ADMIN — SODIUM CHLORIDE, PRESERVATIVE FREE 10 ML: 5 INJECTION INTRAVENOUS at 09:12

## 2025-04-28 RX ADMIN — SENNOSIDES 8.6 MG: 8.6 TABLET, FILM COATED ORAL at 21:32

## 2025-04-28 RX ADMIN — Medication: at 14:19

## 2025-04-28 RX ADMIN — HYDROCORTISONE SODIUM SUCCINATE 50 MG: 100 INJECTION, POWDER, FOR SOLUTION INTRAMUSCULAR; INTRAVENOUS at 21:32

## 2025-04-28 RX ADMIN — MIDODRINE HYDROCHLORIDE 10 MG: 5 TABLET ORAL at 17:11

## 2025-04-28 RX ADMIN — MIDODRINE HYDROCHLORIDE 10 MG: 5 TABLET ORAL at 12:36

## 2025-04-28 RX ADMIN — HYDROCORTISONE SODIUM SUCCINATE 50 MG: 100 INJECTION, POWDER, FOR SOLUTION INTRAMUSCULAR; INTRAVENOUS at 10:59

## 2025-04-28 RX ADMIN — HYDROCORTISONE SODIUM SUCCINATE 50 MG: 100 INJECTION, POWDER, FOR SOLUTION INTRAMUSCULAR; INTRAVENOUS at 05:03

## 2025-04-28 RX ADMIN — Medication: at 23:13

## 2025-04-28 RX ADMIN — SODIUM CHLORIDE, PRESERVATIVE FREE 10 ML: 5 INJECTION INTRAVENOUS at 21:33

## 2025-04-28 RX ADMIN — IPRATROPIUM BROMIDE AND ALBUTEROL SULFATE 1 DOSE: 2.5; .5 SOLUTION RESPIRATORY (INHALATION) at 07:45

## 2025-04-28 RX ADMIN — IPRATROPIUM BROMIDE AND ALBUTEROL SULFATE 1 DOSE: 2.5; .5 SOLUTION RESPIRATORY (INHALATION) at 11:34

## 2025-04-28 RX ADMIN — Medication: at 18:00

## 2025-04-28 RX ADMIN — Medication: at 14:18

## 2025-04-28 RX ADMIN — VASOPRESSIN 0.03 UNITS/MIN: 20 INJECTION INTRAVENOUS at 06:40

## 2025-04-28 RX ADMIN — Medication 1 MG: at 09:11

## 2025-04-28 RX ADMIN — BUDESONIDE 500 MCG: 0.5 INHALANT RESPIRATORY (INHALATION) at 19:41

## 2025-04-28 RX ADMIN — HYDROCORTISONE SODIUM SUCCINATE 50 MG: 100 INJECTION, POWDER, FOR SOLUTION INTRAMUSCULAR; INTRAVENOUS at 17:11

## 2025-04-28 RX ADMIN — Medication: at 14:20

## 2025-04-28 RX ADMIN — VASOPRESSIN 0.03 UNITS/MIN: 20 INJECTION INTRAVENOUS at 19:04

## 2025-04-28 RX ADMIN — CEFEPIME 2000 MG: 2 INJECTION, POWDER, FOR SOLUTION INTRAVENOUS at 18:05

## 2025-04-28 RX ADMIN — SULFUR HEXAFLUORIDE 2 ML: KIT at 11:10

## 2025-04-28 RX ADMIN — IPRATROPIUM BROMIDE AND ALBUTEROL SULFATE 1 DOSE: 2.5; .5 SOLUTION RESPIRATORY (INHALATION) at 19:41

## 2025-04-28 ASSESSMENT — PAIN SCALES - GENERAL
PAINLEVEL_OUTOF10: 5
PAINLEVEL_OUTOF10: 0

## 2025-04-28 NOTE — WOUND CARE
Consulted for sacrum.    Seen in [id number] with RN, Ember.  Requires 2-person turns.  Resting on a OSEI mattress.   Barrier cream in use and wiped away with ostomy remover wipe.  No wounds or redness seen.  Triad applied as barrier.   Natural deep gluteal cleft.     Heels intact without redness and offloaded on pillows.     Elba Gage, CWLIZAN

## 2025-04-29 ENCOUNTER — APPOINTMENT (OUTPATIENT)
Facility: HOSPITAL | Age: 65
DRG: 720 | End: 2025-04-29
Attending: STUDENT IN AN ORGANIZED HEALTH CARE EDUCATION/TRAINING PROGRAM
Payer: MEDICAID

## 2025-04-29 ENCOUNTER — APPOINTMENT (OUTPATIENT)
Facility: HOSPITAL | Age: 65
DRG: 720 | End: 2025-04-29
Payer: MEDICAID

## 2025-04-29 PROBLEM — Z51.5 PALLIATIVE CARE BY SPECIALIST: Status: ACTIVE | Noted: 2025-04-29

## 2025-04-29 PROBLEM — N18.6 ESRD (END STAGE RENAL DISEASE) (HCC): Status: ACTIVE | Noted: 2025-04-29

## 2025-04-29 PROBLEM — I50.9 CONGESTIVE HEART FAILURE (HCC): Status: ACTIVE | Noted: 2025-04-29

## 2025-04-29 LAB
ANION GAP SERPL CALC-SCNC: 5 MMOL/L (ref 2–12)
BACTERIA SPEC CULT: NORMAL
BACTERIA SPEC CULT: NORMAL
BUN SERPL-MCNC: 35 MG/DL (ref 6–20)
BUN/CREAT SERPL: 7 (ref 12–20)
CALCIUM SERPL-MCNC: 8.6 MG/DL (ref 8.5–10.1)
CHLORIDE SERPL-SCNC: 107 MMOL/L (ref 97–108)
CO2 SERPL-SCNC: 24 MMOL/L (ref 21–32)
COMMENT:: NORMAL
CREAT SERPL-MCNC: 4.95 MG/DL (ref 0.55–1.02)
ERYTHROCYTE [DISTWIDTH] IN BLOOD BY AUTOMATED COUNT: 16 % (ref 11.5–14.5)
GLUCOSE SERPL-MCNC: 143 MG/DL (ref 65–100)
HCT VFR BLD AUTO: 31.5 % (ref 35–47)
HGB BLD-MCNC: 10 G/DL (ref 11.5–16)
MAGNESIUM SERPL-MCNC: 2.3 MG/DL (ref 1.6–2.4)
MCH RBC QN AUTO: 33.3 PG (ref 26–34)
MCHC RBC AUTO-ENTMCNC: 31.7 G/DL (ref 30–36.5)
MCV RBC AUTO: 105 FL (ref 80–99)
NRBC # BLD: 0.02 K/UL (ref 0–0.01)
NRBC BLD-RTO: 0.2 PER 100 WBC
PHOSPHATE SERPL-MCNC: 3.5 MG/DL (ref 2.6–4.7)
PLATELET # BLD AUTO: 50 K/UL (ref 150–400)
PMV BLD AUTO: 12.1 FL (ref 8.9–12.9)
POTASSIUM SERPL-SCNC: 4.4 MMOL/L (ref 3.5–5.1)
RBC # BLD AUTO: 3 M/UL (ref 3.8–5.2)
SERVICE CMNT-IMP: NORMAL
SODIUM SERPL-SCNC: 136 MMOL/L (ref 136–145)
SPECIMEN HOLD: NORMAL
TRANSFERRIN SERPL-MCNC: 94 MG/DL (ref 192–364)
VANCOMYCIN SERPL-MCNC: 15.7 UG/ML
WBC # BLD AUTO: 10.7 K/UL (ref 3.6–11)

## 2025-04-29 PROCEDURE — 94640 AIRWAY INHALATION TREATMENT: CPT

## 2025-04-29 PROCEDURE — 99221 1ST HOSP IP/OBS SF/LOW 40: CPT | Performed by: INTERNAL MEDICINE

## 2025-04-29 PROCEDURE — P9047 ALBUMIN (HUMAN), 25%, 50ML: HCPCS | Performed by: INTERNAL MEDICINE

## 2025-04-29 PROCEDURE — 71045 X-RAY EXAM CHEST 1 VIEW: CPT

## 2025-04-29 PROCEDURE — 2580000003 HC RX 258: Performed by: INTERNAL MEDICINE

## 2025-04-29 PROCEDURE — 6370000000 HC RX 637 (ALT 250 FOR IP): Performed by: STUDENT IN AN ORGANIZED HEALTH CARE EDUCATION/TRAINING PROGRAM

## 2025-04-29 PROCEDURE — 2000000000 HC ICU R&B

## 2025-04-29 PROCEDURE — 2500000003 HC RX 250 WO HCPCS: Performed by: PHYSICIAN ASSISTANT

## 2025-04-29 PROCEDURE — 6370000000 HC RX 637 (ALT 250 FOR IP): Performed by: PHYSICIAN ASSISTANT

## 2025-04-29 PROCEDURE — 94760 N-INVAS EAR/PLS OXIMETRY 1: CPT

## 2025-04-29 PROCEDURE — 80202 ASSAY OF VANCOMYCIN: CPT

## 2025-04-29 PROCEDURE — 90935 HEMODIALYSIS ONE EVALUATION: CPT

## 2025-04-29 PROCEDURE — 5A1D70Z PERFORMANCE OF URINARY FILTRATION, INTERMITTENT, LESS THAN 6 HOURS PER DAY: ICD-10-PCS | Performed by: INTERNAL MEDICINE

## 2025-04-29 PROCEDURE — 2500000003 HC RX 250 WO HCPCS: Performed by: INTERNAL MEDICINE

## 2025-04-29 PROCEDURE — 6370000000 HC RX 637 (ALT 250 FOR IP): Performed by: ANESTHESIOLOGY

## 2025-04-29 PROCEDURE — 85027 COMPLETE CBC AUTOMATED: CPT

## 2025-04-29 PROCEDURE — 2700000000 HC OXYGEN THERAPY PER DAY

## 2025-04-29 PROCEDURE — 84100 ASSAY OF PHOSPHORUS: CPT

## 2025-04-29 PROCEDURE — 93970 EXTREMITY STUDY: CPT

## 2025-04-29 PROCEDURE — 6360000002 HC RX W HCPCS: Performed by: INTERNAL MEDICINE

## 2025-04-29 PROCEDURE — 83735 ASSAY OF MAGNESIUM: CPT

## 2025-04-29 PROCEDURE — 81256 HFE GENE: CPT

## 2025-04-29 PROCEDURE — 2580000003 HC RX 258: Performed by: PHYSICIAN ASSISTANT

## 2025-04-29 PROCEDURE — 2580000003 HC RX 258: Performed by: STUDENT IN AN ORGANIZED HEALTH CARE EDUCATION/TRAINING PROGRAM

## 2025-04-29 PROCEDURE — 6360000002 HC RX W HCPCS: Performed by: STUDENT IN AN ORGANIZED HEALTH CARE EDUCATION/TRAINING PROGRAM

## 2025-04-29 PROCEDURE — 80048 BASIC METABOLIC PNL TOTAL CA: CPT

## 2025-04-29 RX ORDER — ALBUMIN (HUMAN) 12.5 G/50ML
25 SOLUTION INTRAVENOUS AS NEEDED
Status: DISCONTINUED | OUTPATIENT
Start: 2025-04-29 | End: 2025-05-20 | Stop reason: HOSPADM

## 2025-04-29 RX ORDER — SODIUM CHLORIDE 9 MG/ML
INJECTION, SOLUTION INTRAVENOUS CONTINUOUS
Status: DISCONTINUED | OUTPATIENT
Start: 2025-04-29 | End: 2025-05-20 | Stop reason: HOSPADM

## 2025-04-29 RX ADMIN — IPRATROPIUM BROMIDE AND ALBUTEROL SULFATE 1 DOSE: 2.5; .5 SOLUTION RESPIRATORY (INHALATION) at 07:08

## 2025-04-29 RX ADMIN — MIDODRINE HYDROCHLORIDE 10 MG: 5 TABLET ORAL at 17:24

## 2025-04-29 RX ADMIN — MONTELUKAST 10 MG: 10 TABLET, FILM COATED ORAL at 20:25

## 2025-04-29 RX ADMIN — Medication: at 02:43

## 2025-04-29 RX ADMIN — BUDESONIDE 500 MCG: 0.5 INHALANT RESPIRATORY (INHALATION) at 19:44

## 2025-04-29 RX ADMIN — MIDODRINE HYDROCHLORIDE 10 MG: 5 TABLET ORAL at 12:50

## 2025-04-29 RX ADMIN — IPRATROPIUM BROMIDE AND ALBUTEROL SULFATE 1 DOSE: 2.5; .5 SOLUTION RESPIRATORY (INHALATION) at 19:44

## 2025-04-29 RX ADMIN — HEPARIN SODIUM 1800 UNITS: 1000 INJECTION INTRAVENOUS; SUBCUTANEOUS at 12:00

## 2025-04-29 RX ADMIN — BUDESONIDE 500 MCG: 0.5 INHALANT RESPIRATORY (INHALATION) at 07:08

## 2025-04-29 RX ADMIN — SENNOSIDES 8.6 MG: 8.6 TABLET, FILM COATED ORAL at 20:25

## 2025-04-29 RX ADMIN — HYDROCORTISONE SODIUM SUCCINATE 50 MG: 100 INJECTION, POWDER, FOR SOLUTION INTRAMUSCULAR; INTRAVENOUS at 17:24

## 2025-04-29 RX ADMIN — SODIUM CHLORIDE, PRESERVATIVE FREE 10 ML: 5 INJECTION INTRAVENOUS at 21:00

## 2025-04-29 RX ADMIN — CYCLOBENZAPRINE 10 MG: 10 TABLET, FILM COATED ORAL at 20:25

## 2025-04-29 RX ADMIN — IPRATROPIUM BROMIDE AND ALBUTEROL SULFATE 1 DOSE: 2.5; .5 SOLUTION RESPIRATORY (INHALATION) at 12:04

## 2025-04-29 RX ADMIN — ACETAMINOPHEN 650 MG: 325 TABLET ORAL at 20:25

## 2025-04-29 RX ADMIN — HEPARIN SODIUM 1800 UNITS: 1000 INJECTION INTRAVENOUS; SUBCUTANEOUS at 04:40

## 2025-04-29 RX ADMIN — IPRATROPIUM BROMIDE AND ALBUTEROL SULFATE 1 DOSE: 2.5; .5 SOLUTION RESPIRATORY (INHALATION) at 15:34

## 2025-04-29 RX ADMIN — MIDODRINE HYDROCHLORIDE 10 MG: 5 TABLET ORAL at 09:03

## 2025-04-29 RX ADMIN — HYDROCORTISONE SODIUM SUCCINATE 50 MG: 100 INJECTION, POWDER, FOR SOLUTION INTRAMUSCULAR; INTRAVENOUS at 23:09

## 2025-04-29 RX ADMIN — HYDROCORTISONE SODIUM SUCCINATE 50 MG: 100 INJECTION, POWDER, FOR SOLUTION INTRAMUSCULAR; INTRAVENOUS at 10:59

## 2025-04-29 RX ADMIN — VASOPRESSIN 0.03 UNITS/MIN: 20 INJECTION INTRAVENOUS at 03:19

## 2025-04-29 RX ADMIN — Medication 1 MG: at 09:03

## 2025-04-29 RX ADMIN — VASOPRESSIN 0.03 UNITS/MIN: 20 INJECTION INTRAVENOUS at 12:51

## 2025-04-29 RX ADMIN — SODIUM CHLORIDE: 0.9 INJECTION, SOLUTION INTRAVENOUS at 23:24

## 2025-04-29 RX ADMIN — SODIUM CHLORIDE 1250 MG: 9 INJECTION, SOLUTION INTRAVENOUS at 13:45

## 2025-04-29 RX ADMIN — ALBUMIN (HUMAN) 25 G: 0.25 INJECTION, SOLUTION INTRAVENOUS at 11:07

## 2025-04-29 RX ADMIN — ATORVASTATIN CALCIUM 10 MG: 20 TABLET, FILM COATED ORAL at 20:25

## 2025-04-29 RX ADMIN — CEFEPIME 2000 MG: 2 INJECTION, POWDER, FOR SOLUTION INTRAVENOUS at 05:53

## 2025-04-29 RX ADMIN — HYDROCORTISONE SODIUM SUCCINATE 50 MG: 100 INJECTION, POWDER, FOR SOLUTION INTRAMUSCULAR; INTRAVENOUS at 05:54

## 2025-04-29 RX ADMIN — HEPARIN SODIUM 1900 UNITS: 1000 INJECTION INTRAVENOUS; SUBCUTANEOUS at 12:02

## 2025-04-29 RX ADMIN — NOREPINEPHRINE BITARTRATE 4 MCG/MIN: 1 INJECTION, SOLUTION, CONCENTRATE INTRAVENOUS at 03:14

## 2025-04-29 RX ADMIN — SODIUM CHLORIDE, PRESERVATIVE FREE 10 ML: 5 INJECTION INTRAVENOUS at 09:04

## 2025-04-29 RX ADMIN — HEPARIN SODIUM 1900 UNITS: 1000 INJECTION INTRAVENOUS; SUBCUTANEOUS at 04:39

## 2025-04-29 ASSESSMENT — PAIN SCALES - GENERAL
PAINLEVEL_OUTOF10: 0
PAINLEVEL_OUTOF10: 0

## 2025-04-29 NOTE — DIALYSIS
1600 Called  about this patient twice clotted on CRRT machine overnight. Ordered not to restart and wants to do trial HD in the morning. Also informed her that patient still currently on 2 pressors. Hd orders via telephone call given. CCU nurse made aware.

## 2025-04-30 LAB
ALBUMIN SERPL-MCNC: 3 G/DL (ref 3.5–5)
ANION GAP SERPL CALC-SCNC: 8 MMOL/L (ref 2–12)
BACTERIA SPEC CULT: NORMAL
BACTERIA SPEC CULT: NORMAL
BUN SERPL-MCNC: 26 MG/DL (ref 6–20)
BUN/CREAT SERPL: 7 (ref 12–20)
CALCIUM SERPL-MCNC: 9.3 MG/DL (ref 8.5–10.1)
CHLORIDE SERPL-SCNC: 105 MMOL/L (ref 97–108)
CO2 SERPL-SCNC: 25 MMOL/L (ref 21–32)
CREAT SERPL-MCNC: 3.87 MG/DL (ref 0.55–1.02)
ERYTHROCYTE [DISTWIDTH] IN BLOOD BY AUTOMATED COUNT: 15.9 % (ref 11.5–14.5)
GLUCOSE SERPL-MCNC: 137 MG/DL (ref 65–100)
HBV CORE AB SERPL QL IA: NEGATIVE
HCT VFR BLD AUTO: 31.8 % (ref 35–47)
HGB BLD-MCNC: 9.9 G/DL (ref 11.5–16)
MAGNESIUM SERPL-MCNC: 2.2 MG/DL (ref 1.6–2.4)
MCH RBC QN AUTO: 33.7 PG (ref 26–34)
MCHC RBC AUTO-ENTMCNC: 31.1 G/DL (ref 30–36.5)
MCV RBC AUTO: 108.2 FL (ref 80–99)
NRBC # BLD: 0 K/UL (ref 0–0.01)
NRBC BLD-RTO: 0 PER 100 WBC
PHOSPHATE SERPL-MCNC: 4.3 MG/DL (ref 2.6–4.7)
PLATELET # BLD AUTO: 53 K/UL (ref 150–400)
PMV BLD AUTO: 12.7 FL (ref 8.9–12.9)
POTASSIUM SERPL-SCNC: 4.4 MMOL/L (ref 3.5–5.1)
RBC # BLD AUTO: 2.94 M/UL (ref 3.8–5.2)
SERVICE CMNT-IMP: NORMAL
SERVICE CMNT-IMP: NORMAL
SODIUM SERPL-SCNC: 138 MMOL/L (ref 136–145)
WBC # BLD AUTO: 8.6 K/UL (ref 3.6–11)

## 2025-04-30 PROCEDURE — 6370000000 HC RX 637 (ALT 250 FOR IP): Performed by: STUDENT IN AN ORGANIZED HEALTH CARE EDUCATION/TRAINING PROGRAM

## 2025-04-30 PROCEDURE — 6360000002 HC RX W HCPCS: Performed by: STUDENT IN AN ORGANIZED HEALTH CARE EDUCATION/TRAINING PROGRAM

## 2025-04-30 PROCEDURE — 2500000003 HC RX 250 WO HCPCS: Performed by: INTERNAL MEDICINE

## 2025-04-30 PROCEDURE — 97530 THERAPEUTIC ACTIVITIES: CPT

## 2025-04-30 PROCEDURE — 6370000000 HC RX 637 (ALT 250 FOR IP): Performed by: PHYSICIAN ASSISTANT

## 2025-04-30 PROCEDURE — 97163 PT EVAL HIGH COMPLEX 45 MIN: CPT

## 2025-04-30 PROCEDURE — 2700000000 HC OXYGEN THERAPY PER DAY

## 2025-04-30 PROCEDURE — 6370000000 HC RX 637 (ALT 250 FOR IP): Performed by: ANESTHESIOLOGY

## 2025-04-30 PROCEDURE — 97166 OT EVAL MOD COMPLEX 45 MIN: CPT

## 2025-04-30 PROCEDURE — 94760 N-INVAS EAR/PLS OXIMETRY 1: CPT

## 2025-04-30 PROCEDURE — 94640 AIRWAY INHALATION TREATMENT: CPT

## 2025-04-30 PROCEDURE — 5A09357 ASSISTANCE WITH RESPIRATORY VENTILATION, LESS THAN 24 CONSECUTIVE HOURS, CONTINUOUS POSITIVE AIRWAY PRESSURE: ICD-10-PCS | Performed by: PHYSICIAN ASSISTANT

## 2025-04-30 PROCEDURE — 6360000002 HC RX W HCPCS: Performed by: INTERNAL MEDICINE

## 2025-04-30 PROCEDURE — 2000000000 HC ICU R&B

## 2025-04-30 PROCEDURE — 83735 ASSAY OF MAGNESIUM: CPT

## 2025-04-30 PROCEDURE — 97535 SELF CARE MNGMENT TRAINING: CPT

## 2025-04-30 PROCEDURE — 90935 HEMODIALYSIS ONE EVALUATION: CPT

## 2025-04-30 PROCEDURE — 80069 RENAL FUNCTION PANEL: CPT

## 2025-04-30 PROCEDURE — 2580000003 HC RX 258: Performed by: STUDENT IN AN ORGANIZED HEALTH CARE EDUCATION/TRAINING PROGRAM

## 2025-04-30 PROCEDURE — 2500000003 HC RX 250 WO HCPCS: Performed by: PHYSICIAN ASSISTANT

## 2025-04-30 PROCEDURE — 2580000003 HC RX 258: Performed by: INTERNAL MEDICINE

## 2025-04-30 PROCEDURE — 85027 COMPLETE CBC AUTOMATED: CPT

## 2025-04-30 PROCEDURE — 94660 CPAP INITIATION&MGMT: CPT

## 2025-04-30 RX ORDER — HYDROCORTISONE SODIUM SUCCINATE 100 MG/2ML
50 INJECTION INTRAMUSCULAR; INTRAVENOUS EVERY 8 HOURS
Status: DISCONTINUED | OUTPATIENT
Start: 2025-04-30 | End: 2025-05-01

## 2025-04-30 RX ORDER — ARFORMOTEROL TARTRATE 15 UG/2ML
15 SOLUTION RESPIRATORY (INHALATION)
Status: DISCONTINUED | OUTPATIENT
Start: 2025-04-30 | End: 2025-04-30

## 2025-04-30 RX ORDER — HEPARIN SODIUM 5000 [USP'U]/ML
5000 INJECTION, SOLUTION INTRAVENOUS; SUBCUTANEOUS EVERY 8 HOURS SCHEDULED
Status: COMPLETED | OUTPATIENT
Start: 2025-04-30 | End: 2025-05-01

## 2025-04-30 RX ORDER — MIDODRINE HYDROCHLORIDE 5 MG/1
15 TABLET ORAL
Status: DISCONTINUED | OUTPATIENT
Start: 2025-04-30 | End: 2025-05-08

## 2025-04-30 RX ADMIN — HEPARIN SODIUM 1800 UNITS: 1000 INJECTION INTRAVENOUS; SUBCUTANEOUS at 21:04

## 2025-04-30 RX ADMIN — VASOPRESSIN 0.03 UNITS/MIN: 20 INJECTION INTRAVENOUS at 00:00

## 2025-04-30 RX ADMIN — IPRATROPIUM BROMIDE AND ALBUTEROL SULFATE 1 DOSE: 2.5; .5 SOLUTION RESPIRATORY (INHALATION) at 20:34

## 2025-04-30 RX ADMIN — IPRATROPIUM BROMIDE AND ALBUTEROL SULFATE 1 DOSE: 2.5; .5 SOLUTION RESPIRATORY (INHALATION) at 11:36

## 2025-04-30 RX ADMIN — NOREPINEPHRINE BITARTRATE 3 MCG/MIN: 1 INJECTION, SOLUTION, CONCENTRATE INTRAVENOUS at 02:00

## 2025-04-30 RX ADMIN — BUDESONIDE 500 MCG: 0.5 INHALANT RESPIRATORY (INHALATION) at 07:26

## 2025-04-30 RX ADMIN — MIDODRINE HYDROCHLORIDE 10 MG: 5 TABLET ORAL at 09:04

## 2025-04-30 RX ADMIN — CYCLOBENZAPRINE 10 MG: 10 TABLET, FILM COATED ORAL at 21:55

## 2025-04-30 RX ADMIN — Medication 1 MG: at 09:04

## 2025-04-30 RX ADMIN — VASOPRESSIN 0.03 UNITS/MIN: 20 INJECTION INTRAVENOUS at 12:24

## 2025-04-30 RX ADMIN — IPRATROPIUM BROMIDE AND ALBUTEROL SULFATE 1 DOSE: 2.5; .5 SOLUTION RESPIRATORY (INHALATION) at 15:50

## 2025-04-30 RX ADMIN — ATORVASTATIN CALCIUM 10 MG: 20 TABLET, FILM COATED ORAL at 21:55

## 2025-04-30 RX ADMIN — HYDROCORTISONE SODIUM SUCCINATE 50 MG: 100 INJECTION, POWDER, FOR SOLUTION INTRAMUSCULAR; INTRAVENOUS at 12:31

## 2025-04-30 RX ADMIN — MONTELUKAST 10 MG: 10 TABLET, FILM COATED ORAL at 21:55

## 2025-04-30 RX ADMIN — HYDROCORTISONE SODIUM SUCCINATE 50 MG: 100 INJECTION, POWDER, FOR SOLUTION INTRAMUSCULAR; INTRAVENOUS at 21:55

## 2025-04-30 RX ADMIN — HEPARIN SODIUM 5000 UNITS: 5000 INJECTION INTRAVENOUS; SUBCUTANEOUS at 21:55

## 2025-04-30 RX ADMIN — SODIUM CHLORIDE, PRESERVATIVE FREE 10 ML: 5 INJECTION INTRAVENOUS at 22:01

## 2025-04-30 RX ADMIN — BUDESONIDE 500 MCG: 0.5 INHALANT RESPIRATORY (INHALATION) at 20:34

## 2025-04-30 RX ADMIN — VASOPRESSIN 0.03 UNITS/MIN: 20 INJECTION INTRAVENOUS at 22:55

## 2025-04-30 RX ADMIN — ACETAMINOPHEN 650 MG: 325 TABLET ORAL at 21:54

## 2025-04-30 RX ADMIN — MIDODRINE HYDROCHLORIDE 15 MG: 5 TABLET ORAL at 12:30

## 2025-04-30 RX ADMIN — SODIUM CHLORIDE, PRESERVATIVE FREE 10 ML: 5 INJECTION INTRAVENOUS at 09:04

## 2025-04-30 RX ADMIN — SENNOSIDES 8.6 MG: 8.6 TABLET, FILM COATED ORAL at 21:54

## 2025-04-30 RX ADMIN — IPRATROPIUM BROMIDE AND ALBUTEROL SULFATE 1 DOSE: 2.5; .5 SOLUTION RESPIRATORY (INHALATION) at 07:26

## 2025-04-30 RX ADMIN — HYDROCORTISONE SODIUM SUCCINATE 50 MG: 100 INJECTION, POWDER, FOR SOLUTION INTRAMUSCULAR; INTRAVENOUS at 05:18

## 2025-04-30 RX ADMIN — HEPARIN SODIUM 1900 UNITS: 1000 INJECTION INTRAVENOUS; SUBCUTANEOUS at 21:07

## 2025-04-30 RX ADMIN — HEPARIN SODIUM 5000 UNITS: 5000 INJECTION INTRAVENOUS; SUBCUTANEOUS at 14:20

## 2025-04-30 RX ADMIN — CEFEPIME 1000 MG: 1 INJECTION, POWDER, FOR SOLUTION INTRAMUSCULAR; INTRAVENOUS at 05:19

## 2025-04-30 RX ADMIN — MIDODRINE HYDROCHLORIDE 15 MG: 5 TABLET ORAL at 16:57

## 2025-04-30 ASSESSMENT — PAIN DESCRIPTION - DESCRIPTORS: DESCRIPTORS: ACHING

## 2025-04-30 ASSESSMENT — PAIN SCALES - GENERAL
PAINLEVEL_OUTOF10: 0
PAINLEVEL_OUTOF10: 0
PAINLEVEL_OUTOF10: 5
PAINLEVEL_OUTOF10: 0
PAINLEVEL_OUTOF10: 0
PAINLEVEL_OUTOF10: 5
PAINLEVEL_OUTOF10: 0

## 2025-04-30 ASSESSMENT — PAIN DESCRIPTION - LOCATION: LOCATION: WRIST

## 2025-04-30 ASSESSMENT — PAIN DESCRIPTION - ORIENTATION: ORIENTATION: RIGHT

## 2025-04-30 ASSESSMENT — PAIN - FUNCTIONAL ASSESSMENT: PAIN_FUNCTIONAL_ASSESSMENT: ACTIVITIES ARE NOT PREVENTED

## 2025-05-01 LAB
ANION GAP SERPL CALC-SCNC: 9 MMOL/L (ref 2–12)
BUN SERPL-MCNC: 24 MG/DL (ref 6–20)
BUN/CREAT SERPL: 7 (ref 12–20)
CALCIUM SERPL-MCNC: 9.2 MG/DL (ref 8.5–10.1)
CHLORIDE SERPL-SCNC: 104 MMOL/L (ref 97–108)
CO2 SERPL-SCNC: 24 MMOL/L (ref 21–32)
CREAT SERPL-MCNC: 3.57 MG/DL (ref 0.55–1.02)
ECHO BSA: 2.24 M2
ERYTHROCYTE [DISTWIDTH] IN BLOOD BY AUTOMATED COUNT: 15.9 % (ref 11.5–14.5)
GLUCOSE SERPL-MCNC: 118 MG/DL (ref 65–100)
HCT VFR BLD AUTO: 30.9 % (ref 35–47)
HGB BLD-MCNC: 9.7 G/DL (ref 11.5–16)
MAGNESIUM SERPL-MCNC: 2 MG/DL (ref 1.6–2.4)
MCH RBC QN AUTO: 33.1 PG (ref 26–34)
MCHC RBC AUTO-ENTMCNC: 31.4 G/DL (ref 30–36.5)
MCV RBC AUTO: 105.5 FL (ref 80–99)
NRBC # BLD: 0 K/UL (ref 0–0.01)
NRBC BLD-RTO: 0 PER 100 WBC
PHOSPHATE SERPL-MCNC: 4.3 MG/DL (ref 2.6–4.7)
PLATELET # BLD AUTO: 67 K/UL (ref 150–400)
PMV BLD AUTO: 12 FL (ref 8.9–12.9)
POTASSIUM SERPL-SCNC: 4.1 MMOL/L (ref 3.5–5.1)
RBC # BLD AUTO: 2.93 M/UL (ref 3.8–5.2)
SODIUM SERPL-SCNC: 137 MMOL/L (ref 136–145)
WBC # BLD AUTO: 7.9 K/UL (ref 3.6–11)

## 2025-05-01 PROCEDURE — 6370000000 HC RX 637 (ALT 250 FOR IP): Performed by: ANESTHESIOLOGY

## 2025-05-01 PROCEDURE — 84100 ASSAY OF PHOSPHORUS: CPT

## 2025-05-01 PROCEDURE — 80048 BASIC METABOLIC PNL TOTAL CA: CPT

## 2025-05-01 PROCEDURE — 6370000000 HC RX 637 (ALT 250 FOR IP): Performed by: STUDENT IN AN ORGANIZED HEALTH CARE EDUCATION/TRAINING PROGRAM

## 2025-05-01 PROCEDURE — 97530 THERAPEUTIC ACTIVITIES: CPT

## 2025-05-01 PROCEDURE — 2580000003 HC RX 258: Performed by: INTERNAL MEDICINE

## 2025-05-01 PROCEDURE — 83735 ASSAY OF MAGNESIUM: CPT

## 2025-05-01 PROCEDURE — 6360000002 HC RX W HCPCS: Performed by: INTERNAL MEDICINE

## 2025-05-01 PROCEDURE — 2580000003 HC RX 258: Performed by: STUDENT IN AN ORGANIZED HEALTH CARE EDUCATION/TRAINING PROGRAM

## 2025-05-01 PROCEDURE — 6370000000 HC RX 637 (ALT 250 FOR IP): Performed by: PHYSICIAN ASSISTANT

## 2025-05-01 PROCEDURE — 90935 HEMODIALYSIS ONE EVALUATION: CPT

## 2025-05-01 PROCEDURE — 94760 N-INVAS EAR/PLS OXIMETRY 1: CPT

## 2025-05-01 PROCEDURE — 2500000003 HC RX 250 WO HCPCS: Performed by: INTERNAL MEDICINE

## 2025-05-01 PROCEDURE — 2700000000 HC OXYGEN THERAPY PER DAY

## 2025-05-01 PROCEDURE — 97535 SELF CARE MNGMENT TRAINING: CPT

## 2025-05-01 PROCEDURE — 6360000002 HC RX W HCPCS: Performed by: STUDENT IN AN ORGANIZED HEALTH CARE EDUCATION/TRAINING PROGRAM

## 2025-05-01 PROCEDURE — 94640 AIRWAY INHALATION TREATMENT: CPT

## 2025-05-01 PROCEDURE — 2000000000 HC ICU R&B

## 2025-05-01 PROCEDURE — 85027 COMPLETE CBC AUTOMATED: CPT

## 2025-05-01 PROCEDURE — 2500000003 HC RX 250 WO HCPCS: Performed by: PHYSICIAN ASSISTANT

## 2025-05-01 PROCEDURE — 4A023N6 MEASUREMENT OF CARDIAC SAMPLING AND PRESSURE, RIGHT HEART, PERCUTANEOUS APPROACH: ICD-10-PCS | Performed by: INTERNAL MEDICINE

## 2025-05-01 PROCEDURE — 99231 SBSQ HOSP IP/OBS SF/LOW 25: CPT | Performed by: INTERNAL MEDICINE

## 2025-05-01 RX ORDER — HYDROCORTISONE SODIUM SUCCINATE 100 MG/2ML
50 INJECTION INTRAMUSCULAR; INTRAVENOUS EVERY 12 HOURS
Status: DISCONTINUED | OUTPATIENT
Start: 2025-05-01 | End: 2025-05-02

## 2025-05-01 RX ADMIN — MIDODRINE HYDROCHLORIDE 15 MG: 5 TABLET ORAL at 16:30

## 2025-05-01 RX ADMIN — HEPARIN SODIUM 1900 UNITS: 1000 INJECTION INTRAVENOUS; SUBCUTANEOUS at 15:03

## 2025-05-01 RX ADMIN — IPRATROPIUM BROMIDE AND ALBUTEROL SULFATE 1 DOSE: 2.5; .5 SOLUTION RESPIRATORY (INHALATION) at 19:35

## 2025-05-01 RX ADMIN — HEPARIN SODIUM 5000 UNITS: 5000 INJECTION INTRAVENOUS; SUBCUTANEOUS at 22:04

## 2025-05-01 RX ADMIN — NOREPINEPHRINE BITARTRATE 2 MCG/MIN: 1 INJECTION, SOLUTION, CONCENTRATE INTRAVENOUS at 23:03

## 2025-05-01 RX ADMIN — HEPARIN SODIUM 5000 UNITS: 5000 INJECTION INTRAVENOUS; SUBCUTANEOUS at 16:30

## 2025-05-01 RX ADMIN — CYCLOBENZAPRINE 10 MG: 10 TABLET, FILM COATED ORAL at 22:04

## 2025-05-01 RX ADMIN — SODIUM CHLORIDE, PRESERVATIVE FREE 10 ML: 5 INJECTION INTRAVENOUS at 09:00

## 2025-05-01 RX ADMIN — BUDESONIDE 500 MCG: 0.5 INHALANT RESPIRATORY (INHALATION) at 19:35

## 2025-05-01 RX ADMIN — CEFEPIME 1000 MG: 1 INJECTION, POWDER, FOR SOLUTION INTRAMUSCULAR; INTRAVENOUS at 06:29

## 2025-05-01 RX ADMIN — VASOPRESSIN 0.03 UNITS/MIN: 20 INJECTION INTRAVENOUS at 23:04

## 2025-05-01 RX ADMIN — HEPARIN SODIUM 1800 UNITS: 1000 INJECTION INTRAVENOUS; SUBCUTANEOUS at 15:02

## 2025-05-01 RX ADMIN — MONTELUKAST 10 MG: 10 TABLET, FILM COATED ORAL at 22:05

## 2025-05-01 RX ADMIN — HEPARIN SODIUM 5000 UNITS: 5000 INJECTION INTRAVENOUS; SUBCUTANEOUS at 06:29

## 2025-05-01 RX ADMIN — NOREPINEPHRINE BITARTRATE 1 MCG/MIN: 1 INJECTION, SOLUTION, CONCENTRATE INTRAVENOUS at 06:31

## 2025-05-01 RX ADMIN — VASOPRESSIN 0.03 UNITS/MIN: 20 INJECTION INTRAVENOUS at 10:50

## 2025-05-01 RX ADMIN — HYDROCORTISONE SODIUM SUCCINATE 50 MG: 100 INJECTION, POWDER, FOR SOLUTION INTRAMUSCULAR; INTRAVENOUS at 06:26

## 2025-05-01 RX ADMIN — SENNOSIDES 8.6 MG: 8.6 TABLET, FILM COATED ORAL at 22:04

## 2025-05-01 RX ADMIN — ATORVASTATIN CALCIUM 10 MG: 20 TABLET, FILM COATED ORAL at 22:05

## 2025-05-01 RX ADMIN — HYDROCORTISONE SODIUM SUCCINATE 50 MG: 100 INJECTION, POWDER, FOR SOLUTION INTRAMUSCULAR; INTRAVENOUS at 16:30

## 2025-05-01 RX ADMIN — SODIUM CHLORIDE, PRESERVATIVE FREE 10 ML: 5 INJECTION INTRAVENOUS at 22:05

## 2025-05-01 RX ADMIN — MIDODRINE HYDROCHLORIDE 15 MG: 5 TABLET ORAL at 12:34

## 2025-05-01 RX ADMIN — Medication 1 MG: at 09:14

## 2025-05-01 RX ADMIN — MIDODRINE HYDROCHLORIDE 15 MG: 5 TABLET ORAL at 09:14

## 2025-05-01 ASSESSMENT — PAIN DESCRIPTION - DESCRIPTORS
DESCRIPTORS: SPASM
DESCRIPTORS: ACHING

## 2025-05-01 ASSESSMENT — PAIN SCALES - GENERAL
PAINLEVEL_OUTOF10: 0
PAINLEVEL_OUTOF10: 3
PAINLEVEL_OUTOF10: 0
PAINLEVEL_OUTOF10: 5
PAINLEVEL_OUTOF10: 4

## 2025-05-01 ASSESSMENT — PAIN DESCRIPTION - LOCATION
LOCATION: JAW
LOCATION: BACK

## 2025-05-01 ASSESSMENT — PAIN DESCRIPTION - ORIENTATION: ORIENTATION: RIGHT

## 2025-05-01 ASSESSMENT — PAIN - FUNCTIONAL ASSESSMENT: PAIN_FUNCTIONAL_ASSESSMENT: PREVENTS OR INTERFERES SOME ACTIVE ACTIVITIES AND ADLS

## 2025-05-02 LAB
ALBUMIN SERPL-MCNC: 3.5 G/DL (ref 3.5–5)
ALBUMIN/GLOB SERPL: 1.1 (ref 1.1–2.2)
ALP SERPL-CCNC: 112 U/L (ref 45–117)
ALT SERPL-CCNC: 31 U/L (ref 12–78)
ANION GAP SERPL CALC-SCNC: 11 MMOL/L (ref 2–12)
AST SERPL-CCNC: 16 U/L (ref 15–37)
BILIRUB SERPL-MCNC: 0.8 MG/DL (ref 0.2–1)
BUN SERPL-MCNC: 42 MG/DL (ref 6–20)
BUN/CREAT SERPL: 8 (ref 12–20)
CALCIUM SERPL-MCNC: 9.5 MG/DL (ref 8.5–10.1)
CHLORIDE SERPL-SCNC: 103 MMOL/L (ref 97–108)
CO2 SERPL-SCNC: 25 MMOL/L (ref 21–32)
CREAT SERPL-MCNC: 4.96 MG/DL (ref 0.55–1.02)
ECHO BSA: 2.24 M2
ERYTHROCYTE [DISTWIDTH] IN BLOOD BY AUTOMATED COUNT: 15.6 % (ref 11.5–14.5)
GLOBULIN SER CALC-MCNC: 3.1 G/DL (ref 2–4)
GLUCOSE SERPL-MCNC: 121 MG/DL (ref 65–100)
HCT VFR BLD AUTO: 33.8 % (ref 35–47)
HGB BLD-MCNC: 10.7 G/DL (ref 11.5–16)
MAGNESIUM SERPL-MCNC: 2.3 MG/DL (ref 1.6–2.4)
MCH RBC QN AUTO: 33.4 PG (ref 26–34)
MCHC RBC AUTO-ENTMCNC: 31.7 G/DL (ref 30–36.5)
MCV RBC AUTO: 105.6 FL (ref 80–99)
NRBC # BLD: 0 K/UL (ref 0–0.01)
NRBC BLD-RTO: 0 PER 100 WBC
PHOSPHATE SERPL-MCNC: 4.7 MG/DL (ref 2.6–4.7)
PLATELET # BLD AUTO: 97 K/UL (ref 150–400)
PMV BLD AUTO: 12.3 FL (ref 8.9–12.9)
POTASSIUM SERPL-SCNC: 4.5 MMOL/L (ref 3.5–5.1)
PROT SERPL-MCNC: 6.6 G/DL (ref 6.4–8.2)
RBC # BLD AUTO: 3.2 M/UL (ref 3.8–5.2)
SODIUM SERPL-SCNC: 139 MMOL/L (ref 136–145)
WBC # BLD AUTO: 8.6 K/UL (ref 3.6–11)

## 2025-05-02 PROCEDURE — 6360000002 HC RX W HCPCS: Performed by: STUDENT IN AN ORGANIZED HEALTH CARE EDUCATION/TRAINING PROGRAM

## 2025-05-02 PROCEDURE — 94640 AIRWAY INHALATION TREATMENT: CPT

## 2025-05-02 PROCEDURE — 6360000002 HC RX W HCPCS: Performed by: INTERNAL MEDICINE

## 2025-05-02 PROCEDURE — 99153 MOD SED SAME PHYS/QHP EA: CPT | Performed by: INTERNAL MEDICINE

## 2025-05-02 PROCEDURE — 2500000003 HC RX 250 WO HCPCS: Performed by: PHYSICIAN ASSISTANT

## 2025-05-02 PROCEDURE — C1713 ANCHOR/SCREW BN/BN,TIS/BN: HCPCS | Performed by: INTERNAL MEDICINE

## 2025-05-02 PROCEDURE — 6370000000 HC RX 637 (ALT 250 FOR IP): Performed by: STUDENT IN AN ORGANIZED HEALTH CARE EDUCATION/TRAINING PROGRAM

## 2025-05-02 PROCEDURE — 2000000000 HC ICU R&B

## 2025-05-02 PROCEDURE — 2580000003 HC RX 258: Performed by: INTERNAL MEDICINE

## 2025-05-02 PROCEDURE — 6370000000 HC RX 637 (ALT 250 FOR IP): Performed by: PHYSICIAN ASSISTANT

## 2025-05-02 PROCEDURE — 6360000004 HC RX CONTRAST MEDICATION: Performed by: INTERNAL MEDICINE

## 2025-05-02 PROCEDURE — 2700000000 HC OXYGEN THERAPY PER DAY

## 2025-05-02 PROCEDURE — 99152 MOD SED SAME PHYS/QHP 5/>YRS: CPT | Performed by: INTERNAL MEDICINE

## 2025-05-02 PROCEDURE — 80053 COMPREHEN METABOLIC PANEL: CPT

## 2025-05-02 PROCEDURE — 93451 RIGHT HEART CATH: CPT | Performed by: INTERNAL MEDICINE

## 2025-05-02 PROCEDURE — G0269 OCCLUSIVE DEVICE IN VEIN ART: HCPCS | Performed by: INTERNAL MEDICINE

## 2025-05-02 PROCEDURE — C1725 CATH, TRANSLUMIN NON-LASER: HCPCS | Performed by: INTERNAL MEDICINE

## 2025-05-02 PROCEDURE — 85027 COMPLETE CBC AUTOMATED: CPT

## 2025-05-02 PROCEDURE — 2709999900 HC NON-CHARGEABLE SUPPLY: Performed by: INTERNAL MEDICINE

## 2025-05-02 PROCEDURE — 83735 ASSAY OF MAGNESIUM: CPT

## 2025-05-02 PROCEDURE — C1894 INTRO/SHEATH, NON-LASER: HCPCS | Performed by: INTERNAL MEDICINE

## 2025-05-02 PROCEDURE — 84100 ASSAY OF PHOSPHORUS: CPT

## 2025-05-02 PROCEDURE — 76937 US GUIDE VASCULAR ACCESS: CPT | Performed by: INTERNAL MEDICINE

## 2025-05-02 PROCEDURE — C1769 GUIDE WIRE: HCPCS | Performed by: INTERNAL MEDICINE

## 2025-05-02 RX ORDER — HEPARIN SODIUM 200 [USP'U]/100ML
INJECTION, SOLUTION INTRAVENOUS CONTINUOUS PRN
Status: COMPLETED | OUTPATIENT
Start: 2025-05-02 | End: 2025-05-02

## 2025-05-02 RX ORDER — POLYETHYLENE GLYCOL 3350 17 G/17G
17 POWDER, FOR SOLUTION ORAL DAILY PRN
Status: DISCONTINUED | OUTPATIENT
Start: 2025-05-02 | End: 2025-05-20 | Stop reason: HOSPADM

## 2025-05-02 RX ORDER — MIDAZOLAM HYDROCHLORIDE 1 MG/ML
INJECTION, SOLUTION INTRAMUSCULAR; INTRAVENOUS PRN
Status: DISCONTINUED | OUTPATIENT
Start: 2025-05-02 | End: 2025-05-02 | Stop reason: HOSPADM

## 2025-05-02 RX ORDER — IOPAMIDOL 755 MG/ML
INJECTION, SOLUTION INTRAVASCULAR PRN
Status: DISCONTINUED | OUTPATIENT
Start: 2025-05-02 | End: 2025-05-02 | Stop reason: HOSPADM

## 2025-05-02 RX ORDER — LIDOCAINE HYDROCHLORIDE 10 MG/ML
INJECTION, SOLUTION INFILTRATION; PERINEURAL PRN
Status: DISCONTINUED | OUTPATIENT
Start: 2025-05-02 | End: 2025-05-02 | Stop reason: HOSPADM

## 2025-05-02 RX ORDER — HYDROCORTISONE SODIUM SUCCINATE 100 MG/2ML
50 INJECTION INTRAMUSCULAR; INTRAVENOUS DAILY
Status: DISCONTINUED | OUTPATIENT
Start: 2025-05-03 | End: 2025-05-13

## 2025-05-02 RX ORDER — FENTANYL CITRATE 50 UG/ML
INJECTION, SOLUTION INTRAMUSCULAR; INTRAVENOUS PRN
Status: DISCONTINUED | OUTPATIENT
Start: 2025-05-02 | End: 2025-05-02 | Stop reason: HOSPADM

## 2025-05-02 RX ORDER — HEPARIN SODIUM 1000 [USP'U]/ML
INJECTION, SOLUTION INTRAVENOUS; SUBCUTANEOUS PRN
Status: DISCONTINUED | OUTPATIENT
Start: 2025-05-02 | End: 2025-05-02 | Stop reason: HOSPADM

## 2025-05-02 RX ADMIN — APIXABAN 5 MG: 5 TABLET, FILM COATED ORAL at 20:41

## 2025-05-02 RX ADMIN — BUDESONIDE 500 MCG: 0.5 INHALANT RESPIRATORY (INHALATION) at 20:04

## 2025-05-02 RX ADMIN — IPRATROPIUM BROMIDE AND ALBUTEROL SULFATE 1 DOSE: 2.5; .5 SOLUTION RESPIRATORY (INHALATION) at 20:04

## 2025-05-02 RX ADMIN — HYDROCORTISONE SODIUM SUCCINATE 50 MG: 100 INJECTION, POWDER, FOR SOLUTION INTRAMUSCULAR; INTRAVENOUS at 05:04

## 2025-05-02 RX ADMIN — SODIUM CHLORIDE, PRESERVATIVE FREE 10 ML: 5 INJECTION INTRAVENOUS at 20:18

## 2025-05-02 RX ADMIN — HEPARIN SODIUM 1900 UNITS: 1000 INJECTION INTRAVENOUS; SUBCUTANEOUS at 16:55

## 2025-05-02 RX ADMIN — MONTELUKAST 10 MG: 10 TABLET, FILM COATED ORAL at 20:17

## 2025-05-02 RX ADMIN — VASOPRESSIN 0.03 UNITS/MIN: 20 INJECTION INTRAVENOUS at 06:17

## 2025-05-02 RX ADMIN — SENNOSIDES 8.6 MG: 8.6 TABLET, FILM COATED ORAL at 20:17

## 2025-05-02 RX ADMIN — VASOPRESSIN 0.03 UNITS/MIN: 20 INJECTION INTRAVENOUS at 17:52

## 2025-05-02 RX ADMIN — BUDESONIDE 500 MCG: 0.5 INHALANT RESPIRATORY (INHALATION) at 08:47

## 2025-05-02 RX ADMIN — HEPARIN SODIUM 1800 UNITS: 1000 INJECTION INTRAVENOUS; SUBCUTANEOUS at 16:55

## 2025-05-02 RX ADMIN — ATORVASTATIN CALCIUM 10 MG: 20 TABLET, FILM COATED ORAL at 20:17

## 2025-05-02 RX ADMIN — IPRATROPIUM BROMIDE AND ALBUTEROL SULFATE 1 DOSE: 2.5; .5 SOLUTION RESPIRATORY (INHALATION) at 08:47

## 2025-05-02 RX ADMIN — MIDODRINE HYDROCHLORIDE 15 MG: 5 TABLET ORAL at 13:29

## 2025-05-02 RX ADMIN — MIDODRINE HYDROCHLORIDE 15 MG: 5 TABLET ORAL at 17:29

## 2025-05-02 RX ADMIN — EPOETIN ALFA 10000 UNITS: 10000 SOLUTION INTRAVENOUS; SUBCUTANEOUS at 17:27

## 2025-05-02 ASSESSMENT — PAIN SCALES - GENERAL
PAINLEVEL_OUTOF10: 0

## 2025-05-02 NOTE — DIALYSIS
Hep B verification performed by (RN): LAMONT Hope RN/LAMONT Cho RN  Hep B results, dates, and Primary Source: Neg/Susceptible 04/28/2025, Epic  Hepatitis B Surface Ag   Date/Time Value Ref Range Status   04/28/2025 01:57 PM <0.10 Index Final     Hep B S Ag Interp   Date/Time Value Ref Range Status   04/28/2025 01:57 PM Negative NEG   Final     HEP B SURF AB   Date/Time Value Ref Range Status   10/23/2020 01:55 PM 49.42 mIU/mL Final     Hep B S Ab   Date/Time Value Ref Range Status   04/28/2025 01:57 PM 6.16 mIU/mL Final     Hep B S Ab Interp   Date/Time Value Ref Range Status   04/28/2025 01:57 PM NONREACTIVE NR   Final     Comment:     (NOTE)  The [employer] Anti-HBs2 assay is traceable to the World Health   Organization (WHO) Hepatitis B Immunoglobulin 1st International   Reference Preparation (1977). Samples with a calculated value of 10   mIU/mL or greater are considered reactive (protective) in accordance   with the CDC guidelines. The accepted criteria for immunity to HBV is   anti-HBs activity greater than or equal to 10 mIU/mL, as defined by   the WHO International Reference Preparation.  Assay performance has not been established in pregnant women,   patients who are immunosuppressed or immunocompromised, nor have   performance characteristics been established in conjunction with   other 's assays for specific HBV serologic markers. This   assay does not differentiate between vaccine induced immune response   and a response due to infection with HBV. Passively acquired anti-HBs   may be identified following patient transfusion, receipt of   immunoglobulin products, etc.       Machine disinfect process:Acid/Heat Disinfection

## 2025-05-03 LAB
ALBUMIN SERPL-MCNC: 3.2 G/DL (ref 3.5–5)
ALBUMIN/GLOB SERPL: 1 (ref 1.1–2.2)
ALP SERPL-CCNC: 100 U/L (ref 45–117)
ALT SERPL-CCNC: 32 U/L (ref 12–78)
ANION GAP SERPL CALC-SCNC: 9 MMOL/L (ref 2–12)
AST SERPL-CCNC: 23 U/L (ref 15–37)
BILIRUB SERPL-MCNC: 0.8 MG/DL (ref 0.2–1)
BUN SERPL-MCNC: 28 MG/DL (ref 6–20)
BUN/CREAT SERPL: 7 (ref 12–20)
CALCIUM SERPL-MCNC: 9.2 MG/DL (ref 8.5–10.1)
CHLORIDE SERPL-SCNC: 104 MMOL/L (ref 97–108)
CO2 SERPL-SCNC: 26 MMOL/L (ref 21–32)
CREAT SERPL-MCNC: 3.91 MG/DL (ref 0.55–1.02)
ERYTHROCYTE [DISTWIDTH] IN BLOOD BY AUTOMATED COUNT: 15.5 % (ref 11.5–14.5)
GLOBULIN SER CALC-MCNC: 3.1 G/DL (ref 2–4)
GLUCOSE SERPL-MCNC: 92 MG/DL (ref 65–100)
HCT VFR BLD AUTO: 32.6 % (ref 35–47)
HGB BLD-MCNC: 10.4 G/DL (ref 11.5–16)
MAGNESIUM SERPL-MCNC: 2 MG/DL (ref 1.6–2.4)
MCH RBC QN AUTO: 33.5 PG (ref 26–34)
MCHC RBC AUTO-ENTMCNC: 31.9 G/DL (ref 30–36.5)
MCV RBC AUTO: 105.2 FL (ref 80–99)
NRBC # BLD: 0.04 K/UL (ref 0–0.01)
NRBC BLD-RTO: 0.6 PER 100 WBC
PHOSPHATE SERPL-MCNC: 4 MG/DL (ref 2.6–4.7)
PLATELET # BLD AUTO: 105 K/UL (ref 150–400)
PMV BLD AUTO: 11.9 FL (ref 8.9–12.9)
POTASSIUM SERPL-SCNC: 3.9 MMOL/L (ref 3.5–5.1)
PROT SERPL-MCNC: 6.3 G/DL (ref 6.4–8.2)
RBC # BLD AUTO: 3.1 M/UL (ref 3.8–5.2)
SODIUM SERPL-SCNC: 139 MMOL/L (ref 136–145)
WBC # BLD AUTO: 6.3 K/UL (ref 3.6–11)

## 2025-05-03 PROCEDURE — 6370000000 HC RX 637 (ALT 250 FOR IP): Performed by: STUDENT IN AN ORGANIZED HEALTH CARE EDUCATION/TRAINING PROGRAM

## 2025-05-03 PROCEDURE — 80053 COMPREHEN METABOLIC PANEL: CPT

## 2025-05-03 PROCEDURE — 6360000002 HC RX W HCPCS: Performed by: INTERNAL MEDICINE

## 2025-05-03 PROCEDURE — 6360000002 HC RX W HCPCS: Performed by: STUDENT IN AN ORGANIZED HEALTH CARE EDUCATION/TRAINING PROGRAM

## 2025-05-03 PROCEDURE — 83735 ASSAY OF MAGNESIUM: CPT

## 2025-05-03 PROCEDURE — 85027 COMPLETE CBC AUTOMATED: CPT

## 2025-05-03 PROCEDURE — 94660 CPAP INITIATION&MGMT: CPT

## 2025-05-03 PROCEDURE — 2580000003 HC RX 258: Performed by: INTERNAL MEDICINE

## 2025-05-03 PROCEDURE — 6370000000 HC RX 637 (ALT 250 FOR IP): Performed by: INTERNAL MEDICINE

## 2025-05-03 PROCEDURE — 6370000000 HC RX 637 (ALT 250 FOR IP): Performed by: PHYSICIAN ASSISTANT

## 2025-05-03 PROCEDURE — 2000000000 HC ICU R&B

## 2025-05-03 PROCEDURE — 2500000003 HC RX 250 WO HCPCS: Performed by: PHYSICIAN ASSISTANT

## 2025-05-03 PROCEDURE — 84100 ASSAY OF PHOSPHORUS: CPT

## 2025-05-03 PROCEDURE — 94640 AIRWAY INHALATION TREATMENT: CPT

## 2025-05-03 RX ORDER — MILRINONE LACTATE 0.2 MG/ML
0.12 INJECTION, SOLUTION INTRAVENOUS CONTINUOUS
Status: DISCONTINUED | OUTPATIENT
Start: 2025-05-03 | End: 2025-05-06 | Stop reason: ALTCHOICE

## 2025-05-03 RX ORDER — IPRATROPIUM BROMIDE AND ALBUTEROL SULFATE 2.5; .5 MG/3ML; MG/3ML
1 SOLUTION RESPIRATORY (INHALATION)
Status: DISCONTINUED | OUTPATIENT
Start: 2025-05-03 | End: 2025-05-20 | Stop reason: HOSPADM

## 2025-05-03 RX ORDER — FLUDROCORTISONE ACETATE 0.1 MG/1
0.05 TABLET ORAL DAILY
Status: DISCONTINUED | OUTPATIENT
Start: 2025-05-03 | End: 2025-05-11

## 2025-05-03 RX ADMIN — Medication 1 MG: at 08:28

## 2025-05-03 RX ADMIN — VASOPRESSIN 0.03 UNITS/MIN: 20 INJECTION INTRAVENOUS at 15:00

## 2025-05-03 RX ADMIN — MIDODRINE HYDROCHLORIDE 15 MG: 5 TABLET ORAL at 14:26

## 2025-05-03 RX ADMIN — IPRATROPIUM BROMIDE AND ALBUTEROL SULFATE 1 DOSE: 2.5; .5 SOLUTION RESPIRATORY (INHALATION) at 07:55

## 2025-05-03 RX ADMIN — BUDESONIDE 500 MCG: 0.5 INHALANT RESPIRATORY (INHALATION) at 19:54

## 2025-05-03 RX ADMIN — ATORVASTATIN CALCIUM 10 MG: 20 TABLET, FILM COATED ORAL at 20:11

## 2025-05-03 RX ADMIN — SODIUM CHLORIDE, PRESERVATIVE FREE 10 ML: 5 INJECTION INTRAVENOUS at 20:11

## 2025-05-03 RX ADMIN — SENNOSIDES 8.6 MG: 8.6 TABLET, FILM COATED ORAL at 20:11

## 2025-05-03 RX ADMIN — SODIUM CHLORIDE, PRESERVATIVE FREE 10 ML: 5 INJECTION INTRAVENOUS at 08:28

## 2025-05-03 RX ADMIN — HYDROCORTISONE SODIUM SUCCINATE 50 MG: 100 INJECTION, POWDER, FOR SOLUTION INTRAMUSCULAR; INTRAVENOUS at 08:28

## 2025-05-03 RX ADMIN — MILRINONE LACTATE 0.12 MCG/KG/MIN: 0.2 INJECTION, SOLUTION INTRAVENOUS at 09:40

## 2025-05-03 RX ADMIN — IPRATROPIUM BROMIDE AND ALBUTEROL SULFATE 1 DOSE: 2.5; .5 SOLUTION RESPIRATORY (INHALATION) at 19:54

## 2025-05-03 RX ADMIN — BUDESONIDE 500 MCG: 0.5 INHALANT RESPIRATORY (INHALATION) at 07:55

## 2025-05-03 RX ADMIN — MONTELUKAST 10 MG: 10 TABLET, FILM COATED ORAL at 20:11

## 2025-05-03 RX ADMIN — MIDODRINE HYDROCHLORIDE 15 MG: 5 TABLET ORAL at 08:27

## 2025-05-03 RX ADMIN — VASOPRESSIN 0.03 UNITS/MIN: 20 INJECTION INTRAVENOUS at 02:14

## 2025-05-03 RX ADMIN — APIXABAN 5 MG: 5 TABLET, FILM COATED ORAL at 20:31

## 2025-05-03 RX ADMIN — APIXABAN 5 MG: 5 TABLET, FILM COATED ORAL at 08:28

## 2025-05-03 RX ADMIN — FLUDROCORTISONE ACETATE 0.05 MG: 0.1 TABLET ORAL at 08:27

## 2025-05-03 RX ADMIN — MIDODRINE HYDROCHLORIDE 15 MG: 5 TABLET ORAL at 18:30

## 2025-05-03 ASSESSMENT — PAIN SCALES - GENERAL
PAINLEVEL_OUTOF10: 0
PAINLEVEL_OUTOF10: 0

## 2025-05-04 LAB
ALBUMIN SERPL-MCNC: 3.1 G/DL (ref 3.5–5)
ALBUMIN/GLOB SERPL: 1.1 (ref 1.1–2.2)
ALP SERPL-CCNC: 104 U/L (ref 45–117)
ALT SERPL-CCNC: 35 U/L (ref 12–78)
ANION GAP SERPL CALC-SCNC: 7 MMOL/L (ref 2–12)
AST SERPL-CCNC: 26 U/L (ref 15–37)
BILIRUB SERPL-MCNC: 0.7 MG/DL (ref 0.2–1)
BUN SERPL-MCNC: 43 MG/DL (ref 6–20)
BUN/CREAT SERPL: 7 (ref 12–20)
CALCIUM SERPL-MCNC: 8.9 MG/DL (ref 8.5–10.1)
CHLORIDE SERPL-SCNC: 106 MMOL/L (ref 97–108)
CO2 SERPL-SCNC: 27 MMOL/L (ref 21–32)
CREAT SERPL-MCNC: 5.88 MG/DL (ref 0.55–1.02)
ERYTHROCYTE [DISTWIDTH] IN BLOOD BY AUTOMATED COUNT: 15.4 % (ref 11.5–14.5)
GLOBULIN SER CALC-MCNC: 2.7 G/DL (ref 2–4)
GLUCOSE SERPL-MCNC: 96 MG/DL (ref 65–100)
HCT VFR BLD AUTO: 31 % (ref 35–47)
HGB BLD-MCNC: 10 G/DL (ref 11.5–16)
MAGNESIUM SERPL-MCNC: 1.9 MG/DL (ref 1.6–2.4)
MCH RBC QN AUTO: 33.8 PG (ref 26–34)
MCHC RBC AUTO-ENTMCNC: 32.3 G/DL (ref 30–36.5)
MCV RBC AUTO: 104.7 FL (ref 80–99)
NRBC # BLD: 0.02 K/UL (ref 0–0.01)
NRBC BLD-RTO: 0.3 PER 100 WBC
PHOSPHATE SERPL-MCNC: 4.4 MG/DL (ref 2.6–4.7)
PLATELET # BLD AUTO: 111 K/UL (ref 150–400)
PMV BLD AUTO: 11.8 FL (ref 8.9–12.9)
POTASSIUM SERPL-SCNC: 4.1 MMOL/L (ref 3.5–5.1)
PROT SERPL-MCNC: 5.8 G/DL (ref 6.4–8.2)
RBC # BLD AUTO: 2.96 M/UL (ref 3.8–5.2)
SODIUM SERPL-SCNC: 140 MMOL/L (ref 136–145)
WBC # BLD AUTO: 7.3 K/UL (ref 3.6–11)

## 2025-05-04 PROCEDURE — 6370000000 HC RX 637 (ALT 250 FOR IP): Performed by: INTERNAL MEDICINE

## 2025-05-04 PROCEDURE — 6370000000 HC RX 637 (ALT 250 FOR IP): Performed by: STUDENT IN AN ORGANIZED HEALTH CARE EDUCATION/TRAINING PROGRAM

## 2025-05-04 PROCEDURE — 2700000000 HC OXYGEN THERAPY PER DAY

## 2025-05-04 PROCEDURE — 2500000003 HC RX 250 WO HCPCS: Performed by: PHYSICIAN ASSISTANT

## 2025-05-04 PROCEDURE — 2500000003 HC RX 250 WO HCPCS: Performed by: INTERNAL MEDICINE

## 2025-05-04 PROCEDURE — 2000000000 HC ICU R&B

## 2025-05-04 PROCEDURE — 84100 ASSAY OF PHOSPHORUS: CPT

## 2025-05-04 PROCEDURE — 6360000002 HC RX W HCPCS: Performed by: STUDENT IN AN ORGANIZED HEALTH CARE EDUCATION/TRAINING PROGRAM

## 2025-05-04 PROCEDURE — 6370000000 HC RX 637 (ALT 250 FOR IP): Performed by: PHYSICIAN ASSISTANT

## 2025-05-04 PROCEDURE — 85027 COMPLETE CBC AUTOMATED: CPT

## 2025-05-04 PROCEDURE — 94760 N-INVAS EAR/PLS OXIMETRY 1: CPT

## 2025-05-04 PROCEDURE — 80053 COMPREHEN METABOLIC PANEL: CPT

## 2025-05-04 PROCEDURE — 83735 ASSAY OF MAGNESIUM: CPT

## 2025-05-04 PROCEDURE — 36415 COLL VENOUS BLD VENIPUNCTURE: CPT

## 2025-05-04 PROCEDURE — 94640 AIRWAY INHALATION TREATMENT: CPT

## 2025-05-04 PROCEDURE — 6360000002 HC RX W HCPCS: Performed by: INTERNAL MEDICINE

## 2025-05-04 PROCEDURE — 2580000003 HC RX 258: Performed by: INTERNAL MEDICINE

## 2025-05-04 RX ADMIN — VASOPRESSIN 0.03 UNITS/MIN: 20 INJECTION INTRAVENOUS at 12:59

## 2025-05-04 RX ADMIN — POLYETHYLENE GLYCOL 3350 17 G: 17 POWDER, FOR SOLUTION ORAL at 09:40

## 2025-05-04 RX ADMIN — BUDESONIDE 500 MCG: 0.5 INHALANT RESPIRATORY (INHALATION) at 08:17

## 2025-05-04 RX ADMIN — APIXABAN 5 MG: 5 TABLET, FILM COATED ORAL at 09:00

## 2025-05-04 RX ADMIN — SODIUM CHLORIDE, PRESERVATIVE FREE 10 ML: 5 INJECTION INTRAVENOUS at 20:20

## 2025-05-04 RX ADMIN — NOREPINEPHRINE BITARTRATE 3 MCG/MIN: 1 INJECTION, SOLUTION, CONCENTRATE INTRAVENOUS at 00:42

## 2025-05-04 RX ADMIN — IPRATROPIUM BROMIDE AND ALBUTEROL SULFATE 1 DOSE: 2.5; .5 SOLUTION RESPIRATORY (INHALATION) at 08:17

## 2025-05-04 RX ADMIN — SODIUM CHLORIDE, PRESERVATIVE FREE 10 ML: 5 INJECTION INTRAVENOUS at 09:41

## 2025-05-04 RX ADMIN — FLUDROCORTISONE ACETATE 0.05 MG: 0.1 TABLET ORAL at 09:40

## 2025-05-04 RX ADMIN — MONTELUKAST 10 MG: 10 TABLET, FILM COATED ORAL at 20:20

## 2025-05-04 RX ADMIN — APIXABAN 5 MG: 5 TABLET, FILM COATED ORAL at 20:33

## 2025-05-04 RX ADMIN — Medication 1 MG: at 09:40

## 2025-05-04 RX ADMIN — SENNOSIDES 8.6 MG: 8.6 TABLET, FILM COATED ORAL at 20:20

## 2025-05-04 RX ADMIN — IPRATROPIUM BROMIDE AND ALBUTEROL SULFATE 1 DOSE: 2.5; .5 SOLUTION RESPIRATORY (INHALATION) at 19:37

## 2025-05-04 RX ADMIN — MIDODRINE HYDROCHLORIDE 15 MG: 5 TABLET ORAL at 18:07

## 2025-05-04 RX ADMIN — VASOPRESSIN 0.03 UNITS/MIN: 20 INJECTION INTRAVENOUS at 00:41

## 2025-05-04 RX ADMIN — MIDODRINE HYDROCHLORIDE 15 MG: 5 TABLET ORAL at 12:59

## 2025-05-04 RX ADMIN — MIDODRINE HYDROCHLORIDE 15 MG: 5 TABLET ORAL at 09:40

## 2025-05-04 RX ADMIN — HYDROCORTISONE SODIUM SUCCINATE 50 MG: 100 INJECTION, POWDER, FOR SOLUTION INTRAMUSCULAR; INTRAVENOUS at 09:40

## 2025-05-04 RX ADMIN — ATORVASTATIN CALCIUM 10 MG: 20 TABLET, FILM COATED ORAL at 20:20

## 2025-05-04 RX ADMIN — BUDESONIDE 500 MCG: 0.5 INHALANT RESPIRATORY (INHALATION) at 19:37

## 2025-05-04 ASSESSMENT — PAIN SCALES - GENERAL
PAINLEVEL_OUTOF10: 0
PAINLEVEL_OUTOF10: 0

## 2025-05-05 PROBLEM — R53.81 DEBILITY: Status: ACTIVE | Noted: 2025-05-05

## 2025-05-05 PROBLEM — E43 SEVERE PROTEIN-ENERGY MALNUTRITION: Status: ACTIVE | Noted: 2025-05-05

## 2025-05-05 PROBLEM — Z51.5 PALLIATIVE CARE ENCOUNTER: Status: ACTIVE | Noted: 2025-05-05

## 2025-05-05 LAB
ALBUMIN SERPL-MCNC: 3.2 G/DL (ref 3.5–5)
ALBUMIN/GLOB SERPL: 1.1 (ref 1.1–2.2)
ALP SERPL-CCNC: 111 U/L (ref 45–117)
ALT SERPL-CCNC: 42 U/L (ref 12–78)
ANION GAP SERPL CALC-SCNC: 9 MMOL/L (ref 2–12)
AST SERPL-CCNC: 24 U/L (ref 15–37)
BASOPHILS # BLD: 0 K/UL (ref 0–0.1)
BASOPHILS NFR BLD: 0 % (ref 0–1)
BILIRUB SERPL-MCNC: 0.6 MG/DL (ref 0.2–1)
BUN SERPL-MCNC: 59 MG/DL (ref 6–20)
BUN/CREAT SERPL: 8 (ref 12–20)
CALCIUM SERPL-MCNC: 9.1 MG/DL (ref 8.5–10.1)
CHLORIDE SERPL-SCNC: 107 MMOL/L (ref 97–108)
CO2 SERPL-SCNC: 25 MMOL/L (ref 21–32)
CREAT SERPL-MCNC: 7.57 MG/DL (ref 0.55–1.02)
DIFFERENTIAL METHOD BLD: ABNORMAL
EOSINOPHIL # BLD: 0.08 K/UL (ref 0–0.4)
EOSINOPHIL NFR BLD: 1 % (ref 0–7)
ERYTHROCYTE [DISTWIDTH] IN BLOOD BY AUTOMATED COUNT: 15.7 % (ref 11.5–14.5)
GLOBULIN SER CALC-MCNC: 3 G/DL (ref 2–4)
GLUCOSE SERPL-MCNC: 99 MG/DL (ref 65–100)
HCT VFR BLD AUTO: 31 % (ref 35–47)
HFE GENE MUT ANL BLD/T: NORMAL
HGB BLD-MCNC: 9.8 G/DL (ref 11.5–16)
IMM GRANULOCYTES # BLD AUTO: 0 K/UL
IMM GRANULOCYTES NFR BLD AUTO: 0 %
LYMPHOCYTES # BLD: 1.29 K/UL (ref 0.8–3.5)
LYMPHOCYTES NFR BLD: 17 % (ref 12–49)
MAGNESIUM SERPL-MCNC: 1.8 MG/DL (ref 1.6–2.4)
MCH RBC QN AUTO: 33.4 PG (ref 26–34)
MCHC RBC AUTO-ENTMCNC: 31.6 G/DL (ref 30–36.5)
MCV RBC AUTO: 105.8 FL (ref 80–99)
MONOCYTES # BLD: 0.61 K/UL (ref 0–1)
MONOCYTES NFR BLD: 8 % (ref 5–13)
NEUTS SEG # BLD: 5.62 K/UL (ref 1.8–8)
NEUTS SEG NFR BLD: 74 % (ref 32–75)
NRBC # BLD: 0.02 K/UL (ref 0–0.01)
NRBC BLD-RTO: 0.3 PER 100 WBC
PHOSPHATE SERPL-MCNC: 4.8 MG/DL (ref 2.6–4.7)
PLATELET # BLD AUTO: 112 K/UL (ref 150–400)
PMV BLD AUTO: 12.2 FL (ref 8.9–12.9)
POTASSIUM SERPL-SCNC: 4.1 MMOL/L (ref 3.5–5.1)
PROT SERPL-MCNC: 6.2 G/DL (ref 6.4–8.2)
RBC # BLD AUTO: 2.93 M/UL (ref 3.8–5.2)
RBC MORPH BLD: ABNORMAL
RBC MORPH BLD: ABNORMAL
REVIEWED BY: NORMAL
SODIUM SERPL-SCNC: 141 MMOL/L (ref 136–145)
WBC # BLD AUTO: 7.6 K/UL (ref 3.6–11)

## 2025-05-05 PROCEDURE — 2500000003 HC RX 250 WO HCPCS: Performed by: PHYSICIAN ASSISTANT

## 2025-05-05 PROCEDURE — 94760 N-INVAS EAR/PLS OXIMETRY 1: CPT

## 2025-05-05 PROCEDURE — 6370000000 HC RX 637 (ALT 250 FOR IP): Performed by: STUDENT IN AN ORGANIZED HEALTH CARE EDUCATION/TRAINING PROGRAM

## 2025-05-05 PROCEDURE — 6360000002 HC RX W HCPCS: Performed by: STUDENT IN AN ORGANIZED HEALTH CARE EDUCATION/TRAINING PROGRAM

## 2025-05-05 PROCEDURE — 84100 ASSAY OF PHOSPHORUS: CPT

## 2025-05-05 PROCEDURE — 2500000003 HC RX 250 WO HCPCS: Performed by: INTERNAL MEDICINE

## 2025-05-05 PROCEDURE — P9047 ALBUMIN (HUMAN), 25%, 50ML: HCPCS | Performed by: INTERNAL MEDICINE

## 2025-05-05 PROCEDURE — 94640 AIRWAY INHALATION TREATMENT: CPT

## 2025-05-05 PROCEDURE — 80053 COMPREHEN METABOLIC PANEL: CPT

## 2025-05-05 PROCEDURE — 83735 ASSAY OF MAGNESIUM: CPT

## 2025-05-05 PROCEDURE — 99232 SBSQ HOSP IP/OBS MODERATE 35: CPT | Performed by: PHYSICAL MEDICINE & REHABILITATION

## 2025-05-05 PROCEDURE — 6360000002 HC RX W HCPCS: Performed by: INTERNAL MEDICINE

## 2025-05-05 PROCEDURE — 2700000000 HC OXYGEN THERAPY PER DAY

## 2025-05-05 PROCEDURE — 94660 CPAP INITIATION&MGMT: CPT

## 2025-05-05 PROCEDURE — 90935 HEMODIALYSIS ONE EVALUATION: CPT

## 2025-05-05 PROCEDURE — 2000000000 HC ICU R&B

## 2025-05-05 PROCEDURE — 6370000000 HC RX 637 (ALT 250 FOR IP): Performed by: PHYSICIAN ASSISTANT

## 2025-05-05 PROCEDURE — 2580000003 HC RX 258: Performed by: INTERNAL MEDICINE

## 2025-05-05 PROCEDURE — 85025 COMPLETE CBC W/AUTO DIFF WBC: CPT

## 2025-05-05 PROCEDURE — 6370000000 HC RX 637 (ALT 250 FOR IP): Performed by: INTERNAL MEDICINE

## 2025-05-05 RX ORDER — DOBUTAMINE HYDROCHLORIDE 200 MG/100ML
2.5 INJECTION INTRAVENOUS CONTINUOUS
Status: DISCONTINUED | OUTPATIENT
Start: 2025-05-05 | End: 2025-05-07

## 2025-05-05 RX ADMIN — HEPARIN SODIUM 1800 UNITS: 1000 INJECTION INTRAVENOUS; SUBCUTANEOUS at 12:13

## 2025-05-05 RX ADMIN — FLUDROCORTISONE ACETATE 0.05 MG: 0.1 TABLET ORAL at 08:11

## 2025-05-05 RX ADMIN — BUDESONIDE 500 MCG: 0.5 INHALANT RESPIRATORY (INHALATION) at 21:03

## 2025-05-05 RX ADMIN — HEPARIN SODIUM 1900 UNITS: 1000 INJECTION INTRAVENOUS; SUBCUTANEOUS at 12:14

## 2025-05-05 RX ADMIN — NOREPINEPHRINE BITARTRATE 1 MCG/MIN: 1 INJECTION, SOLUTION, CONCENTRATE INTRAVENOUS at 00:27

## 2025-05-05 RX ADMIN — VASOPRESSIN 0.03 UNITS/MIN: 20 INJECTION INTRAVENOUS at 00:27

## 2025-05-05 RX ADMIN — DOBUTAMINE HYDROCHLORIDE 2.5 MCG/KG/MIN: 200 INJECTION INTRAVENOUS at 08:19

## 2025-05-05 RX ADMIN — APIXABAN 5 MG: 5 TABLET, FILM COATED ORAL at 21:15

## 2025-05-05 RX ADMIN — HYDROCORTISONE SODIUM SUCCINATE 50 MG: 100 INJECTION, POWDER, FOR SOLUTION INTRAMUSCULAR; INTRAVENOUS at 08:11

## 2025-05-05 RX ADMIN — ALBUMIN (HUMAN) 25 G: 0.25 INJECTION, SOLUTION INTRAVENOUS at 08:47

## 2025-05-05 RX ADMIN — MIDODRINE HYDROCHLORIDE 15 MG: 5 TABLET ORAL at 08:10

## 2025-05-05 RX ADMIN — IPRATROPIUM BROMIDE AND ALBUTEROL SULFATE 1 DOSE: 2.5; .5 SOLUTION RESPIRATORY (INHALATION) at 21:03

## 2025-05-05 RX ADMIN — APIXABAN 5 MG: 5 TABLET, FILM COATED ORAL at 08:11

## 2025-05-05 RX ADMIN — SODIUM CHLORIDE, PRESERVATIVE FREE 10 ML: 5 INJECTION INTRAVENOUS at 21:15

## 2025-05-05 RX ADMIN — Medication 1 MG: at 08:11

## 2025-05-05 RX ADMIN — EPOETIN ALFA 10000 UNITS: 10000 SOLUTION INTRAVENOUS; SUBCUTANEOUS at 18:00

## 2025-05-05 RX ADMIN — MONTELUKAST 10 MG: 10 TABLET, FILM COATED ORAL at 21:15

## 2025-05-05 RX ADMIN — ATORVASTATIN CALCIUM 10 MG: 20 TABLET, FILM COATED ORAL at 21:15

## 2025-05-05 RX ADMIN — MIDODRINE HYDROCHLORIDE 15 MG: 5 TABLET ORAL at 12:46

## 2025-05-05 RX ADMIN — MIDODRINE HYDROCHLORIDE 15 MG: 5 TABLET ORAL at 18:00

## 2025-05-05 RX ADMIN — SENNOSIDES 8.6 MG: 8.6 TABLET, FILM COATED ORAL at 21:15

## 2025-05-05 RX ADMIN — SODIUM CHLORIDE, PRESERVATIVE FREE 10 ML: 5 INJECTION INTRAVENOUS at 08:12

## 2025-05-05 ASSESSMENT — PAIN SCALES - GENERAL
PAINLEVEL_OUTOF10: 0

## 2025-05-06 LAB
ALBUMIN SERPL-MCNC: 3.4 G/DL (ref 3.5–5)
ALBUMIN/GLOB SERPL: 1.2 (ref 1.1–2.2)
ALP SERPL-CCNC: 115 U/L (ref 45–117)
ALT SERPL-CCNC: 43 U/L (ref 12–78)
ANION GAP SERPL CALC-SCNC: 9 MMOL/L (ref 2–12)
AST SERPL-CCNC: 30 U/L (ref 15–37)
BASOPHILS # BLD: 0 K/UL (ref 0–0.1)
BASOPHILS NFR BLD: 0 % (ref 0–1)
BILIRUB SERPL-MCNC: 0.7 MG/DL (ref 0.2–1)
BUN SERPL-MCNC: 33 MG/DL (ref 6–20)
BUN/CREAT SERPL: 6 (ref 12–20)
CALCIUM SERPL-MCNC: 9.6 MG/DL (ref 8.5–10.1)
CHLORIDE SERPL-SCNC: 106 MMOL/L (ref 97–108)
CO2 SERPL-SCNC: 24 MMOL/L (ref 21–32)
CREAT SERPL-MCNC: 5.53 MG/DL (ref 0.55–1.02)
DIFFERENTIAL METHOD BLD: ABNORMAL
EOSINOPHIL # BLD: 0.08 K/UL (ref 0–0.4)
EOSINOPHIL NFR BLD: 1 % (ref 0–7)
ERYTHROCYTE [DISTWIDTH] IN BLOOD BY AUTOMATED COUNT: 16 % (ref 11.5–14.5)
GLOBULIN SER CALC-MCNC: 2.9 G/DL (ref 2–4)
GLUCOSE SERPL-MCNC: 113 MG/DL (ref 65–100)
HCT VFR BLD AUTO: 34 % (ref 35–47)
HGB BLD-MCNC: 10.8 G/DL (ref 11.5–16)
IMM GRANULOCYTES # BLD AUTO: 0 K/UL
IMM GRANULOCYTES NFR BLD AUTO: 0 %
LYMPHOCYTES # BLD: 1.54 K/UL (ref 0.8–3.5)
LYMPHOCYTES NFR BLD: 19 % (ref 12–49)
MAGNESIUM SERPL-MCNC: 1.9 MG/DL (ref 1.6–2.4)
MCH RBC QN AUTO: 33.5 PG (ref 26–34)
MCHC RBC AUTO-ENTMCNC: 31.8 G/DL (ref 30–36.5)
MCV RBC AUTO: 105.6 FL (ref 80–99)
MONOCYTES # BLD: 0.41 K/UL (ref 0–1)
MONOCYTES NFR BLD: 5 % (ref 5–13)
MYELOCYTES NFR BLD MANUAL: 1 %
NEUTS SEG # BLD: 5.99 K/UL (ref 1.8–8)
NEUTS SEG NFR BLD: 74 % (ref 32–75)
NRBC # BLD: 0.02 K/UL (ref 0–0.01)
NRBC BLD-RTO: 0.2 PER 100 WBC
PHOSPHATE SERPL-MCNC: 4.4 MG/DL (ref 2.6–4.7)
PLATELET # BLD AUTO: 129 K/UL (ref 150–400)
PMV BLD AUTO: 12.3 FL (ref 8.9–12.9)
POTASSIUM SERPL-SCNC: 4 MMOL/L (ref 3.5–5.1)
PROT SERPL-MCNC: 6.3 G/DL (ref 6.4–8.2)
RBC # BLD AUTO: 3.22 M/UL (ref 3.8–5.2)
RBC MORPH BLD: ABNORMAL
SODIUM SERPL-SCNC: 139 MMOL/L (ref 136–145)
WBC # BLD AUTO: 8.1 K/UL (ref 3.6–11)

## 2025-05-06 PROCEDURE — 85025 COMPLETE CBC W/AUTO DIFF WBC: CPT

## 2025-05-06 PROCEDURE — P9045 ALBUMIN (HUMAN), 5%, 250 ML: HCPCS | Performed by: STUDENT IN AN ORGANIZED HEALTH CARE EDUCATION/TRAINING PROGRAM

## 2025-05-06 PROCEDURE — 2500000003 HC RX 250 WO HCPCS: Performed by: PHYSICIAN ASSISTANT

## 2025-05-06 PROCEDURE — 6370000000 HC RX 637 (ALT 250 FOR IP): Performed by: STUDENT IN AN ORGANIZED HEALTH CARE EDUCATION/TRAINING PROGRAM

## 2025-05-06 PROCEDURE — 94760 N-INVAS EAR/PLS OXIMETRY 1: CPT

## 2025-05-06 PROCEDURE — 80053 COMPREHEN METABOLIC PANEL: CPT

## 2025-05-06 PROCEDURE — 83735 ASSAY OF MAGNESIUM: CPT

## 2025-05-06 PROCEDURE — 6370000000 HC RX 637 (ALT 250 FOR IP): Performed by: ANESTHESIOLOGY

## 2025-05-06 PROCEDURE — 6370000000 HC RX 637 (ALT 250 FOR IP): Performed by: INTERNAL MEDICINE

## 2025-05-06 PROCEDURE — 2500000003 HC RX 250 WO HCPCS: Performed by: INTERNAL MEDICINE

## 2025-05-06 PROCEDURE — 2700000000 HC OXYGEN THERAPY PER DAY

## 2025-05-06 PROCEDURE — 6360000002 HC RX W HCPCS: Performed by: STUDENT IN AN ORGANIZED HEALTH CARE EDUCATION/TRAINING PROGRAM

## 2025-05-06 PROCEDURE — 99231 SBSQ HOSP IP/OBS SF/LOW 25: CPT | Performed by: PHYSICAL MEDICINE & REHABILITATION

## 2025-05-06 PROCEDURE — 2000000000 HC ICU R&B

## 2025-05-06 PROCEDURE — 6360000002 HC RX W HCPCS: Performed by: PHYSICIAN ASSISTANT

## 2025-05-06 PROCEDURE — 84100 ASSAY OF PHOSPHORUS: CPT

## 2025-05-06 PROCEDURE — 94640 AIRWAY INHALATION TREATMENT: CPT

## 2025-05-06 PROCEDURE — 6370000000 HC RX 637 (ALT 250 FOR IP): Performed by: PHYSICIAN ASSISTANT

## 2025-05-06 PROCEDURE — 2580000003 HC RX 258: Performed by: INTERNAL MEDICINE

## 2025-05-06 RX ORDER — ALBUMIN HUMAN 50 G/1000ML
12.5 SOLUTION INTRAVENOUS ONCE
Status: COMPLETED | OUTPATIENT
Start: 2025-05-06 | End: 2025-05-06

## 2025-05-06 RX ADMIN — ONDANSETRON 4 MG: 2 INJECTION, SOLUTION INTRAMUSCULAR; INTRAVENOUS at 00:15

## 2025-05-06 RX ADMIN — DOBUTAMINE HYDROCHLORIDE 2.5 MCG/KG/MIN: 200 INJECTION INTRAVENOUS at 00:00

## 2025-05-06 RX ADMIN — NOREPINEPHRINE BITARTRATE 16 MCG/MIN: 1 INJECTION, SOLUTION, CONCENTRATE INTRAVENOUS at 16:57

## 2025-05-06 RX ADMIN — DOBUTAMINE HYDROCHLORIDE 2.5 MCG/KG/MIN: 200 INJECTION INTRAVENOUS at 22:40

## 2025-05-06 RX ADMIN — MIDODRINE HYDROCHLORIDE 15 MG: 5 TABLET ORAL at 09:23

## 2025-05-06 RX ADMIN — ALBUMIN (HUMAN) 12.5 G: 12.5 INJECTION, SOLUTION INTRAVENOUS at 10:23

## 2025-05-06 RX ADMIN — CYCLOBENZAPRINE 10 MG: 10 TABLET, FILM COATED ORAL at 00:15

## 2025-05-06 RX ADMIN — ATORVASTATIN CALCIUM 10 MG: 20 TABLET, FILM COATED ORAL at 20:20

## 2025-05-06 RX ADMIN — APIXABAN 5 MG: 5 TABLET, FILM COATED ORAL at 09:24

## 2025-05-06 RX ADMIN — CYCLOBENZAPRINE 10 MG: 10 TABLET, FILM COATED ORAL at 22:41

## 2025-05-06 RX ADMIN — BUDESONIDE 500 MCG: 0.5 INHALANT RESPIRATORY (INHALATION) at 19:09

## 2025-05-06 RX ADMIN — MONTELUKAST 10 MG: 10 TABLET, FILM COATED ORAL at 20:20

## 2025-05-06 RX ADMIN — MIDODRINE HYDROCHLORIDE 15 MG: 5 TABLET ORAL at 12:54

## 2025-05-06 RX ADMIN — IPRATROPIUM BROMIDE AND ALBUTEROL SULFATE 1 DOSE: 2.5; .5 SOLUTION RESPIRATORY (INHALATION) at 19:09

## 2025-05-06 RX ADMIN — ONDANSETRON 4 MG: 2 INJECTION, SOLUTION INTRAMUSCULAR; INTRAVENOUS at 22:41

## 2025-05-06 RX ADMIN — SODIUM CHLORIDE, PRESERVATIVE FREE 10 ML: 5 INJECTION INTRAVENOUS at 09:35

## 2025-05-06 RX ADMIN — APIXABAN 5 MG: 5 TABLET, FILM COATED ORAL at 20:30

## 2025-05-06 RX ADMIN — NOREPINEPHRINE BITARTRATE 12 MCG/MIN: 1 INJECTION, SOLUTION, CONCENTRATE INTRAVENOUS at 00:01

## 2025-05-06 RX ADMIN — FLUDROCORTISONE ACETATE 0.05 MG: 0.1 TABLET ORAL at 09:24

## 2025-05-06 RX ADMIN — SENNOSIDES 8.6 MG: 8.6 TABLET, FILM COATED ORAL at 20:20

## 2025-05-06 RX ADMIN — HYDROCORTISONE SODIUM SUCCINATE 50 MG: 100 INJECTION, POWDER, FOR SOLUTION INTRAMUSCULAR; INTRAVENOUS at 09:34

## 2025-05-06 RX ADMIN — Medication 1 MG: at 09:24

## 2025-05-06 RX ADMIN — MIDODRINE HYDROCHLORIDE 15 MG: 5 TABLET ORAL at 16:56

## 2025-05-06 RX ADMIN — SODIUM CHLORIDE, PRESERVATIVE FREE 10 ML: 5 INJECTION INTRAVENOUS at 20:20

## 2025-05-06 ASSESSMENT — PAIN - FUNCTIONAL ASSESSMENT
PAIN_FUNCTIONAL_ASSESSMENT: PREVENTS OR INTERFERES SOME ACTIVE ACTIVITIES AND ADLS
PAIN_FUNCTIONAL_ASSESSMENT: PREVENTS OR INTERFERES SOME ACTIVE ACTIVITIES AND ADLS

## 2025-05-06 ASSESSMENT — PAIN SCALES - GENERAL
PAINLEVEL_OUTOF10: 5
PAINLEVEL_OUTOF10: 0
PAINLEVEL_OUTOF10: 6

## 2025-05-06 ASSESSMENT — PAIN DESCRIPTION - LOCATION
LOCATION: BACK
LOCATION: BACK

## 2025-05-06 ASSESSMENT — PAIN DESCRIPTION - DESCRIPTORS: DESCRIPTORS: SPASM

## 2025-05-07 LAB
ALBUMIN SERPL-MCNC: 3.4 G/DL (ref 3.5–5)
ALBUMIN/GLOB SERPL: 1.3 (ref 1.1–2.2)
ALP SERPL-CCNC: 119 U/L (ref 45–117)
ALT SERPL-CCNC: 43 U/L (ref 12–78)
ANION GAP SERPL CALC-SCNC: 10 MMOL/L (ref 2–12)
AST SERPL-CCNC: 25 U/L (ref 15–37)
BASOPHILS # BLD: 0 K/UL (ref 0–0.1)
BASOPHILS NFR BLD: 0 % (ref 0–1)
BILIRUB SERPL-MCNC: 0.7 MG/DL (ref 0.2–1)
BUN SERPL-MCNC: 48 MG/DL (ref 6–20)
BUN/CREAT SERPL: 7 (ref 12–20)
CALCIUM SERPL-MCNC: 9.3 MG/DL (ref 8.5–10.1)
CHLORIDE SERPL-SCNC: 108 MMOL/L (ref 97–108)
CO2 SERPL-SCNC: 22 MMOL/L (ref 21–32)
CREAT SERPL-MCNC: 7.25 MG/DL (ref 0.55–1.02)
DIFFERENTIAL METHOD BLD: ABNORMAL
EOSINOPHIL # BLD: 0 K/UL (ref 0–0.4)
EOSINOPHIL NFR BLD: 0 % (ref 0–7)
ERYTHROCYTE [DISTWIDTH] IN BLOOD BY AUTOMATED COUNT: 15.9 % (ref 11.5–14.5)
GLOBULIN SER CALC-MCNC: 2.7 G/DL (ref 2–4)
GLUCOSE SERPL-MCNC: 102 MG/DL (ref 65–100)
HCT VFR BLD AUTO: 32.8 % (ref 35–47)
HGB BLD-MCNC: 10.7 G/DL (ref 11.5–16)
IMM GRANULOCYTES # BLD AUTO: 0 K/UL
IMM GRANULOCYTES NFR BLD AUTO: 0 %
LYMPHOCYTES # BLD: 2.06 K/UL (ref 0.8–3.5)
LYMPHOCYTES NFR BLD: 24 % (ref 12–49)
MAGNESIUM SERPL-MCNC: 1.9 MG/DL (ref 1.6–2.4)
MCH RBC QN AUTO: 33.9 PG (ref 26–34)
MCHC RBC AUTO-ENTMCNC: 32.6 G/DL (ref 30–36.5)
MCV RBC AUTO: 103.8 FL (ref 80–99)
METAMYELOCYTES NFR BLD MANUAL: 2 %
MONOCYTES # BLD: 0.69 K/UL (ref 0–1)
MONOCYTES NFR BLD: 8 % (ref 5–13)
MYELOCYTES NFR BLD MANUAL: 2 %
NEUTS SEG # BLD: 5.5 K/UL (ref 1.8–8)
NEUTS SEG NFR BLD: 64 % (ref 32–75)
NRBC # BLD: 0.02 K/UL (ref 0–0.01)
NRBC BLD-RTO: 0.2 PER 100 WBC
PHOSPHATE SERPL-MCNC: 5 MG/DL (ref 2.6–4.7)
PLATELET # BLD AUTO: 137 K/UL (ref 150–400)
PMV BLD AUTO: 12.2 FL (ref 8.9–12.9)
POTASSIUM SERPL-SCNC: 4.2 MMOL/L (ref 3.5–5.1)
PROT SERPL-MCNC: 6.1 G/DL (ref 6.4–8.2)
RBC # BLD AUTO: 3.16 M/UL (ref 3.8–5.2)
RBC MORPH BLD: ABNORMAL
SODIUM SERPL-SCNC: 140 MMOL/L (ref 136–145)
WBC # BLD AUTO: 8.6 K/UL (ref 3.6–11)

## 2025-05-07 PROCEDURE — 94640 AIRWAY INHALATION TREATMENT: CPT

## 2025-05-07 PROCEDURE — 84100 ASSAY OF PHOSPHORUS: CPT

## 2025-05-07 PROCEDURE — 94660 CPAP INITIATION&MGMT: CPT

## 2025-05-07 PROCEDURE — 6360000002 HC RX W HCPCS: Performed by: STUDENT IN AN ORGANIZED HEALTH CARE EDUCATION/TRAINING PROGRAM

## 2025-05-07 PROCEDURE — 6370000000 HC RX 637 (ALT 250 FOR IP): Performed by: STUDENT IN AN ORGANIZED HEALTH CARE EDUCATION/TRAINING PROGRAM

## 2025-05-07 PROCEDURE — 2700000000 HC OXYGEN THERAPY PER DAY

## 2025-05-07 PROCEDURE — 2580000003 HC RX 258: Performed by: INTERNAL MEDICINE

## 2025-05-07 PROCEDURE — 6370000000 HC RX 637 (ALT 250 FOR IP): Performed by: INTERNAL MEDICINE

## 2025-05-07 PROCEDURE — 90935 HEMODIALYSIS ONE EVALUATION: CPT

## 2025-05-07 PROCEDURE — 6360000002 HC RX W HCPCS: Performed by: INTERNAL MEDICINE

## 2025-05-07 PROCEDURE — 2500000003 HC RX 250 WO HCPCS: Performed by: INTERNAL MEDICINE

## 2025-05-07 PROCEDURE — 80053 COMPREHEN METABOLIC PANEL: CPT

## 2025-05-07 PROCEDURE — 6370000000 HC RX 637 (ALT 250 FOR IP): Performed by: PHYSICIAN ASSISTANT

## 2025-05-07 PROCEDURE — 85025 COMPLETE CBC W/AUTO DIFF WBC: CPT

## 2025-05-07 PROCEDURE — 83735 ASSAY OF MAGNESIUM: CPT

## 2025-05-07 PROCEDURE — 2500000003 HC RX 250 WO HCPCS: Performed by: PHYSICIAN ASSISTANT

## 2025-05-07 PROCEDURE — 2000000000 HC ICU R&B

## 2025-05-07 PROCEDURE — P9047 ALBUMIN (HUMAN), 25%, 50ML: HCPCS | Performed by: INTERNAL MEDICINE

## 2025-05-07 RX ORDER — HEPARIN SODIUM 1000 [USP'U]/ML
1800 INJECTION, SOLUTION INTRAVENOUS; SUBCUTANEOUS PRN
Status: DISCONTINUED | OUTPATIENT
Start: 2025-05-07 | End: 2025-05-20 | Stop reason: HOSPADM

## 2025-05-07 RX ORDER — HEPARIN SODIUM 1000 [USP'U]/ML
1900 INJECTION, SOLUTION INTRAVENOUS; SUBCUTANEOUS PRN
Status: DISCONTINUED | OUTPATIENT
Start: 2025-05-07 | End: 2025-05-20 | Stop reason: HOSPADM

## 2025-05-07 RX ADMIN — HEPARIN SODIUM 1800 UNITS: 1000 INJECTION INTRAVENOUS; SUBCUTANEOUS at 12:52

## 2025-05-07 RX ADMIN — MIDODRINE HYDROCHLORIDE 15 MG: 5 TABLET ORAL at 17:42

## 2025-05-07 RX ADMIN — ATORVASTATIN CALCIUM 10 MG: 20 TABLET, FILM COATED ORAL at 21:51

## 2025-05-07 RX ADMIN — MONTELUKAST 10 MG: 10 TABLET, FILM COATED ORAL at 21:51

## 2025-05-07 RX ADMIN — APIXABAN 5 MG: 5 TABLET, FILM COATED ORAL at 08:53

## 2025-05-07 RX ADMIN — SODIUM CHLORIDE, PRESERVATIVE FREE 10 ML: 5 INJECTION INTRAVENOUS at 08:53

## 2025-05-07 RX ADMIN — IPRATROPIUM BROMIDE AND ALBUTEROL SULFATE 1 DOSE: 2.5; .5 SOLUTION RESPIRATORY (INHALATION) at 19:29

## 2025-05-07 RX ADMIN — HYDROCORTISONE SODIUM SUCCINATE 50 MG: 100 INJECTION, POWDER, FOR SOLUTION INTRAMUSCULAR; INTRAVENOUS at 08:53

## 2025-05-07 RX ADMIN — MIDODRINE HYDROCHLORIDE 15 MG: 5 TABLET ORAL at 12:06

## 2025-05-07 RX ADMIN — MIDODRINE HYDROCHLORIDE 15 MG: 5 TABLET ORAL at 08:53

## 2025-05-07 RX ADMIN — ALBUMIN (HUMAN) 25 G: 0.25 INJECTION, SOLUTION INTRAVENOUS at 09:42

## 2025-05-07 RX ADMIN — NOREPINEPHRINE BITARTRATE 19 MCG/MIN: 1 INJECTION, SOLUTION, CONCENTRATE INTRAVENOUS at 12:56

## 2025-05-07 RX ADMIN — HEPARIN SODIUM 1900 UNITS: 1000 INJECTION INTRAVENOUS; SUBCUTANEOUS at 12:53

## 2025-05-07 RX ADMIN — BUDESONIDE 500 MCG: 0.5 INHALANT RESPIRATORY (INHALATION) at 19:29

## 2025-05-07 RX ADMIN — SENNOSIDES 8.6 MG: 8.6 TABLET, FILM COATED ORAL at 21:51

## 2025-05-07 RX ADMIN — APIXABAN 5 MG: 5 TABLET, FILM COATED ORAL at 21:51

## 2025-05-07 RX ADMIN — Medication 1 MG: at 08:53

## 2025-05-07 RX ADMIN — FLUDROCORTISONE ACETATE 0.05 MG: 0.1 TABLET ORAL at 08:53

## 2025-05-07 ASSESSMENT — PAIN SCALES - GENERAL
PAINLEVEL_OUTOF10: 0

## 2025-05-08 LAB
ANION GAP SERPL CALC-SCNC: 8 MMOL/L (ref 2–12)
BUN SERPL-MCNC: 32 MG/DL (ref 6–20)
BUN/CREAT SERPL: 6 (ref 12–20)
CALCIUM SERPL-MCNC: 9.3 MG/DL (ref 8.5–10.1)
CHLORIDE SERPL-SCNC: 106 MMOL/L (ref 97–108)
CO2 SERPL-SCNC: 24 MMOL/L (ref 21–32)
CREAT SERPL-MCNC: 5.01 MG/DL (ref 0.55–1.02)
ERYTHROCYTE [DISTWIDTH] IN BLOOD BY AUTOMATED COUNT: 16.1 % (ref 11.5–14.5)
GLUCOSE SERPL-MCNC: 87 MG/DL (ref 65–100)
HCT VFR BLD AUTO: 32.6 % (ref 35–47)
HGB BLD-MCNC: 10.5 G/DL (ref 11.5–16)
MCH RBC QN AUTO: 33.7 PG (ref 26–34)
MCHC RBC AUTO-ENTMCNC: 32.2 G/DL (ref 30–36.5)
MCV RBC AUTO: 104.5 FL (ref 80–99)
NRBC # BLD: 0.02 K/UL (ref 0–0.01)
NRBC BLD-RTO: 0.2 PER 100 WBC
PLATELET # BLD AUTO: 138 K/UL (ref 150–400)
PMV BLD AUTO: 12.2 FL (ref 8.9–12.9)
POTASSIUM SERPL-SCNC: 3.6 MMOL/L (ref 3.5–5.1)
RBC # BLD AUTO: 3.12 M/UL (ref 3.8–5.2)
SODIUM SERPL-SCNC: 138 MMOL/L (ref 136–145)
WBC # BLD AUTO: 9 K/UL (ref 3.6–11)

## 2025-05-08 PROCEDURE — 6360000002 HC RX W HCPCS: Performed by: PHYSICIAN ASSISTANT

## 2025-05-08 PROCEDURE — 6370000000 HC RX 637 (ALT 250 FOR IP): Performed by: STUDENT IN AN ORGANIZED HEALTH CARE EDUCATION/TRAINING PROGRAM

## 2025-05-08 PROCEDURE — 2000000000 HC ICU R&B

## 2025-05-08 PROCEDURE — 2700000000 HC OXYGEN THERAPY PER DAY

## 2025-05-08 PROCEDURE — 94640 AIRWAY INHALATION TREATMENT: CPT

## 2025-05-08 PROCEDURE — 2580000003 HC RX 258: Performed by: INTERNAL MEDICINE

## 2025-05-08 PROCEDURE — 2500000003 HC RX 250 WO HCPCS: Performed by: PHYSICIAN ASSISTANT

## 2025-05-08 PROCEDURE — 6370000000 HC RX 637 (ALT 250 FOR IP): Performed by: INTERNAL MEDICINE

## 2025-05-08 PROCEDURE — 6370000000 HC RX 637 (ALT 250 FOR IP): Performed by: PHYSICIAN ASSISTANT

## 2025-05-08 PROCEDURE — 80048 BASIC METABOLIC PNL TOTAL CA: CPT

## 2025-05-08 PROCEDURE — 6370000000 HC RX 637 (ALT 250 FOR IP): Performed by: ANESTHESIOLOGY

## 2025-05-08 PROCEDURE — 2500000003 HC RX 250 WO HCPCS: Performed by: INTERNAL MEDICINE

## 2025-05-08 PROCEDURE — 6360000002 HC RX W HCPCS: Performed by: STUDENT IN AN ORGANIZED HEALTH CARE EDUCATION/TRAINING PROGRAM

## 2025-05-08 PROCEDURE — 99231 SBSQ HOSP IP/OBS SF/LOW 25: CPT | Performed by: PHYSICAL MEDICINE & REHABILITATION

## 2025-05-08 PROCEDURE — 85027 COMPLETE CBC AUTOMATED: CPT

## 2025-05-08 PROCEDURE — 94760 N-INVAS EAR/PLS OXIMETRY 1: CPT

## 2025-05-08 PROCEDURE — 94660 CPAP INITIATION&MGMT: CPT

## 2025-05-08 RX ORDER — MIDODRINE HYDROCHLORIDE 5 MG/1
20 TABLET ORAL
Status: DISCONTINUED | OUTPATIENT
Start: 2025-05-08 | End: 2025-05-20 | Stop reason: HOSPADM

## 2025-05-08 RX ADMIN — Medication 1 MG: at 08:46

## 2025-05-08 RX ADMIN — IPRATROPIUM BROMIDE AND ALBUTEROL SULFATE 1 DOSE: 2.5; .5 SOLUTION RESPIRATORY (INHALATION) at 20:17

## 2025-05-08 RX ADMIN — MIDODRINE HYDROCHLORIDE 20 MG: 5 TABLET ORAL at 12:10

## 2025-05-08 RX ADMIN — APIXABAN 5 MG: 5 TABLET, FILM COATED ORAL at 08:46

## 2025-05-08 RX ADMIN — HYDROCORTISONE SODIUM SUCCINATE 50 MG: 100 INJECTION, POWDER, FOR SOLUTION INTRAMUSCULAR; INTRAVENOUS at 08:47

## 2025-05-08 RX ADMIN — ONDANSETRON 4 MG: 2 INJECTION, SOLUTION INTRAMUSCULAR; INTRAVENOUS at 21:30

## 2025-05-08 RX ADMIN — APIXABAN 5 MG: 5 TABLET, FILM COATED ORAL at 21:30

## 2025-05-08 RX ADMIN — ATORVASTATIN CALCIUM 10 MG: 20 TABLET, FILM COATED ORAL at 21:30

## 2025-05-08 RX ADMIN — BUDESONIDE 500 MCG: 0.5 INHALANT RESPIRATORY (INHALATION) at 07:43

## 2025-05-08 RX ADMIN — IPRATROPIUM BROMIDE AND ALBUTEROL SULFATE 1 DOSE: 2.5; .5 SOLUTION RESPIRATORY (INHALATION) at 07:43

## 2025-05-08 RX ADMIN — NOREPINEPHRINE BITARTRATE 8 MCG/MIN: 1 INJECTION, SOLUTION, CONCENTRATE INTRAVENOUS at 23:43

## 2025-05-08 RX ADMIN — SODIUM CHLORIDE, PRESERVATIVE FREE 10 ML: 5 INJECTION INTRAVENOUS at 08:47

## 2025-05-08 RX ADMIN — SODIUM CHLORIDE, PRESERVATIVE FREE 10 ML: 5 INJECTION INTRAVENOUS at 21:30

## 2025-05-08 RX ADMIN — MIDODRINE HYDROCHLORIDE 20 MG: 5 TABLET ORAL at 17:52

## 2025-05-08 RX ADMIN — MONTELUKAST 10 MG: 10 TABLET, FILM COATED ORAL at 21:30

## 2025-05-08 RX ADMIN — CYCLOBENZAPRINE 10 MG: 10 TABLET, FILM COATED ORAL at 21:30

## 2025-05-08 RX ADMIN — SENNOSIDES 8.6 MG: 8.6 TABLET, FILM COATED ORAL at 21:30

## 2025-05-08 RX ADMIN — MIDODRINE HYDROCHLORIDE 20 MG: 5 TABLET ORAL at 08:47

## 2025-05-08 RX ADMIN — NOREPINEPHRINE BITARTRATE 4 MCG/MIN: 1 INJECTION, SOLUTION, CONCENTRATE INTRAVENOUS at 17:55

## 2025-05-08 RX ADMIN — FLUDROCORTISONE ACETATE 0.05 MG: 0.1 TABLET ORAL at 08:46

## 2025-05-08 RX ADMIN — BUDESONIDE 500 MCG: 0.5 INHALANT RESPIRATORY (INHALATION) at 20:17

## 2025-05-08 ASSESSMENT — PAIN DESCRIPTION - LOCATION: LOCATION: BACK

## 2025-05-08 ASSESSMENT — PAIN SCALES - GENERAL
PAINLEVEL_OUTOF10: 0
PAINLEVEL_OUTOF10: 5

## 2025-05-08 ASSESSMENT — PAIN DESCRIPTION - DESCRIPTORS: DESCRIPTORS: SPASM

## 2025-05-08 ASSESSMENT — PAIN - FUNCTIONAL ASSESSMENT: PAIN_FUNCTIONAL_ASSESSMENT: PREVENTS OR INTERFERES SOME ACTIVE ACTIVITIES AND ADLS

## 2025-05-09 LAB
ALBUMIN SERPL-MCNC: 3.2 G/DL (ref 3.5–5)
ANION GAP SERPL CALC-SCNC: 8 MMOL/L (ref 2–12)
BASOPHILS # BLD: 0 K/UL (ref 0–0.1)
BASOPHILS NFR BLD: 0 % (ref 0–1)
BUN SERPL-MCNC: 55 MG/DL (ref 6–20)
BUN/CREAT SERPL: 8 (ref 12–20)
CALCIUM SERPL-MCNC: 9.1 MG/DL (ref 8.5–10.1)
CHLORIDE SERPL-SCNC: 106 MMOL/L (ref 97–108)
CO2 SERPL-SCNC: 26 MMOL/L (ref 21–32)
CREAT SERPL-MCNC: 6.93 MG/DL (ref 0.55–1.02)
DIFFERENTIAL METHOD BLD: ABNORMAL
EOSINOPHIL # BLD: 0.19 K/UL (ref 0–0.4)
EOSINOPHIL NFR BLD: 2 % (ref 0–7)
ERYTHROCYTE [DISTWIDTH] IN BLOOD BY AUTOMATED COUNT: 16 % (ref 11.5–14.5)
GLUCOSE SERPL-MCNC: 92 MG/DL (ref 65–100)
HCT VFR BLD AUTO: 35 % (ref 35–47)
HGB BLD-MCNC: 11.3 G/DL (ref 11.5–16)
IMM GRANULOCYTES # BLD AUTO: 0 K/UL
IMM GRANULOCYTES NFR BLD AUTO: 0 %
LYMPHOCYTES # BLD: 1.34 K/UL (ref 0.8–3.5)
LYMPHOCYTES NFR BLD: 14 % (ref 12–49)
MCH RBC QN AUTO: 34.1 PG (ref 26–34)
MCHC RBC AUTO-ENTMCNC: 32.3 G/DL (ref 30–36.5)
MCV RBC AUTO: 105.7 FL (ref 80–99)
METAMYELOCYTES NFR BLD MANUAL: 1 %
MONOCYTES # BLD: 0.96 K/UL (ref 0–1)
MONOCYTES NFR BLD: 10 % (ref 5–13)
MYELOCYTES NFR BLD MANUAL: 1 %
NEUTS BAND NFR BLD MANUAL: 3 % (ref 0–6)
NEUTS SEG # BLD: 6.91 K/UL (ref 1.8–8)
NEUTS SEG NFR BLD: 69 % (ref 32–75)
NRBC # BLD: 0.02 K/UL (ref 0–0.01)
NRBC BLD-RTO: 0.2 PER 100 WBC
PHOSPHATE SERPL-MCNC: 5 MG/DL (ref 2.6–4.7)
PLATELET # BLD AUTO: 149 K/UL (ref 150–400)
PMV BLD AUTO: 12 FL (ref 8.9–12.9)
POTASSIUM SERPL-SCNC: 3.6 MMOL/L (ref 3.5–5.1)
RBC # BLD AUTO: 3.31 M/UL (ref 3.8–5.2)
RBC MORPH BLD: ABNORMAL
RBC MORPH BLD: ABNORMAL
SODIUM SERPL-SCNC: 140 MMOL/L (ref 136–145)
WBC # BLD AUTO: 9.6 K/UL (ref 3.6–11)
WBC MORPH BLD: ABNORMAL

## 2025-05-09 PROCEDURE — 90935 HEMODIALYSIS ONE EVALUATION: CPT

## 2025-05-09 PROCEDURE — 97530 THERAPEUTIC ACTIVITIES: CPT

## 2025-05-09 PROCEDURE — 94640 AIRWAY INHALATION TREATMENT: CPT

## 2025-05-09 PROCEDURE — 97535 SELF CARE MNGMENT TRAINING: CPT

## 2025-05-09 PROCEDURE — 6370000000 HC RX 637 (ALT 250 FOR IP): Performed by: STUDENT IN AN ORGANIZED HEALTH CARE EDUCATION/TRAINING PROGRAM

## 2025-05-09 PROCEDURE — 6360000002 HC RX W HCPCS: Performed by: STUDENT IN AN ORGANIZED HEALTH CARE EDUCATION/TRAINING PROGRAM

## 2025-05-09 PROCEDURE — 80069 RENAL FUNCTION PANEL: CPT

## 2025-05-09 PROCEDURE — P9047 ALBUMIN (HUMAN), 25%, 50ML: HCPCS | Performed by: INTERNAL MEDICINE

## 2025-05-09 PROCEDURE — 6370000000 HC RX 637 (ALT 250 FOR IP): Performed by: PHYSICIAN ASSISTANT

## 2025-05-09 PROCEDURE — 6370000000 HC RX 637 (ALT 250 FOR IP): Performed by: INTERNAL MEDICINE

## 2025-05-09 PROCEDURE — 94760 N-INVAS EAR/PLS OXIMETRY 1: CPT

## 2025-05-09 PROCEDURE — 85025 COMPLETE CBC W/AUTO DIFF WBC: CPT

## 2025-05-09 PROCEDURE — 2700000000 HC OXYGEN THERAPY PER DAY

## 2025-05-09 PROCEDURE — 99233 SBSQ HOSP IP/OBS HIGH 50: CPT | Performed by: PHYSICAL MEDICINE & REHABILITATION

## 2025-05-09 PROCEDURE — G0316 PR PROLONG INPT EVAL ADD15 M: HCPCS | Performed by: PHYSICAL MEDICINE & REHABILITATION

## 2025-05-09 PROCEDURE — 94660 CPAP INITIATION&MGMT: CPT

## 2025-05-09 PROCEDURE — 6360000002 HC RX W HCPCS: Performed by: INTERNAL MEDICINE

## 2025-05-09 PROCEDURE — 2500000003 HC RX 250 WO HCPCS: Performed by: PHYSICIAN ASSISTANT

## 2025-05-09 PROCEDURE — 2000000000 HC ICU R&B

## 2025-05-09 RX ADMIN — IPRATROPIUM BROMIDE AND ALBUTEROL SULFATE 1 DOSE: 2.5; .5 SOLUTION RESPIRATORY (INHALATION) at 20:11

## 2025-05-09 RX ADMIN — APIXABAN 5 MG: 5 TABLET, FILM COATED ORAL at 20:30

## 2025-05-09 RX ADMIN — ALBUMIN (HUMAN) 25 G: 0.25 INJECTION, SOLUTION INTRAVENOUS at 08:26

## 2025-05-09 RX ADMIN — MIDODRINE HYDROCHLORIDE 20 MG: 5 TABLET ORAL at 17:56

## 2025-05-09 RX ADMIN — MIDODRINE HYDROCHLORIDE 20 MG: 5 TABLET ORAL at 08:33

## 2025-05-09 RX ADMIN — SODIUM CHLORIDE, PRESERVATIVE FREE 10 ML: 5 INJECTION INTRAVENOUS at 09:00

## 2025-05-09 RX ADMIN — HEPARIN SODIUM 1900 UNITS: 1000 INJECTION INTRAVENOUS; SUBCUTANEOUS at 11:30

## 2025-05-09 RX ADMIN — BUDESONIDE 500 MCG: 0.5 INHALANT RESPIRATORY (INHALATION) at 20:11

## 2025-05-09 RX ADMIN — HEPARIN SODIUM 1800 UNITS: 1000 INJECTION INTRAVENOUS; SUBCUTANEOUS at 11:29

## 2025-05-09 RX ADMIN — SODIUM CHLORIDE, PRESERVATIVE FREE 10 ML: 5 INJECTION INTRAVENOUS at 20:30

## 2025-05-09 RX ADMIN — APIXABAN 5 MG: 5 TABLET, FILM COATED ORAL at 12:04

## 2025-05-09 RX ADMIN — Medication 1 MG: at 12:04

## 2025-05-09 RX ADMIN — FLUDROCORTISONE ACETATE 0.05 MG: 0.1 TABLET ORAL at 12:04

## 2025-05-09 RX ADMIN — MIDODRINE HYDROCHLORIDE 20 MG: 5 TABLET ORAL at 12:04

## 2025-05-09 RX ADMIN — SENNOSIDES 8.6 MG: 8.6 TABLET, FILM COATED ORAL at 20:30

## 2025-05-09 RX ADMIN — HYDROCORTISONE SODIUM SUCCINATE 50 MG: 100 INJECTION, POWDER, FOR SOLUTION INTRAMUSCULAR; INTRAVENOUS at 12:04

## 2025-05-09 RX ADMIN — ATORVASTATIN CALCIUM 10 MG: 20 TABLET, FILM COATED ORAL at 20:30

## 2025-05-09 RX ADMIN — MONTELUKAST 10 MG: 10 TABLET, FILM COATED ORAL at 20:30

## 2025-05-09 ASSESSMENT — PAIN SCALES - GENERAL
PAINLEVEL_OUTOF10: 0

## 2025-05-10 PROCEDURE — 6360000002 HC RX W HCPCS: Performed by: STUDENT IN AN ORGANIZED HEALTH CARE EDUCATION/TRAINING PROGRAM

## 2025-05-10 PROCEDURE — 2500000003 HC RX 250 WO HCPCS: Performed by: PHYSICIAN ASSISTANT

## 2025-05-10 PROCEDURE — 94760 N-INVAS EAR/PLS OXIMETRY 1: CPT

## 2025-05-10 PROCEDURE — 2580000003 HC RX 258: Performed by: INTERNAL MEDICINE

## 2025-05-10 PROCEDURE — 2500000003 HC RX 250 WO HCPCS: Performed by: INTERNAL MEDICINE

## 2025-05-10 PROCEDURE — 2000000000 HC ICU R&B

## 2025-05-10 PROCEDURE — 94660 CPAP INITIATION&MGMT: CPT

## 2025-05-10 PROCEDURE — 94640 AIRWAY INHALATION TREATMENT: CPT

## 2025-05-10 PROCEDURE — 6370000000 HC RX 637 (ALT 250 FOR IP): Performed by: STUDENT IN AN ORGANIZED HEALTH CARE EDUCATION/TRAINING PROGRAM

## 2025-05-10 PROCEDURE — 6370000000 HC RX 637 (ALT 250 FOR IP): Performed by: INTERNAL MEDICINE

## 2025-05-10 PROCEDURE — 6370000000 HC RX 637 (ALT 250 FOR IP): Performed by: PHYSICIAN ASSISTANT

## 2025-05-10 RX ADMIN — IPRATROPIUM BROMIDE AND ALBUTEROL SULFATE 1 DOSE: 2.5; .5 SOLUTION RESPIRATORY (INHALATION) at 19:40

## 2025-05-10 RX ADMIN — IPRATROPIUM BROMIDE AND ALBUTEROL SULFATE 1 DOSE: 2.5; .5 SOLUTION RESPIRATORY (INHALATION) at 08:04

## 2025-05-10 RX ADMIN — FLUDROCORTISONE ACETATE 0.05 MG: 0.1 TABLET ORAL at 08:29

## 2025-05-10 RX ADMIN — APIXABAN 5 MG: 5 TABLET, FILM COATED ORAL at 08:29

## 2025-05-10 RX ADMIN — SODIUM CHLORIDE, PRESERVATIVE FREE 10 ML: 5 INJECTION INTRAVENOUS at 08:29

## 2025-05-10 RX ADMIN — HYDROCORTISONE SODIUM SUCCINATE 50 MG: 100 INJECTION, POWDER, FOR SOLUTION INTRAMUSCULAR; INTRAVENOUS at 08:29

## 2025-05-10 RX ADMIN — BUDESONIDE 500 MCG: 0.5 INHALANT RESPIRATORY (INHALATION) at 08:04

## 2025-05-10 RX ADMIN — MONTELUKAST 10 MG: 10 TABLET, FILM COATED ORAL at 20:25

## 2025-05-10 RX ADMIN — MIDODRINE HYDROCHLORIDE 20 MG: 5 TABLET ORAL at 08:29

## 2025-05-10 RX ADMIN — ATORVASTATIN CALCIUM 10 MG: 20 TABLET, FILM COATED ORAL at 20:25

## 2025-05-10 RX ADMIN — NOREPINEPHRINE BITARTRATE 5 MCG/MIN: 1 INJECTION, SOLUTION, CONCENTRATE INTRAVENOUS at 10:21

## 2025-05-10 RX ADMIN — Medication 1 MG: at 08:29

## 2025-05-10 RX ADMIN — SENNOSIDES 8.6 MG: 8.6 TABLET, FILM COATED ORAL at 20:52

## 2025-05-10 RX ADMIN — SODIUM CHLORIDE, PRESERVATIVE FREE 10 ML: 5 INJECTION INTRAVENOUS at 20:25

## 2025-05-10 RX ADMIN — MIDODRINE HYDROCHLORIDE 20 MG: 5 TABLET ORAL at 12:46

## 2025-05-10 RX ADMIN — APIXABAN 5 MG: 5 TABLET, FILM COATED ORAL at 20:52

## 2025-05-10 RX ADMIN — BUDESONIDE 500 MCG: 0.5 INHALANT RESPIRATORY (INHALATION) at 19:40

## 2025-05-10 ASSESSMENT — PAIN SCALES - GENERAL
PAINLEVEL_OUTOF10: 0

## 2025-05-11 PROCEDURE — 2700000000 HC OXYGEN THERAPY PER DAY

## 2025-05-11 PROCEDURE — 2000000000 HC ICU R&B

## 2025-05-11 PROCEDURE — 2500000003 HC RX 250 WO HCPCS: Performed by: PHYSICIAN ASSISTANT

## 2025-05-11 PROCEDURE — 94660 CPAP INITIATION&MGMT: CPT

## 2025-05-11 PROCEDURE — 6370000000 HC RX 637 (ALT 250 FOR IP): Performed by: PHYSICIAN ASSISTANT

## 2025-05-11 PROCEDURE — 94640 AIRWAY INHALATION TREATMENT: CPT

## 2025-05-11 PROCEDURE — 6370000000 HC RX 637 (ALT 250 FOR IP): Performed by: INTERNAL MEDICINE

## 2025-05-11 PROCEDURE — 94760 N-INVAS EAR/PLS OXIMETRY 1: CPT

## 2025-05-11 PROCEDURE — 6360000002 HC RX W HCPCS: Performed by: STUDENT IN AN ORGANIZED HEALTH CARE EDUCATION/TRAINING PROGRAM

## 2025-05-11 PROCEDURE — 6370000000 HC RX 637 (ALT 250 FOR IP): Performed by: STUDENT IN AN ORGANIZED HEALTH CARE EDUCATION/TRAINING PROGRAM

## 2025-05-11 PROCEDURE — 6370000000 HC RX 637 (ALT 250 FOR IP): Performed by: ANESTHESIOLOGY

## 2025-05-11 RX ORDER — FLUDROCORTISONE ACETATE 0.1 MG/1
0.05 TABLET ORAL 2 TIMES DAILY
Status: DISCONTINUED | OUTPATIENT
Start: 2025-05-11 | End: 2025-05-19

## 2025-05-11 RX ORDER — DROXIDOPA 100 MG/1
200 CAPSULE ORAL 3 TIMES DAILY
Status: DISCONTINUED | OUTPATIENT
Start: 2025-05-11 | End: 2025-05-19

## 2025-05-11 RX ADMIN — FLUDROCORTISONE ACETATE 0.05 MG: 0.1 TABLET ORAL at 20:16

## 2025-05-11 RX ADMIN — BUDESONIDE 500 MCG: 0.5 INHALANT RESPIRATORY (INHALATION) at 19:08

## 2025-05-11 RX ADMIN — MIDODRINE HYDROCHLORIDE 20 MG: 5 TABLET ORAL at 12:18

## 2025-05-11 RX ADMIN — IPRATROPIUM BROMIDE AND ALBUTEROL SULFATE 1 DOSE: 2.5; .5 SOLUTION RESPIRATORY (INHALATION) at 08:22

## 2025-05-11 RX ADMIN — MIDODRINE HYDROCHLORIDE 20 MG: 5 TABLET ORAL at 17:41

## 2025-05-11 RX ADMIN — MONTELUKAST 10 MG: 10 TABLET, FILM COATED ORAL at 20:17

## 2025-05-11 RX ADMIN — IPRATROPIUM BROMIDE AND ALBUTEROL SULFATE 1 DOSE: 2.5; .5 SOLUTION RESPIRATORY (INHALATION) at 19:08

## 2025-05-11 RX ADMIN — DROXIDOPA 200 MG: 100 CAPSULE ORAL at 14:39

## 2025-05-11 RX ADMIN — MIDODRINE HYDROCHLORIDE 20 MG: 5 TABLET ORAL at 08:56

## 2025-05-11 RX ADMIN — ATORVASTATIN CALCIUM 10 MG: 20 TABLET, FILM COATED ORAL at 20:16

## 2025-05-11 RX ADMIN — APIXABAN 5 MG: 5 TABLET, FILM COATED ORAL at 20:30

## 2025-05-11 RX ADMIN — APIXABAN 5 MG: 5 TABLET, FILM COATED ORAL at 08:56

## 2025-05-11 RX ADMIN — SODIUM CHLORIDE, PRESERVATIVE FREE 10 ML: 5 INJECTION INTRAVENOUS at 20:17

## 2025-05-11 RX ADMIN — DROXIDOPA 200 MG: 100 CAPSULE ORAL at 17:41

## 2025-05-11 RX ADMIN — SODIUM CHLORIDE, PRESERVATIVE FREE 10 ML: 5 INJECTION INTRAVENOUS at 08:57

## 2025-05-11 RX ADMIN — Medication 1 MG: at 08:56

## 2025-05-11 RX ADMIN — HYDROCORTISONE SODIUM SUCCINATE 50 MG: 100 INJECTION, POWDER, FOR SOLUTION INTRAMUSCULAR; INTRAVENOUS at 08:56

## 2025-05-11 RX ADMIN — CYCLOBENZAPRINE 10 MG: 10 TABLET, FILM COATED ORAL at 20:17

## 2025-05-11 RX ADMIN — BUDESONIDE 500 MCG: 0.5 INHALANT RESPIRATORY (INHALATION) at 08:22

## 2025-05-11 RX ADMIN — SENNOSIDES 8.6 MG: 8.6 TABLET, FILM COATED ORAL at 20:17

## 2025-05-11 RX ADMIN — FLUDROCORTISONE ACETATE 0.05 MG: 0.1 TABLET ORAL at 09:00

## 2025-05-11 ASSESSMENT — PAIN SCALES - GENERAL
PAINLEVEL_OUTOF10: 0

## 2025-05-12 LAB
ALBUMIN SERPL-MCNC: 3 G/DL (ref 3.5–5)
ANION GAP SERPL CALC-SCNC: 8 MMOL/L (ref 2–12)
BUN SERPL-MCNC: 71 MG/DL (ref 6–20)
BUN/CREAT SERPL: 9 (ref 12–20)
CALCIUM SERPL-MCNC: 8.6 MG/DL (ref 8.5–10.1)
CHLORIDE SERPL-SCNC: 105 MMOL/L (ref 97–108)
CO2 SERPL-SCNC: 25 MMOL/L (ref 21–32)
CREAT SERPL-MCNC: 8.1 MG/DL (ref 0.55–1.02)
ERYTHROCYTE [DISTWIDTH] IN BLOOD BY AUTOMATED COUNT: 16.4 % (ref 11.5–14.5)
GLUCOSE SERPL-MCNC: 88 MG/DL (ref 65–100)
HCT VFR BLD AUTO: 32.6 % (ref 35–47)
HGB BLD-MCNC: 10.4 G/DL (ref 11.5–16)
MCH RBC QN AUTO: 33.4 PG (ref 26–34)
MCHC RBC AUTO-ENTMCNC: 31.9 G/DL (ref 30–36.5)
MCV RBC AUTO: 104.8 FL (ref 80–99)
NRBC # BLD: 0.02 K/UL (ref 0–0.01)
NRBC BLD-RTO: 0.2 PER 100 WBC
PHOSPHATE SERPL-MCNC: 5.7 MG/DL (ref 2.6–4.7)
PLATELET # BLD AUTO: 141 K/UL (ref 150–400)
PMV BLD AUTO: 11.9 FL (ref 8.9–12.9)
POTASSIUM SERPL-SCNC: 4.4 MMOL/L (ref 3.5–5.1)
RBC # BLD AUTO: 3.11 M/UL (ref 3.8–5.2)
SODIUM SERPL-SCNC: 138 MMOL/L (ref 136–145)
WBC # BLD AUTO: 9.7 K/UL (ref 3.6–11)

## 2025-05-12 PROCEDURE — 85027 COMPLETE CBC AUTOMATED: CPT

## 2025-05-12 PROCEDURE — 2500000003 HC RX 250 WO HCPCS: Performed by: PHYSICIAN ASSISTANT

## 2025-05-12 PROCEDURE — 6360000002 HC RX W HCPCS: Performed by: STUDENT IN AN ORGANIZED HEALTH CARE EDUCATION/TRAINING PROGRAM

## 2025-05-12 PROCEDURE — 6370000000 HC RX 637 (ALT 250 FOR IP): Performed by: STUDENT IN AN ORGANIZED HEALTH CARE EDUCATION/TRAINING PROGRAM

## 2025-05-12 PROCEDURE — 2580000003 HC RX 258: Performed by: INTERNAL MEDICINE

## 2025-05-12 PROCEDURE — 94640 AIRWAY INHALATION TREATMENT: CPT

## 2025-05-12 PROCEDURE — 80069 RENAL FUNCTION PANEL: CPT

## 2025-05-12 PROCEDURE — 6360000002 HC RX W HCPCS: Performed by: INTERNAL MEDICINE

## 2025-05-12 PROCEDURE — 6370000000 HC RX 637 (ALT 250 FOR IP): Performed by: INTERNAL MEDICINE

## 2025-05-12 PROCEDURE — 94660 CPAP INITIATION&MGMT: CPT

## 2025-05-12 PROCEDURE — 90935 HEMODIALYSIS ONE EVALUATION: CPT

## 2025-05-12 PROCEDURE — 6370000000 HC RX 637 (ALT 250 FOR IP): Performed by: PHYSICIAN ASSISTANT

## 2025-05-12 PROCEDURE — 2500000003 HC RX 250 WO HCPCS: Performed by: INTERNAL MEDICINE

## 2025-05-12 PROCEDURE — 2000000000 HC ICU R&B

## 2025-05-12 PROCEDURE — 99231 SBSQ HOSP IP/OBS SF/LOW 25: CPT | Performed by: PHYSICAL MEDICINE & REHABILITATION

## 2025-05-12 RX ORDER — LIDOCAINE 4 G/G
1 PATCH TOPICAL DAILY PRN
Status: DISCONTINUED | OUTPATIENT
Start: 2025-05-12 | End: 2025-05-20 | Stop reason: HOSPADM

## 2025-05-12 RX ADMIN — BUDESONIDE 500 MCG: 0.5 INHALANT RESPIRATORY (INHALATION) at 20:07

## 2025-05-12 RX ADMIN — APIXABAN 5 MG: 5 TABLET, FILM COATED ORAL at 08:39

## 2025-05-12 RX ADMIN — IPRATROPIUM BROMIDE AND ALBUTEROL SULFATE 1 DOSE: 2.5; .5 SOLUTION RESPIRATORY (INHALATION) at 20:07

## 2025-05-12 RX ADMIN — HYDROCORTISONE SODIUM SUCCINATE 50 MG: 100 INJECTION, POWDER, FOR SOLUTION INTRAMUSCULAR; INTRAVENOUS at 08:40

## 2025-05-12 RX ADMIN — ATORVASTATIN CALCIUM 10 MG: 20 TABLET, FILM COATED ORAL at 21:08

## 2025-05-12 RX ADMIN — MIDODRINE HYDROCHLORIDE 20 MG: 5 TABLET ORAL at 18:12

## 2025-05-12 RX ADMIN — SODIUM CHLORIDE, PRESERVATIVE FREE 10 ML: 5 INJECTION INTRAVENOUS at 21:09

## 2025-05-12 RX ADMIN — MONTELUKAST 10 MG: 10 TABLET, FILM COATED ORAL at 21:08

## 2025-05-12 RX ADMIN — HEPARIN SODIUM 1900 UNITS: 1000 INJECTION INTRAVENOUS; SUBCUTANEOUS at 11:13

## 2025-05-12 RX ADMIN — Medication 1 MG: at 08:42

## 2025-05-12 RX ADMIN — APIXABAN 5 MG: 5 TABLET, FILM COATED ORAL at 21:08

## 2025-05-12 RX ADMIN — MIDODRINE HYDROCHLORIDE 20 MG: 5 TABLET ORAL at 08:39

## 2025-05-12 RX ADMIN — MIDODRINE HYDROCHLORIDE 20 MG: 5 TABLET ORAL at 12:01

## 2025-05-12 RX ADMIN — FLUDROCORTISONE ACETATE 0.05 MG: 0.1 TABLET ORAL at 21:08

## 2025-05-12 RX ADMIN — FLUDROCORTISONE ACETATE 0.05 MG: 0.1 TABLET ORAL at 08:39

## 2025-05-12 RX ADMIN — NOREPINEPHRINE BITARTRATE 2 MCG/MIN: 1 INJECTION, SOLUTION, CONCENTRATE INTRAVENOUS at 00:09

## 2025-05-12 RX ADMIN — DROXIDOPA 200 MG: 100 CAPSULE ORAL at 08:40

## 2025-05-12 RX ADMIN — DROXIDOPA 200 MG: 100 CAPSULE ORAL at 12:01

## 2025-05-12 RX ADMIN — DROXIDOPA 200 MG: 100 CAPSULE ORAL at 18:12

## 2025-05-12 RX ADMIN — HEPARIN SODIUM 1800 UNITS: 1000 INJECTION INTRAVENOUS; SUBCUTANEOUS at 11:12

## 2025-05-12 RX ADMIN — SENNOSIDES 8.6 MG: 8.6 TABLET, FILM COATED ORAL at 21:08

## 2025-05-12 ASSESSMENT — PAIN SCALES - GENERAL
PAINLEVEL_OUTOF10: 0
PAINLEVEL_OUTOF10: 4
PAINLEVEL_OUTOF10: 0

## 2025-05-12 ASSESSMENT — PAIN - FUNCTIONAL ASSESSMENT: PAIN_FUNCTIONAL_ASSESSMENT: PREVENTS OR INTERFERES SOME ACTIVE ACTIVITIES AND ADLS

## 2025-05-12 ASSESSMENT — PAIN DESCRIPTION - PAIN TYPE: TYPE: NEUROPATHIC PAIN

## 2025-05-12 ASSESSMENT — PAIN DESCRIPTION - ORIENTATION: ORIENTATION: RIGHT

## 2025-05-12 ASSESSMENT — PAIN DESCRIPTION - ONSET: ONSET: PROGRESSIVE

## 2025-05-12 ASSESSMENT — PAIN DESCRIPTION - LOCATION: LOCATION: HIP

## 2025-05-12 ASSESSMENT — PAIN DESCRIPTION - FREQUENCY: FREQUENCY: CONTINUOUS

## 2025-05-13 PROCEDURE — 6370000000 HC RX 637 (ALT 250 FOR IP): Performed by: INTERNAL MEDICINE

## 2025-05-13 PROCEDURE — 2500000003 HC RX 250 WO HCPCS: Performed by: PHYSICIAN ASSISTANT

## 2025-05-13 PROCEDURE — 2580000003 HC RX 258: Performed by: INTERNAL MEDICINE

## 2025-05-13 PROCEDURE — 2700000000 HC OXYGEN THERAPY PER DAY

## 2025-05-13 PROCEDURE — 2000000000 HC ICU R&B

## 2025-05-13 PROCEDURE — 6370000000 HC RX 637 (ALT 250 FOR IP): Performed by: STUDENT IN AN ORGANIZED HEALTH CARE EDUCATION/TRAINING PROGRAM

## 2025-05-13 PROCEDURE — 6370000000 HC RX 637 (ALT 250 FOR IP): Performed by: PHYSICIAN ASSISTANT

## 2025-05-13 PROCEDURE — 6360000002 HC RX W HCPCS: Performed by: STUDENT IN AN ORGANIZED HEALTH CARE EDUCATION/TRAINING PROGRAM

## 2025-05-13 PROCEDURE — 94640 AIRWAY INHALATION TREATMENT: CPT

## 2025-05-13 PROCEDURE — 97530 THERAPEUTIC ACTIVITIES: CPT

## 2025-05-13 PROCEDURE — 99231 SBSQ HOSP IP/OBS SF/LOW 25: CPT | Performed by: PHYSICAL MEDICINE & REHABILITATION

## 2025-05-13 PROCEDURE — 2500000003 HC RX 250 WO HCPCS: Performed by: INTERNAL MEDICINE

## 2025-05-13 RX ORDER — PREDNISONE 20 MG/1
10 TABLET ORAL DAILY
Status: DISCONTINUED | OUTPATIENT
Start: 2025-05-14 | End: 2025-05-20 | Stop reason: HOSPADM

## 2025-05-13 RX ADMIN — MIDODRINE HYDROCHLORIDE 20 MG: 5 TABLET ORAL at 12:48

## 2025-05-13 RX ADMIN — SODIUM CHLORIDE, PRESERVATIVE FREE 10 ML: 5 INJECTION INTRAVENOUS at 09:05

## 2025-05-13 RX ADMIN — DROXIDOPA 200 MG: 100 CAPSULE ORAL at 09:03

## 2025-05-13 RX ADMIN — DROXIDOPA 200 MG: 100 CAPSULE ORAL at 12:48

## 2025-05-13 RX ADMIN — FLUDROCORTISONE ACETATE 0.05 MG: 0.1 TABLET ORAL at 20:28

## 2025-05-13 RX ADMIN — SODIUM CHLORIDE, PRESERVATIVE FREE 10 ML: 5 INJECTION INTRAVENOUS at 20:31

## 2025-05-13 RX ADMIN — SENNOSIDES 8.6 MG: 8.6 TABLET, FILM COATED ORAL at 20:29

## 2025-05-13 RX ADMIN — MIDODRINE HYDROCHLORIDE 20 MG: 5 TABLET ORAL at 17:11

## 2025-05-13 RX ADMIN — IPRATROPIUM BROMIDE AND ALBUTEROL SULFATE 1 DOSE: 2.5; .5 SOLUTION RESPIRATORY (INHALATION) at 08:35

## 2025-05-13 RX ADMIN — BUDESONIDE 500 MCG: 0.5 INHALANT RESPIRATORY (INHALATION) at 08:35

## 2025-05-13 RX ADMIN — IPRATROPIUM BROMIDE AND ALBUTEROL SULFATE 1 DOSE: 2.5; .5 SOLUTION RESPIRATORY (INHALATION) at 21:13

## 2025-05-13 RX ADMIN — BUDESONIDE 500 MCG: 0.5 INHALANT RESPIRATORY (INHALATION) at 20:23

## 2025-05-13 RX ADMIN — APIXABAN 5 MG: 5 TABLET, FILM COATED ORAL at 20:30

## 2025-05-13 RX ADMIN — DROXIDOPA 200 MG: 100 CAPSULE ORAL at 17:12

## 2025-05-13 RX ADMIN — APIXABAN 5 MG: 5 TABLET, FILM COATED ORAL at 09:04

## 2025-05-13 RX ADMIN — MIDODRINE HYDROCHLORIDE 20 MG: 5 TABLET ORAL at 09:04

## 2025-05-13 RX ADMIN — NOREPINEPHRINE BITARTRATE 5 MCG/MIN: 1 INJECTION, SOLUTION, CONCENTRATE INTRAVENOUS at 03:09

## 2025-05-13 RX ADMIN — FLUDROCORTISONE ACETATE 0.05 MG: 0.1 TABLET ORAL at 09:04

## 2025-05-13 RX ADMIN — MONTELUKAST 10 MG: 10 TABLET, FILM COATED ORAL at 20:28

## 2025-05-13 RX ADMIN — HYDROCORTISONE SODIUM SUCCINATE 50 MG: 100 INJECTION, POWDER, FOR SOLUTION INTRAMUSCULAR; INTRAVENOUS at 09:05

## 2025-05-13 RX ADMIN — Medication 1 MG: at 09:04

## 2025-05-13 RX ADMIN — ATORVASTATIN CALCIUM 10 MG: 20 TABLET, FILM COATED ORAL at 20:28

## 2025-05-13 ASSESSMENT — PAIN SCALES - GENERAL
PAINLEVEL_OUTOF10: 0

## 2025-05-14 PROCEDURE — 6360000002 HC RX W HCPCS: Performed by: INTERNAL MEDICINE

## 2025-05-14 PROCEDURE — P9047 ALBUMIN (HUMAN), 25%, 50ML: HCPCS | Performed by: INTERNAL MEDICINE

## 2025-05-14 PROCEDURE — 2000000000 HC ICU R&B

## 2025-05-14 PROCEDURE — 6370000000 HC RX 637 (ALT 250 FOR IP): Performed by: PHYSICIAN ASSISTANT

## 2025-05-14 PROCEDURE — 6360000002 HC RX W HCPCS: Performed by: STUDENT IN AN ORGANIZED HEALTH CARE EDUCATION/TRAINING PROGRAM

## 2025-05-14 PROCEDURE — 2500000003 HC RX 250 WO HCPCS: Performed by: INTERNAL MEDICINE

## 2025-05-14 PROCEDURE — 6370000000 HC RX 637 (ALT 250 FOR IP): Performed by: INTERNAL MEDICINE

## 2025-05-14 PROCEDURE — 6370000000 HC RX 637 (ALT 250 FOR IP): Performed by: STUDENT IN AN ORGANIZED HEALTH CARE EDUCATION/TRAINING PROGRAM

## 2025-05-14 PROCEDURE — 2580000003 HC RX 258: Performed by: INTERNAL MEDICINE

## 2025-05-14 PROCEDURE — 2500000003 HC RX 250 WO HCPCS: Performed by: PHYSICIAN ASSISTANT

## 2025-05-14 PROCEDURE — 94640 AIRWAY INHALATION TREATMENT: CPT

## 2025-05-14 PROCEDURE — 94760 N-INVAS EAR/PLS OXIMETRY 1: CPT

## 2025-05-14 PROCEDURE — 90935 HEMODIALYSIS ONE EVALUATION: CPT

## 2025-05-14 PROCEDURE — 2700000000 HC OXYGEN THERAPY PER DAY

## 2025-05-14 RX ADMIN — SENNOSIDES 8.6 MG: 8.6 TABLET, FILM COATED ORAL at 20:25

## 2025-05-14 RX ADMIN — HEPARIN SODIUM 1900 UNITS: 1000 INJECTION INTRAVENOUS; SUBCUTANEOUS at 14:59

## 2025-05-14 RX ADMIN — DROXIDOPA 200 MG: 100 CAPSULE ORAL at 08:21

## 2025-05-14 RX ADMIN — MIDODRINE HYDROCHLORIDE 20 MG: 5 TABLET ORAL at 11:36

## 2025-05-14 RX ADMIN — PREDNISONE 10 MG: 20 TABLET ORAL at 08:22

## 2025-05-14 RX ADMIN — MIDODRINE HYDROCHLORIDE 20 MG: 5 TABLET ORAL at 16:39

## 2025-05-14 RX ADMIN — BUDESONIDE 500 MCG: 0.5 INHALANT RESPIRATORY (INHALATION) at 07:00

## 2025-05-14 RX ADMIN — SODIUM CHLORIDE, PRESERVATIVE FREE 10 ML: 5 INJECTION INTRAVENOUS at 08:23

## 2025-05-14 RX ADMIN — DROXIDOPA 200 MG: 100 CAPSULE ORAL at 11:36

## 2025-05-14 RX ADMIN — IPRATROPIUM BROMIDE AND ALBUTEROL SULFATE 1 DOSE: 2.5; .5 SOLUTION RESPIRATORY (INHALATION) at 07:00

## 2025-05-14 RX ADMIN — Medication 1 MG: at 08:22

## 2025-05-14 RX ADMIN — DROXIDOPA 200 MG: 100 CAPSULE ORAL at 16:39

## 2025-05-14 RX ADMIN — BUDESONIDE 500 MCG: 0.5 INHALANT RESPIRATORY (INHALATION) at 21:09

## 2025-05-14 RX ADMIN — ATORVASTATIN CALCIUM 10 MG: 20 TABLET, FILM COATED ORAL at 20:26

## 2025-05-14 RX ADMIN — MIDODRINE HYDROCHLORIDE 20 MG: 5 TABLET ORAL at 08:22

## 2025-05-14 RX ADMIN — NOREPINEPHRINE BITARTRATE 2 MCG/MIN: 1 INJECTION, SOLUTION, CONCENTRATE INTRAVENOUS at 05:47

## 2025-05-14 RX ADMIN — HEPARIN SODIUM 1800 UNITS: 1000 INJECTION INTRAVENOUS; SUBCUTANEOUS at 14:58

## 2025-05-14 RX ADMIN — SODIUM CHLORIDE, PRESERVATIVE FREE 10 ML: 5 INJECTION INTRAVENOUS at 20:26

## 2025-05-14 RX ADMIN — FLUDROCORTISONE ACETATE 0.05 MG: 0.1 TABLET ORAL at 08:22

## 2025-05-14 RX ADMIN — APIXABAN 5 MG: 5 TABLET, FILM COATED ORAL at 08:22

## 2025-05-14 RX ADMIN — APIXABAN 5 MG: 5 TABLET, FILM COATED ORAL at 20:26

## 2025-05-14 RX ADMIN — IPRATROPIUM BROMIDE AND ALBUTEROL SULFATE 1 DOSE: 2.5; .5 SOLUTION RESPIRATORY (INHALATION) at 21:09

## 2025-05-14 RX ADMIN — MONTELUKAST 10 MG: 10 TABLET, FILM COATED ORAL at 20:25

## 2025-05-14 RX ADMIN — FLUDROCORTISONE ACETATE 0.05 MG: 0.1 TABLET ORAL at 20:25

## 2025-05-14 RX ADMIN — ALBUMIN (HUMAN) 25 G: 0.25 INJECTION, SOLUTION INTRAVENOUS at 12:14

## 2025-05-14 ASSESSMENT — PAIN SCALES - GENERAL
PAINLEVEL_OUTOF10: 0

## 2025-05-14 NOTE — CARE COORDINATION
Transition of Care Plan:    RUR: 20% High Risk  Prior Level of Functioning: Dependent  Disposition: Home with Hospice; TBD  CHRISTOPHER: 5/15/2025  Follow up appointments: None  DME needed: To be determined by hospice  Transportation at discharge: [place]; [place]  IM/IMM Medicare/ letter given: N/A  Is patient a  and connected with VA? No   If yes, was  transfer form completed and VA notified?   Caregiver Contact: Ariel Escobar, Yunior - [phone]  Discharge Caregiver contacted prior to discharge? Yes   Care Conference needed? no  Barriers to discharge: Medical stability; Hospice plan    Mr. Ariel Escobar was called.  He was offered choice for Hospice services.  He chose to use [facility].  He was informed that hospice does not arranged 24/7 care.  He requested Medicaid aides.  He was informed a UAI would be requested to arrange this.  He was told when the UAI was completed, he would need to contact a personal care company to arrange the aides.  He would need to give the UAI to them.  He is going to take FMLA from his job to take care for his mother.  He stated that he had no other support from family or friends to assist him.  He was told that hospice would arrange the medical equipment needed for his mother.        A referral was sent to [facility] through [facility].    Will continue to follow for discharge planning.  PING PENN LCSW

## 2025-05-15 PROCEDURE — 6370000000 HC RX 637 (ALT 250 FOR IP): Performed by: STUDENT IN AN ORGANIZED HEALTH CARE EDUCATION/TRAINING PROGRAM

## 2025-05-15 PROCEDURE — 2700000000 HC OXYGEN THERAPY PER DAY

## 2025-05-15 PROCEDURE — 94760 N-INVAS EAR/PLS OXIMETRY 1: CPT

## 2025-05-15 PROCEDURE — 2000000000 HC ICU R&B

## 2025-05-15 PROCEDURE — 6370000000 HC RX 637 (ALT 250 FOR IP): Performed by: INTERNAL MEDICINE

## 2025-05-15 PROCEDURE — 6370000000 HC RX 637 (ALT 250 FOR IP): Performed by: PHYSICIAN ASSISTANT

## 2025-05-15 PROCEDURE — 94640 AIRWAY INHALATION TREATMENT: CPT

## 2025-05-15 PROCEDURE — 6360000002 HC RX W HCPCS: Performed by: STUDENT IN AN ORGANIZED HEALTH CARE EDUCATION/TRAINING PROGRAM

## 2025-05-15 PROCEDURE — 2500000003 HC RX 250 WO HCPCS: Performed by: PHYSICIAN ASSISTANT

## 2025-05-15 RX ADMIN — DROXIDOPA 200 MG: 100 CAPSULE ORAL at 18:23

## 2025-05-15 RX ADMIN — FLUDROCORTISONE ACETATE 0.05 MG: 0.1 TABLET ORAL at 20:32

## 2025-05-15 RX ADMIN — DROXIDOPA 200 MG: 100 CAPSULE ORAL at 13:00

## 2025-05-15 RX ADMIN — APIXABAN 5 MG: 5 TABLET, FILM COATED ORAL at 08:24

## 2025-05-15 RX ADMIN — SODIUM CHLORIDE, PRESERVATIVE FREE 10 ML: 5 INJECTION INTRAVENOUS at 20:34

## 2025-05-15 RX ADMIN — MIDODRINE HYDROCHLORIDE 20 MG: 5 TABLET ORAL at 13:00

## 2025-05-15 RX ADMIN — MIDODRINE HYDROCHLORIDE 20 MG: 5 TABLET ORAL at 18:24

## 2025-05-15 RX ADMIN — MIDODRINE HYDROCHLORIDE 20 MG: 5 TABLET ORAL at 08:24

## 2025-05-15 RX ADMIN — ATORVASTATIN CALCIUM 10 MG: 20 TABLET, FILM COATED ORAL at 20:33

## 2025-05-15 RX ADMIN — Medication 1 MG: at 08:24

## 2025-05-15 RX ADMIN — PREDNISONE 10 MG: 20 TABLET ORAL at 08:24

## 2025-05-15 RX ADMIN — BUDESONIDE 500 MCG: 0.5 INHALANT RESPIRATORY (INHALATION) at 08:18

## 2025-05-15 RX ADMIN — MONTELUKAST 10 MG: 10 TABLET, FILM COATED ORAL at 20:32

## 2025-05-15 RX ADMIN — BUDESONIDE 500 MCG: 0.5 INHALANT RESPIRATORY (INHALATION) at 19:15

## 2025-05-15 RX ADMIN — FLUDROCORTISONE ACETATE 0.05 MG: 0.1 TABLET ORAL at 08:25

## 2025-05-15 RX ADMIN — IPRATROPIUM BROMIDE AND ALBUTEROL SULFATE 1 DOSE: 2.5; .5 SOLUTION RESPIRATORY (INHALATION) at 19:15

## 2025-05-15 RX ADMIN — APIXABAN 5 MG: 5 TABLET, FILM COATED ORAL at 20:33

## 2025-05-15 RX ADMIN — SODIUM CHLORIDE, PRESERVATIVE FREE 10 ML: 5 INJECTION INTRAVENOUS at 08:26

## 2025-05-15 RX ADMIN — DROXIDOPA 200 MG: 100 CAPSULE ORAL at 08:24

## 2025-05-15 RX ADMIN — SENNOSIDES 8.6 MG: 8.6 TABLET, FILM COATED ORAL at 20:33

## 2025-05-15 RX ADMIN — IPRATROPIUM BROMIDE AND ALBUTEROL SULFATE 1 DOSE: 2.5; .5 SOLUTION RESPIRATORY (INHALATION) at 08:18

## 2025-05-16 LAB
ALBUMIN SERPL-MCNC: 3.3 G/DL (ref 3.5–5)
ALBUMIN/GLOB SERPL: 0.9 (ref 1.1–2.2)
ALP SERPL-CCNC: 108 U/L (ref 45–117)
ALT SERPL-CCNC: 35 U/L (ref 12–78)
ANION GAP SERPL CALC-SCNC: 9 MMOL/L (ref 2–12)
AST SERPL-CCNC: 24 U/L (ref 15–37)
BASOPHILS # BLD: 0.05 K/UL (ref 0–0.1)
BASOPHILS NFR BLD: 0.6 % (ref 0–1)
BILIRUB SERPL-MCNC: 0.5 MG/DL (ref 0.2–1)
BUN SERPL-MCNC: 65 MG/DL (ref 6–20)
BUN/CREAT SERPL: 9 (ref 12–20)
CALCIUM SERPL-MCNC: 9.5 MG/DL (ref 8.5–10.1)
CHLORIDE SERPL-SCNC: 106 MMOL/L (ref 97–108)
CO2 SERPL-SCNC: 23 MMOL/L (ref 21–32)
CREAT SERPL-MCNC: 7.5 MG/DL (ref 0.55–1.02)
DIFFERENTIAL METHOD BLD: ABNORMAL
EOSINOPHIL # BLD: 0.08 K/UL (ref 0–0.4)
EOSINOPHIL NFR BLD: 0.9 % (ref 0–7)
ERYTHROCYTE [DISTWIDTH] IN BLOOD BY AUTOMATED COUNT: 16.4 % (ref 11.5–14.5)
GLOBULIN SER CALC-MCNC: 3.6 G/DL (ref 2–4)
GLUCOSE SERPL-MCNC: 113 MG/DL (ref 65–100)
HCT VFR BLD AUTO: 33.3 % (ref 35–47)
HGB BLD-MCNC: 10.5 G/DL (ref 11.5–16)
IMM GRANULOCYTES # BLD AUTO: 0.06 K/UL (ref 0–0.04)
IMM GRANULOCYTES NFR BLD AUTO: 0.7 % (ref 0–0.5)
LYMPHOCYTES # BLD: 1.62 K/UL (ref 0.8–3.5)
LYMPHOCYTES NFR BLD: 17.9 % (ref 12–49)
MCH RBC QN AUTO: 33.9 PG (ref 26–34)
MCHC RBC AUTO-ENTMCNC: 31.5 G/DL (ref 30–36.5)
MCV RBC AUTO: 107.4 FL (ref 80–99)
MONOCYTES # BLD: 0.44 K/UL (ref 0–1)
MONOCYTES NFR BLD: 4.9 % (ref 5–13)
NEUTS SEG # BLD: 6.81 K/UL (ref 1.8–8)
NEUTS SEG NFR BLD: 75 % (ref 32–75)
NRBC # BLD: 0 K/UL (ref 0–0.01)
NRBC BLD-RTO: 0 PER 100 WBC
PHOSPHATE SERPL-MCNC: 6.6 MG/DL (ref 2.6–4.7)
PLATELET # BLD AUTO: 106 K/UL (ref 150–400)
PMV BLD AUTO: 12.6 FL (ref 8.9–12.9)
POTASSIUM SERPL-SCNC: 4.4 MMOL/L (ref 3.5–5.1)
PROT SERPL-MCNC: 6.9 G/DL (ref 6.4–8.2)
RBC # BLD AUTO: 3.1 M/UL (ref 3.8–5.2)
SODIUM SERPL-SCNC: 138 MMOL/L (ref 136–145)
WBC # BLD AUTO: 9.1 K/UL (ref 3.6–11)

## 2025-05-16 PROCEDURE — 94760 N-INVAS EAR/PLS OXIMETRY 1: CPT

## 2025-05-16 PROCEDURE — 84100 ASSAY OF PHOSPHORUS: CPT

## 2025-05-16 PROCEDURE — 94640 AIRWAY INHALATION TREATMENT: CPT

## 2025-05-16 PROCEDURE — 6360000002 HC RX W HCPCS: Performed by: INTERNAL MEDICINE

## 2025-05-16 PROCEDURE — 6360000002 HC RX W HCPCS: Performed by: STUDENT IN AN ORGANIZED HEALTH CARE EDUCATION/TRAINING PROGRAM

## 2025-05-16 PROCEDURE — 2700000000 HC OXYGEN THERAPY PER DAY

## 2025-05-16 PROCEDURE — 6370000000 HC RX 637 (ALT 250 FOR IP): Performed by: STUDENT IN AN ORGANIZED HEALTH CARE EDUCATION/TRAINING PROGRAM

## 2025-05-16 PROCEDURE — 97535 SELF CARE MNGMENT TRAINING: CPT

## 2025-05-16 PROCEDURE — 2500000003 HC RX 250 WO HCPCS: Performed by: PHYSICIAN ASSISTANT

## 2025-05-16 PROCEDURE — 2000000000 HC ICU R&B

## 2025-05-16 PROCEDURE — 2580000003 HC RX 258: Performed by: INTERNAL MEDICINE

## 2025-05-16 PROCEDURE — 90935 HEMODIALYSIS ONE EVALUATION: CPT

## 2025-05-16 PROCEDURE — 85025 COMPLETE CBC W/AUTO DIFF WBC: CPT

## 2025-05-16 PROCEDURE — 2500000003 HC RX 250 WO HCPCS: Performed by: INTERNAL MEDICINE

## 2025-05-16 PROCEDURE — 80053 COMPREHEN METABOLIC PANEL: CPT

## 2025-05-16 PROCEDURE — 6370000000 HC RX 637 (ALT 250 FOR IP): Performed by: INTERNAL MEDICINE

## 2025-05-16 RX ORDER — SIMETHICONE 80 MG
80 TABLET,CHEWABLE ORAL EVERY 6 HOURS PRN
Status: DISCONTINUED | OUTPATIENT
Start: 2025-05-16 | End: 2025-05-20 | Stop reason: HOSPADM

## 2025-05-16 RX ADMIN — DROXIDOPA 200 MG: 100 CAPSULE ORAL at 08:33

## 2025-05-16 RX ADMIN — NOREPINEPHRINE BITARTRATE 5 MCG/MIN: 1 INJECTION, SOLUTION, CONCENTRATE INTRAVENOUS at 12:32

## 2025-05-16 RX ADMIN — PREDNISONE 10 MG: 20 TABLET ORAL at 08:33

## 2025-05-16 RX ADMIN — DROXIDOPA 200 MG: 100 CAPSULE ORAL at 12:33

## 2025-05-16 RX ADMIN — FLUDROCORTISONE ACETATE 0.05 MG: 0.1 TABLET ORAL at 08:33

## 2025-05-16 RX ADMIN — IPRATROPIUM BROMIDE AND ALBUTEROL SULFATE 1 DOSE: 2.5; .5 SOLUTION RESPIRATORY (INHALATION) at 08:17

## 2025-05-16 RX ADMIN — MIDODRINE HYDROCHLORIDE 20 MG: 5 TABLET ORAL at 10:58

## 2025-05-16 RX ADMIN — SODIUM CHLORIDE, PRESERVATIVE FREE 10 ML: 5 INJECTION INTRAVENOUS at 08:40

## 2025-05-16 RX ADMIN — SIMETHICONE 80 MG: 80 TABLET, CHEWABLE ORAL at 11:11

## 2025-05-16 RX ADMIN — MIDODRINE HYDROCHLORIDE 20 MG: 5 TABLET ORAL at 18:39

## 2025-05-16 RX ADMIN — Medication 1 MG: at 08:34

## 2025-05-16 RX ADMIN — BUDESONIDE 500 MCG: 0.5 INHALANT RESPIRATORY (INHALATION) at 08:17

## 2025-05-16 RX ADMIN — HEPARIN SODIUM 1900 UNITS: 1000 INJECTION INTRAVENOUS; SUBCUTANEOUS at 15:55

## 2025-05-16 RX ADMIN — HEPARIN SODIUM 1800 UNITS: 1000 INJECTION INTRAVENOUS; SUBCUTANEOUS at 15:54

## 2025-05-16 RX ADMIN — IPRATROPIUM BROMIDE AND ALBUTEROL SULFATE 1 DOSE: 2.5; .5 SOLUTION RESPIRATORY (INHALATION) at 20:15

## 2025-05-16 RX ADMIN — BUDESONIDE 500 MCG: 0.5 INHALANT RESPIRATORY (INHALATION) at 20:15

## 2025-05-16 RX ADMIN — APIXABAN 5 MG: 5 TABLET, FILM COATED ORAL at 08:33

## 2025-05-16 RX ADMIN — MIDODRINE HYDROCHLORIDE 20 MG: 5 TABLET ORAL at 08:33

## 2025-05-16 RX ADMIN — DROXIDOPA 200 MG: 100 CAPSULE ORAL at 18:39

## 2025-05-16 ASSESSMENT — PAIN SCALES - GENERAL
PAINLEVEL_OUTOF10: 0

## 2025-05-17 PROCEDURE — 2500000003 HC RX 250 WO HCPCS: Performed by: PHYSICIAN ASSISTANT

## 2025-05-17 PROCEDURE — 6360000002 HC RX W HCPCS: Performed by: STUDENT IN AN ORGANIZED HEALTH CARE EDUCATION/TRAINING PROGRAM

## 2025-05-17 PROCEDURE — 6370000000 HC RX 637 (ALT 250 FOR IP): Performed by: STUDENT IN AN ORGANIZED HEALTH CARE EDUCATION/TRAINING PROGRAM

## 2025-05-17 PROCEDURE — 2000000000 HC ICU R&B

## 2025-05-17 PROCEDURE — 2700000000 HC OXYGEN THERAPY PER DAY

## 2025-05-17 PROCEDURE — 6370000000 HC RX 637 (ALT 250 FOR IP): Performed by: PHYSICIAN ASSISTANT

## 2025-05-17 PROCEDURE — 94760 N-INVAS EAR/PLS OXIMETRY 1: CPT

## 2025-05-17 PROCEDURE — 94640 AIRWAY INHALATION TREATMENT: CPT

## 2025-05-17 PROCEDURE — 6370000000 HC RX 637 (ALT 250 FOR IP): Performed by: INTERNAL MEDICINE

## 2025-05-17 PROCEDURE — 6370000000 HC RX 637 (ALT 250 FOR IP): Performed by: ANESTHESIOLOGY

## 2025-05-17 RX ORDER — DIPHENHYDRAMINE HCL 12.5 MG/5ML
25 SOLUTION ORAL EVERY 6 HOURS PRN
Status: DISCONTINUED | OUTPATIENT
Start: 2025-05-17 | End: 2025-05-20 | Stop reason: HOSPADM

## 2025-05-17 RX ADMIN — MONTELUKAST 10 MG: 10 TABLET, FILM COATED ORAL at 21:09

## 2025-05-17 RX ADMIN — IPRATROPIUM BROMIDE AND ALBUTEROL SULFATE 1 DOSE: 2.5; .5 SOLUTION RESPIRATORY (INHALATION) at 20:17

## 2025-05-17 RX ADMIN — FLUDROCORTISONE ACETATE 0.05 MG: 0.1 TABLET ORAL at 21:09

## 2025-05-17 RX ADMIN — SODIUM CHLORIDE, PRESERVATIVE FREE 10 ML: 5 INJECTION INTRAVENOUS at 21:10

## 2025-05-17 RX ADMIN — ATORVASTATIN CALCIUM 10 MG: 20 TABLET, FILM COATED ORAL at 21:09

## 2025-05-17 RX ADMIN — CYCLOBENZAPRINE 10 MG: 10 TABLET, FILM COATED ORAL at 21:09

## 2025-05-17 RX ADMIN — BUDESONIDE 500 MCG: 0.5 INHALANT RESPIRATORY (INHALATION) at 20:17

## 2025-05-17 RX ADMIN — APIXABAN 5 MG: 5 TABLET, FILM COATED ORAL at 21:09

## 2025-05-17 RX ADMIN — POLYETHYLENE GLYCOL 3350 17 G: 17 POWDER, FOR SOLUTION ORAL at 10:32

## 2025-05-17 RX ADMIN — DROXIDOPA 200 MG: 100 CAPSULE ORAL at 13:15

## 2025-05-17 RX ADMIN — PREDNISONE 10 MG: 20 TABLET ORAL at 09:41

## 2025-05-17 RX ADMIN — DROXIDOPA 200 MG: 100 CAPSULE ORAL at 17:48

## 2025-05-17 RX ADMIN — BUDESONIDE 500 MCG: 0.5 INHALANT RESPIRATORY (INHALATION) at 07:56

## 2025-05-17 RX ADMIN — MIDODRINE HYDROCHLORIDE 20 MG: 5 TABLET ORAL at 09:42

## 2025-05-17 RX ADMIN — SENNOSIDES 8.6 MG: 8.6 TABLET, FILM COATED ORAL at 21:09

## 2025-05-17 RX ADMIN — Medication 1 MG: at 09:42

## 2025-05-17 RX ADMIN — DROXIDOPA 200 MG: 100 CAPSULE ORAL at 09:42

## 2025-05-17 RX ADMIN — APIXABAN 5 MG: 5 TABLET, FILM COATED ORAL at 09:42

## 2025-05-17 RX ADMIN — FLUDROCORTISONE ACETATE 0.05 MG: 0.1 TABLET ORAL at 09:42

## 2025-05-17 RX ADMIN — SIMETHICONE 80 MG: 80 TABLET, CHEWABLE ORAL at 18:58

## 2025-05-17 RX ADMIN — IPRATROPIUM BROMIDE AND ALBUTEROL SULFATE 1 DOSE: 2.5; .5 SOLUTION RESPIRATORY (INHALATION) at 07:56

## 2025-05-17 RX ADMIN — MIDODRINE HYDROCHLORIDE 20 MG: 5 TABLET ORAL at 17:48

## 2025-05-17 RX ADMIN — SODIUM CHLORIDE, PRESERVATIVE FREE 10 ML: 5 INJECTION INTRAVENOUS at 09:42

## 2025-05-17 RX ADMIN — MIDODRINE HYDROCHLORIDE 20 MG: 5 TABLET ORAL at 13:14

## 2025-05-17 ASSESSMENT — PAIN SCALES - GENERAL
PAINLEVEL_OUTOF10: 3
PAINLEVEL_OUTOF10: 0
PAINLEVEL_OUTOF10: 0

## 2025-05-17 ASSESSMENT — PAIN DESCRIPTION - LOCATION: LOCATION: HIP

## 2025-05-17 ASSESSMENT — PAIN DESCRIPTION - DESCRIPTORS: DESCRIPTORS: ACHING

## 2025-05-17 ASSESSMENT — PAIN DESCRIPTION - ORIENTATION: ORIENTATION: RIGHT

## 2025-05-17 NOTE — CARE COORDINATION
Transition of Care Plan:     RUR: 19% Moderate Risk  Prior Level of Functioning: Dependent  Disposition: Home with Hospice; TBD  CHRISTOPHER: 5/15/2025  Follow up appointments: None  DME needed: To be determined by hospice  Transportation at discharge: BLS; Stretcher  IM/IMM Medicare/ letter given: N/A  Is patient a [place] and connected with VA? No              If yes, was [place] transfer form completed and VA notified?   Caregiver Contact: Ariel Escobar, Yunior - [phone]  Discharge Caregiver contacted prior to discharge? Yes   Care Conference needed? no  Barriers to discharge: Medical stability; Hospice plan    [facility] was called.  Spoke to the .  They were paging the on-call hospice nurse to see what the discharge plan is.  She may be going home on hospice.      9:00 AM    [facility] was called.  Spoke to the .  A message was left to have the on-call hospice nurse call me    11:00 AM    Camille called from Hospice.  She stated that a nurse is scheduled for 10:00 AM on Monday to open Mrs. Escobar to hospice services.  She can be discharged if she wishes to.  There will be no services until Monday.  If she needed any equipment, that should have been order.     Nursing was informed.    1:30 PM    Nursing called.  The nurse had talked to nephrology about Mrs. Escobar having her dialysis tomorrow.  The dialysis could not be confirmed at this point.  He had also messaged Edilberto about the coordination of the initiation of the hospice services.      Edilberto ([phone]) of [facility].  She is the liaison for [facility].  If Mrs. Bullock dialysis is confirmed for tomorrow, she can do the signing of the hospice forms.  Hospice can then start on Monday with the 10:00 am appointment.  If dialysis cannot be confirmed, the forms would need to be completed on Monday.  Hospice could start on Tuesday.      4:45 PM    Nursing called.  Mrs. Escobar will be going home

## 2025-05-18 LAB
ANION GAP SERPL CALC-SCNC: 8 MMOL/L (ref 2–12)
BUN SERPL-MCNC: 61 MG/DL (ref 6–20)
BUN/CREAT SERPL: 9 (ref 12–20)
CALCIUM SERPL-MCNC: 9.7 MG/DL (ref 8.5–10.1)
CHLORIDE SERPL-SCNC: 100 MMOL/L (ref 97–108)
CO2 SERPL-SCNC: 25 MMOL/L (ref 21–32)
CREAT SERPL-MCNC: 6.86 MG/DL (ref 0.55–1.02)
ERYTHROCYTE [DISTWIDTH] IN BLOOD BY AUTOMATED COUNT: 16 % (ref 11.5–14.5)
GLUCOSE SERPL-MCNC: 118 MG/DL (ref 65–100)
HCT VFR BLD AUTO: 33.8 % (ref 35–47)
HGB BLD-MCNC: 10.8 G/DL (ref 11.5–16)
MCH RBC QN AUTO: 34.1 PG (ref 26–34)
MCHC RBC AUTO-ENTMCNC: 32 G/DL (ref 30–36.5)
MCV RBC AUTO: 106.6 FL (ref 80–99)
NRBC # BLD: 0 K/UL (ref 0–0.01)
NRBC BLD-RTO: 0 PER 100 WBC
PLATELET # BLD AUTO: 102 K/UL (ref 150–400)
PMV BLD AUTO: 12.3 FL (ref 8.9–12.9)
POTASSIUM SERPL-SCNC: 5.8 MMOL/L (ref 3.5–5.1)
RBC # BLD AUTO: 3.17 M/UL (ref 3.8–5.2)
SODIUM SERPL-SCNC: 133 MMOL/L (ref 136–145)
WBC # BLD AUTO: 8.7 K/UL (ref 3.6–11)

## 2025-05-18 PROCEDURE — 6370000000 HC RX 637 (ALT 250 FOR IP): Performed by: INTERNAL MEDICINE

## 2025-05-18 PROCEDURE — 94640 AIRWAY INHALATION TREATMENT: CPT

## 2025-05-18 PROCEDURE — 6370000000 HC RX 637 (ALT 250 FOR IP): Performed by: STUDENT IN AN ORGANIZED HEALTH CARE EDUCATION/TRAINING PROGRAM

## 2025-05-18 PROCEDURE — 2500000003 HC RX 250 WO HCPCS: Performed by: PHYSICIAN ASSISTANT

## 2025-05-18 PROCEDURE — 6370000000 HC RX 637 (ALT 250 FOR IP): Performed by: PHYSICIAN ASSISTANT

## 2025-05-18 PROCEDURE — 94660 CPAP INITIATION&MGMT: CPT

## 2025-05-18 PROCEDURE — 6360000002 HC RX W HCPCS: Performed by: INTERNAL MEDICINE

## 2025-05-18 PROCEDURE — 85027 COMPLETE CBC AUTOMATED: CPT

## 2025-05-18 PROCEDURE — 2700000000 HC OXYGEN THERAPY PER DAY

## 2025-05-18 PROCEDURE — 90935 HEMODIALYSIS ONE EVALUATION: CPT

## 2025-05-18 PROCEDURE — 6360000002 HC RX W HCPCS: Performed by: PHYSICIAN ASSISTANT

## 2025-05-18 PROCEDURE — 94760 N-INVAS EAR/PLS OXIMETRY 1: CPT

## 2025-05-18 PROCEDURE — 6360000002 HC RX W HCPCS: Performed by: STUDENT IN AN ORGANIZED HEALTH CARE EDUCATION/TRAINING PROGRAM

## 2025-05-18 PROCEDURE — 80048 BASIC METABOLIC PNL TOTAL CA: CPT

## 2025-05-18 PROCEDURE — 2000000000 HC ICU R&B

## 2025-05-18 RX ADMIN — HEPARIN SODIUM 1800 UNITS: 1000 INJECTION INTRAVENOUS; SUBCUTANEOUS at 10:58

## 2025-05-18 RX ADMIN — APIXABAN 5 MG: 5 TABLET, FILM COATED ORAL at 11:29

## 2025-05-18 RX ADMIN — IPRATROPIUM BROMIDE AND ALBUTEROL SULFATE 1 DOSE: 2.5; .5 SOLUTION RESPIRATORY (INHALATION) at 08:26

## 2025-05-18 RX ADMIN — PREDNISONE 10 MG: 20 TABLET ORAL at 11:29

## 2025-05-18 RX ADMIN — IPRATROPIUM BROMIDE AND ALBUTEROL SULFATE 1 DOSE: 2.5; .5 SOLUTION RESPIRATORY (INHALATION) at 19:06

## 2025-05-18 RX ADMIN — SODIUM CHLORIDE, PRESERVATIVE FREE 10 ML: 5 INJECTION INTRAVENOUS at 20:13

## 2025-05-18 RX ADMIN — FLUDROCORTISONE ACETATE 0.05 MG: 0.1 TABLET ORAL at 11:29

## 2025-05-18 RX ADMIN — Medication 1 MG: at 11:29

## 2025-05-18 RX ADMIN — BUDESONIDE 500 MCG: 0.5 INHALANT RESPIRATORY (INHALATION) at 08:26

## 2025-05-18 RX ADMIN — ONDANSETRON 4 MG: 2 INJECTION, SOLUTION INTRAMUSCULAR; INTRAVENOUS at 12:07

## 2025-05-18 RX ADMIN — SODIUM CHLORIDE, PRESERVATIVE FREE 10 ML: 5 INJECTION INTRAVENOUS at 09:00

## 2025-05-18 RX ADMIN — APIXABAN 5 MG: 5 TABLET, FILM COATED ORAL at 20:08

## 2025-05-18 RX ADMIN — MONTELUKAST 10 MG: 10 TABLET, FILM COATED ORAL at 20:08

## 2025-05-18 RX ADMIN — BUDESONIDE 500 MCG: 0.5 INHALANT RESPIRATORY (INHALATION) at 19:06

## 2025-05-18 RX ADMIN — MIDODRINE HYDROCHLORIDE 20 MG: 5 TABLET ORAL at 11:29

## 2025-05-18 RX ADMIN — FLUDROCORTISONE ACETATE 0.05 MG: 0.1 TABLET ORAL at 20:08

## 2025-05-18 RX ADMIN — ONDANSETRON 4 MG: 2 INJECTION, SOLUTION INTRAMUSCULAR; INTRAVENOUS at 00:18

## 2025-05-18 RX ADMIN — ATORVASTATIN CALCIUM 10 MG: 20 TABLET, FILM COATED ORAL at 20:09

## 2025-05-18 RX ADMIN — HEPARIN SODIUM 1900 UNITS: 1000 INJECTION INTRAVENOUS; SUBCUTANEOUS at 10:59

## 2025-05-18 ASSESSMENT — PAIN SCALES - GENERAL
PAINLEVEL_OUTOF10: 0

## 2025-05-18 NOTE — FLOWSHEET NOTE
CRRT / [phone]    Primary RN SBAR: Kimberly Washington, RN  Blood Warmer Tubing Lot #: [id number]  Patient Education provided: procedure  Preferred Education method and Primary language: english/verbal  Dialysis Consent: yes  Hospital General Consent Verified: yes  Hospital associated wait time; reason: NA  Hepatitis B Surface Ag   Date/Time Value Ref Range Status   04/28/2025 01:57 PM <0.10 Index Final     Hep B S Ag Interp   Date/Time Value Ref Range Status   04/28/2025 01:57 PM Negative NEG   Final     HEP B SURF AB   Date/Time Value Ref Range Status   10/23/2020 01:55 PM 49.42 mIU/mL Final     Hep B S Ab   Date/Time Value Ref Range Status   04/28/2025 01:57 PM 6.16 mIU/mL Final     Hep B S Ab Interp   Date/Time Value Ref Range Status   04/28/2025 01:57 PM NONREACTIVE NR   Final     Comment:     (NOTE)  The ADVIA Centaur Anti-HBs2 assay is traceable to the World Health   Organization (WHO) Hepatitis B Immunoglobulin 1st International   Reference Preparation (1977). Samples with a calculated value of 10   mIU/mL or greater are considered reactive (protective) in accordance   with the CDC guidelines. The accepted criteria for immunity to HBV is   anti-HBs activity greater than or equal to 10 mIU/mL, as defined by   the WHO International Reference Preparation.  Assay performance has not been established in pregnant women,   patients who are immunosuppressed or immunocompromised, nor have   performance characteristics been established in conjunction with   other 's assays for specific HBV serologic markers. This   assay does not differentiate between vaccine induced immune response   and a response due to infection with HBV. Passively acquired anti-HBs   may be identified following patient transfusion, receipt of   immunoglobulin products, etc.        CRRT RESTARTED @2215 04/28/25 2215   Observations & Evaluations   Level of Consciousness 0   Heart Rhythm   (on icu monitoring)   Respiratory 
  Pre-Dialysis 04/30/25 1810   Observations & Evaluations   Level of Consciousness 0   Oriented X 3   Heart Rhythm Regular  (paced)   Respiratory Quality/Effort Unlabored   O2 Device Nasal cannula   Bilateral Breath Sounds Clear;Diminished   Skin Color Ashen   Skin Condition/Temp Dry;Warm   Appetite Fair   Abdomen Inspection Obese;Soft   Bowel Sounds (All Quadrants) Audible   Edema Right lower extremity;Left lower extremity   Edema Generalized +1   RLE Edema +1   LLE Edema +1   Comments treatment explained   Vital Signs   BP (!) 115/57  (A-Line)   Temp 99 °F (37.2 °C)   Pain Assessment   Pain Assessment None - Denies Pain   Hemodialysis Central Access - Permanent/Tunneled Left Neck   No placement date or time found.   Present on Admission/Arrival: Yes  Orientation: Left  Access Location: Neck   Continued need for line? Yes   Site Assessment Clean, dry & intact   Blue Lumen Status Brisk blood return;Flushed   Red Lumen Status Brisk blood return;Flushed   Line Care Line pulled back;Chlorhexidine wipes;Ports disinfected   Dressing Type Bacteriocidal;Transparent   Date of Last Dressing Change 04/29/25   Dressing Status Clean, dry & intact   Technical Checks   Dialysis Machine No. 06   RO Machine Number [id number]   Dialyzer Lot No. [id number]   Tubing Lot Number [id number]   All Connections Secure Yes   NS Bag Yes   Saline Line Double Clamped Yes   Dialyzer Nipro ELISIO   Prime Volume (mL) 200 mL   ICEBOAT I;C;E;B;O;A;T   RO Machine Log Sheet Completed Yes   Machine Alarm Self Test Completed;Passed   Air Foam Detector Tested;Proper Function;pH Reading   Extracorporeal Circuit Tested for Integrity Yes   Machine Conductivity 13.8   Manual Ph 7.4   Bleach Test (Neg) Yes   Bath Temperature 96.8 °F (36 °C)   Dialysis Bath   K+ (Potassium) 3   Ca+ (Calcium) 2.5   Na+ (Sodium) 138   HCO3 (Bicarb) 35   Bicarbonate Concentrate Lot No. [id number]   Acid Concentrate Lot No. [id number]   Handoff   Communication Given Transfer Handoff  (hEMODIALYSIS) 
CRRT New Start @ 1435:   04/28/25 1422   Observations & Evaluations   Level of Consciousness 1   Heart Rhythm Regular   Respiratory Quality/Effort Unlabored   O2 Device Nasal cannula   Skin Condition/Temp Dry;Warm   Abdomen Inspection Soft;Obese   Edema Generalized   Vital Signs   BP (!) 103/52   Temp 97.9 °F (36.6 °C)   Pulse 70   Respirations 23   SpO2 97 %   ICEBOAT   ICEBOAT I;C;E;B;O;A;T   Treatment   $CRRT $Yes   Machine #   ([id number])   Cartridge Lot #   ([id number])   Therapy Type CVVH   System Used   System Used Mouna   Flow Rates (Mouna)   Blood Flow (mL/min) 200 mL/min   Replacement Fluid Pre-Filter (mL/hr) 700 mL/hr   Replacement Filter Post-Filter (mL/hr) 700 mL/hr   Pre-Blood Pump (mL/hr) 1450 mL/hr   Mouna Calculation   (D) Physician Ordered Hourly Removal (mL) 0 ml   CRRT Activities   Intervention Initiated   Hemodialysis Central Access - Permanent/Tunneled Left Neck   No placement date or time found.   Present on Admission/Arrival: Yes  Orientation: Left  Access Location: Neck   Continued need for line? Yes   Site Assessment Clean, dry & intact   Blue Lumen Status Brisk blood return;Flushed   Red Lumen Status Brisk blood return;Flushed   Line Care Ports disinfected;Connections checked and tightened   Dressing Type Antimicrobial;Transparent   Date of Last Dressing Change 04/25/25   Dressing Status Clean, dry & intact   Initiation of Therapy   Dialysis Nurse Intiation of CRRT Therapy Yes        04/28/25 1435   Pressures (Mouna)   Access (mmHg) -52 mmHg   Return/Venous (mmHg) 69 mmHg   TMP (mmHg) 40 mmHg   Pressure Drop (mmHg) 58 mmHg   Filter (mmHg) 181 mmHg   Deaeration Chamber Check Yes     Primary RN SBAR: JIMENA Tanner RN  Incapacitated Nurse [facility]. provided: N/A  Patient Education provided: CVC infection prevention.  Preferred Education method and Primary language: verbal, English  Dialysis consent: yes, obtained 4/28/25 from son via phone  Hospital General Consent Verified: yes  Hospital associated 
PRETREATMENT      05/07/25 0900   Observations & Evaluations   Level of Consciousness 0   Oriented X 4   Heart Rhythm   (bedside telemetry ccu)   Respiratory Quality/Effort Unlabored   O2 Device Nasal cannula  (3 liters)   Bilateral Breath Sounds Clear;Diminished   Skin Color   (appropriate for ethnicity)   Skin Condition/Temp Warm;Dry   Abdomen Inspection Soft;Rounded;Obese   Edema Generalized   Edema Generalized +1   RUE Edema Trace   LUE Edema Trace   RLE Edema +1   LLE Edema +1   Vital Signs   BP   (arterial pressure reading)   Temp 98.1 °F (36.7 °C)   Pulse 70   Respirations 23   SpO2 98 %   Pain Assessment   Pain Assessment None - Denies Pain   Pain Level 0   Patient's Stated Pain Goal 0 - No pain   Hemodialysis Central Access - Permanent/Tunneled Left Neck   No placement date or time found.   Present on Admission/Arrival: Yes  Orientation: Left  Access Location: Neck   Continued need for line? Yes   Site Assessment Clean, dry & intact   Blue Lumen Status Brisk blood return;Flushed   Red Lumen Status Brisk blood return;Flushed   Line Care Ports disinfected   Dressing Type Bacteriocidal;Sterile dressing, transparent   Date of Last Dressing Change 05/06/25   Dressing Status Clean, dry & intact   Technical Checks   Dialysis Machine No. [id number]   RO Machine Number [id number]   Dialyzer Lot No. [id number]   Tubing Lot Number [id number]   All Connections Secure Yes   NS Bag Yes   Saline Line Double Clamped Yes   Dialyzer Nipro ELISIO   Prime Volume (mL) 200 mL   ICEBOAT I;C;E;B;O;A;T  (CHRONIC CONSENT [facility])   RO Machine Log Sheet Completed Yes   Machine Alarm Self Test Completed;Passed   Air Foam Detector Tested;Proper Function   Extracorporeal Circuit Tested for Integrity Yes   Machine Conductivity 13.5   Manual Ph 7.4   Bleach Test (Neg) Yes   Bath Temperature 96.8 °F (36 °C)   During Hemodialysis Assessment   ABP (Arterial line BP) 98/50   Dialysis Bath   K+ (Potassium) 2   Ca+ (Calcium) 2.5   Na+ 
PRETREATMENT    05/09/25 0800   Observations & Evaluations   Level of Consciousness 0   Oriented X 4   Heart Rhythm   (bedside telemetry monitored by icu staff)   Respiratory Quality/Effort Unlabored   O2 Device PAP (positive airway pressure)   Bilateral Breath Sounds Clear;Diminished   Skin Condition/Temp Dry;Warm   Abdomen Inspection Soft;Rounded;Obese   Edema Generalized   Edema Generalized Trace   RUE Edema Trace   LUE Edema Trace   RLE Edema +1   LLE Edema +1   Vital Signs   /60   Temp 97.7 °F (36.5 °C)   Pulse 70   Respirations 23   SpO2 99 %   Pain Assessment   Pain Assessment 0-10   Pain Level 0   Patient's Stated Pain Goal 0 - No pain   Technical Checks   Dialysis Machine No. [id number]   RO Machine Number [id number]   Dialyzer Lot No. [id number]   Tubing Lot Number [id number]   All Connections Secure Yes   NS Bag Yes   Saline Line Double Clamped Yes   Dialyzer Nipro ELISIO   Prime Volume (mL) 200 mL   ICEBOAT I;C;E;B;O;A;T  (chronic consent conveys [place] dialysis)   RO Machine Log Sheet Completed Yes   Machine Alarm Self Test Completed;Passed   Air Foam Detector Tested;Proper Function   Extracorporeal Circuit Tested for Integrity Yes   Machine Conductivity 13.5   Manual Ph 7.4   Bleach Test (Neg) Yes   Bath Temperature 96.8 °F (36 °C)   During Hemodialysis Assessment   ABP (Arterial line BP) 105/56   Dialysis Bath   K+ (Potassium) 3   Ca+ (Calcium) 2.5   Na+ (Sodium) 138   HCO3 (Bicarb) 35   Bicarbonate Concentrate Lot No. [id number]   Acid Concentrate Lot No. [id number]     Primary RN SBAR: MATT Floyd RN   Incapacitated Nurse [facility]. provided: yes  Patient Education provided: cvc infection control wearing mask when cvc is open and/or dressing change  Preferred Education method and Primary language: verbal/english  Dialysis consent: chronic consent conveys   Hospital General Consent Verified: documented unable to sign medical condition  Hospital associated wait time; reason: 0  Hepatitis B Surface Ag 
PRETREATMENT    05/14/25 1200   Observations & Evaluations   Level of Consciousness 0   Oriented X 4   Heart Rhythm   (bedside telemery)   Respiratory Quality/Effort Unlabored   O2 Device Nasal cannula  (3 liters)   Bilateral Breath Sounds Diminished   Skin Color   (appropriate for ethnicity)   Skin Condition/Temp Dry;Warm   Abdomen Inspection Soft;Obese   Edema Generalized Trace   RUE Edema Trace   LUE Edema Trace   RLE Edema Trace   LLE Edema Trace   Vital Signs   BP   (ARTERIAL BLOOD PRESSURE READINGS)   Temp 97.8 °F (36.6 °C)   Pulse 70   Respirations (!) 31   SpO2 94 %   Pain Assessment   Pain Assessment None - Denies Pain   Pain Level 0   Technical Checks   Dialysis Machine No. [id number]   RO Machine Number [id number]   Dialyzer Lot No. [id number]   Tubing Lot Number [id number]   All Connections Secure Yes   NS Bag Yes   Saline Line Double Clamped Yes   Dialyzer Nipro ELISIO   Prime Volume (mL) 200 mL   ICEBOAT I;C;E;B;O;A;T  (chronic consent conveys)   RO Machine Log Sheet Completed Yes   Machine Alarm Self Test Completed;Passed   Air Foam Detector Tested;Proper Function   Extracorporeal Circuit Tested for Integrity Yes   Machine Conductivity 13.5   Manual Ph 7.4   Bleach Test (Neg) Yes   Bath Temperature 78.8 °F (26 °C)   During Hemodialysis Assessment   ABP (Arterial line BP) 90/46   Dialysis Bath   K+ (Potassium) 3   Ca+ (Calcium) 2.5   Na+ (Sodium) 138   HCO3 (Bicarb) 35   Bicarbonate Concentrate Lot No. [id number]   Acid Concentrate Lot No. [id number]     Primary RN SBAR: ANTONY Balderrama RN   Incapacitated Nurse Patient Education provided: provided: cvc infection control wearing mask initiation/discontinuation of treatment  Preferred Education method and Primary language: verbal/english  Dialysis consent: chronic consent conveys [facility] General Consent Verified: documented unable to obtain due to medical condition  Hospital associated wait time; reason: 0  Hepatitis B Surface Ag   Date/Time Value Ref Range 
Primary RN SBAR: Ivana Ann RN  Incapacitated Nurse [facility]. provided: YES  Patient Education provided: YES  Preferred Education method and Primary language: VERBAL/ENGLISH  Dialysis consent: YES  Hospital General Consent Verified: YES  Hospital associated wait time; reason: 0  Hepatitis B Surface Ag   Date/Time Value Ref Range Status   04/28/2025 01:57 PM <0.10 Index Final     Hep B S Ag Interp   Date/Time Value Ref Range Status   04/28/2025 01:57 PM Negative NEG   Final     HEP B SURF AB   Date/Time Value Ref Range Status   10/23/2020 01:55 PM 49.42 mIU/mL Final     Hep B S Ab   Date/Time Value Ref Range Status   04/28/2025 01:57 PM 6.16 mIU/mL Final     Hep B S Ab Interp   Date/Time Value Ref Range Status   04/28/2025 01:57 PM NONREACTIVE NR   Final     Comment:     (NOTE)  The ADVIA Centaur Anti-HBs2 assay is traceable to the World Health   Organization (WHO) Hepatitis B Immunoglobulin 1st International   Reference Preparation (1977). Samples with a calculated value of 10   mIU/mL or greater are considered reactive (protective) in accordance   with the CDC guidelines. The accepted criteria for immunity to HBV is   anti-HBs activity greater than or equal to 10 mIU/mL, as defined by   the WHO International Reference Preparation.  Assay performance has not been established in pregnant women,   patients who are immunosuppressed or immunocompromised, nor have   performance characteristics been established in conjunction with   other 's assays for specific HBV serologic markers. This   assay does not differentiate between vaccine induced immune response   and a response due to infection with HBV. Passively acquired anti-HBs   may be identified following patient transfusion, receipt of   immunoglobulin products, etc.       Time out done, order parameters checked, Dialysis related medications and consent have been reviewed.  Treatment started with slow flow, gradually increased to ordered BF. Monitored 
Primary RN SBAR: Kimberly RN  Incapacitated Nurse [facility]. provided: Yes  Patient Education provided: HD treatment procedure  Preferred Education method and Primary language: English/Verbal  Dialysis consent: Yes  Hospital General Consent Verified: Yes  Hospital associated wait time; reason: NA  Hepatitis B Surface Ag   Date/Time Value Ref Range Status   04/28/2025 01:57 PM <0.10 Index Final     Hep B S Ag Interp   Date/Time Value Ref Range Status   04/28/2025 01:57 PM Negative NEG   Final     HEP B SURF AB   Date/Time Value Ref Range Status   10/23/2020 01:55 PM 49.42 mIU/mL Final     Hep B S Ab   Date/Time Value Ref Range Status   04/28/2025 01:57 PM 6.16 mIU/mL Final     Hep B S Ab Interp   Date/Time Value Ref Range Status   04/28/2025 01:57 PM NONREACTIVE NR   Final     Comment:     (NOTE)  The ADVIA Centaur Anti-HBs2 assay is traceable to the World Health   Organization (WHO) Hepatitis B Immunoglobulin 1st International   Reference Preparation (1977). Samples with a calculated value of 10   mIU/mL or greater are considered reactive (protective) in accordance   with the CDC guidelines. The accepted criteria for immunity to HBV is   anti-HBs activity greater than or equal to 10 mIU/mL, as defined by   the WHO International Reference Preparation.  Assay performance has not been established in pregnant women,   patients who are immunosuppressed or immunocompromised, nor have   performance characteristics been established in conjunction with   other 's assays for specific HBV serologic markers. This   assay does not differentiate between vaccine induced immune response   and a response due to infection with HBV. Passively acquired anti-HBs   may be identified following patient transfusion, receipt of   immunoglobulin products, etc.          PRE-HD Assessment    NOTE: 10:00- UFG 2L. Treatment time 3.5hrs. Give Albumin for Hypotension as per Dr. Puentes     04/29/25 0806   Vital Signs   BP (!) 115/57   Temp 98.1 
Primary RN SBAR: Martir Floyd RN  Incapacitated Nurse [facility]. provided: yes  Patient Education provided: Procedural  Preferred Education method and Primary language: English  Dialysis consent: yes  Hospital General Consent Verified: yes  Hospital associated wait time; reason: none  Hepatitis B Surface Ag   Date/Time Value Ref Range Status   04/28/2025 01:57 PM <0.10 Index Final     Hep B S Ag Interp   Date/Time Value Ref Range Status   04/28/2025 01:57 PM Negative NEG   Final     HEP B SURF AB   Date/Time Value Ref Range Status   10/23/2020 01:55 PM 49.42 mIU/mL Final     Hep B S Ab   Date/Time Value Ref Range Status   04/28/2025 01:57 PM 6.16 mIU/mL Final     Hep B S Ab Interp   Date/Time Value Ref Range Status   04/28/2025 01:57 PM NONREACTIVE NR   Final     Comment:     (NOTE)  The ADVIA Centaur Anti-HBs2 assay is traceable to the World Health   Organization (WHO) Hepatitis B Immunoglobulin 1st International   Reference Preparation (1977). Samples with a calculated value of 10   mIU/mL or greater are considered reactive (protective) in accordance   with the CDC guidelines. The accepted criteria for immunity to HBV is   anti-HBs activity greater than or equal to 10 mIU/mL, as defined by   the WHO International Reference Preparation.  Assay performance has not been established in pregnant women,   patients who are immunosuppressed or immunocompromised, nor have   performance characteristics been established in conjunction with   other 's assays for specific HBV serologic markers. This   assay does not differentiate between vaccine induced immune response   and a response due to infection with HBV. Passively acquired anti-HBs   may be identified following patient transfusion, receipt of   immunoglobulin products, etc.          05/16/25 1230   Treatment   Treatment Goal 3.5H/1.5-2L   Observations & Evaluations   Level of Consciousness 0   Oriented X x4   Heart Rhythm   (on ICU monitor in progress) 
    Primary RN SBAR: JIMENA Tanner RN  Incapacitated Nurse [facility]. provided: yes  Patient Education provided: CVC infection prevention.  Preferred Education method and Primary language: verbal, English  Dialysis consent: yes  Hospital General Consent Verified: documented unable to sign medical condition. Discussed further with inge Doshi & he will track.  Hospital associated wait time; reason: N/A  Hepatitis B Surface Ag   Date/Time Value Ref Range Status   04/28/2025 01:57 PM <0.10 Index Final     Hep B S Ag Interp   Date/Time Value Ref Range Status   04/28/2025 01:57 PM Negative NEG   Final     HEP B SURF AB   Date/Time Value Ref Range Status   10/23/2020 01:55 PM 49.42 mIU/mL Final     Hep B S Ab   Date/Time Value Ref Range Status   04/28/2025 01:57 PM 6.16 mIU/mL Final     Hep B S Ab Interp   Date/Time Value Ref Range Status   04/28/2025 01:57 PM NONREACTIVE NR   Final     Comment:     (NOTE)  The ADVIA Centaur Anti-HBs2 assay is traceable to the World Health   Organization (WHO) Hepatitis B Immunoglobulin 1st International   Reference Preparation (1977). Samples with a calculated value of 10   mIU/mL or greater are considered reactive (protective) in accordance   with the CDC guidelines. The accepted criteria for immunity to HBV is   anti-HBs activity greater than or equal to 10 mIU/mL, as defined by   the WHO International Reference Preparation.  Assay performance has not been established in pregnant women,   patients who are immunosuppressed or immunocompromised, nor have   performance characteristics been established in conjunction with   other 's assays for specific HBV serologic markers. This   assay does not differentiate between vaccine induced immune response   and a response due to infection with HBV. Passively acquired anti-HBs   may be identified following patient transfusion, receipt of   immunoglobulin products, etc.       Intra Dialysis:     05/12/25 0805   During Hemodialysis Assessment 
  Treatment   Time On 1315   Treatment Goal 2.5 liters in 2hrs UF   Treatment Initiation   Dialyze Hours 2  (sequential)   Treatment  Initiation Universal Precautions maintained;Lines secured to patient;Connections secured;Prime given;Venous Parameters set;Arterial Parameters set;Air foam detector engaged   During Hemodialysis Assessment   Blood Flow Rate (ml/min) 400 ml/min   Arterial Pressure (mmHg) -150 mmHg   Venous Pressure (mmHg) 160   TMP 50   DFR 0  (sequential)   Comments treatment started   Access Visible Yes   Ultrafiltration Rate (ml/hr) 1500 ml/hr   Ultrafiltration Removed (ml) 0 ml        05/01/25 1515   Treatment Termination   Time On 1315   Time Off 1515   Treatment Goal 2.5 liters   Observations & Evaluations   Level of Consciousness 0   Oriented X 3   Heart Rhythm Regular   Respiratory Quality/Effort Unlabored   O2 Device Nasal cannula   Bilateral Breath Sounds Diminished;Crackles   Skin Color Ashen   Skin Condition/Temp Dry;Warm   Abdomen Inspection Obese;Soft   Bowel Sounds (All Quadrants) Audible   Edema Generalized   Edema Generalized +1   RLE Edema +1   LLE Edema +1   Comments blood returned   Vital Signs   Temp 97.7 °F (36.5 °C)   Pain Assessment   Pain Assessment 0-10   Pain Level 4   Pain Location Jaw   Pain Orientation Right   Pain Descriptors Aching   During Hemodialysis Assessment   Blood Flow Rate (ml/min) 250 ml/min   Comments treatment ended   Ultrafiltration Removed (ml) 3000 ml      05/01/25 1522   Vital Signs   BP (!) 142/59   Post-Hemodialysis Assessment   Post-Treatment Procedures Blood returned;Catheter capped, clamped and heparinized x 2 ports   Machine Disinfection Process Exterior Machine Disinfection   Rinseback Volume (ml) 300 ml   Blood Volume Processed (Liters) 0 L   Dialyzer Clearance Clear   Duration of Treatment (minutes) 120 minutes   Heparin Amount Administered During Treatment (mL) 3.7 mL   Hemodialysis Intake (ml) 500 ml   Hemodialysis Output (ml) 3000 ml   NET 
Rate (ml/min) 300 ml/min   Arterial Pressure (mmHg) -110 mmHg   Venous Pressure (mmHg) 90   TMP 80      Comments Treatment started w/albumin,midodrine and Dobutmine started   Access Visible Yes   Ultrafiltration Rate (ml/hr) 0 ml/hr   Ultrafiltration Removed (ml) 0 ml       Treatment End     05/05/25 1215   Treatment   Time Off 1215   Treatment Goal 1L   Observations & Evaluations   Level of Consciousness 0   Oriented X 4   Heart Rhythm Regular   Respiratory Quality/Effort Unlabored   O2 Device Nasal cannula   Bilateral Breath Sounds Clear;Diminished   RUE Edema None   LUE Edema None   RLE Edema +1;Pitting   LLE Edema +1;Pitting   Vital Signs   BP (!) 90/44   Temp 98 °F (36.7 °C)   Pulse (!) 101   Respirations 22   SpO2 100 %   Pain Assessment   Pain Assessment None - Denies Pain   Hemodialysis Central Access - Permanent/Tunneled Left Neck   No placement date or time found.   Present on Admission/Arrival: Yes  Orientation: Left  Access Location: Neck   Continued need for line? Yes   Site Assessment Clean, dry & intact   Blue Lumen Status Flushed;Heparin locked   Red Lumen Status Flushed;Heparin locked   Line Care Connections checked and tightened;Chlorhexidine wipes;Ports disinfected   Dressing Type Bacteriocidal;Transparent   Date of Last Dressing Change 05/02/25   Dressing Status Clean, dry & intact   During Hemodialysis Assessment   ABP (Arterial line BP) 90/44   Blood Flow Rate (ml/min) 300 ml/min   Arterial Pressure (mmHg) -120 mmHg   Venous Pressure (mmHg) 110   TMP 80      Comments Treatment End   Access Visible Yes   Ultrafiltration Rate (ml/hr) 90 ml/hr   Ultrafiltration Removed (ml) 1600 ml   Post-Hemodialysis Assessment   Post-Treatment Procedures Blood returned;Catheter capped, clamped and heparinized x 2 ports   Machine Disinfection Process Exterior Machine Disinfection   Rinseback Volume (ml) 300 ml   Blood Volume Processed (Liters) 60 L   Dialyzer Clearance Clear   Duration of Treatment 
Acid/Vinegar Clean;Heat Disinfect;Exterior Machine Disinfection   Rinseback Volume (ml) 250 ml   Blood Volume Processed (Liters) 68 L   Dialyzer Clearance Lightly streaked   Duration of Treatment (minutes) 180 minutes   Hemodialysis Intake (ml) 500 ml   Hemodialysis Output (ml) 2166 ml   NET Removed (ml) 1666   Tolerated Treatment Good   Patient Response to Treatment Good       Primary RN SBAR: Caitlyn RN   Comments: Labs reviewed, blood flow rate of 300 achieved. 1.5L UF as ordered. . All possible blood returned, lines flushed and heparin instilled x2. Sterile caps applied. Dressing dry, clean and intact on departure. Bed in lowest position, call light within reach. VSS.

## 2025-05-19 PROCEDURE — 6370000000 HC RX 637 (ALT 250 FOR IP): Performed by: STUDENT IN AN ORGANIZED HEALTH CARE EDUCATION/TRAINING PROGRAM

## 2025-05-19 PROCEDURE — 6370000000 HC RX 637 (ALT 250 FOR IP): Performed by: INTERNAL MEDICINE

## 2025-05-19 PROCEDURE — 94640 AIRWAY INHALATION TREATMENT: CPT

## 2025-05-19 PROCEDURE — 2000000000 HC ICU R&B

## 2025-05-19 PROCEDURE — 2700000000 HC OXYGEN THERAPY PER DAY

## 2025-05-19 PROCEDURE — 94660 CPAP INITIATION&MGMT: CPT

## 2025-05-19 PROCEDURE — 6360000002 HC RX W HCPCS: Performed by: STUDENT IN AN ORGANIZED HEALTH CARE EDUCATION/TRAINING PROGRAM

## 2025-05-19 PROCEDURE — 6370000000 HC RX 637 (ALT 250 FOR IP): Performed by: PHYSICIAN ASSISTANT

## 2025-05-19 PROCEDURE — 94760 N-INVAS EAR/PLS OXIMETRY 1: CPT

## 2025-05-19 PROCEDURE — 2500000003 HC RX 250 WO HCPCS: Performed by: PHYSICIAN ASSISTANT

## 2025-05-19 RX ADMIN — BUDESONIDE 500 MCG: 0.5 INHALANT RESPIRATORY (INHALATION) at 07:48

## 2025-05-19 RX ADMIN — APIXABAN 5 MG: 5 TABLET, FILM COATED ORAL at 08:53

## 2025-05-19 RX ADMIN — MIDODRINE HYDROCHLORIDE 20 MG: 5 TABLET ORAL at 08:54

## 2025-05-19 RX ADMIN — APIXABAN 5 MG: 5 TABLET, FILM COATED ORAL at 21:30

## 2025-05-19 RX ADMIN — SENNOSIDES 8.6 MG: 8.6 TABLET, FILM COATED ORAL at 22:00

## 2025-05-19 RX ADMIN — SODIUM CHLORIDE, PRESERVATIVE FREE 10 ML: 5 INJECTION INTRAVENOUS at 08:54

## 2025-05-19 RX ADMIN — ATORVASTATIN CALCIUM 10 MG: 20 TABLET, FILM COATED ORAL at 22:00

## 2025-05-19 RX ADMIN — ACETAMINOPHEN 650 MG: 325 TABLET ORAL at 17:13

## 2025-05-19 RX ADMIN — SODIUM CHLORIDE, PRESERVATIVE FREE 10 ML: 5 INJECTION INTRAVENOUS at 22:16

## 2025-05-19 RX ADMIN — IPRATROPIUM BROMIDE AND ALBUTEROL SULFATE 1 DOSE: 2.5; .5 SOLUTION RESPIRATORY (INHALATION) at 19:46

## 2025-05-19 RX ADMIN — PREDNISONE 10 MG: 20 TABLET ORAL at 08:53

## 2025-05-19 RX ADMIN — BUDESONIDE 500 MCG: 0.5 INHALANT RESPIRATORY (INHALATION) at 19:46

## 2025-05-19 RX ADMIN — IPRATROPIUM BROMIDE AND ALBUTEROL SULFATE 1 DOSE: 2.5; .5 SOLUTION RESPIRATORY (INHALATION) at 07:48

## 2025-05-19 RX ADMIN — MONTELUKAST 10 MG: 10 TABLET, FILM COATED ORAL at 22:00

## 2025-05-19 RX ADMIN — FLUDROCORTISONE ACETATE 0.05 MG: 0.1 TABLET ORAL at 08:54

## 2025-05-19 RX ADMIN — MIDODRINE HYDROCHLORIDE 20 MG: 5 TABLET ORAL at 12:45

## 2025-05-19 RX ADMIN — Medication 1 MG: at 08:53

## 2025-05-19 RX ADMIN — MIDODRINE HYDROCHLORIDE 20 MG: 5 TABLET ORAL at 17:13

## 2025-05-19 ASSESSMENT — PAIN SCALES - GENERAL
PAINLEVEL_OUTOF10: 0

## 2025-05-19 NOTE — CARE COORDINATION
Transition of Care Plan:     RUR: 19% Moderate Risk  Prior Level of Functioning: Dependent  Disposition: Home with Hospice; TBD  CHRISTOPHER: 5/20/25  Follow up appointments: None  DME needed: Delivered to home by Hospice  Transportation at discharge: BLS; Stretcher  IM/IMM Medicare/ letter given: N/A  Is patient a  and connected with [facility]? No   If yes, was  transfer form completed and [facility] notified?   Caregiver Contact: Yunior Villarreal - [phone]  Discharge Caregiver contacted prior to discharge? Yes   Care Conference needed? no  Barriers to discharge:    Plan to discharge home tomorrow followed by [facility]. Equipment has been delivered to the home. CM has requested stretcher transport with [facility] for 1000. CM [facility]

## 2025-05-20 ENCOUNTER — HOSPITAL ENCOUNTER (EMERGENCY)
Facility: HOSPITAL | Age: 65
Discharge: HOME | End: 2025-05-20
Attending: STUDENT IN AN ORGANIZED HEALTH CARE EDUCATION/TRAINING PROGRAM
Payer: MEDICAID

## 2025-05-20 VITALS
WEIGHT: 249.34 LBS | DIASTOLIC BLOOD PRESSURE: 40 MMHG | HEART RATE: 71 BPM | BODY MASS INDEX: 41.54 KG/M2 | HEIGHT: 65 IN | OXYGEN SATURATION: 97 % | TEMPERATURE: 98 F | RESPIRATION RATE: 22 BRPM | SYSTOLIC BLOOD PRESSURE: 95 MMHG

## 2025-05-20 VITALS
OXYGEN SATURATION: 100 % | DIASTOLIC BLOOD PRESSURE: 70 MMHG | TEMPERATURE: 97.9 F | RESPIRATION RATE: 16 BRPM | HEART RATE: 70 BPM | SYSTOLIC BLOOD PRESSURE: 100 MMHG

## 2025-05-20 DIAGNOSIS — Z99.2: Primary | ICD-10-CM

## 2025-05-20 PROCEDURE — 6370000000 HC RX 637 (ALT 250 FOR IP): Performed by: INTERNAL MEDICINE

## 2025-05-20 PROCEDURE — 94760 N-INVAS EAR/PLS OXIMETRY 1: CPT

## 2025-05-20 PROCEDURE — 94660 CPAP INITIATION&MGMT: CPT

## 2025-05-20 PROCEDURE — 2700000000 HC OXYGEN THERAPY PER DAY

## 2025-05-20 PROCEDURE — 6370000000 HC RX 637 (ALT 250 FOR IP): Performed by: STUDENT IN AN ORGANIZED HEALTH CARE EDUCATION/TRAINING PROGRAM

## 2025-05-20 PROCEDURE — 94640 AIRWAY INHALATION TREATMENT: CPT

## 2025-05-20 PROCEDURE — 6360000002 HC RX W HCPCS: Performed by: STUDENT IN AN ORGANIZED HEALTH CARE EDUCATION/TRAINING PROGRAM

## 2025-05-20 PROCEDURE — 2500000003 HC RX 250 WO HCPCS: Performed by: PHYSICIAN ASSISTANT

## 2025-05-20 PROCEDURE — 99283 EMERGENCY DEPT VISIT LOW MDM: CPT

## 2025-05-20 RX ORDER — MIDODRINE HYDROCHLORIDE 10 MG/1
20 TABLET ORAL
Status: SHIPPED | COMMUNITY
Start: 2025-05-20

## 2025-05-20 RX ADMIN — BUDESONIDE 500 MCG: 0.5 INHALANT RESPIRATORY (INHALATION) at 08:10

## 2025-05-20 RX ADMIN — MIDODRINE HYDROCHLORIDE 20 MG: 5 TABLET ORAL at 08:52

## 2025-05-20 RX ADMIN — PREDNISONE 10 MG: 20 TABLET ORAL at 08:52

## 2025-05-20 RX ADMIN — SODIUM CHLORIDE, PRESERVATIVE FREE 10 ML: 5 INJECTION INTRAVENOUS at 08:52

## 2025-05-20 RX ADMIN — Medication 1 MG: at 08:52

## 2025-05-20 RX ADMIN — APIXABAN 5 MG: 5 TABLET, FILM COATED ORAL at 08:51

## 2025-05-20 RX ADMIN — IPRATROPIUM BROMIDE AND ALBUTEROL SULFATE 1 DOSE: 2.5; .5 SOLUTION RESPIRATORY (INHALATION) at 08:10

## 2025-05-20 NOTE — ED TRIAGE NOTES
Patient presents from home via EMS to have her dialysis catheter removed. She is a hospice patient

## 2025-05-20 NOTE — PROGRESS NOTES
NAME: Yuki Escobar        :  1960        MRN:  [id number]              Assessment   :                                               Plan:  ESKD  Perm cath     Hypotension     Anemia     Chronic hypoxic hypercapnic resp failure  Copd  Trace  On home Oxygen     Acute on chronic thrombocytopenia MWF HD at [facility]; no urgent need for HD today     Next HD likely Monday (modality -iHD vs CRRT - as hemodynamics allow)    Currently on 2 pressors (asymptomatic)     Hgb > 10, no jose for now     BC's ngtd       Subjective:     Chief Complaint:  on pressor. On home oxygen requirement.  Alert. Poor historian. I spoke with icu nurse and reviewed the chart      Review of Systems:    Symptom Y/N Comments  Symptom Y/N Comments   Fever/Chills    Chest Pain     Poor Appetite    Edema     Cough    Abdominal Pain     Sputum    Joint Pain     SOB/STEPHENSON    Pruritis/Rash     Nausea/vomit    Tolerating PT/OT     Diarrhea    Tolerating Diet     Constipation    Other       Could not obtain due to:      Objective:     VITALS:   Last 24hrs VS reviewed since prior progress note. Most recent are:  Vitals:    25 1200   BP:    Pulse: 70   Resp: 24   Temp: 97.6 °F (36.4 °C)   SpO2:        Intake/Output Summary (Last 24 hours) at 2025 1428  Last data filed at 2025 1200  Gross per 24 hour   Intake 548.82 ml   Output --   Net 548.82 ml      Telemetry Reviewed:     PHYSICAL EXAM:  General: NAD      Lab Data Reviewed: (see below)    Medications Reviewed: (see below)    PMH/SH reviewed - no change compared to H&P  ________________________________________________________________________  Care Plan discussed with:  Patient     Family      RN     Care Manager                    Consultant:          Comments   >50% of visit spent in counseling and coordination of care       ________________________________________________________________________  Anaid 
                                                                         NAME: Yuki Escobar        :  1960        MRN:  [id number]              Assessment   :                                               Plan:  ESKD  Perm cath     Hypotension     Anemia     Chronic hypoxic hypercapnic resp failure  Copd  Trace  On home Oxygen     Acute on chronic thrombocytopenia MWF HD at [facility]; no urgent need for HD today     Next HD today  (modality  CRRT - as she is on 2 pressors still)    Currently on 2 pressors (asymptomatic)     Hgb > 10, no jose for now     BC's ngtd    Plan discussed with ICU & HD nurse        Subjective:     Chief Complaint:  on pressor. On home oxygen requirement.  Alert. Poor historian. I spoke with icu nurse and reviewed the chart      Review of Systems:    Symptom Y/N Comments  Symptom Y/N Comments   Fever/Chills    Chest Pain     Poor Appetite    Edema     Cough    Abdominal Pain     Sputum    Joint Pain     SOB/STEPHENSON    Pruritis/Rash     Nausea/vomit    Tolerating PT/OT     Diarrhea    Tolerating Diet     Constipation    Other       Could not obtain due to:      Objective:     VITALS:   Last 24hrs VS reviewed since prior progress note. Most recent are:  Vitals:    25 1300   BP:    Pulse: 70   Resp: 23   Temp:    SpO2:        Intake/Output Summary (Last 24 hours) at 2025 1424  Last data filed at 2025 1300  Gross per 24 hour   Intake 465.65 ml   Output --   Net 465.65 ml      Telemetry Reviewed:     PHYSICAL EXAM:  General: NAD      Lab Data Reviewed: (see below)    Medications Reviewed: (see below)    PMH/SH reviewed - no change compared to H&P  ________________________________________________________________________  Care Plan discussed with:  Patient     Family      RN     Care Manager                    Consultant:          Comments   >50% of visit spent in counseling and coordination of care   
                                                                         NAME: Yuki Escobar        :  1960        MRN:  [id number]              Assessment   :                                               Plan:  ESKD  Perm cath     Hypotension -improving     Anemia     Chronic hypoxic hypercapnic resp failure  Copd  Trace  On home Oxygen     Acute on chronic thrombocytopenia MWF HD at [facility]; no urgent need for HD today     -Was on CRRT on  but the circuit clotted off last night.   S/p  HD  on  ,   Will try to dialyze her tomorrow and again on Friday to put her back on MWF schedule  UF as tolerated, goal 1.5 to 2 L       Hgb > 10, no jose for now     BC's ngtd    Plan discussed with ICU & HD nurse        Subjective:     Chief Complaint: She is more alert and oriented today.  Responding appropriately.  Tolerated regular dialysis well yesterday.    Objective:     - She is not in any respiratory distress, was saturating well on 2 L oxygen via nasal cannula    VITALS:   Last 24hrs VS reviewed since prior progress note. Most recent are:  Vitals:    25 1030   BP: (!) 92/48   Pulse: 70   Resp: 30   Temp:    SpO2: 97%       Intake/Output Summary (Last 24 hours) at 2025 1052  Last data filed at 2025 0800  Gross per 24 hour   Intake 1088 ml   Output 2500 ml   Net -1412 ml      Telemetry Reviewed:     PHYSICAL EXAM:  General: NAD      Lab Data Reviewed: (see below)    Medications Reviewed: (see below)    PMH/SH reviewed - no change compared to H&P  ________________________________________________________________________  Care Plan discussed with:  Patient     Family      RN     Care Manager                    Consultant:          Comments   >50% of visit spent in counseling and coordination of care       ________________________________________________________________________  Janet Puentes MD     Procedures: see electronic medical records for all procedures/Xrays and details 
                                                                         NAME: Yuki Escobar        :  1960        MRN:  [id number]              Assessment   :                                               Plan:  ESKD  Perm cath     Hypotension - Cardiogenic Shock  Pulmn HTN   Cor pulmonale     Anemia     Chronic hypoxic hypercapnic resp failure  Copd  Trace  On home Oxygen     Acute on chronic thrombocytopenia MWF HD at [facility];      -Was on CRRT on     S/p  HD  on  and  ( UF 2.5L),   - S/p UF on   - HD done  with 3 L Volume removal    - HD today with 2L UF goal    On levophed and Dobutamine drip  Ct Midodrine and Florinef     Hgb - stable ,SHAHRIAR      BC's ngtd    Plan discussed with HD and ICU  nurse        Subjective:     Chief Complaint: Seen on HD at approx 9:50am. Tolerating tx. Anwering questions appropriately    Objective:       VITALS:   Last 24hrs VS reviewed since prior progress note. Most recent are:  Vitals:    25 1030   BP: (!) 93/45   Pulse: 94   Resp:    Temp:    SpO2:        Intake/Output Summary (Last 24 hours) at 2025 1039  Last data filed at 2025 0700  Gross per 24 hour   Intake 249.52 ml   Output --   Net 249.52 ml      Telemetry Reviewed:     PHYSICAL EXAM:  General: NAD/Obese  Permacath  Lungs - good air entry B/L   CVS - Mitral regurgitation murmur radiating to back  Aortic systolic murmur     Lab Data Reviewed: (see below)    Medications Reviewed: (see below)    PMH/SH reviewed - no change compared to H&P  __    Bari Venegas MD     Procedures: see electronic medical records for all procedures/Xrays and details which  were not copied into this note but were reviewed prior to creation of Plan.      LABS:  Recent Labs     25  0200 25  0303   WBC 7.3 7.6   HGB 10.0* 9.8*   HCT 31.0* 31.0*   * 112*     Recent Labs     25  0219 25  0200 25  0303    140 141   K 3.9 4.1 4.1    106 107   CO2 26 27 25   BUN 
                                                                         NAME: Yuki Escobar        :  1960        MRN:  [id number]              Assessment   :                                               Plan:  ESKD  Perm cath     Hypotension - Cardiogenic Shock  Pulmn HTN   Biv failure      Anemia     Chronic hypoxic hypercapnic resp failure  Copd  Trace  On home Oxygen     Acute on chronic thrombocytopenia MWF HD at [facility];      -Was on CRRT on     S/p  HD  on  and  ( UF 2.5L),   - S/p UF on   - HD done  with 3 L Volume removal         Hgb - stable ,      BC's ngtd    Plan discussed with ICU  nurse        Subjective:     Chief Complaint: She is more alert and oriented today.  Responding appropriately.  She is still on low-dose vasopressin  -Discussed with ICU team,     Objective:     - She is not in any respiratory distress, was saturating well on 2 L oxygen via nasal cannula    VITALS:   Last 24hrs VS reviewed since prior progress note. Most recent are:  Vitals:    25 0700   BP:    Pulse: 70   Resp: 17   Temp:    SpO2: 100%       Intake/Output Summary (Last 24 hours) at 5/3/2025 0704  Last data filed at 5/3/2025 0600  Gross per 24 hour   Intake 771.99 ml   Output 2186 ml   Net -1414.01 ml      Telemetry Reviewed:     PHYSICAL EXAM:  General: NAD  Lungs - good air entry B/L   CVS - Mitral regurgitation murmur radiating to back  Aortic systolic murmur     Lab Data Reviewed: (see below)    Medications Reviewed: (see below)    PMH/SH reviewed - no change compared to H&P  __    Janet Puentes MD     Procedures: see electronic medical records for all procedures/Xrays and details which  were not copied into this note but were reviewed prior to creation of Plan.      LABS:  Recent Labs     25  0010 259   WBC 8.6 6.3   HGB 10.7* 10.4*   HCT 33.8* 32.6*   PLT 97* 105*     Recent Labs     25  0532 25  0010 25  021    139 139   K 4.1 4.5 3.9 
                                                                         NAME: Yuki Escobar        :  1960        MRN:  [id number]              Assessment   :                                               Plan:  ESKD  Perm cath     Hypotension - Cardiogenic Shock  Pulmn HTN   Cor pulmonale     Anemia     Chronic hypoxic hypercapnic resp failure  Copd  Trace  On home Oxygen     Acute on chronic thrombocytopenia MWF HD at [facility];      -Was on CRRT on     S/p  HD  on  and  ( UF 2.5L),   - S/p UF on   - HD done  with 3 L Volume removal    - HD  with 1L UF   HD today    On levophed drip  Ct Midodrine and Florinef     Hgb - stable ,SHAHRIAR      BC's ngtd    Poor overall prognosis    Discussed with patient and ICU  nurse        Subjective:     Chief Complaint: Seen on HD at ~10:20am. Remains on Levophed. Off Dobutamine since this morning. No new complaints by patient.     Objective:       VITALS:   Last 24hrs VS reviewed since prior progress note. Most recent are:  Vitals:    25 1200   BP: (!) 115/58   Pulse: 71   Resp: 25   Temp: 98.1 °F (36.7 °C)   SpO2: 100%       Intake/Output Summary (Last 24 hours) at 2025 1214  Last data filed at 2025 1100  Gross per 24 hour   Intake 546.95 ml   Output --   Net 546.95 ml      Telemetry Reviewed:     PHYSICAL EXAM:  General: NAD/Obese  Permacath  Lungs - good air entry B/L   Minimal peripheral edema      Lab Data Reviewed: (see below)    Medications Reviewed: (see below)    PMH/SH reviewed - no change compared to H&P  __    Deep MD Theo     Procedures: see electronic medical records for all procedures/Xrays and details which  were not copied into this note but were reviewed prior to creation of Plan.      LABS:  Recent Labs     25  03325  06   WBC 8.1 8.6   HGB 10.8* 10.7*   HCT 34.0* 32.8*   * 137*     Recent Labs     25  0303 25  03325  0626    139 140   K 4.1 4.0 4.2    106 108 
                                                                         NAME: Yuki Escobar        :  1960        MRN:  [id number]              Assessment   :                                               Plan:  ESKD  Perm cath     Hypotension - Cardiogenic Shock  Pulmn HTN   Cor pulmonale     Anemia     Chronic hypoxic hypercapnic resp failure  Copd  Trace  On home Oxygen     Acute on chronic thrombocytopenia MWF HD at [facility];      -Was on CRRT on     S/p  HD  on  and  ( UF 2.5L),   - S/p UF on   - HD done  with 3 L Volume removal    - Will try regular hemodialysis tomorrow, if needed will start her on low-dose Levophed during the treatment     Hgb - stable ,      BC's ngtd    Plan discussed with ICU  nurse        Subjective:     Chief Complaint: She is more alert and oriented today.  Responding appropriately.  She is still on low-dose vasopressin  -Discussed with ICU team,     Objective:     - She is not in any respiratory distress, was saturating well on 2 L oxygen via nasal cannula    VITALS:   Last 24hrs VS reviewed since prior progress note. Most recent are:  Vitals:    25 1800   BP:    Pulse: 70   Resp: 23   Temp:    SpO2:        Intake/Output Summary (Last 24 hours) at 2025 1819  Last data filed at 2025 1714  Gross per 24 hour   Intake 283.82 ml   Output --   Net 283.82 ml      Telemetry Reviewed:     PHYSICAL EXAM:  General: NAD  Lungs - good air entry B/L   CVS - Mitral regurgitation murmur radiating to back  Aortic systolic murmur     Lab Data Reviewed: (see below)    Medications Reviewed: (see below)    PMH/SH reviewed - no change compared to H&P  __    Janet Puentes MD     Procedures: see electronic medical records for all procedures/Xrays and details which  were not copied into this note but were reviewed prior to creation of Plan.      LABS:  Recent Labs     25  0219 25  0200   WBC 6.3 7.3   HGB 10.4* 10.0*   HCT 32.6* 31.0*   * 
                                                                         NAME: Yuki Escobar        :  1960        MRN:  [id number]              Assessment   :                                               Plan:  ESKD  Perm cath     Hypotension -improving     Anemia     Chronic hypoxic hypercapnic resp failure  Copd  Trace  On home Oxygen     Acute on chronic thrombocytopenia MWF HD at [facility];      -Was on CRRT on     S/p  HD  on  and  ( UF 2.5L),   - will do UF today        Hgb - trending down, will start epo ,      BC's ngtd    Plan discussed with ICU & HD nurse        Subjective:     Chief Complaint: She is more alert and oriented today.  Responding appropriately.  She is still on low-dose Levophed and vasopressin  -Discussed with ICU team, plan is for cardiac cath in the next few days to assess right-sided pressures    Objective:     - She is not in any respiratory distress, was saturating well on 2 L oxygen via nasal cannula    VITALS:   Last 24hrs VS reviewed since prior progress note. Most recent are:  Vitals:    25 0400   BP: (!) 115/59   Pulse: 70   Resp: 21   Temp: 98.2 °F (36.8 °C)   SpO2: 97%       Intake/Output Summary (Last 24 hours) at 2025 0646  Last data filed at 2025 0000  Gross per 24 hour   Intake 840.38 ml   Output 2500 ml   Net -1659.62 ml      Telemetry Reviewed:     PHYSICAL EXAM:  General: NAD  Lungs - good air entry B/L   CVS - Mitral regurgitation murmur radiating to back  Aortic systolic murmur     Lab Data Reviewed: (see below)    Medications Reviewed: (see below)    PMH/SH reviewed - no change compared to H&P  __    Janet Puentes MD     Procedures: see electronic medical records for all procedures/Xrays and details which  were not copied into this note but were reviewed prior to creation of Plan.      LABS:  Recent Labs     25  0211 25  0532   WBC 8.6 7.9   HGB 9.9* 9.7*   HCT 31.8* 30.9*   PLT 53* 67*     Recent Labs     
                                                                         NAME: Yuki Escobar        :  1960        MRN:  [id number]              Assessment   :                                               Plan:  ESKD  Perm cath     Hypotension     Anemia     Chronic hypoxic hypercapnic resp failure  Copd  Trace  On home Oxygen     Acute on chronic thrombocytopenia MWF HD at [facility]; no urgent need for HD today     -Was on CRRT yesterday but the circuit clotted off last night.   will attempt HD today, UF as tolerated, goal 1.5 to 2 L    Currently on 2 pressors (asymptomatic)     Hgb > 10, no jose for now     BC's ngtd    Plan discussed with ICU & HD nurse        Subjective:     Chief Complaint: She is more alert and oriented today.  Responding appropriately.  Noted to be on 2 pressors but low doses and is hemodynamically stable    Objective:     - She is not in any respiratory distress, was saturating well on 2 L oxygen via nasal cannula    VITALS:   Last 24hrs VS reviewed since prior progress note. Most recent are:  Vitals:    25 0930   BP:    Pulse: 70   Resp:    Temp:    SpO2:        Intake/Output Summary (Last 24 hours) at 2025 0944  Last data filed at 2025 0700  Gross per 24 hour   Intake 400.75 ml   Output 262.3 ml   Net 138.45 ml      Telemetry Reviewed:     PHYSICAL EXAM:  General: NAD      Lab Data Reviewed: (see below)    Medications Reviewed: (see below)    PMH/SH reviewed - no change compared to H&P  ________________________________________________________________________  Care Plan discussed with:  Patient     Family      RN     Care Manager                    Consultant:          Comments   >50% of visit spent in counseling and coordination of care       ________________________________________________________________________  Janet Puentes MD     Procedures: see electronic medical records for all procedures/Xrays and details which  were not copied into this note but 
                                                                         NAME: Yuki Escobar        :  1960        MRN:  [id number]              Assessment   :                                               Plan:  ESKD  Perm cath     Hypotension -improving only on Vasipressin now      Anemia     Chronic hypoxic hypercapnic resp failure  Copd  Trace  On home Oxygen     Acute on chronic thrombocytopenia MWF HD at [facility];      -Was on CRRT on     S/p  HD  on  and  ( UF 2.5L),   - S/p UF on   - HD today for 3 hours ,        Hgb - stable ,      BC's ngtd    Plan discussed with ICU & HD nurse        Subjective:     Chief Complaint: She is more alert and oriented today.  Responding appropriately.  She is still on low-dose vasopressin  -Discussed with ICU team,     Objective:     - She is not in any respiratory distress, was saturating well on 2 L oxygen via nasal cannula    VITALS:   Last 24hrs VS reviewed since prior progress note. Most recent are:  Vitals:    25 0400   BP:    Pulse: 70   Resp: 21   Temp: 98 °F (36.7 °C)   SpO2: 98%       Intake/Output Summary (Last 24 hours) at 2025 0654  Last data filed at 2025 0439  Gross per 24 hour   Intake 787.39 ml   Output 3000 ml   Net -2212.61 ml      Telemetry Reviewed:     PHYSICAL EXAM:  General: NAD  Lungs - good air entry B/L   CVS - Mitral regurgitation murmur radiating to back  Aortic systolic murmur     Lab Data Reviewed: (see below)    Medications Reviewed: (see below)    PMH/SH reviewed - no change compared to H&P  __    Janet Puentes MD     Procedures: see electronic medical records for all procedures/Xrays and details which  were not copied into this note but were reviewed prior to creation of Plan.      LABS:  Recent Labs     25  0532 25  0010   WBC 7.9 8.6   HGB 9.7* 10.7*   HCT 30.9* 33.8*   PLT 67* 97*     Recent Labs     25  0211 25  0532 25  0010    137 139   K 4.4 4.1 4.5   CL 
         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  Worsened by volume overload  End-stage renal disease.  On hemodialysis  JF, on BiPAP at home  COPD. FEV1 of 0.62 L at 29%.  Not bronchospastic Reviewed with ICU doctor, nurse, primary pulmonary physician  She tolerated volume removal well yesterday.    Will consider a right heart catheterization when she is more euvolemic, tomorrow vs next week. Will request cards to help out with RHC   Below is attached Dr Wallace's last note and the RHC at [facility] in .   DVT proph  Qhs noninvasive ventilation  O2 titration above 90%  On stress dose steroids   Brovana to her Pulmicort     Daily Progression:      Listening to good music  No reported issues today   No DVTs on dopplers       Consult Note requested by ICU team    Patient presented for admission on 2025 secondary to respiratory failure and altered mental status.  There is been inability to hemodialyzed patient since she has been hypotensive.  Also notes confusion unknown if she has been off of her noninvasive ventilation.  It has been difficult to actually dialyze her due to her pressures and she underwent CRRT several days ago and actually finally underwent hemodialysis yesterday and had 2 L removed.  Tolerated well with her pressor regimen at this point.  Apparently there has been a cognitive decline over the last month as reported by her family.  At this time it has been unable to elicit why her blood pressure has been such a significant issue.  No evidence of sepsis at this time has been delineated.  Does have significant COPD.  On bronchodilators.  No evidence of infiltrates on her 
         Pulmonary, Critical Care, and Sleep Medicine~Progress Note    Name: Yuki Escobar MRN: [id number]   : 1960 Hospital: [facility]   Date: 2025 12:10 PM Admission: 2025     Impression Plan   Acute on chronic hypoxic respiratory failure secondary to below  Cardiogenic shock   Severe PH. 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  Worsened by volume overload  End-stage renal disease.  On hemodialysis  JF, on BiPAP at home  COPD. FEV1 of 0.62 L at 29%.  Not bronchospastic On a trial of dobutamine today  Not a candidate for transplant  Below is attached Dr Wallace's last note and the RHC at [facility] in .   On a lot of levo  Agree with palliative care involvement   DVT proph  Qhs noninvasive ventilation  O2 titration above 90%  On stress dose steroids   Brovana to her Pulmicort  Discussed with attending, RN     Daily Progression:      On 20 of levo  BP ok  No dyspnea reported     5/  Pressures soft today  Little change  Did not tolerate milrinone over the weekend  Getting HD today        Seen and examined earlier today.  Did not tolerate trial of inotrope.  Continues to be on Levophed as well as vasopressin.  Subjectively, she feels better.  She is not a candidate for transplant.  Trial of IV Veletri may be associated with more risks than benefits given there does not appear to be any destination therapeutic goal. D/W RN    5/3  Patient seen and examined, reviewed data, reviewed RHC tracings with end expiratory wedge pressure at a wave of 18-19 mmHg. This is after aggressive volume optimization and on vasoactive agents (norepi and vasopressin). Pre-capillary component is not =  WHO Group 1 PAH. Her hemodynamic classification is still WHO Group 2 combined pre and post capillary PH with wedge pressure more than 15 mmHg (after aggressive volume removal). Underlying severe lung disease with last FEV1 ~ 600 ml, JF, hypoxia and ESRD all are contributing to precapillary 
         Pulmonary, Critical Care, and Sleep Medicine~Progress Note    Name: Yuki Escobar MRN: [id number]   : 1960 Hospital: [facility]   Date: 2025 12:45 PM Admission: 2025     Impression Plan   Acute on chronic hypoxic respiratory failure secondary to below  Cardiogenic shock   Severe PH. 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  Worsened by volume overload  End-stage renal disease.  On hemodialysis  JF, on BiPAP at home  COPD. FEV1 of 0.62 L at 29%.  Not bronchospastic On a trial of dobutamine today  Not a candidate for transplant  Below is attached Dr Wallace's last note and the RHC at [facility] in .   DVT proph  Qhs noninvasive ventilation  O2 titration above 90%  On stress dose steroids   Brovana to her Pulmicort  Discussed with attending, ICU doc, RN     Daily Progression:      Pressures soft today  Little change  Did not tolerate milrinone over the weekend  Getting HD today        Seen and examined earlier today.  Did not tolerate trial of inotrope.  Continues to be on Levophed as well as vasopressin.  Subjectively, she feels better.  She is not a candidate for transplant.  Trial of IV Veletri may be associated with more risks than benefits given there does not appear to be any destination therapeutic goal. D/W RN    5/3  Patient seen and examined, reviewed data, reviewed RHC tracings with end expiratory wedge pressure at a wave of 18-19 mmHg. This is after aggressive volume optimization and on vasoactive agents (norepi and vasopressin). Pre-capillary component is not =  WHO Group 1 PAH. Her hemodynamic classification is still WHO Group 2 combined pre and post capillary PH with wedge pressure more than 15 mmHg (after aggressive volume removal). Underlying severe lung disease with last FEV1 ~ 600 ml, JF, hypoxia and ESRD all are contributing to precapillary component of PH. Unfortunately, there are no guideline approved PH specific medications for WHO Groups 2 
         Pulmonary, Critical Care, and Sleep Medicine~Progress Note    Name: Yuki Escobar MRN: [id number]   : 1960 Hospital: [facility]   Date: 5/3/2025 8:54 AM Admission: 2025     Impression Plan   Acute on chronic hypoxic respiratory failure secondary to below  Cardiogenic shock   Severe PH. 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  Worsened by volume overload  End-stage renal disease.  On hemodialysis  JF, on BiPAP at home  COPD. FEV1 of 0.62 L at 29%.  Not bronchospastic Please see progress note below - limited options but trial of milrinone followed by very cautious off label use of veletri to see if she stabilizes. Not a candidate for transplant  Below is attached Dr Wallace's last note and the RHC at [facility] in .   DVT proph  Qhs noninvasive ventilation  O2 titration above 90%  On stress dose steroids   Brovana to her Pulmicort  CCT 40'     Daily Progression:  5/3  Patient seen and examined, reviewed data, reviewed RHC tracings with end expiratory wedge pressure at a wave of 18-19 mmHg. This is after aggressive volume optimization and on vasoactive agents (norepi and vasopressin). Pre-capillary component is not =  WHO Group 1 PAH. Her hemodynamic classification is still WHO Group 2 combined pre and post capillary PH with wedge pressure more than 15 mmHg (after aggressive volume removal). Underlying severe lung disease with last FEV1 ~ 600 ml, JF, hypoxia and ESRD all are contributing to precapillary component of PH. Unfortunately, there are no guideline approved PH specific medications for WHO Groups 2 and 3 disease and she is not a candidate for transplant. Off label therapy with pulmonary vasodilator therapy will likely be associated with increased risk. Sildenafil will likely be ineffective at this point of disease trajectory. Off label very cautious trial of IV Veletri could be a consideration but we do not have a PA catheter in place to stop treatment if the wedge 
     SOUND CRITICAL CARE PROGRESS NOTE.      Name: Yuki Escobar   : 1960   MRN: [id number]   Date: 2025      Chief Complaint   Patient presents with    Hypotension              Reason for ICU admission: Shock     Hospital Course:     64yoF with COPD (on home O2), JF/OHS on BiPAP, ESRD, chronic hypotension, complete AV block s/p pacemaker, Afib/flutter, DM, HFpEF, pulmonary HTN, who presented to ED  with hypotension. Per chart review, she went to HD as scheduled today and was found to be hypotensive prior to session. She was given midodrine and underwent HD without volume removal; she remained hypotensive and was sent to the ED. Labs notable for lactate 2.16, troponin 358, BNP >35K, albumin 2.9; no leukocytosis. CXR w/ cardiomegaly and hilar congestion. CT A/P w/o acute findings. She was given 1.5L IVF and IV albumin. ICU consulted for further management.      Pt is pleasantly confused, she has no complaints. Per son at bedside, she was recently hospitalized at OSH - for acute on chronic hypoxic hypercapnic respiratory failure requiring intubation. Since discharge, she has had waxing/waning mental status and has not been compliant with nightly bipap. She was also recently diagnosed with AF/flutter and was started on metoprolol. Due to chronic hypotension, she was instructed to hold this on HD days. Patient and son uncertain if she took metoprolol today; also uncertain if she took her midodrine. She currently denies fever, cough, congestion, chest pain, dyspnea, abdominal pain, NVD, LE edema. Son reports poor appetite with weight loss. They have been discussing goals of care and code status, but at this time would like all interventions.       : A line placed overnight to accurately measure the BP. Vasopressors requirement increased in the am. Central line placed for vasopressors. Pt was difficult to arouse in the am but later got better.           Assessment & Plan 
  Cardiac Cath Lab Procedure Area Arrival Note:    Yuki Escobar arrived to Cardiac Cath Lab, Procedure Area. Patient identifiers verified with NAME and DATE OF BIRTH. Procedure verified with patient. Consent forms verified. Allergies verified. Patient informed of procedure and plan of care. Questions answered with review. Patient voiced understanding of procedure and plan of care.    Patient on cardiac monitor, non-invasive blood pressure, SPO2 monitor. O2 @ 3 lpm via NC.  IV of NS on pump at 50 ml/hr. Patient status doing well without problems. Patient is A&Ox 4. Patient reports no pain or complaints.     Patient medicated during procedure with orders obtained and verified by Dr. Soria.    Refer to patients Cardiac Cath Lab PROCEDURE REPORT for vital signs, assessment, status, and response during procedure, printed at end of case. Printed report on chart or scanned into chart.    
  Palliative Medicine   [place] [phone] (COPE)        [facility] [phone] (COPE)      Palliative Medicine SW and Dr. Valladares met with patient at bedside - getting dialysis, cards and balloons in the window, she shares she had a great birthday with family visiting & family baked a cake for her. Remains on levo - extensive family meeting held Friday, patient is DNR/I and no escalation of care - will continue to have ongoing discussions this week regarding next steps, difficult for family given how alert and interactive patient is. Today she tells us she is is really hoping to go home.       Following closely with you - will have ongoing conversations this week regarding next steps w/ family.       Thank you for including Palliative Medicine in the care of Yuki Lemus LCSW     
  Palliative Medicine   [place] [phone] (COPE)        [facility] [phone] (COPE)      Palliative Medicine SW met with the patient at bedside along with Dr. Valladares - she is in good spirits, listening to music and eating gummy bears, she offers us one. She denies any distress.     Yunior George shortly arrives - he shares he spoke w/ team yesterday, right now patient is off levophed, however, soft BP - going to get dialysis today, we confirm w/ family last session - if needs levo, will add on for dialysis session to help her complete it - then Ariel says plan is for her to discharge home tomorrow.     Hospice is following - they are unable to chart in EPIC, so will defer to case management for discharge planning.     Support provided - patient in good spirits, supportive yunior George at bedside.       Thank you for including Palliative Medicine in the care of [facility]uki [facility]tano [facility]     
  Palliative Medicine   [place] [phone] - [phone] (COPE)        [facility] [phone] ([place])      Palliative Medicine SW and Dr. Valladares met with patient at bedside, she is currently getting dialysis - she is alert and engaging, however, would benefit from assistance with complex medical decision making.     Our team discussed w/ Dr. Tan and Pulmonary - unfortunately, there are not good treatment options for her pulmonary hypertension and her co-morbidities. She did not tolerate milrinone over the weekend, today trial of dobutamine.     We called the patient's son Ariel - (only child/LNOK), she is not  - he has limited knowledge of her current condition. He tells us that he was told she was getting a heart cath, however, he hasn't heard those results yet. We explain that right now will see how patient does in coming days, however, if she doesn't make progress we will nee to have a family meeting later this week to discuss next steps and options. We did explain to Ariel she's on medications right now that can be considered \"life support\" type medications - I.e., could not leave the hospital with. He verbalized understanding this may be needed. Ariel works nights, but available mid day for family meetings as needed - if patient doesn't make improvements in coming days will plan to set up family meeting to further discuss care. Unfortunately - there is not much more we can offer medically for her condition.     Following with you.     Thank you for including Palliative Medicine in the care of Soni Escobar         Cass Lemus LCSW     
  Palliative Medicine   [place] [phone] (COPE)        [facility] [phone] (COPE)      Palliative Medicine SW met with patient at bedside, she is on dialysis - denies any distress currently, shares she is doing okay. She needs assistance with complex medical decision making. SW discussed Music Therapy, and she is open to consultation. SHREYAS informed her that this writer will call Ariel.     SHREYAS called Ariel, family meeting is scheduled for Friday at 11:30 a.m. -     Ariel does asks what is going to happen before then because he heard from ICU doctor medicine may be discontinued - do not know which in particular he is referring to, he says the BP medication - SHREYAS shared that right now it looks like she is on it, will defer to ICU team, however, SHREYAS did inform Ariel that there is worry I.e., she is very sick and SW discussed what do we do if we cannot get her off this medication? Ariel shares that it would be helpful to talk together - SHREYAS sent [employer] to ICU MD, as this writer can't speak to the specifics of conversation they had.     Family meeting scheduled for Friday at 11:30 a.m.     Addendum: SHREYAS called Ariel back - explained to him (after talking w/ ICU doctor) that the BP medication cannot be done long-term, however, we would not stop it if not safe/medically indicated without having a conversation w/ Ariel before hand - (I.e., we would not just take it off if she couldn't sustain without it without talking to Ariel). Explained if she decompensated, CCU team would also reach out to him. Explained that this medication cannot be done long-term - and discussed Friday we can discuss next steps and her plan of care further. Ariel appreciated the phone call and family meeting is set-up for Friday.       Thank you for including Palliative Medicine in the care of Yuki Lemus LCSW     
  Palliative Medicine   [place] [phone] (COPE)        [facility] [phone] (COPE)'        Palliative Medicine SW met w/ patient and Dr. Valladares 5/13 - patient in good spirits.     Updated on conversations held between ICU MD and family - patient weaning off levophed later this week w/ plan for hopeful home hospice admission - family meeting held about this last week.     Patient has been clear her wish to get home - discussed w/ Case Management, will have a short window to get home and will need to have hospice agency in-place and ready to receive patient as soon as she gets home / transported. Patient will also need 24/7 supervision - discussed w/ case management.     If she becomes increasingly symptomatic - will need to monitor if she will need IP hospice, but goal is for home.  Would hold weaning levo until transportation and hospice is in-place w/ set discharge date and time.     Following with you.     Thank you for including Palliative Medicine in the care of Soni Escobar          Cass Lemus LCSW     
  Physician Progress Note      PATIENT:               JULIANO MATTHEWS  CSN #:                  [id number]  :                       1960  ADMIT DATE:       2025 3:51 PM  DISCH DATE:  RESPONDING  PROVIDER #:        Patricio Tan MD          QUERY TEXT:    Please clarify the patient?s nutritional status:    The clinical indicators include:   Registered Dietician Consultant  Malnutrition Status:  Severe malnutrition (25 1008)  Context:  Chronic Illness  Findings of the 6 clinical characteristics of malnutrition:  Energy Intake:  75% or less estimated energy requirements for 1 month or   longer  Weight Loss:  Greater than 7.5% over 3 months  Body Fat Loss:  No body fat loss  Muscle Mass Loss:  No muscle mass loss  Fluid Accumulation:  No fluid accumulation (not related to malnutrition)   Strength:  Not Performed    Nutrition Recommendations/Plan:  Continue regular diet.  Add Nepro BID.  Daily Weights.  Input/Output.  Options provided:  -- Protein calorie malnutrition severe  -- Other - I will add my own diagnosis  -- Disagree - Not applicable / Not valid  -- Disagree - Clinically unable to determine / Unknown  -- Refer to Clinical Documentation Reviewer    PROVIDER RESPONSE TEXT:    This patient has severe protein calorie malnutrition.    Query created by: Luz Westbrook on 2025 11:44 AM      Electronically signed by:  Patricio Tan MD 2025 12:04 PM          
..Bedside shift change report given to Ariella (oncoming nurse) by Stephanie (offgoing nurse). Report included the following information Nurse Handoff Report, Adult Overview, MAR, Cardiac Rhythm V Paced, Quality Measures, Neuro Assessment, and Event Log.      0930: Nephrology @bedside.     0940: Levo increased to 14, MAP 59.    1000: Persistent soft pressures, MD aware. Albumin ordered.    1100: Pulmonology @bedside.        
0730 Bedside and Verbal shift change report given to Audrey RN (oncoming nurse) by CHARLY Lopez (offgoing nurse). Report included the following information Nurse Handoff Report, Intake/Output, MAR, Recent Results, and Cardiac Rhythm vpaced .     0955 Dr. Correa at bedside updated on pts status - orders received to check cuff pressures   LFA /92 (95)  /89 (97)  Levophed gtt weaned     1000 IDRs completed at bedside - plan to continue to weaning pressors based off cuff pressures     1020 PT/OT at bedside working w/ pt - cuff BP 87/41 (56) correlating w/ a-line 83/43 (56) - increased levo gtt & Dr. Correa made aware    1050 Dr. Correa at bedside - orders received for NICOM  CO: 3.1-3.6  CI: 1.2-1.6  SV: 48.9  SVI: 22  TPR: 1723    1110 Jerry Okeefe PA-C at bedside - updated on pts status - plan for poss RHC tomorrow & cards consult     1240 Spoke w/ Amber Evans NP - plan for RHC tomorrow     1215 Branden RN at [facility] for HD    1515 HD compled 2.5L off - see Branden RNs note    1930 Bedside and Verbal shift change report given to CHARLY Brown (oncoming nurse) by CHARLY Ventura (offgoing nurse). Report included the following information Nurse Handoff Report, Intake/Output, MAR, Recent Results, and Cardiac Rhythm vpaced .     
0730 Bedside and Verbal shift change report given to CHARLY Ventura (oncoming nurse) by CHARLY Perales (offgoing nurse). Report included the following information Nurse Handoff Report, Intake/Output, MAR, Recent Results, and Cardiac Rhythm vpaced .     1000 IDRs completed at bedside - plan to reach out to pulmonology regarding RHC vs PH therapy    1040 PT/OT at bedside - see note    1140 Jerry Okeefe PA-C at bedside speaking w/ pt & Dr. Correa - recommend fluid removal to improve pts decompensated RV failure - will reach out to nephrology regarding daily HD    1719 MAP 50s - increased vaso gtt to 0.04u/min per Dr. Correa    1800 Branden RN at bedside to start HD - restarted levo gtt in preparation for HD    1930 Bedside and Verbal shift change report given to CHARLY Lopez (oncoming nurse) by CHARLY Ventura (offgoing nurse). Report included the following information Nurse Handoff Report, Intake/Output, MAR, Recent Results, and Cardiac Rhythm v-paced .     
0730 Bedside and Verbal shift change report given to Martir (oncoming nurse) by Brooke (offgoing nurse). Report included the following information Nurse Handoff Report, Index, Intake/Output, MAR, and Recent Results.    
0730 Bedside and Verbal shift change report given to Martir (oncoming nurse) by Ivana (offgoing nurse). Report included the following information Nurse Handoff Report, Index, Intake/Output, MAR, and Recent Results.    
0730 Bedside and Verbal shift change report given to Martir (oncoming nurse) by Lilly (offgoing nurse). Report included the following information Nurse Handoff Report, Index, Intake/Output, MAR, and Recent Results.    
0730 Bedside and Verbal shift change report given to Martir (oncoming nurse) by Setphanie (offgoing nurse). Report included the following information Nurse Handoff Report, Index, Intake/Output, MAR, and Recent Results.    
0730 Bedside and Verbal shift change report given to Rianna RN (oncoming nurse) by Brooke RN (offgoing nurse). Report included the following information Nurse Handoff Report, Adult Overview, Intake/Output, MAR, Recent Results, and Cardiac Rhythm A paced .     0800 Devan RN @ bedside for HD    1930 Bedside and Verbal shift change report given to Stephanie PARKS (oncoming nurse) by Rianna RN (offgoing nurse). Report included the following information Nurse Handoff Report, Adult Overview, Intake/Output, MAR, Recent Results, and Cardiac Rhythm V paced .     
0730 Bedside and Verbal shift change report given to Rianna RN (oncoming nurse) by Brooke RN (offgoing nurse). Report included the following information Nurse Handoff Report, Adult Overview, Intake/Output, MAR, Recent Results, and Cardiac Rhythm Vpaced .     0738 Pt MAP in the 40's, MD made aware, RN unable to place another PIV    0848 Dr. Porter @ bedside place TL CVC R IJ     0930 Levo started    1033 Gabo stopped  Vaso started    1930 Bedside and Verbal shift change report given to Brooke RN (oncoming nurse) by Rianna RN (offgoing nurse). Report included the following information Nurse Handoff Report, Adult Overview, Intake/Output, MAR, Recent Results, and Cardiac Rhythm Vpaced .         
0730 Bedside and Verbal shift change report given to Rianna RN (oncoming nurse) by Kimberly RN (offgoing nurse). Report included the following information Nurse Handoff Report, Adult Overview, Intake/Output, MAR, Recent Results, and Cardiac Rhythm V paced .     1930 Bedside and Verbal shift change report given to Stephanie RN (oncoming nurse) by Rianna RN (offgoing nurse). Report included the following information Nurse Handoff Report, Adult Overview, Intake/Output, MAR, Recent Results, and Cardiac Rhythm V paced .     
0730 Bedside and Verbal shift change report given to Rianna RN (oncoming nurse) by Stephanie PARKS (offgoing nurse). Report included the following information Nurse Handoff Report, Adult Overview, and Cardiac Rhythm V paced .     0830 Devan RN @ bedside for HD    1930 Bedside and Verbal shift change report given to Heather RN (oncoming nurse) by Rianna RN (offgoing nurse). Report included the following information Nurse Handoff Report, Adult Overview, Intake/Output, MAR, Recent Results, and Cardiac Rhythm AV paced .     
0730 Bedside shift change report given to Ember PARKS (oncoming nurse) by Brooke RN (offgoing nurse). Report included the following information Nurse Handoff Report, Index, ED Encounter Summary, ED SBAR, Adult Overview, Surgery Report, Intake/Output, MAR, Recent Results, Med Rec Status, and Cardiac Rhythm VPaced .      1330 Nephro @ bedside. Orders to initiate CRRT    1430 CRRT initiated, keep @ net zero per Deloris CORLEY    1500 Wound care @ bedside. No wounds noted, signing off    1930 Bedside shift change report given to Kimberly PARKS (oncoming nurse) by Ember RN (offgoing nurse). Report included the following information Nurse Handoff Report, Index, ED Encounter Summary, ED SBAR, Adult Overview, Surgery Report, Intake/Output, MAR, Recent Results, Med Rec Status, and Cardiac Rhythm VPaced .    
0730 Bedside shift change report given to Ember PARKS (oncoming nurse) by Brooke RN (offgoing nurse). Report included the following information Nurse Handoff Report, Index, ED Encounter Summary, ED SBAR, Adult Overview, Surgery Report, Intake/Output, MAR, Recent Results, Med Rec Status, and Cardiac Rhythm VPaced .      Drips- Levo @ 6, Vaso @ 0.03    1930 Bedside shift change report given to Brooke PARKS (oncoming nurse) by Ember PARKS (offgoing nurse). Report included the following information Nurse Handoff Report, Index, Adult Overview, Surgery Report, Intake/Output, MAR, Recent Results, Med Rec Status, and Cardiac Rhythm VPaced .    
0730 Bedside shift change report given to Ember RN (oncoming nurse) by Susu RN (offgoing nurse). Report included the following information Nurse Handoff Report, Index, ED Encounter Summary, ED SBAR, Adult Overview, Surgery Report, Intake/Output, MAR, Recent Results, Med Rec Status, and Cardiac Rhythm VPaced .      1200 HD complete, 2L removed.    1400 Pt hoyered to chair, VSS, tolerated well.    1930 Bedside shift change report given to Susu RN (oncoming nurse) by Ember RN (offgoing nurse). Report included the following information Nurse Handoff Report.    
0730 Shift report received from Stephanie PARKS    Drips- Levo @ 3, Vaso @ 0.03    0930 TRANSFER - OUT REPORT:    Verbal report given to Shen PARKS on Yuki M Shawn  being transferred to [facility] for ordered procedure       Report consisted of patient's Situation, Background, Assessment and   Recommendations(SBAR).     Information from the following report(s) Nurse Handoff Report was reviewed with the receiving nurse.       Opportunity for questions and clarification was provided.      Patient transported with:  Monitor and O2 @ 3lpm  RN    1300 Pt back on floor, VSS. R groin site clean,dry and intact.    1430 Davita @ bedside to initiate HD    1930 Bedside shift change report given to Brooke PARKS (oncoming nurse) by Ember PARKS (offgoing nurse). Report included the following information Nurse Handoff Report.            
0730 Shift report received from Susu PARKS    1930 Shift report given to Loy PARKS  
0730: Bedside and Verbal shift change report given to CHARLY Ventura/ Lacy, RN (oncoming nurse) by Rima PARKS/ CHARLY Harris (offgoing nurse). Report included the following information Nurse Handoff Report, Intake/Output, MAR, Recent Results, and Cardiac Rhythm Ventricular placed .     0800: Devan RN at bedside for HD.    1000: IDR completed. No new orders at this time, plan for possible CTA and Head CT after HD completed.    1210: HD completed, 2L removed, see Devan RN's note.    1400: Due to pt's iodine allergy CT's postponed at this time.     1500: Palliative MD at bedside speaking with pt.    1621: Chest x-ray per Dr Correa.    1900: Vascular tech at bedside for duplex.    1930: Bedside and Verbal shift change report given to CHARLY Johnston (oncoming nurse) by Audrey PARKS/CHARLY House (offgoing nurse). Report included the following information Nurse Handoff Report, Intake/Output, MAR, Recent Results, and Cardiac Rhythm ventricular paced .        
1930 - Bedside and Verbal shift change report given to Kimberly RN (oncoming nurse) by Ember RN (offgoing nurse). Report included the following information Nurse Handoff Report, Index, Adult Overview, Intake/Output, MAR, Recent Results, Cardiac Rhythm NSR, Alarm Parameters, Quality Measures, Neuro Assessment, and Event Log.      2041 - CRRT clotted off. Devan notified.     2200 - Devan RN at bedside.     2223 - CRRT restarted.     0415 - CRRT filter pressures increasing to 260s. 186 mL rinsed back at this time. Dr. Tan notified & Devan paged.     0425 - Devan RN called back - Nephrologist aware and verbal orders given to Devan RN to not restart HD and will trial HD later today instead.     0730 - Bedside and Verbal shift change report given to Lacy RN & Audrey RN (oncoming nurse) by Kimberly RN (offgoing nurse). Report included the following information Nurse Handoff Report, Index, Adult Overview, Intake/Output, MAR, Recent Results, Cardiac Rhythm NSR, Alarm Parameters, Quality Measures, Neuro Assessment, and Event Log.        
1930 - Bedside and Verbal shift change report given to Kimberly RN (oncoming nurse) by Martir RN (offgoing nurse). Report included the following information Nurse Handoff Report, Index, Adult Overview, Intake/Output, MAR, Recent Results, Cardiac Rhythm Ventricular Paced, Alarm Parameters, Quality Measures, Neuro Assessment, and Event Log.      No events overnight.     0730 - Bedside and Verbal shift change report given to Rianna RN (oncoming nurse) by Kimberly RN (offgoing nurse). Report included the following information Nurse Handoff Report, Index, Adult Overview, Intake/Output, Recent Results, Cardiac Rhythm Ventricular paced, Alarm Parameters, and Event Log.    
1930 Bedside shift change report given to Stephanie Palomino RN (oncoming nurse) by Ariella Byrne RN (offgoing nurse). Report included the following information Nurse Handoff Report, Adult Overview, Intake/Output, MAR, Recent Results, Cardiac Rhythm NSR, Alarm Parameters, and Neuro Assessment. .    2200 CHG bath complete    0028 Placed on BiPAP 12/6 32% for sleep    
1930 Bedside shift change report given to Stephanie Palomino RN (oncoming nurse) by Ariella Martins (offgoing nurse). Report included the following information Nurse Handoff Report, Adult Overview, Intake/Output, MAR, Recent Results, Cardiac Rhythm NSR, Alarm Parameters, and Neuro Assessment.    2300 CHG bath complete.  Pt placed on BiPAP 12/6 32% for sleep.    0730 Bedside shift change report given to Martir Floyd RN (oncoming nurse) by Stephanie Palomino RN (offgoing nurse). Report included the following information Nurse Handoff Report, Adult Overview, Intake/Output, MAR, Recent Results, Cardiac Rhythm NSR, Alarm Parameters, Pre Procedure Checklist, and Neuro Assessment.           
1930 Bedside shift change report given to Stephanie Palomino RN (oncoming nurse) by Rianna Ny RN (offgoing nurse). Report included the following information Nurse Handoff Report, Adult Overview, Intake/Output, MAR, Recent Results, Cardiac Rhythm V Paced, Alarm Parameters, and Neuro Assessment.     2000 CHG bath complete.    0381 Bedside shift change report given to *** (oncoming nurse) by Stephanie Palomino RN (offgoing nurse). Report included the following information Nurse Handoff Report, Adult Overview, Intake/Output, MAR, Recent Results, Cardiac Rhythm V Paced, Alarm Parameters, and Neuro Assessment.      
1930: Bedside report received from Ember PARKS    0300: Pt placed on cpap for sleep.     0730: Bedside report given to Kassandra PARKS  
1930: Bedside report received from Ember PARKS    0730: Bedside report given to Ember PARKS  
1930: Bedside report received from Kassandra PARKS    0330: Levo off.      0700: MD at bedside, orders to trial off vaso received.     0730: Bedside report given to Rianna PARKS  
1930: Bedside report received from Kassandra PARKS    0730: Bedside report given to Kassandra PARKS  
1930: Bedside report received from Martir PARKS    0730: Bedside report given to Martir PARKS  
1930:Bedside report received from Rianna PARKS    0730: Bedside report given to Ember PARKS  
2000 Received report from Audrey and [facility] care.  0210 Labs drawn.   0730 Bedside and Verbal shift change report given to Audrey (oncoming nurse) by Angella (offgoing nurse). Report included the following information Nurse Handoff Report, Adult Overview, Intake/Output, MAR, and Recent Results.    
2000 Received report from Delaney and [facility] care.  0100 Placed on bipap 12/6, 40%.  0140 Labs drawn.  0730 Bedside and Verbal shift change report given to Ember (oncoming nurse) by Angella (offgoing nurse). Report included the following information Nurse Handoff Report, Adult Overview, Intake/Output, MAR, Recent Results, and Alarm Parameters.    
2000 Received report from Ember [facility].  0730 Bedside and Verbal shift change report given to Júnior (oncoming nurse) by Angella (offgoing nurse). Report included the following information Nurse Handoff Report, Adult Overview, Intake/Output, MAR, and Recent Results.    
2230: TRANSFER - IN REPORT:    Verbal report received from Rehana PARKS on Yuki Escobar  being received from ED for routine progression of patient care      Report consisted of patient's Situation, Background, Assessment and   Recommendations(SBAR).     Information from the following report(s) Nurse Handoff Report, ED Encounter Summary, ED SBAR, Intake/Output, MAR, and Recent Results was reviewed with the receiving nurse.    Opportunity for questions and clarification was provided.      Assessment completed upon patient's arrival to unit and care assumed.     2300: Pt to CCU with RN. Son at bedside, updates provided.      0300: Unable to obtain consistent NIBP readings with cuff on all extremities, MD notified, plans for art line placement.      0330:  at bedside for R fem arterial line placement.  MAPS goal >60 established and one time midodrine given.      0430: MAP 50, orders for marissa received.     0645:Pt requiring significant increase in pressors requirements,  aware, plans for CVC.    0700: MD spoke with son who is on the way to hospital, plans to address CVC on his arrival.     0730: Bedside report given to Rianna PARKS       
4 Eyes Skin Assessment     NAME:  Yuki Escobar  YOB: 1960  MEDICAL RECORD NUMBER:  [id number]    The patient is being assessed for  Admission    I agree that at least one RN has performed a thorough Head to Toe Skin Assessment on the patient. ALL assessment sites listed below have been assessed.      Areas assessed by both nurses:    Head, Face, Ears, Shoulders, Back, Chest, Arms, Elbows, Hands, Sacrum. Buttock, Coccyx, Ischium, Legs. Feet and Heels, and Under Medical Devices         Does the Patient have a Wound? Yes wound(s) were present on assessment. LDA wound assessment was Initiated and completed by RN       Elias Prevention initiated by RN: Yes  Wound Care Orders initiated by RN: Yes    Pressure Injury (Stage 3,4, Unstageable, DTI, NWPT, and Complex wounds) if present, place Wound referral order by RN under : Yes    New Ostomies, if present place, Ostomy referral order under : No     Nurse 1 eSignature: Electronically signed by Brooke Son RN on 4/26/25 at 12:10 AM EDT    **SHARE this note so that the co-signing nurse can place an eSignature**    Nurse 2 eSignature: Electronically signed by Pramod Correa RN on 4/26/25 at 12:11 AM EDT    
7030 Bedside and Verbal shift change report given to Martir (oncoming nurse) by Lilly (offgoing nurse). Report included the following information Nurse Handoff Report, Index, Intake/Output, MAR, and Recent Results.    
CATH LAB to RECOVERY ROOM REPORT    Procedure: [facility]    MD: MAX Soria MD    The procedure was diagnostic only.    Verbal Report given to Recovery Nurse on patient being transferred to Cardiac Cath Lab  for routine post-op. Patient stable upon transfer to .  Vitals, mental status, MAR, procedural summary discussed with recovery RN.    Medication given during procedure:    Contrast: 10 mL                          Versed: 2 mg                                                               Fentanyl: 75 mcg       Heparin: 2,500 units                                                                  Sheaths:    Right femoral vein sheath pulled at 1239 pm, secured with a CHG and tegaderm.    Right Internal Jugular access attempted only.     
CRITICAL CARE NOTE    Name: Yuki Escobar   : 1960   MRN: [id number]   Date: 2025      Reason for ICU Admission: septic shock      ICU PROBLEM LIST   Septic shock  ESRD on HD  Acute on chronic mixed respiratory failure  HFpEF  Pulm HTN II/III  Failure to thrive    Chronic problems  Anemia of chronic disease  COPD  JF  Afib  DM      HISTORY OF PRESENT ILLNESS:   This is a 64 year old female with history of COPD (no PFTs), JF (BiPAP), AFIB, DM, HFpEF and MO who presented to the ED on  with complaint of low BP - admitted to the ICU for undifferentiated shock. Has remained vasopressor dependent. Severe end stage pulm HTN. Has remained vasopressor dependent. Ongoing family discussions regarding GOC and end of life care.     24 HOUR EVENTS:   Getting HD this morning. SBP in 140s while on HD. Will hold droxidopa. On BiPAP at the time of my exam. Denied any new complaints.     NEUROLOGICAL:    Monitor mentation  PRN pain regimen  PT/OT    PULMONOLOGY:   O2 via NC, goal sO2 88-94%  Nocturnal CPAP  LMA/LABA/ICS  OOB to chair and IS as tolerated     CARDIOVASCULAR:   Off IV vasopressors now  Midodrine to keep MAP goal >65  Will hold droxidopa given improvement in BP   C/w steroid taper, fludro  Eliquis     GASTROINTESTINAL:   Diet as tolerated  Monitor LFTs  Possible steatosis     RENAL/ELECTROLYTE/FLUIDS:   Strict I/Os  HD per nephrology, MWF  Renal dose meds, avoid nephrotoxins  Monitor renal labs  Mg/Phos/K >2/3/4    ENDOCRINE:   Goal euglycemia    HEMATOLOGY/ONCOLOGY:   Monitor CBC  Goal hgb >7    ID/MICRO:   No acute issues    ICU DAILY CHECKLIST     Code Status: DNR, awaiting transition to hospice   DVT Prophylaxis: Eliquis  T/L/D: A line, CVC, HD line  SUP: NA  Diet: as tolerated  Activity Level: ad spring  ABCDEF Bundle/Checklist Completed:Yes  Disposition: Stay in ICU  Multidisciplinary Rounds Completed:  Yes  Patient/Family Updated: Yes      Review of Systems:   Negative except as noted 
CRITICAL CARE NOTE    Name: Yuki Escobar   : 1960   MRN: [id number]   Date: 2025      Reason for ICU Admission: septic shock      ICU PROBLEM LIST   Septic shock  ESRD on HD  Acute on chronic mixed respiratory failure  HFpEF  Pulm HTN II/III  Failure to thrive    Chronic problems  Anemia of chronic disease  COPD  JF  Afib  DM      HISTORY OF PRESENT ILLNESS:   This is a 64 year old female with history of COPD (no PFTs), JF (BiPAP), AFIB, DM, HFpEF and MO who presented to the ED on  with complaint of low BP - admitted to the ICU for undifferentiated shock.     24 HOUR EVENTS:   Continues to require intermittent vasopressors. Trial low dose dobutamine (previously didn't tolerate milrinone)    NEUROLOGICAL:    Monitor mentation  PRN pain regimen  PT/OT    PULMONOLOGY:   O2 per NC for spO2 88-94%  CPAP qHS  LMA/LABA/ICS  OOB to chair and IS as tolerated     CARDIOVASCULAR:   On vasopressin  MAP goal >65  C/w midodrine, steroids, fludro  Discussed w/ pulm, could consider low dose velitri, however few therapeutic options in this patient  Eliquis     GASTROINTESTINAL:   Diet as tolerated  Monitor LFTs  Possible steatosis vs hemochromatosis  Follow HFE      RENAL/ELECTROLYTE/FLUIDS:   Strict I/Os  HD per nephrology, MWF  Renal dose meds, avoid nephrotoxins  Monitor renal labs  Mg/Phos/K >2/3/4    ENDOCRINE:   Goal euglycemia    HEMATOLOGY/ONCOLOGY:   Monitor CBC  Goal hgb >7    ID/MICRO:   No acute issues      ICU DAILY CHECKLIST     Code Status:full  DVT Prophylaxis:eliquis  T/L/D: A line, CVC, HD line  SUP: NA  Diet: as tolerated  Activity Level:ad spring  ABCDEF Bundle/Checklist Completed:Yes  Disposition: Stay in ICU  Multidisciplinary Rounds Completed:  Yes  Patient/Family Updated: Yes      Review of Systems:   Negative except as noted above    OBJECTIVE:     Labs and Data: Reviewed 25  Medications: Reviewed 25  Imaging: Reviewed 25    BP (!) 119/58   Pulse 70   Temp 98 °F (36.7 °C) 
CRITICAL CARE NOTE    Name: Yuki Escobar   : 1960   MRN: [id number]   Date: 5/10/2025      Reason for ICU Admission: septic shock      ICU PROBLEM LIST   Septic shock  ESRD on HD  Acute on chronic mixed respiratory failure  HFpEF  Pulm HTN II/III  Failure to thrive    Chronic problems  Anemia of chronic disease  COPD  JF  Afib  DM      HISTORY OF PRESENT ILLNESS:   This is a 64 year old female with history of COPD (no PFTs), JF (BiPAP), AFIB, DM, HFpEF and MO who presented to the ED on  with complaint of low BP - admitted to the ICU for undifferentiated shock. Has remained vasopressor dependent. Severe end stage pulm HTN. Has remained vasopressor dependent, family meeting for Friday for further discussions regarding possible transition to comfort focused care or hospice.       24 HOUR EVENTS:   Yesterday had [facility] family meeting with palliative care.  CODE STATUS DNR DNI.  No plan to escalate care.  Patient and family would like to celebrate patient's birthday today.  However considering transition to comfort care or hospice later this week.  Otherwise, no acute events overnight.    NEUROLOGICAL:    Monitor mentation  PRN pain regimen  PT/OT    PULMONOLOGY:   O2 via NC, goal sO2 88-94%  Nocturnal CPAP  LMA/LABA/ICS  OOB to chair and IS as tolerated     CARDIOVASCULAR:   Continuing to wean Levophed.  Remains off dobutamine  MAP goal >65  C/w midodrine, steroids, fludro  Discussed w/ pulm, could consider low dose velitri, however few therapeutic options in this patient  Eliquis     GASTROINTESTINAL:   Diet as tolerated  Monitor LFTs  Possible steatosis vs hemochromatosis  Follow HFE      RENAL/ELECTROLYTE/FLUIDS:   Strict I/Os  HD per nephrology, MWF  Renal dose meds, avoid nephrotoxins  Monitor renal labs  Mg/Phos/K >2/3/4    ENDOCRINE:   Goal euglycemia    HEMATOLOGY/ONCOLOGY:   Monitor CBC  Goal hgb >7    ID/MICRO:   No acute issues      ICU DAILY CHECKLIST     Code Status:full  DVT 
CRITICAL CARE NOTE    Name: Yuki Escobar   : 1960   MRN: [id number]   Date: 2025      Reason for ICU Admission: septic shock      ICU PROBLEM LIST   Septic shock  ESRD on HD  Acute on chronic mixed respiratory failure  HFpEF  Pulm HTN II/III  Failure to thrive    Chronic problems  Anemia of chronic disease  COPD  JF  Afib  DM      HISTORY OF PRESENT ILLNESS:   This is a 64 year old female with history of COPD (no PFTs), JF (BiPAP), AFIB, DM, HFpEF and MO who presented to the ED on  with complaint of low BP - admitted to the ICU for undifferentiated shock.     24 HOUR EVENTS:   Did not tolerate inotrope trial, however now off levophed this AM.     NEUROLOGICAL:    Monitor mentation  PRN pain regimen  PT/OT    PULMONOLOGY:   O2 per NC for spO2 88-94%  CPAP qHS  LMA/LABA/ICS  OOB to chair and IS as tolerated     CARDIOVASCULAR:   On vasopressin  MAP goal >65  C/w midodrine, steroids, fludro  Discussed w/ pulm, could consider low dose velitri, however few therapeutic options in this patient  Eliquis     GASTROINTESTINAL:   Diet as tolerated  Monitor LFTs  Possible steatosis vs hemochromatosis  Follow HFE      RENAL/ELECTROLYTE/FLUIDS:   Strict I/Os  HD per nephrology, MWF  Renal dose meds, avoid nephrotoxins  Monitor renal labs  Mg/Phos/K >2/3/4    ENDOCRINE:   Goal euglycemia    HEMATOLOGY/ONCOLOGY:   Monitor CBC  Goal hgb >7    ID/MICRO:   No acute issues      ICU DAILY CHECKLIST     Code Status:full  DVT Prophylaxis:eliquis  T/L/D: A line, CVC, HD line  SUP: NA  Diet: as tolerated  Activity Level:ad spring  ABCDEF Bundle/Checklist Completed:Yes  Disposition: Stay in ICU  Multidisciplinary Rounds Completed:  Yes  Patient/Family Updated: Yes      Review of Systems:   Negative except as noted above    OBJECTIVE:     Labs and Data: Reviewed 25  Medications: Reviewed 25  Imaging: Reviewed 25    BP (!) 96/49   Pulse 71   Temp 98 °F (36.7 °C) (Axillary)   Resp 23   Ht 1.651 m (5' 5\")  
CRITICAL CARE NOTE    Name: Yuki Escobar   : 1960   MRN: [id number]   Date: 2025      Reason for ICU Admission: septic shock      ICU PROBLEM LIST   Septic shock  ESRD on HD  Acute on chronic mixed respiratory failure  HFpEF  Pulm HTN II/III  Failure to thrive    Chronic problems  Anemia of chronic disease  COPD  JF  Afib  DM      HISTORY OF PRESENT ILLNESS:   This is a 64 year old female with history of COPD (no PFTs), JF (BiPAP), AFIB, DM, HFpEF and MO who presented to the ED on  with complaint of low BP - admitted to the ICU for undifferentiated shock. Has remained vasopressor dependent. Severe end stage pulm HTN. Has remained vasopressor dependent. Ongoing family discussions regarding GOC and end of life care.     24 HOUR EVENTS:   Remains off IV vasopressors. No new complaints this morning.     NEUROLOGICAL:    Monitor mentation  PRN pain regimen  PT/OT    PULMONOLOGY:   O2 via NC, goal sO2 88-94%  Nocturnal CPAP  LMA/LABA/ICS  OOB to chair and IS as tolerated     CARDIOVASCULAR:   Off IV vasopressors now  MAP goal >65  C/w midodrine, steroids, fludro, droxydopa  Eliquis     GASTROINTESTINAL:   Diet as tolerated  Monitor LFTs  Possible steatosis     RENAL/ELECTROLYTE/FLUIDS:   Strict I/Os  HD per nephrology, MWF  Renal dose meds, avoid nephrotoxins  Monitor renal labs  Mg/Phos/K >2/3/4    ENDOCRINE:   Goal euglycemia    HEMATOLOGY/ONCOLOGY:   Monitor CBC  Goal hgb >7    ID/MICRO:   No acute issues    ICU DAILY CHECKLIST     Code Status: DNR, awaiting transition to hospice   DVT Prophylaxis: Eliquis  T/L/D: A line, CVC, HD line  SUP: NA  Diet: as tolerated  Activity Level: ad spring  ABCDEF Bundle/Checklist Completed:Yes  Disposition: Stay in ICU  Multidisciplinary Rounds Completed:  Yes  Patient/Family Updated: Yes      Review of Systems:   Negative except as noted above    OBJECTIVE:     Labs and Data: Reviewed 25  Medications: Reviewed 25  Imaging: Reviewed 25    BP (!) 
CRITICAL CARE NOTE    Name: Yuki Escobar   : 1960   MRN: [id number]   Date: 2025      Reason for ICU Admission: septic shock      ICU PROBLEM LIST   Septic shock  ESRD on HD  Acute on chronic mixed respiratory failure  HFpEF  Pulm HTN II/III  Failure to thrive    Chronic problems  Anemia of chronic disease  COPD  JF  Afib  DM      HISTORY OF PRESENT ILLNESS:   This is a 64 year old female with history of COPD (no PFTs), JF (BiPAP), AFIB, DM, HFpEF and MO who presented to the ED on  with complaint of low BP - admitted to the ICU for undifferentiated shock. Has remained vasopressor dependent. Severe end stage pulm HTN. Has remained vasopressor dependent. Ongoing family discussions regarding GOC and end of life care.       24 HOUR EVENTS:   No acute events o/n. On low dose levophed this AM. HD today. Hospice consult to determine if home hospice option as pt wishes to return home    NEUROLOGICAL:    Monitor mentation  PRN pain regimen  PT/OT    PULMONOLOGY:   O2 via NC, goal sO2 88-94%  Nocturnal CPAP  LMA/LABA/ICS  OOB to chair and IS as tolerated     CARDIOVASCULAR:   Continuing to wean Levophed.   MAP goal >65  C/w midodrine, steroids, fludro, droxydopa  Eliquis     GASTROINTESTINAL:   Diet as tolerated  Monitor LFTs  Possible steatosis     RENAL/ELECTROLYTE/FLUIDS:   Strict I/Os  HD per nephrology, MWF  Renal dose meds, avoid nephrotoxins  Monitor renal labs  Mg/Phos/K >2/3/4    ENDOCRINE:   Goal euglycemia    HEMATOLOGY/ONCOLOGY:   Monitor CBC  Goal hgb >7    ID/MICRO:   No acute issues      ICU DAILY CHECKLIST     Code Status:DNR, possible transition to hospice later this week  DVT Prophylaxis:eliquis  T/L/D: A line, CVC, HD line  SUP: NA  Diet: as tolerated  Activity Level:ad spring  ABCDEF Bundle/Checklist Completed:Yes  Disposition: Stay in ICU  Multidisciplinary Rounds Completed:  Yes  Patient/Family Updated: Yes      Review of Systems:   Negative except as noted above    OBJECTIVE:     Labs 
CRITICAL CARE NOTE    Name: Yuki Escobar   : 1960   MRN: [id number]   Date: 2025      Reason for ICU Admission: septic shock      ICU PROBLEM LIST   Septic shock  ESRD on HD  Acute on chronic mixed respiratory failure  HFpEF  Pulm HTN II/III  Failure to thrive    Chronic problems  Anemia of chronic disease  COPD  JF  Afib  DM      HISTORY OF PRESENT ILLNESS:   This is a 64 year old female with history of COPD (no PFTs), JF (BiPAP), AFIB, DM, HFpEF and MO who presented to the ED on  with complaint of low BP - admitted to the ICU for undifferentiated shock. Has remained vasopressor dependent. Severe end stage pulm HTN. Has remained vasopressor dependent. Ongoing family discussions regarding GOC and end of life care.       24 HOUR EVENTS:   Taken outside by RN yesterday, tolerating HD. Otherwise remains vasopressor dependent. Will touch base w/ NOK today regarding timing of transition to CMO.     NEUROLOGICAL:    Monitor mentation  PRN pain regimen  PT/OT    PULMONOLOGY:   O2 via NC, goal sO2 88-94%  Nocturnal CPAP  LMA/LABA/ICS  OOB to chair and IS as tolerated     CARDIOVASCULAR:   Continuing to wean Levophed.   MAP goal >65  C/w midodrine, steroids, fludro, droxydopa  Eliquis     GASTROINTESTINAL:   Diet as tolerated  Monitor LFTs  Possible steatosis     RENAL/ELECTROLYTE/FLUIDS:   Strict I/Os  HD per nephrology, MWF  Renal dose meds, avoid nephrotoxins  Monitor renal labs  Mg/Phos/K >2/3/4    ENDOCRINE:   Goal euglycemia    HEMATOLOGY/ONCOLOGY:   Monitor CBC  Goal hgb >7    ID/MICRO:   No acute issues      ICU DAILY CHECKLIST     Code Status:DNR, possible transition to hospice later this week  DVT Prophylaxis:eliquis  T/L/D: A line, CVC, HD line  SUP: NA  Diet: as tolerated  Activity Level:ad spring  ABCDEF Bundle/Checklist Completed:Yes  Disposition: Stay in ICU  Multidisciplinary Rounds Completed:  Yes  Patient/Family Updated: Yes      Review of Systems:   Negative except as noted 
CRITICAL CARE NOTE    Name: Yuki Escobar   : 1960   MRN: [id number]   Date: 5/3/2025      Reason for ICU Admission: septic shock      ICU PROBLEM LIST   Septic shock  ESRD on HD  Acute on chronic mixed respiratory failure  HFpEF  Pulm HTN II/III  Failure to thrive    Chronic problems  Anemia of chronic disease  COPD  JF  Afib  DM      HISTORY OF PRESENT ILLNESS:   This is a 64 year old female with history of COPD (no PFTs), JF (BiPAP), AFIB, DM, HFpEF and MO who presented to the ED on  with complaint of low BP - admitted to the ICU for undifferentiated shock.     24 HOUR EVENTS:   No acute events o/n, remains on levophed and vaso. RHC yesterday w/ mild wedge elevation, but primarily pre-capillary pHTN    NEUROLOGICAL:    Monitor mentation  PRN pain regimen  PT/OT    PULMONOLOGY:   O2 per NC for spO2 88-94%  CPAP qHS  LMA/LABA/ICS  OOB to chair and IS as tolerated     CARDIOVASCULAR:   On levophed and vasopressin  MAP goal >65  C/w midodrine, steroids  Add fludrocort  Consider inotrope if unable to wean off vasopressors  Discuss w/ pulm if sildenafil appropriate given pre-capillary component of pulm HTN  Eliquis     GASTROINTESTINAL:   Diet as tolerated  Monitor LFTs  Possible steatosis vs hemochromatosis  Follow HFE      RENAL/ELECTROLYTE/FLUIDS:   Strict I/Os  HD per nephrology, MWF  Renal dose meds, avoid nephrotoxins  Monitor renal labs  Mg/Phos/K >2/3/4    ENDOCRINE:   Goal euglycemia    HEMATOLOGY/ONCOLOGY:   Monitor CBC  Goal hgb >7    ID/MICRO:   No acute issues      ICU DAILY CHECKLIST     Code Status:full  DVT Prophylaxis:eliquis  T/L/D: A line, CVC, HD line  SUP: NA  Diet: as tolerated  Activity Level:ad spring  ABCDEF Bundle/Checklist Completed:Yes  Disposition: Stay in ICU  Multidisciplinary Rounds Completed:  Yes  Patient/Family Updated: Yes      Review of Systems:   Negative except as noted above    OBJECTIVE:     Labs and Data: Reviewed 25  Medications: Reviewed 25  Imaging: 
CRITICAL CARE NOTE    Name: Yuki Escobar   : 1960   MRN: [id number]   Date: 2025      Reason for ICU Admission: septic shock      ICU PROBLEM LIST   Septic shock  ESRD on HD  Acute on chronic mixed respiratory failure  HFpEF  Pulm HTN II/III  Failure to thrive    Chronic problems  Anemia of chronic disease  COPD  JF  Afib  DM      HISTORY OF PRESENT ILLNESS:   This is a 64 year old female with history of COPD (no PFTs), JF (BiPAP), AFIB, DM, HFpEF and MO who presented to the ED on  with complaint of low BP - admitted to the ICU for undifferentiated shock. Has remained vasopressor dependent. Severe end stage pulm HTN. Has remained vasopressor dependent, family meeting for Friday for further discussions regarding possible transition to comfort focused care or hospice.       24 HOUR EVENTS:   Clinically unchanged, remains on low dose levophed.     NEUROLOGICAL:    Monitor mentation  PRN pain regimen  PT/OT    PULMONOLOGY:   O2 per NC for spO2 88-94%  CPAP qHS  LMA/LABA/ICS  OOB to chair and IS as tolerated     CARDIOVASCULAR:   On levophed, down this AM  MAP goal >65  C/w midodrine, steroids, fludro  Discussed w/ pulm, could consider low dose velitri, however few therapeutic options in this patient  Eliquis     GASTROINTESTINAL:   Diet as tolerated  Monitor LFTs  Possible steatosis vs hemochromatosis  Follow HFE      RENAL/ELECTROLYTE/FLUIDS:   Strict I/Os  HD per nephrology, MWF  Renal dose meds, avoid nephrotoxins  Monitor renal labs  Mg/Phos/K >2/3/4    ENDOCRINE:   Goal euglycemia    HEMATOLOGY/ONCOLOGY:   Monitor CBC  Goal hgb >7    ID/MICRO:   No acute issues      ICU DAILY CHECKLIST     Code Status:full  DVT Prophylaxis:eliquis  T/L/D: A line, CVC, HD line  SUP: NA  Diet: as tolerated  Activity Level:ad spring  ABCDEF Bundle/Checklist Completed:Yes  Disposition: Stay in ICU  Multidisciplinary Rounds Completed:  Yes  Patient/Family Updated: Yes      Review of Systems:   Negative except as noted 
CRITICAL CARE NOTE    Name: Yuki Escobar   : 1960   MRN: [id number]   Date: 2025      Reason for ICU Admission: septic shock      ICU PROBLEM LIST   Septic shock  ESRD on HD  Acute on chronic mixed respiratory failure  HFpEF  Pulm HTN II/III  Failure to thrive    Chronic problems  Anemia of chronic disease  COPD  JF  Afib  DM      HISTORY OF PRESENT ILLNESS:   This is a 64 year old female with history of COPD (no PFTs), FJ (BiPAP), AFIB, DM, HFpEF and MO who presented to the ED on  with complaint of low BP - admitted to the ICU for undifferentiated shock. Has remained vasopressor dependent. Severe end stage pulm HTN. Has remained vasopressor dependent. Ongoing family discussions regarding GOC and end of life care.       24 HOUR EVENTS:   No acute events. Remains on low dose levophed. HD today.    NEUROLOGICAL:    Monitor mentation  PRN pain regimen  PT/OT    PULMONOLOGY:   O2 via NC, goal sO2 88-94%  Nocturnal CPAP  LMA/LABA/ICS  OOB to chair and IS as tolerated     CARDIOVASCULAR:   Continuing to wean Levophed.   MAP goal >65  C/w midodrine, steroids, fludro, droxydopa  Eliquis     GASTROINTESTINAL:   Diet as tolerated  Monitor LFTs  Possible steatosis     RENAL/ELECTROLYTE/FLUIDS:   Strict I/Os  HD per nephrology, MWF  Renal dose meds, avoid nephrotoxins  Monitor renal labs  Mg/Phos/K >2/3/4    ENDOCRINE:   Goal euglycemia    HEMATOLOGY/ONCOLOGY:   Monitor CBC  Goal hgb >7    ID/MICRO:   No acute issues      ICU DAILY CHECKLIST     Code Status:DNR, possible transition to hospice later this week  DVT Prophylaxis:eliquis  T/L/D: A line, CVC, HD line  SUP: NA  Diet: as tolerated  Activity Level:ad spring  ABCDEF Bundle/Checklist Completed:Yes  Disposition: Stay in ICU  Multidisciplinary Rounds Completed:  Yes  Patient/Family Updated: Yes      Review of Systems:   Negative except as noted above    OBJECTIVE:     Labs and Data: Reviewed 25  Medications: Reviewed 25  Imaging: Reviewed 
CRITICAL CARE NOTE    Name: Yuki Escobar   : 1960   MRN: [id number]   Date: 2025      Reason for ICU Admission: septic shock      ICU PROBLEM LIST   Septic shock  ESRD on HD  Acute on chronic mixed respiratory failure  HFpEF  Pulm HTN II/III  Failure to thrive    Chronic problems  Anemia of chronic disease  COPD  JF  Afib  DM      HISTORY OF PRESENT ILLNESS:   This is a 64 year old female with history of COPD (no PFTs), JF (BiPAP), AFIB, DM, HFpEF and MO who presented to the ED on  with complaint of low BP - admitted to the ICU for undifferentiated shock. Has remained vasopressor dependent. Severe end stage pulm HTN. Has remained vasopressor dependent. Ongoing family discussions regarding GOC and end of life care.       24 HOUR EVENTS:   Off levophed for 24hrs, will evaluate how she can tolerate HD today.     Addendum: Unable to tolerate HD without resuming levophed. Vasopressors resumed, plan on home hospice admission on Monday.     NEUROLOGICAL:    Monitor mentation  PRN pain regimen  PT/OT    PULMONOLOGY:   O2 via NC, goal sO2 88-94%  Nocturnal CPAP  LMA/LABA/ICS  OOB to chair and IS as tolerated     CARDIOVASCULAR:   Off vasopressors for 24hrs  MAP goal >65  C/w midodrine, steroids, fludro, droxydopa  Eliquis     GASTROINTESTINAL:   Diet as tolerated  Monitor LFTs  Possible steatosis     RENAL/ELECTROLYTE/FLUIDS:   Strict I/Os  HD per nephrology, MWF  Renal dose meds, avoid nephrotoxins  Monitor renal labs  Mg/Phos/K >2/3/4    ENDOCRINE:   Goal euglycemia    HEMATOLOGY/ONCOLOGY:   Monitor CBC  Goal hgb >7    ID/MICRO:   No acute issues      ICU DAILY CHECKLIST     Code Status:DNR, possible transition to hospice later this week  DVT Prophylaxis:eliquis  T/L/D: A line, CVC, HD line  SUP: NA  Diet: as tolerated  Activity Level:ad spring  ABCDEF Bundle/Checklist Completed:Yes  Disposition: Stay in ICU  Multidisciplinary Rounds Completed:  Yes  Patient/Family Updated: Yes      Review of Systems: 
CRITICAL CARE NOTE    Name: Yuki Escobar   : 1960   MRN: [id number]   Date: 5/15/2025      Reason for ICU Admission: septic shock      ICU PROBLEM LIST   Septic shock  ESRD on HD  Acute on chronic mixed respiratory failure  HFpEF  Pulm HTN II/III  Failure to thrive    Chronic problems  Anemia of chronic disease  COPD  JF  Afib  DM      HISTORY OF PRESENT ILLNESS:   This is a 64 year old female with history of COPD (no PFTs), JF (BiPAP), AFIB, DM, HFpEF and MO who presented to the ED on  with complaint of low BP - admitted to the ICU for undifferentiated shock. Has remained vasopressor dependent. Severe end stage pulm HTN. Has remained vasopressor dependent. Ongoing family discussions regarding GOC and end of life care.       24 HOUR EVENTS:   Hospice to return today to discuss options and GIP, may not be able to go home w/ hospice due to agency availability and home support needed.     NEUROLOGICAL:    Monitor mentation  PRN pain regimen  PT/OT    PULMONOLOGY:   O2 via NC, goal sO2 88-94%  Nocturnal CPAP  LMA/LABA/ICS  OOB to chair and IS as tolerated     CARDIOVASCULAR:   Continuing to wean Levophed.   MAP goal >65  C/w midodrine, steroids, fludro, droxydopa  Eliquis     GASTROINTESTINAL:   Diet as tolerated  Monitor LFTs  Possible steatosis     RENAL/ELECTROLYTE/FLUIDS:   Strict I/Os  HD per nephrology, MWF  Renal dose meds, avoid nephrotoxins  Monitor renal labs  Mg/Phos/K >2/3/4    ENDOCRINE:   Goal euglycemia    HEMATOLOGY/ONCOLOGY:   Monitor CBC  Goal hgb >7    ID/MICRO:   No acute issues      ICU DAILY CHECKLIST     Code Status:DNR, possible transition to hospice later this week  DVT Prophylaxis:eliquis  T/L/D: A line, CVC, HD line  SUP: NA  Diet: as tolerated  Activity Level:ad spring  ABCDEF Bundle/Checklist Completed:Yes  Disposition: Stay in ICU  Multidisciplinary Rounds Completed:  Yes  Patient/Family Updated: Yes      Review of Systems:   Negative except as noted above    OBJECTIVE: 
CRITICAL CARE NOTE    Name: Yuki Escobar   : 1960   MRN: [id number]   Date: 2025      Reason for ICU Admission: septic shock      ICU PROBLEM LIST   Septic shock  ESRD on HD  Acute on chronic mixed respiratory failure  HFpEF  Pulm HTN II/III  Failure to thrive    Chronic problems  Anemia of chronic disease  COPD  JF  Afib  DM      HISTORY OF PRESENT ILLNESS:   This is a 64 year old female with history of COPD (no PFTs), JF (BiPAP), AFIB, DM, HFpEF and MO who presented to the ED on  with complaint of low BP - admitted to the ICU for undifferentiated shock.     24 HOUR EVENTS:   Icreased vasopressor requirement o/n. Continue GOC conversations    Overall prognosis poor    NEUROLOGICAL:    Monitor mentation  PRN pain regimen  PT/OT    PULMONOLOGY:   O2 per NC for spO2 88-94%  CPAP qHS  LMA/LABA/ICS  OOB to chair and IS as tolerated     CARDIOVASCULAR:   On levophed  C/w dobutamine  MAP goal >65  C/w midodrine, steroids, fludro  Discussed w/ pulm, could consider low dose velitri, however few therapeutic options in this patient  Eliquis     GASTROINTESTINAL:   Diet as tolerated  Monitor LFTs  Possible steatosis vs hemochromatosis  Follow HFE      RENAL/ELECTROLYTE/FLUIDS:   Strict I/Os  HD per nephrology, MWF  Renal dose meds, avoid nephrotoxins  Monitor renal labs  Mg/Phos/K >2/3/4    ENDOCRINE:   Goal euglycemia    HEMATOLOGY/ONCOLOGY:   Monitor CBC  Goal hgb >7    ID/MICRO:   No acute issues      ICU DAILY CHECKLIST     Code Status:full  DVT Prophylaxis:eliquis  T/L/D: A line, CVC, HD line  SUP: NA  Diet: as tolerated  Activity Level:ad spring  ABCDEF Bundle/Checklist Completed:Yes  Disposition: Stay in ICU  Multidisciplinary Rounds Completed:  Yes  Patient/Family Updated: Yes      Review of Systems:   Negative except as noted above    OBJECTIVE:     Labs and Data: Reviewed 25  Medications: Reviewed 25  Imaging: Reviewed 25    BP (!) 106/53   Pulse 73   Temp 98 °F (36.7 °C) 
CRITICAL CARE PROGRESS NOTE    Name: Yuki Escobar   : 1960   MRN: [id number]   Date: 2025      ICU Diagnosis      Septic Shock  Failure to Thrive  Volume Overload  Morbid Obesity     Overnight Events   No events. Patient without complaints this AM.     ROS negative except as otherwise documented.     A/P   This is a 64 year old female with history of COPD (no PFTs), JF (BiPAP), AFIB, DM, HFpEF and MO who presented to the ED on  with complaint of low BP - admitted to the ICU for undifferentiated shock.     NEUROLOGICAL:    A/O X1 - family report decline in mentation over 1 month. Suspect neurocognitive disorder. DDX delirium or toxic metabolic (hemochromatosis). Exam nonfocal - doubt CVA.   - delirium precautions  - tylenol PRN pain  - oxycodone PRN severe pain   - HFE mutation   - folate 1 mg daily   - CTH if unable to get [place] records     PULMONOLOGY:   History of COPD (no PFTs). On BiPAP for OHS and JF. CXR no acute pathology.   - goal SpO2>=92, pH>=7.30  - continue O2; wean as tolerated  - home BiPAP PRN  - LAMA/LABA/ICS  - IS, OOB to chair     CARDIOVASCULAR:   Shock - suspect cardiogenic. TTE severe RVSP elevation (>100) and moderate RV dysfunction. Likely RV failure from PH. Less likely sepsis or AI.   - MAP > 65  - pulmonary consult - consider RHC  - continue levophed  - continue vasopressin  - wean SDS  - continue home statin   - hold home eliquis (platelets)   - continue midodrine 15 TID  - volume removal as tolerated    GI:   CT with hepatic hyperattenuation. DDX steatosis or hemochromatosis. Elevated ferritin - likely chronic disease but DDX HFE.   - consider hepatology consult  - follow HFE testing     RENAL/ELECTROLYTE:   ESRD on HD. PCKD.   - nephrology consulted - intermittent HD   - goal K>=4, Mg>=2, Phos >3  - daily BMP  - strict I/O's; daily weights  - avoid nephrotoxic medications    ENDOCRINE:   - SSI    ID/MICRO:   Empiric ABX given critical illness. No current nidus for 
CRITICAL CARE PROGRESS NOTE    Name: Yuki Escobar   : 1960   MRN: [id number]   Date: 2025      ICU Diagnosis      Septic Shock  Failure to Thrive  Volume Overload  Morbid Obesity     Overnight Events   No events. Patient continues to report improvement.     ROS negative except as otherwise documented.     A/P   This is a 64 year old female with history of COPD (no PFTs), JF (BiPAP), AFIB, DM, HFpEF and MO who presented to the ED on  with complaint of low BP - admitted to the ICU for undifferentiated shock.     NEUROLOGICAL:    A/O X1 - family report decline in mentation over 1 month. Suspect neurocognitive disorder. DDX delirium or toxic metabolic (hemochromatosis). Exam nonfocal - doubt CVA.   - delirium precautions  - tylenol PRN pain  - oxycodone PRN severe pain   - HFE mutation   - folate 1 mg daily   - CTH if unable to get [place] records     PULMONOLOGY:   History of COPD (no PFTs). On BiPAP for OHS and JF. CXR no acute pathology.   - goal SpO2>=92, pH>=7.30  - continue O2; wean as tolerated  - home BiPAP PRN  - LAMA/LABA/ICS  - IS, OOB to chair     CARDIOVASCULAR:   Shock - currently undifferentiated. TTE severe RVSP elevation (>100) and moderate RV dysfunction. DDX RV failure. Also consider sepsis or AI.   - MAP > 65  - consider RHC  - consider CTA  - continue levophed  - continue vasopressin  - continue SDS  - continue home statin   - hold home eliquis  - continue midodrine 10 TID    HEAM:   Acute on chronic thrombocytopenia. I suspect bone marrow suppression from critical illness. Less likely ITP. Doubt TTP (PLASMIC <4)  - daily CBC    - hold AC    GI:   CT with hepatic hyperattenuation. DDX steatosis or hemochromatosis. Elevated ferritin - likely chronic disease but DDX HFE.   - consider hepatology consult  - follow HFE testing     RENAL/ELECTROLYTE:   ESRD on HD. PCKD.   - nephrology consulted - HD today   - goal K>=4, Mg>=2, Phos >3  - daily BMP  - strict I/O's; daily weights  - 
CRITICAL CARE PROGRESS NOTE    Name: Yuki Escobar   : 1960   MRN: [id number]   Date: 2025      ICU Diagnosis      Septic Shock  Failure to Thrive  Volume Overload    Overnight Events   No events. No complaints this AM.     ROS negative except as otherwise documented.     A/P   This is a 64 year old female with history of COPD (no PFTs), JF (BiPAP), AFIB, DM, HFpEF and MO who presented to the ED on  with complaint of low BP - admitted to the ICU for suspected septic shock.     NEUROLOGICAL:    A/O X1 - family report decline in mentation over 1 month. Suspect neurocognitive disorder. DDX delirium or toxic metabolic. Exam nonfocal - doubt CVA.   - delirium precautions  - limit sedating medications  - tylenol PRN pain  - oxycodone PRN severe pain   - check B12 and folate   - obtain recent [facility] results     PULMONOLOGY:   History of COPD (no PFTs). On BiPAP for OHS and JF. CXR no acute pathology.   - goal SpO2>=92, pH>=7.30  - continue O2; wean as tolerated  - LAMA/LABA/ICS  - IS, OOB to chair   - volume removal as below   - home BiPAP nightly    CARDIOVASCULAR:   Shock - septic vs AI. Last TTE () normal RV and LV function. Prior DX HFpEF.   - MAP > 65  - continue levophed  - continue vasopressin  - continue SDS  - check TTE  - continue home statin   - hold home eliquis  - hold home midodrine     GI:   CT with hepatic hyperattenuation. DDX steatosis or hemochromatosis.   - check ferritin  - check TIBC  - consider hepatology consult  - consider HFE testing  - check ammonia     RENAL/ELECTROLYTE:   ESRD on HD. PCKD.   - nephrology consulted - HD Monday   - goal K>=4, Mg>=2, Phos >3  - daily BMP  - strict I/O's; daily weights  - avoid nephrotoxic medications    ENDOCRINE:   - SSI    ID/MICRO:   Empiric ABX given critical illness. No current nidus for infection.   - follow culture data  - continue vancomycin  - continue zosyn     ICU DAILY CHECKLIST     Code Status: Full - needs GOC  DVT 
CRITICAL CARE PROGRESS NOTE    Name: Yuki Escobar   : 1960   MRN: [id number]   Date: 2025      ICU Diagnosis      Septic Shock  Failure to Thrive  Volume Overload  Morbid Obesity     Overnight Events   No events. Remains off vasopressors. Plan for home with hospice tomorrow.     ROS negative except as otherwise documented.     A/P   This is a 64 year old female with history of COPD (no PFTs), JF (BiPAP), AFIB, DM, HFpEF and MO who presented to the ED on  with complaint of low BP - admitted to the ICU for undifferentiated shock.     NEUROLOGICAL:    A/O X2 - family report decline in mentation over 1 month. Suspect neurocognitive disorder. Exam nonfocal - doubt CVA.   - delirium precautions  - tylenol PRN pain  - oxycodone PRN severe pain   - folate 1 mg daily   - consider CTH  - PT/OT    PULMONOLOGY:   History of COPD (no PFTs). On BiPAP for OHS and JF. CXR with mild pulmonary vascular congestion.   - goal SpO2>=92, pH>=7.30  - continue O2; wean as tolerated  - home BiPAP PRN  - LAMA/LABA/ICS  - IS, OOB to chair     CARDIOVASCULAR:   Shock - suspect cardiogenic. TTE severe RVSP elevation (>100) and moderate RV dysfunction. Likely RV failure from PH (II/III). Less likely sepsis or AI. Now off vasopressors - plan for home with hospice.   - MAP > 65  - pulmonary consult - plan for RHC today   - continue home statin   - continue home eliquis   - continue midodrine 20 TID  - continue prednisone 10 mg QD  - volume removal as tolerated (HD)  - palliative consult; plan for home with hospice tomorrow     RENAL/ELECTROLYTE:   ESRD on HD. PCKD.   - nephrology consulted - intermittent HD   - goal K>=4, Mg>=2, Phos >3  - daily BMP  - strict I/O's; daily weights  - avoid nephrotoxic medications    ENDOCRINE:   - SSI    ID/MICRO:   Empiric ABX given critical illness. No current nidus for infection. Completed empiric course.   - monitor off ABX    ICU DAILY CHECKLIST     Code Status: Full - palliative care 
CRITICAL CARE PROGRESS NOTE    Name: Yuki Escobar   : 1960   MRN: [id number]   Date: 2025      ICU Diagnosis      Septic Shock  Failure to Thrive  Volume Overload  Morbid Obesity     Overnight Events   No events. Patient without complaints this AM.     ROS negative except as otherwise documented.     A/P   This is a 64 year old female with history of COPD (no PFTs), JF (BiPAP), AFIB, DM, HFpEF and MO who presented to the ED on  with complaint of low BP - admitted to the ICU for undifferentiated shock.     NEUROLOGICAL:    A/O X2 - family report decline in mentation over 1 month. Suspect neurocognitive disorder. Exam nonfocal - doubt CVA.   - delirium precautions  - tylenol PRN pain  - oxycodone PRN severe pain   - folate 1 mg daily   - consider CTH  - PT/OT    PULMONOLOGY:   History of COPD (no PFTs). On BiPAP for OHS and JF. CXR with mild pulmonary vascular congestion.   - goal SpO2>=92, pH>=7.30  - continue O2; wean as tolerated  - home BiPAP PRN  - LAMA/LABA/ICS  - IS, OOB to chair     CARDIOVASCULAR:   Shock - suspect cardiogenic. TTE severe RVSP elevation (>100) and moderate RV dysfunction. Likely RV failure from PH (II/III). Less likely sepsis or AI.   - MAP > 65  - pulmonary consult - consider RHC when euvolemic   - continue levophed  - continue vasopressin  - wean SDS  - continue home statin   - restart home eliquis   - continue midodrine 15 TID  - volume removal as tolerated    GI:   CT with hepatic hyperattenuation. DDX steatosis or hemochromatosis. Elevated ferritin - likely chronic disease but DDX HFE.   - consider hepatology consult  - follow HFE testing     RENAL/ELECTROLYTE:   ESRD on HD. PCKD.   - nephrology consulted - intermittent HD   - goal K>=4, Mg>=2, Phos >3  - daily BMP  - strict I/O's; daily weights  - avoid nephrotoxic medications    ENDOCRINE:   - SSI    ID/MICRO:   Empiric ABX given critical illness. No current nidus for infection. Complete empiric course.   - follow 
Comprehensive Nutrition Assessment    Type and Reason for Visit: Initial, LOS    Nutrition Recommendations/Plan:   Continue regular diet.  Add Nepro BID. This will provide 840 kcal and 40 g protein per day (35% and 28% estimated needs, respectively).  Daily weights.  Please document intake of meals and ONS in flowsheets.     Malnutrition Assessment:  Malnutrition Status:  Severe malnutrition (05/05/25 1008)    Context:  Chronic Illness     Findings of the 6 clinical characteristics of malnutrition:  Energy Intake:  75% or less estimated energy requirements for 1 month or longer  Weight Loss:  Greater than 7.5% over 3 months     Body Fat Loss:  No body fat loss     Muscle Mass Loss:  No muscle mass loss    Fluid Accumulation:  No fluid accumulation (not related to malnutrition)     Strength:  Not Performed     Nutrition Assessment:    65 yo female admitted for hypotension, sent from her dialysis clinic, in CCU for septic shock. PMH includes COPD, JF/OHS, ESRD on HD, chronic hypotension, Afib, T2DM, HFpEF, pulmonary HTN.     Patient seen for LOS d 10. Son reports some poor appetite and weight loss. Patient has lost ~20-25 lbs in ~6 months, which is significant. She meets criteria for sev malnutrition. Her intakes since admission have been suboptimal, eating <75%. She has large volumes removed at HD, 3L removed on 5/2. Agreeable to try \"shake\", will order Nepro BID to supplement calorie and protein needs with poor PO. She is not appropriate for diet education at this time. Will monitor for opportunity.    Nutritionally Significant Medications:  Scheduled: eliquis, lipitor, epogen;procrit, florinef, folic acid, protamatine, senna  Continuous: levophed, vasopressin  PRN: albumin    Estimated Daily Nutrient Needs:  Energy (kcal/day): 8674-8192 kcal/d (21-23 kcal/kg/d)  Weight Used for Protein Requirements: Current  Protein (g/day): 132-165 g/d (1.2-1.5 g/kg/d)  Method Used for Fluid Requirements: Standard 
Comprehensive Nutrition Assessment    Type and Reason for Visit: Reassess    Nutrition Recommendations/Plan:   Continue regular diet.  Continue Nepro BID. This will provide 840 kcal and 40 g protein per day (35% and 28% estimated needs, respectively).  Daily weights.  Please document intake of meals and ONS in flowsheets.     Malnutrition Assessment:  Malnutrition Status:  Severe malnutrition (05/05/25 1008)    Context:  Chronic Illness     Findings of the 6 clinical characteristics of malnutrition:  Energy Intake:  75% or less estimated energy requirements for 1 month or longer  Weight Loss:  Greater than 7.5% over 3 months     Body Fat Loss:  No body fat loss     Muscle Mass Loss:  No muscle mass loss    Fluid Accumulation:  No fluid accumulation (not related to malnutrition)     Strength:  Not Performed     Nutrition Assessment:    65 yo female admitted for hypotension, sent from her dialysis clinic, in CCU for septic shock. PMH includes COPD, JF/OHS, ESRD on HD, chronic hypotension, Afib, T2DM, HFpEF, pulmonary HTN.     5/14- Follow up. Patient remains dependent on vasopressors with severe end stage pulmonary edema. Working on d/c home with hospice. Her intakes have improved for most meals, eating %. Intake of Nepro is varied. Her weight has rebounded some to UBW. She has no concerns at this time. She is looking forward to going home.    5/8- Initial. Patient seen for LOS d 10. Son reports some poor appetite and weight loss. Patient has lost ~20-25 lbs in ~6 months, which is significant. She meets criteria for sev malnutrition. Her intakes since admission have been suboptimal, eating <75%. She has large volumes removed at HD, 3L removed on 5/2. Agreeable to try \"shake\", will order Nepro BID to supplement calorie and protein needs with poor PO. She is not appropriate for diet education at this time. Will monitor for opportunity.    Nutritionally Significant Medications:  Scheduled: eliquis, lipitor, 
Day #2   Indication:  bloodstream infection    -Empiric ABX for sepsis of undetermined etiology.  S/p vancomycin + cefepime in ED on .  Nephrology consulted for renal replacement recommendations given history of ESRD on hemodialysis  Current regimen:  cefepime 2 gm IV q12hr  Abx regimen: vancomycin and cefepime  Recent Labs     25  1609 25  0226   WBC 7.9 5.6   CREATININE 2.64* 3.29*   BUN 8 12     Est CrCl: ESRD patient on hemodialysis as an outpatient  Temp (24hrs), Av °F (36.7 °C), Min:97.7 °F (36.5 °C), Max:98.2 °F (36.8 °C)    Cultures:   : Blood, perc x 2 - NGTD    Plan: Change to cefepime 1 gm q24hr given ESRD history.  If CRRT is initiated, will need further dose adjustment      
Day #4 of Cefepime  Indication:  septic shock, unknown source  Current regimen:  1000 mg IV q24h  Abx regimen: Cefepime + Vanc  Recent Labs     25  0226 25  0300 25  0346   WBC 5.6 7.7 9.4   CREATININE 3.29* 5.04* 6.78*   BUN 12 29* 44*     Est CrCl: ESRD on HD -> CRRT ordered today  Temp (24hrs), Av.6 °F (36.4 °C), Min:96.9 °F (36.1 °C), Max:98 °F (36.7 °C)    Cultures:  blood x 2 - NGTD    Plan: Change to 2000 mg IV q12h for CRRT dosing per [facility] P&T Committee Protocol with respect to renal function.  Pharmacy will continue to monitor patient daily and will make dosage adjustments based upon changing renal function.      
Day #5 of Vancomycin  Indication:  Empiric for septic shock, concern for bacteremia  Current regimen:  21011 mg load  @ 1634  Abx regimen:  Cefepime + Vancomycin  ID Following ?: NO  Inpatient dialysis schedule: CRRT yesterday, clotted this morning, trial HD today    Recent Labs     25  0300 25  0346 25  0200   WBC 7.7 9.4 10.7   CREATININE 5.04* 6.78* 4.95*   BUN 29* 44* 35*     Est CrCl: ESRD on HD  Temp (24hrs), Av.9 °F (36.6 °C), Min:97 °F (36.1 °C), Max:98.3 °F (36.8 °C)    Cultures:    Blood x2: NGTD   Flu/COVID: (-)   MRSA screen: pending    MRSA Swab ordered (if applicable)? Yes    Goal pre-dialysis concentration = 20 - 25 mcg/mL for therapeutic goal of 15 - 20 mcg/mL (assuming ~35% removal by dialysis)    Date                Dialysis (Yes/No)       Pre-HD Level              Dose    Yes   --   2500 mg @ 1634                   No                              25.6 (random)              --                   No                              25.3 (random)              --                   Yes (CRRT)                --                                  --                   Yes (planned HD)      15.7                             1250 mg  (planned)    Reminder staff message entered (if needed):yes    Plan: Continue current regimen, level down to 15.7 following CRRT yesterday and overnight, now HD planned for today. Will plan to order 1250 mg x 1 post-HD.    Vancomycin Process in Hemodialysis        
[place] [phone] (COPE)  [facility] [phone] (COPE)      GOALS OF CARE: DNR, continue current care - if patient decompensates, would not escalate care       TREATMENT PREFERENCES:   Code Status: DNR    Advance Care Planning:  [x] The [facility] Interdisciplinary Team has updated the ACP Navigator with Health Care Agent and Patient Capacity    Primary Decision Maker (Health Care Agent):   Relationship to patient:  [] Named in a scanned document   [x] Legal Next of Kin  [] Guardian    The patient's son Ariel is LNOK and medical decision maker     Family Meeting Documentation    Participants: Dr. Valladares, Dr. Tan, son Ariel, family member Kaci (mother of grandchildren)    Discussion: The Palliative Medicine SW, Dr. Linares, and Dr. Valladares met with the patient's son Ariel and the mother of his children, Kaci - she shares that the patient is like a second mother to her.     We did not have conversation w/ patient today as she was getting dialysis/nursing care - and she also has not been retaining any complex medical information. She is however alert, engaging, and in good spirits.     We discuss together that unfortunately the patient has multiple end stage medical conditions and we discuss together that we have treated all acute issues. She has been unable to come off pressor support for her BP and at this point it is a form of life support. We discuss together that she has been unable to be weaned from this medication - we also discuss concerns about her ability to not only be off this medication, but will not be able to tolerate dialysis, etc. We also discuss that prolonged use is not how the medication is intended, and could come with side effects - we also discuss her risk of infection given her overall clinical picture and debility.     We discuss together what comfort focused care and hospice would look like - focus on symptom management, comfort and quality of life. SW explained that with 
Music Therapy Assessment  [facility]    Yuki Escobar [id number]     1960  64 y.o.  female    Patient Telephone Number: [phone] (home)   Worship Affiliation: None   Language: English   Patient Active Problem List    Diagnosis Date Noted    Severe protein-energy malnutrition 05/05/2025    Debility 05/05/2025    Palliative care encounter 05/05/2025    Palliative care by specialist 04/29/2025    Congestive heart failure (HCC) 04/29/2025    ESRD (end stage renal disease) ([facility]) 04/29/2025    Hypotension 04/25/2025    Orthostatic hypotension 03/26/2024    Presence of cardiac pacemaker 03/26/2024    Uses roller walker 03/26/2024    Unstable gait 03/26/2024    Dependence on renal dialysis 08/31/2023    Murmur, heart 04/27/2023    Slow transit constipation 04/27/2023    Hemodialysis-associated hypotension 12/01/2022    On statin therapy 12/01/2022    ESRD (end stage renal disease) on dialysis ([facility])     Acquired hypothyroidism 05/19/2022    Anuria 05/19/2022    Chronic low back pain with sciatica 01/20/2022    Dependence on continuous supplemental oxygen 01/20/2022    Mixed hyperlipidemia 01/20/2022    Shortness of breath 10/21/2020    Aneurysm of arteriovenous dialysis fistula 09/10/2020    Weakness of both legs 09/10/2020    New onset seizure ([facility]) 05/28/2020    Right wrist pain 09/12/2019    Hypomagnesemia 09/12/2019    Chronic prescription opiate use 10/16/2018    Morbid (severe) obesity due to excess calories ([facility]) 05/03/2018    Recurrent depression 12/28/2017    DCIS (ductal carcinoma in situ) of breast 12/18/2014    Obesity hypoventilation syndrome (HCC) 12/12/2014    Asthma     Chronic ankle pain 12/11/2014    GERD (gastroesophageal reflux disease)     Vitamin D deficiency 12/01/2011        Date: 5/8/2025            Total Time Calculated: 50 min          [address] CARE    Mental Status:   [x] Alert [  ] Forgetful [  ]  Confused  [  ] Minimally responsive  [  ] Sleeping    Communication 
Occupational Therapy  04/28/25    Chart reviewed. OT referral received. PT spoke to RN on unit who reported pt just being setup to begin CRRT. Will hold OT evaluation at this time and follow up in the AM. Thank you. Miri Sibley, OT    
Occupational Therapy  04/29/25    Orders received and chart reviewed. Pt currently on dialysis and per RN pt to receive CT after dialysis to rule out PE. OT will continue to follow and see as able and appropriate.    Thank you,  Teri Underwood, OTR/L  
Occupational Therapy  05/02/25    Chart reviewed and pt undergoing RHC this am. OT will continue to follow and see as able.     Thank you,  Teri Underwood, OTR/L  
Occupational Therapy  05/05/25    OT re-order received. Pt is currently on OT caseload 3x/week. Will follow. Miri Sibley, OT    
Occupational Therapy  05/06/25    Chart review completed in prep for OT tx. Case discussed with RN at bedside. Patient with increasing pressor support this AM and BP remains labile, outside of parameters. Not medically appropriate for OT. Will defer and follow-up as patient is medically appropriate and available for OT.     Thank you  Maricel Sanchez, OTR/L    
Occupational Therapy  05/07/25    Chart review completed in prep for OT weekly re-assessment. Pt currently receiving HD at bedside. Will defer and follow up as able and appropriate.     Thank you  Maricel Sanchez, OTR/L    
Palliative Medicine      Code Status: Full Code    Advance Care Planning:    No AMD on file- will offer/discuss this admission.       Patient / Family Encounter Documentation    Participants (names): Dr. Daniels, kev George     Narrative: The Palliative Medicine SW and Dr. Daniels met with the patient's son Ariel at bedside along with patient - Dr. Daniels has been rounding on patient, today wanting to introduce our team to patient and son Ariel.     Patient is to undergo cardiac cath tomorrow. We discuss with Ariel that our team is available to help talk through \"big picture\" of his mother's illness and help navigate next steps and help them process all the information of her critical illness.     See psychsocial information below.     Our team following with you - will be available for ongoing care goals discussions, plan for cath tomorrow.     Psychosocial Issues Identified/ Resilience Factors: The patient lives at home alone, her family checks in on her throughout the week as needed and her son Ariel transports her to dialysis 3x/week. The patient has two grandchildren - ages 16 and 22, and has her first great grandchild due in November. She \"cannot wait\" and is ecstatic about her great grandbaby coming. She enjoys dancing and listening to music. Family shares when she is feeling well she \"Does everything\" and enjoys being active/social - likes planning parties.     Caregiver [place]:   Does the caregiver feel confident administering medication? Admitted from SNF   Does the caregiver need any help connecting with community resources? Monitor  Does the caregiver feel confident assisting with activities of daily living? Admitted from SNF but son says patient was doing well at home prior.     Goals of Care / Plan: Full Code, continue current care     Thank you for including Palliative Medicine in the care of Yuki Escobar     Cass Lemus LCSW    
Palliative Medicine  Patient Name: Yuki Escobar  YOB: 1960  MRN: [id number]  Age: 65 y.o.  Gender: female    Date of Initial Consult: 4/29/2025  Date of Service: 5/13/2025  Time: 3:16 PM  Provider: Asiya Valladares MD  Hospital Day: 19  Admit Date: 4/25/2025  Referring Provider: Dr. Kd Correa       Reasons for Consultation:  Goals of Care    HISTORY OF PRESENT ILLNESS (HPI):   Yuki Escobar is a 65 y.o. female with a past medical history of recent admission to [facility] 4/4/25 to 4/26/25 requiring intubation, ESRD on HD since at least 2020 (has 4 failed access sites), chronic respiratory failure/ COPD/ JF/ OHS on home oxygen, complete AV block s/p pacemaker, DM, morbid obesity/ BMI 42, chronic back and R ankle pain, hx TIA 2019, who was admitted on 4/25/2025 from dialysis due to hypotension.     -5/5- did not tolerate milrinone over weekend. Limited options per Pulm. Started on dobutamine trial this AM. Remains on levo. Had RHC 5/2 - precapillary pulm HTN.  -5/8- able to wean down the levo a bit compared to yest.  5/9- incr levo, getting dialysis   5/12- getting dialysis, tolerating    Psychosocial: Lives alone in [place] in an apartment. Her son drives her to dialysis . Other family also checks in on her several times a week.  Most recently was at [facility].  Son, Ariel Escobar [phone].  Patient shares that she is looking forward to her first great grandchild (due in Nov 2025)   Per chart review, Patient and son have had some conversations lately about care goals but did not come to any conclusions     PALLIATIVE DIAGNOSES:    Altered cognition- improved but still not retaining all information- family reports decline in mentation the month prior to this admission  ESRD on HD, chronic hypotension  Acute on chronic respiratory failure  Hx JF on home oxygen/ BIPAP  RV failure/ severe pulmonary hypertension/ low cardiac output  Hepatic hyperattenuation on CT Scan, elevated 
Palliative Medicine  Patient Name: Yuki Escobar  YOB: 1960  MRN: [id number]  Age: 64 y.o.  Gender: female    Date of Initial Consult: 4/29/2025  Date of Service: 5/6/2025  Time: 11:13 AM  Provider: Asiya Valladares MD  Hospital Day: 12  Admit Date: 4/25/2025  Referring Provider: Dr. Kd Correa       Reasons for Consultation:  Goals of Care    HISTORY OF PRESENT ILLNESS (HPI):   Yuki Escobar is a 64 y.o. female with a past medical history of recent admission to [facility] 4/4/25 to 4/26/25 requiring intubation, ESRD on HD since at least 2020 (has 4 failed access sites), chronic respiratory failure/ COPD/ JF/ OHS on home oxygen, complete AV block s/p pacemaker, DM, morbid obesity/ BMI 42, chronic back and R ankle pain, hx TIA 2019, who was admitted on 4/25/2025 from home with a diagnosis of waxing/ waning mental status since hospital discharge, not compliant with BIPAP at night at home.     -5/5- did not tolerate milrinone over weekend. Limited options per Pulm. Started on dobutamine trial this AM. Remains on levo. Had RHC 5/2 - precapillary pulm HTN.      Psychosocial: Lives alone in [place]. Her son drives her to dialysis .  Son, Ariel Escobar [phone].  Patient shares that she is looking forward to her first grandchild (due in Nov 2025)   Per chart review, Patient and son have had some conversations lately about care goals but did not come to any conclusions       PALLIATIVE DIAGNOSES:    Altered cognition- improved   ESRD on HD, chronic hypotension  Acute on chronic respiratory failure  Hx JF on home oxygen/ BIPAP  RV failure/ severe pulmonary hypertension/ low cardiac output  Hepatic hyperattenuation on CT Scan, elevated ferritin  Chronic thrombocytopenia  Morbid obesity, high BMI, with ongoing weight loss  Palliative medicine encounter  Care goals     ASSESSMENT AND PLAN:   Pt requiring incr levo. She remains awake, alert and in good spirits w/out significant symptom burden.   Do 
Palliative Medicine  Patient Name: Yuki Escobar  YOB: 1960  MRN: [id number]  Age: 64 y.o.  Gender: female    Date of Initial Consult: 4/29/2025  Date of Service: 5/5/2025  Time: 3:16 PM  Provider: Asiya Valladares MD  Hospital Day: 11  Admit Date: 4/25/2025  Referring Provider: Dr. Kd Correa       Reasons for Consultation:  Goals of Care    HISTORY OF PRESENT ILLNESS (HPI):   Yuki Escobar is a 64 y.o. female with a past medical history of recent admission to [facility] 4/4/25 to 4/26/25 requiring intubation, ESRD on HD since at least 2020 (has 4 failed access sites), chronic respiratory failure/ COPD/ JF/ OHS on home oxygen, complete AV block s/p pacemaker, DM, morbid obesity/ BMI 42, chronic back and R ankle pain, hx TIA 2019, who was admitted on 4/25/2025 from home with a diagnosis of waxing/ waning mental status since hospital discharge, not compliant with BIPAP at night at home.     -5/5- did not tolerate milrinone over weekend. Limited options per Pulm. Started on dobutamine trial this AM. Remains on levo. Had RHC 5/2 - precapillary pulm HTN.    ECHO 4/25 : Left Ventricle: Low normal left ventricular systolic function. EF by visual approximation is 50%. Left ventricle size is normal. Normal wall thickness. Normal wall motion.    Right Ventricle: Right ventricle is severely dilated. Moderately reduced systolic function.    Aortic Valve: Moderate stenosis of the aortic valve. AV mean gradient is 27 mmHg. AV peak velocity is 3.4 m/s. LVOT:AV VTI Index is 0.33. AV area by continuity VTI is 1.1 cm2.    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 There is annular calcification noted. Thickened leaflets. Severely calcified leaflets.    Tricuspid Valve: Moderate regurgitation. Severely elevated RVSP, consistent with severe pulmonary hypertension. The estimated RVSP is 111 mmHg.    Left Atrium: Left atrium is severely dilated.    Right Atrium: Right atrium is severely 
Patient is more lethargic this afternoon, although able to protect airway.  Vitals remains stable at this time.  Will hold off on further workup at this time given her goals of care.  She is DNR/DNI and plan is to discharge home with hospice, no escalation of care per last family meeting.        Manny Ferrara MD   Critical Care Medicine  [facility]    
Patient placed on bipap  
Patient refused BIPAP  
Pharmacist Note - Vancomycin Dosing (Hemodialysis Patient)    Consult provided for this 64 y.o. female for indication of bloodstream infection.  This patient is also on the following antibiotic regimen(s): cefepime  Patient on vancomycin PTA? NO     Lab Results   Component Value Date/Time    WBC 5.6 2025 02:26 AM    BUN 12 2025 02:26 AM    IBUN 61 10/01/2019 09:08 AM   Temp (24hrs), Av °F (36.7 °C), Min:97.7 °F (36.5 °C), Max:98.2 °F (36.8 °C)      Estimated CrCl:  ESRD on HD (Monday/Wednesday/Friday)    Cultures:  :  Blood, perc x 2 - in process, NGTD    MRSA Swab ordered (if applicable)? N/A    For this HD patient, patient received an initial loading dose of Vancomycin 2500 mg on  @ 1634 (21 mg/kg).  Dose will be adjusted to maintain a pre-HD trough of approximately 20 - 25 mcg/mL for therapeutic goal of 15 - 20 mcg/mL as approximately 35% of drug will be removed by HD filtration.  Will obtain a random vancomycin level on  with AM labs.  Pharmacy to follow patient daily and order levels / make dose adjustments as appropriate.   
Physical Therapy  04/29/2025    Received orders for PT evaluation.  Performed chart review. Pt currently on dialysis and per RN pt to receive CT after dialysis to rule out PE. Not appropriate for PT eval at this time.  PT will continue to follow and see as able and appropriate.     Thank you,  Lilly Salter, PT, DPT  
Physical Therapy  05/05/2025    PT re-order received.  Pt remains on PT caseload currently 3x/wk.  Will continue to follow.    Thank you,  Lilly Salter, PT, DPT  
Physical Therapy  05/06/2025    PT continues to follow patient.  Performed chart review and discussed patient with RN at bedside.  Patient with increasing pressor support this AM and BP remains labile, outside of parameters.  Not medically appropriate for PT.  Will defer and follow-up as patient is medically appropriate and available for PT.    Thank you,  Lilly Salter, PT, DPT  
Physical Therapy  05/07/2025    Chart review completed in prep for PT weekly re-assessment. Pt currently receiving HD at bedside. Will defer and follow up as able and appropriate.     Thank you,  Lilly Salter, PT, DPT     
Physical Therapy  05/09/2025    PT continues to follow patient.  Up to date on chart review.  Planned for family meeting this AM for GOC discussions. Will defer PT at this time until GOC are determined and will follow-up at PT aligns within patient and family's GOC and as patient is medically appropriate.    Thank you,  Lilly Salter, PT, DPT  
Physical Therapy  5/20/2025    Chart reviewed. Patient with plans to d/c home with hospice this morning. Will complete PT order. Thank you for the opportunity to be involved in Ms. Escobar's care.     Rosa Elena Delgadillo, PT, DPT, CCS  
Physical Therapy 4/28/2025    Orders received and chart reviewed up to date. Called nursing station, pt preparing to start CRRT. Will continue to follow for PT.    Thank you.  Mercedes España, PT, DPT    
Physical Therapy 5/2/2025    Chart reviewed up to date. Pt off floor in cath lab. Will continue to follow.    Thank you.  Mercedes España, PT, DPT      
Pt transitioning to hospice    Rudy Venegas MD  [facility]  
RENAL  PROGRESS NOTE        Subjective:   Doing ok     Objective:   VITALS SIGNS:    BP (!) 91/47   Pulse 70   Temp 97.7 °F (36.5 °C) (Oral)   Resp 26   Ht 1.651 m (5' 5\")   Wt 115.7 kg (255 lb 1.2 oz)   SpO2 96%   BMI 42.45 kg/m²             Temp (24hrs), Av.1 °F (36.7 °C), Min:97.7 °F (36.5 °C), Max:98.7 °F (37.1 °C)         PHYSICAL EXAM:  NAD    DATA REVIEW:     INTAKE / OUTPUT:   Last shift:      No intake/output data recorded.  Last 3 shifts:  1901 -  0700  In: 10.4 [I.V.:10.4]  Out: -     Intake/Output Summary (Last 24 hours) at 2025 0919  Last data filed at 5/15/2025 1403  Gross per 24 hour   Intake 10.4 ml   Output --   Net 10.4 ml         LABS:   LABS:  Recent Labs     25  0825  0258 25  0626 25  0331 25  0303    140 138 140 139 141   K 4.4 3.6 3.6 4.2 4.0 4.1    106 106 108 106 107   CO2 25 26 24 22 24 25   BUN 71* 55* 32* 48* 33* 59*   CREATININE 8.10* 6.93* 5.01* 7.25* 5.53* 7.57*   CALCIUM 8.6 9.1 9.3 9.3 9.6 9.1   PHOS 5.7* 5.0*  --  5.0* 4.4 4.8*   MG  --   --   --  1.9 1.9 1.8     Recent Labs     25  0825  025   WBC 9.7 9.6 9.0   HGB 10.4* 11.3* 10.5*   HCT 32.6* 35.0 32.6*   * 149* 138*     No results for input(s): \"KU\", \"CLU\" in the last 720 hours.    Invalid input(s): \"ELBERT\", \"CREAU\", \"PROU\"      Assessment:   ssessment   :                                               Plan:  ESKD  Perm cath     Hypotension - Cardiogenic Shock  Pulmn HTN   Cor pulmonale     Anemia     Chronic hypoxic hypercapnic resp failure  Copd  Trace  On home Oxygen     Acute on chronic thrombocytopenia MWF HD at [facility];      -Was on CRRT on     Transitioned to IHD on     HD today  Monitor vitals  Will follow back on Monday  Discussed with her and RN                       
RENAL  PROGRESS NOTE        Subjective:   Doing ok  HD done yesterday  Objective:   VITALS SIGNS:    BP (!) 117/59   Pulse 70   Temp 98.2 °F (36.8 °C) (Oral)   Resp 25   Ht 1.651 m (5' 5\")   Wt 111.9 kg (246 lb 11.1 oz)   SpO2 100%   BMI 41.05 kg/m²             Temp (24hrs), Av.9 °F (36.6 °C), Min:97.7 °F (36.5 °C), Max:98.2 °F (36.8 °C)         PHYSICAL EXAM:  NAD    DATA REVIEW:     INTAKE / OUTPUT:   Last shift:      No intake/output data recorded.  Last 3 shifts:  1901 - 05/15 0700  In: 557 [I.V.:57]  Out:      Intake/Output Summary (Last 24 hours) at 5/15/2025 1158  Last data filed at 2025 1634  Gross per 24 hour   Intake 514.87 ml   Output 2000 ml   Net -1485.13 ml         LABS:   LABS:  Recent Labs     25  0825  0258 25  0626 25  0331 25  0303    140 138 140 139 141   K 4.4 3.6 3.6 4.2 4.0 4.1    106 106 108 106 107   CO2 25 26 24 22 24 25   BUN 71* 55* 32* 48* 33* 59*   CREATININE 8.10* 6.93* 5.01* 7.25* 5.53* 7.57*   CALCIUM 8.6 9.1 9.3 9.3 9.6 9.1   PHOS 5.7* 5.0*  --  5.0* 4.4 4.8*   MG  --   --   --  1.9 1.9 1.8     Recent Labs     25  0804 25  0258   WBC 9.7 9.6 9.0   HGB 10.4* 11.3* 10.5*   HCT 32.6* 35.0 32.6*   * 149* 138*     No results for input(s): \"KU\", \"CLU\" in the last 720 hours.    Invalid input(s): \"ELBERT\", \"CREAU\", \"PROU\"      Assessment:   ssessment   :                                               Plan:  ESKD  Perm cath     Hypotension - Cardiogenic Shock  Pulmn HTN   Cor pulmonale     Anemia     Chronic hypoxic hypercapnic resp failure  Copd  Trace  On home Oxygen     Acute on chronic thrombocytopenia MWF HD at [facility];      -Was on CRRT on     Transitioned to IHD on     HD tomorrow  Monitor vitals                       
RENAL  PROGRESS NOTE        Subjective:   Doing ok  Objective:   VITALS SIGNS:    BP (!) 124/59   Pulse 70   Temp 98.5 °F (36.9 °C) (Axillary)   Resp 22   Ht 1.651 m (5' 5\")   Wt 117.6 kg (259 lb 4.2 oz)   SpO2 100%   BMI 43.14 kg/m²             Temp (24hrs), Av.4 °F (36.9 °C), Min:98.2 °F (36.8 °C), Max:98.6 °F (37 °C)         PHYSICAL EXAM:  NAD    DATA REVIEW:     INTAKE / OUTPUT:   Last shift:      No intake/output data recorded.  Last 3 shifts:  190 -  0700  In: 144.5 [I.V.:144.5]  Out: -     Intake/Output Summary (Last 24 hours) at 2025 0915  Last data filed at 2025 0512  Gross per 24 hour   Intake 76.45 ml   Output --   Net 76.45 ml         LABS:   LABS:  Recent Labs     25  0825  0258 25  0626 25  0331 25  0303    140 138 140 139 141   K 4.4 3.6 3.6 4.2 4.0 4.1    106 106 108 106 107   CO2 25 26 24 22 24 25   BUN 71* 55* 32* 48* 33* 59*   CREATININE 8.10* 6.93* 5.01* 7.25* 5.53* 7.57*   CALCIUM 8.6 9.1 9.3 9.3 9.6 9.1   PHOS 5.7* 5.0*  --  5.0* 4.4 4.8*   MG  --   --   --  1.9 1.9 1.8     Recent Labs     25  0825  025   WBC 9.7 9.6 9.0   HGB 10.4* 11.3* 10.5*   HCT 32.6* 35.0 32.6*   * 149* 138*     No results for input(s): \"KU\", \"CLU\" in the last 720 hours.    Invalid input(s): \"ELBERT\", \"CREAU\", \"PROU\"      Assessment:   ssessment   :                                               Plan:  ESKD  Perm cath     Hypotension - Cardiogenic Shock  Pulmn HTN   Cor pulmonale     Anemia     Chronic hypoxic hypercapnic resp failure  Copd  Trace  On home Oxygen     Acute on chronic thrombocytopenia MWF HD at [facility];      -Was on CRRT on     Transitioned to IHD on     HD today  Monitor vitals                       
RENAL  PROGRESS NOTE        Subjective:   Had dialysis in the morning.  Tolerated treatment well.  Later was lethargic.  Clinical events noted  Objective:   VITALS SIGNS:    BP (!) 128/56   Pulse 70   Temp 97.8 °F (36.6 °C) (Axillary)   Resp 29   Ht 1.651 m (5' 5\")   Wt 114.9 kg (253 lb 4.9 oz)   SpO2 92%   BMI 42.15 kg/m²             Temp (24hrs), Av.2 °F (36.8 °C), Min:97.7 °F (36.5 °C), Max:98.9 °F (37.2 °C)         PHYSICAL EXAM:  NAD  Obese, chronically ill-appearing,    left IJ TH DC  Edema chronic  Awake and alert  DATA REVIEW:     INTAKE / OUTPUT:   Last shift:       07 - 1900  In: 500   Out: 1500   Last 3 shifts: 1901 -  0700  In: 9.2 [I.V.:9.2]  Out: -     Intake/Output Summary (Last 24 hours) at 2025 1504  Last data filed at 2025 1115  Gross per 24 hour   Intake 500 ml   Output 1500 ml   Net -1000 ml         LABS:   LABS:  Recent Labs     25  0719 25  0826 25  0136 25  0804 25  0258 25  0626 25  0331 25  0303   * 138 138 140   < > 140 139 141   K 5.8* 4.4 4.4 3.6   < > 4.2 4.0 4.1    106 105 106   < > 108 106 107   CO2 25 23 25 26   < > 22 24 25   BUN 61* 65* 71* 55*   < > 48* 33* 59*   CREATININE 6.86* 7.50* 8.10* 6.93*   < > 7.25* 5.53* 7.57*   CALCIUM 9.7 9.5 8.6 9.1   < > 9.3 9.6 9.1   PHOS  --  6.6* 5.7* 5.0*  --  5.0* 4.4 4.8*   MG  --   --   --   --   --  1.9 1.9 1.8    < > = values in this interval not displayed.     Recent Labs     25  0719 25  0826 25  0136   WBC 8.7 9.1 9.7   HGB 10.8* 10.5* 10.4*   HCT 33.8* 33.3* 32.6*   * 106* 141*     No results for input(s): \"KU\", \"CLU\" in the last 720 hours.    Invalid input(s): \"ELBERT\", \"CREAU\", \"PROU\"      Assessment:   ssessment   :                                               Plan:  ESKD  Perm cath     Hypotension - Cardiogenic Shock  Pulmn HTN   Cor pulmonale     Anemia     Chronic hypoxic hypercapnic resp 
RENAL  PROGRESS NOTE        Subjective:   Seen and examined in the ICU.  Awake and conversant.  States that she would like to try dialysis  Objective:   VITALS SIGNS:    BP (!) 87/43   Pulse 73   Temp 97.6 °F (36.4 °C) (Axillary)   Resp (!) 31   Ht 1.651 m (5' 5\")   Wt 114.9 kg (253 lb 4.9 oz)   SpO2 97%   BMI 42.15 kg/m²             Temp (24hrs), Av.7 °F (36.5 °C), Min:97 °F (36.1 °C), Max:98.7 °F (37.1 °C)         PHYSICAL EXAM:  NAD  Obese, chronically ill-appearing,    left IJ TH DC  Edema chronic  Awake and alert  DATA REVIEW:     INTAKE / OUTPUT:   Last shift:      No intake/output data recorded.  Last 3 shifts: 05/15 1901 -  0700  In: 509.2 [I.V.:9.2]  Out:      Intake/Output Summary (Last 24 hours) at 2025 1522  Last data filed at 2025 1911  Gross per 24 hour   Intake 509.17 ml   Output 2000 ml   Net -1490.83 ml         LABS:   LABS:  Recent Labs     25  0826 25  0136 25  0804 25  0258 25  0626 25  0331 25  0303    138 140   < > 140 139 141   K 4.4 4.4 3.6   < > 4.2 4.0 4.1    105 106   < > 108 106 107   CO2 23 25 26   < > 22 24 25   BUN 65* 71* 55*   < > 48* 33* 59*   CREATININE 7.50* 8.10* 6.93*   < > 7.25* 5.53* 7.57*   CALCIUM 9.5 8.6 9.1   < > 9.3 9.6 9.1   PHOS 6.6* 5.7* 5.0*  --  5.0* 4.4 4.8*   MG  --   --   --   --  1.9 1.9 1.8    < > = values in this interval not displayed.     Recent Labs     25  0826 25  0136 25  0804   WBC 9.1 9.7 9.6   HGB 10.5* 10.4* 11.3*   HCT 33.3* 32.6* 35.0   * 141* 149*     No results for input(s): \"KU\", \"CLU\" in the last 720 hours.    Invalid input(s): \"EBLERT\", \"CREAU\", \"PROU\"      Assessment:   ssessment   :                                               Plan:  ESKD  Perm cath     Hypotension - Cardiogenic Shock  Pulmn HTN   Cor pulmonale     Anemia     Chronic hypoxic hypercapnic resp failure  Copd  Trace  On home Oxygen     Acute on chronic thrombocytopenia MWF HD 
RENAL  PROGRESS NOTE        Subjective:   Seen and examined on HD  BP ok so far  Objective:   VITALS SIGNS:    BP (!) 92/48   Pulse 70   Temp 98.2 °F (36.8 °C)   Resp 25   Ht 1.651 m (5' 5\")   Wt 112.7 kg (248 lb 7.3 oz)   SpO2 100%   BMI 41.35 kg/m²             Temp (24hrs), Av.1 °F (36.7 °C), Min:97.7 °F (36.5 °C), Max:98.4 °F (36.9 °C)         PHYSICAL EXAM:  NAD    DATA REVIEW:     INTAKE / OUTPUT:   Last shift:      No intake/output data recorded.  Last 3 shifts: 05/10 1901 -  07  In: 246.8 [P.O.:100; I.V.:146.8]  Out: -     Intake/Output Summary (Last 24 hours) at 2025 0911  Last data filed at 2025 0600  Gross per 24 hour   Intake 175.3 ml   Output --   Net 175.3 ml         LABS:   LABS:  Recent Labs     25  0825  0258 25  0626 25  0331 25  0303    140 138 140 139 141   K 4.4 3.6 3.6 4.2 4.0 4.1    106 106 108 106 107   CO2 25 26 24 22 24 25   BUN 71* 55* 32* 48* 33* 59*   CREATININE 8.10* 6.93* 5.01* 7.25* 5.53* 7.57*   CALCIUM 8.6 9.1 9.3 9.3 9.6 9.1   PHOS 5.7* 5.0*  --  5.0* 4.4 4.8*   MG  --   --   --  1.9 1.9 1.8     Recent Labs     25  0825  0258   WBC 9.7 9.6 9.0   HGB 10.4* 11.3* 10.5*   HCT 32.6* 35.0 32.6*   * 149* 138*     No results for input(s): \"KU\", \"CLU\" in the last 720 hours.    Invalid input(s): \"ELBERT\", \"CREAU\", \"PROU\"      Assessment:   ssessment   :                                               Plan:  ESKD  Perm cath     Hypotension - Cardiogenic Shock  Pulmn HTN   Cor pulmonale     Anemia     Chronic hypoxic hypercapnic resp failure  Copd  Trace  On home Oxygen     Acute on chronic thrombocytopenia MWF HD at [facility];      -Was on CRRT on     Transitioned to IHD on     HD now  Monitor vitals        Discussed with patient/                 
RENAL  PROGRESS NOTE        Subjective:   resting  Objective:   VITALS SIGNS:    BP (!) 94/46   Pulse 72   Temp 98 °F (36.7 °C) (Axillary)   Resp 28   Ht 1.651 m (5' 5\")   Wt 112.4 kg (247 lb 12.8 oz)   SpO2 96%   BMI 41.24 kg/m²             Temp (24hrs), Av.8 °F (36.6 °C), Min:97.5 °F (36.4 °C), Max:98.1 °F (36.7 °C)         PHYSICAL EXAM:  NAD    DATA REVIEW:     INTAKE / OUTPUT:   Last shift:      701 - 1900  In: 22.7 [I.V.:22.7]  Out: -   Last 3 shifts: 1901 -  0700  In: 747.6 [P.O.:100; I.V.:147.6]  Out: 2500     Intake/Output Summary (Last 24 hours) at 2025 1248  Last data filed at 2025 1100  Gross per 24 hour   Intake 110.32 ml   Output --   Net 110.32 ml         LABS:   LABS:  Recent Labs     25  01325  0804 25  0258 25  0626 25  0331 25  0303    140 138 140 139 141   K 4.4 3.6 3.6 4.2 4.0 4.1    106 106 108 106 107   CO2 25 26 24 22 24 25   BUN 71* 55* 32* 48* 33* 59*   CREATININE 8.10* 6.93* 5.01* 7.25* 5.53* 7.57*   CALCIUM 8.6 9.1 9.3 9.3 9.6 9.1   PHOS 5.7* 5.0*  --  5.0* 4.4 4.8*   MG  --   --   --  1.9 1.9 1.8     Recent Labs     25  0804 25  0258   WBC 9.7 9.6 9.0   HGB 10.4* 11.3* 10.5*   HCT 32.6* 35.0 32.6*   * 149* 138*     No results for input(s): \"KU\", \"CLU\" in the last 720 hours.    Invalid input(s): \"ELBERT\", \"CREAU\", \"PROU\"      Assessment:   ssessment   :                                               Plan:  ESKD  Perm cath     Hypotension - Cardiogenic Shock  Pulmn HTN   Cor pulmonale     Anemia     Chronic hypoxic hypercapnic resp failure  Copd  Trace  On home Oxygen     Acute on chronic thrombocytopenia MWF HD at [facility];      -Was on CRRT on     Transitioned to IHD on     HD AM  Monitor vitals                       
Referral source:   Yuki Escobar at [facility] in [facility] 4 CORONARY CARE.  attended rounds in the CCU as part of the Interdisciplinary team where the patient's ongoing care was discussed. I reviewed the medical record as part of this encounter.     Outcome: Interdisciplinary team are aware of  availability and were encouraged to request Spiritual Health support as needed.      The  on-call can be reached at ([phone]).     Rev. Mary Marcus MDiv, [facility]  Staff     
Referral source:   Yuki Escobar at [facility] in [facility] 4 CORONARY CARE.  attended rounds in the CCU as part of the Interdisciplinary team where the patient's ongoing care was discussed. I reviewed the medical record as part of this encounter.     Outcome: Interdisciplinary team are aware of  availability and were encouraged to request Spiritual Health support as needed.      The  on-call can be reached at ([phone]).     Rev. Mary Marcus MDiv, [facility]  Staff     
Report received from CHARLY Pa in CCU prior to patient coming to cath lab for RHC with Dr. Soria  
Spiritual Health History and Assessment/Progress Note  [facility]    Follow-up,  ,  ,      Name: Yuki Escobar MRN: [id number]    Age: 65 y.o.     Sex: female   Language: English   Pentecostal: None   Hypotension     Date: 5/13/2025            Total Time Calculated: 26 min              Spiritual Assessment continued in [facility] 4 CORONARY CARE        Referral/Consult From: Rounding   Encounter Overview/Reason: Follow-up  Service Provided For: Patient    Audrey, Belief, Meaning:   Patient has beliefs or practices that help with coping during difficult times  Family/Friends No family/friends present      Importance and Influence:  Patient has spiritual/personal beliefs that influence decisions regarding their health  Family/Friends have spiritual/personal beliefs that influence decisions regarding the patient's health and No family/friends present    Community:  Patient feels well-supported. Support system includes: Children  Family/Friends No family/friends present    Assessment and Plan of Care:   Follow-up: Ms Mirza appeared to be doing well. She is hoping to be discharged soon. Music and family are her coping resources.     Patient Interventions include: Facilitated expression of thoughts and feelings  Family/Friends Interventions include: No family/friends present    Patient Plan of Care: Spiritual Care available upon further referral  Family/Friends Plan of Care: No family/friends present    Electronically signed by REV. MELCHOR DESIR M.Div, GAEL on 5/13/2025 at 11:56 AM    
Spiritual Health History and Assessment/Progress Note  [facility]    Interdisciplinary rounds (5.6.25),  ,  ,      Name: Yuki Escobar MRN: [id number]    Age: 64 y.o.     Sex: female   Language: English   Sikhism: None   Hypotension     Date: 5/6/2025            Total Time Calculated: 5 min               attended palliative rounds where patient care was discussed.    Electronically signed by Chaplain Chas Resident on 5/6/2025 at 4:21 PM    
Spiritual Health History and Assessment/Progress Note  [facility]    Initial Encounter,  ,  ,      Name: Yuki Escobar MRN: [id number]    Age: 64 y.o.     Sex: female   Language: English   Mandaeism: None   Hypotension     Date: 4/28/2025            Total Time Calculated: 20 min              Spiritual Assessment began in [facility] 4 CORONARY CARE            Encounter Overview/Reason: Initial Encounter  Service Provided For: Patient    Audrey, Belief, Meaning:   Patient unable to assess at this time  Family/Friends No family/friends present      Importance and Influence:  Patient unable to assess at this time  Family/Friends No family/friends present    Community:  Patient Other: unable to assess at this time  Family/Friends No family/friends present    Assessment and Plan of Care:   I reviewed the medical record prior to this encounter. Per consultation with staff, Yuki Escobar has dementia and slightly confused.  had a belief conversation patient who appeared  very pleasant and open to conversation. She spoke about her son, Ariel.     Patient Interventions include: unable to assess at this time  Family/Friends Interventions include: No family/friends present    Patient Plan of Care: unable to assess at this time  Family/Friends Plan of Care: No family/friends present    Electronically signed by REV. MELCHOR DESIR M.Div, BCC on 4/28/2025 at 10:41 AM      
05/06/25  0331 05/07/25  0626   GLOB 2.9 2.7       No results for input(s): \"INR\", \"APTT\" in the last 72 hours.    Invalid input(s): \"PTP\"   No results for input(s): \"TIBC\" in the last 72 hours.    Invalid input(s): \"FE\", \"PSAT\", \"FERR\"     No results found for: \"RBCF\"   No results for input(s): \"PH\", \"PCO2\", \"PO2\" in the last 72 hours.  No results for input(s): \"CPK\", \"CKMB\", \"TROPONINI\" in the last 72 hours.  No components found for: \"GLPOC\"  @labua@    MEDICATIONS:  Current Facility-Administered Medications   Medication Dose Route Frequency    midodrine (PROAMATINE) tablet 20 mg  20 mg Oral TID WC    heparin (porcine) injection 1,900 Units  1,900 Units IntraCATHeter PRN    And    heparin (porcine) injection 1,800 Units  1,800 Units IntraCATHeter PRN    fludrocortisone (FLORINEF) tablet 0.05 mg  0.05 mg Oral Daily    ipratropium 0.5 mg-albuterol 2.5 mg (DUONEB) nebulizer solution 1 Dose  1 Dose Inhalation BID RT    epoetin cheli (EPOGEN;PROCRIT) injection 10,000 Units  10,000 Units SubCUTAneous Once per day on Monday Wednesday Friday    hydrocortisone sodium succinate PF (SOLU-CORTEF) injection 50 mg  50 mg IntraVENous Daily    polyethylene glycol (GLYCOLAX) packet 17 g  17 g Oral Daily PRN    apixaban (ELIQUIS) tablet 5 mg  5 mg Oral BID    albumin human 25% IV solution 25 g  25 g IntraVENous PRN    0.9 % sodium chloride infusion   IntraVENous Continuous    cyclobenzaprine (FLEXERIL) tablet 10 mg  10 mg Oral TID PRN    budesonide (PULMICORT) nebulizer suspension 500 mcg  0.5 mg Nebulization BID RT    folic acid (FOLVITE) tablet 1 mg  1 mg Oral Daily    norepinephrine (LEVOPHED) 16 mg in sodium chloride 0.9 % 250 mL infusion (premix)  1-100 mcg/min IntraVENous Continuous    ALTEplase (CATHFLO) 1 mg in sterile water 1 mL injection  1 mg IntraCATHeter PRN    sodium chloride flush 0.9 % injection 5-40 mL  5-40 mL IntraVENous 2 times per day    sodium chloride flush 0.9 % injection 5-40 mL  5-40 mL IntraVENous PRN    
05/07/25  0626   GLOB 2.7       No results for input(s): \"INR\", \"APTT\" in the last 72 hours.    Invalid input(s): \"PTP\"   No results for input(s): \"TIBC\" in the last 72 hours.    Invalid input(s): \"FE\", \"PSAT\", \"FERR\"     No results found for: \"RBCF\"   No results for input(s): \"PH\", \"PCO2\", \"PO2\" in the last 72 hours.  No results for input(s): \"CPK\", \"CKMB\", \"TROPONINI\" in the last 72 hours.  No components found for: \"GLPOC\"  @labua@    MEDICATIONS:  Current Facility-Administered Medications   Medication Dose Route Frequency    midodrine (PROAMATINE) tablet 20 mg  20 mg Oral TID WC    heparin (porcine) injection 1,900 Units  1,900 Units IntraCATHeter PRN    And    heparin (porcine) injection 1,800 Units  1,800 Units IntraCATHeter PRN    fludrocortisone (FLORINEF) tablet 0.05 mg  0.05 mg Oral Daily    ipratropium 0.5 mg-albuterol 2.5 mg (DUONEB) nebulizer solution 1 Dose  1 Dose Inhalation BID RT    [Held by provider] epoetin cheli (EPOGEN;PROCRIT) injection 10,000 Units  10,000 Units SubCUTAneous Once per day on Monday Wednesday Friday    hydrocortisone sodium succinate PF (SOLU-CORTEF) injection 50 mg  50 mg IntraVENous Daily    polyethylene glycol (GLYCOLAX) packet 17 g  17 g Oral Daily PRN    apixaban (ELIQUIS) tablet 5 mg  5 mg Oral BID    albumin human 25% IV solution 25 g  25 g IntraVENous PRN    0.9 % sodium chloride infusion   IntraVENous Continuous    cyclobenzaprine (FLEXERIL) tablet 10 mg  10 mg Oral TID PRN    budesonide (PULMICORT) nebulizer suspension 500 mcg  0.5 mg Nebulization BID RT    folic acid (FOLVITE) tablet 1 mg  1 mg Oral Daily    norepinephrine (LEVOPHED) 16 mg in sodium chloride 0.9 % 250 mL infusion (premix)  1-100 mcg/min IntraVENous Continuous    ALTEplase (CATHFLO) 1 mg in sterile water 1 mL injection  1 mg IntraCATHeter PRN    sodium chloride flush 0.9 % injection 5-40 mL  5-40 mL IntraVENous 2 times per day    sodium chloride flush 0.9 % injection 5-40 mL  5-40 mL IntraVENous PRN    
Completed:Yes  Disposition: Stay in ICU  Multidisciplinary Rounds Completed:  Yes  Patient/Family Updated: Yes      Review of Systems:   Negative except as noted above    OBJECTIVE:     Labs and Data: Reviewed 25  Medications: Reviewed 25  Imaging: Reviewed 25    /62   Pulse 70   Temp 98 °F (36.7 °C) (Oral)   Resp 22   Ht 1.651 m (5' 5\")   Wt 113.9 kg (251 lb 1.7 oz)   SpO2 94%   BMI 41.79 kg/m²      Temp (24hrs), Av.2 °F (36.8 °C), Min:97.9 °F (36.6 °C), Max:98.5 °F (36.9 °C)           Physical Exam  Vitals and nursing note reviewed.   Constitutional:       General: She is not in acute distress.     Appearance: Normal appearance. She is obese. She is ill-appearing.   HENT:      Head: Normocephalic and atraumatic.      Nose: Nose normal. No congestion.      Mouth/Throat:      Mouth: Mucous membranes are dry.      Pharynx: Oropharynx is clear. No oropharyngeal exudate.   Eyes:      General: No scleral icterus.     Extraocular Movements: Extraocular movements intact.      Conjunctiva/sclera: Conjunctivae normal.      Pupils: Pupils are equal, round, and reactive to light.      Comments: Glasses   Cardiovascular:      Rate and Rhythm: Normal rate and regular rhythm.      Pulses: Normal pulses.      Heart sounds: Normal heart sounds. No murmur heard.     No friction rub.   Pulmonary:      Effort: Pulmonary effort is normal. No respiratory distress (controlled).      Breath sounds: Normal breath sounds.      Comments: Bipap  Abdominal:      General: Abdomen is flat. Bowel sounds are normal. There is no distension.      Palpations: Abdomen is soft.      Tenderness: There is no abdominal tenderness.   Musculoskeletal:         General: Normal range of motion.      Cervical back: Normal range of motion and neck supple. No rigidity.      Right lower leg: No edema.      Left lower leg: No edema.   Skin:     General: Skin is warm and dry.      Capillary Refill: Capillary refill takes less than 
ferritin  Chronic thrombocytopenia  Morbid obesity, high BMI, with ongoing weight loss  Palliative medicine encounter  Care goals     ASSESSMENT AND PLAN:   Along w/ Cass Lemus LCSW meet w/ pt, who had a great 65th birthday this weekend w/ vivian and her family.   Remains on levo, on dialysis.   Following along w/ intensivist for plan of care.   DNR/I.  Please call with any palliative questions or concerns.  Palliative Care Team is available via perfect serve or via phone.    Referrals to:   [] Outpatient Palliative Care  [] Home Based Palliative Care  [] Home Based Primary Care  [] Hospice       ADVANCE CARE PLANNING:   [] The [facility] Interdisciplinary Team has updated the ACP Navigator with Health Care Decision Maker and Patient Capacity      Primary Decision Maker: Ariel Escobar - Child - [phone]  Confirm Advance Directive: None  Patient Would Like to Complete Advance Directive: No/refused    Current Code Status: DNR     Goals of Care: Goals of Care and Interventions  Patient/Health Care Proxy Stated Goals: Recovery from acute illness       Please refer to Palliative Medicine ACP notes for further details.    PALLIATIVE ASSESSMENT:      Palliative Performance Scale (PPS):  PPS: 50    Pt feels well- \"I'm smoothing my ride to get out of here\"- meaning her apartment.    Modified ESAS:  Modified-[place] Symptom Assessment Scale (ESAS)  Tiredness Score: 2  Pain Score: No pain  Anxiety Score: Not anxious  Dyspnea Score: 1    Clinical Pain Assessment (nonverbal scale for severity on nonverbal patients):   Clinical Pain Assessment  Severity: 0             Vital Signs: Blood pressure (!) 108/55, pulse 79, temperature 98.2 °F (36.8 °C), temperature source Axillary, resp. rate 28, height 1.651 m (5' 5\"), weight 112.7 kg (248 lb 7.3 oz), SpO2 100%.    PHYSICAL ASSESSMENT:   General: [x] Oriented and awake, but does not seem fully aware of condition , in good spirits   [] Well appearing  [] Intubated  []Ill appearing  
hyperattenuation on CT Scan, elevated ferritin  Chronic thrombocytopenia  Morbid obesity, high BMI, with ongoing weight loss  Palliative medicine encounter  Care goals     ASSESSMENT AND PLAN:   Along w/ Cass Lemus LCSW meet w/ pt and son Ariel. Pt is off levophed, soft BP.  Confirm w/ family- plan is for last dialysis session today, and if needs levo that is okay for short term. No more dialysis- after will go home w/ Hospice. Ariel says that this plan is what he discussed w/ Hospice yest. (Hospice can no longer document in Epic).   Available as needed.  Please call with any palliative questions or concerns.  Palliative Care Team is available via perfect serve or via phone.    Referrals to:   [] Outpatient Palliative Care  [] Home Based Palliative Care  [] Home Based Primary Care  [] Hospice       ADVANCE CARE PLANNING:   [] The [facility] Interdisciplinary Team has updated the ACP Navigator with Health Care Decision Maker and Patient Capacity      Primary Decision Maker: Ariel Escobar - [facility] - [phone]  Confirm Advance Directive: None  Patient Would Like to Complete Advance Directive: No/refused    Current Code Status: DNR     Goals of Care: Goals of Care and Interventions  Patient/Health Care Proxy Stated Goals: Recovery from acute illness       Please refer to Palliative Medicine ACP notes for further details.    PALLIATIVE ASSESSMENT:      Palliative Performance Scale (PPS):  PPS: 50    Pt \"jamming\" to her music.    Modified ESAS:  Modified-[place] Symptom Assessment Scale (ESAS)  Tiredness Score: 2  Pain Score: No pain  Anxiety Score: Not anxious  Dyspnea Score: 1    Clinical Pain Assessment (nonverbal scale for severity on nonverbal patients):   Clinical Pain Assessment  Severity: 0             Vital Signs: Blood pressure (!) 90/44, pulse 70, temperature 97.7 °F (36.5 °C), temperature source Oral, resp. rate 26, height 1.651 m (5' 5\"), weight 115.7 kg (255 lb 1.2 oz), SpO2 92%.    PHYSICAL ASSESSMENT: 
monitor off ABX    ICU DAILY CHECKLIST     Code Status: Full - palliative care consult   DVT Prophylaxis: heparin   T/L/D: PIV, CVC, a line  SUP: N/A  Diet: advance as tolerated  ABCDEF Bundle/Checklist Completed:Yes  Disposition: Stay in ICU  Multidisciplinary Rounds Completed:  Yes  Patient/Family Updated: Yes    OBJECTIVE:     Blood pressure (!) 91/50, pulse 70, temperature 98 °F (36.7 °C), temperature source Axillary, resp. rate 23, height 1.651 m (5' 5\"), weight 109.7 kg (241 lb 13.5 oz), SpO2 96%.  Physical Exam  Gen: chronically ill appearing   HEENT: NC/AT, supple  Cardiac: RRR, no M/R/G  Pulm: CTA bilaterally  ABD: S/NT/ND, normal bowel sounds  Extremities: no edema  Skin: no rash  Neuro: follows commands    I have personally reviewed and interpreted all relevant imaging and laboratory data.     CRITICAL CARE DOCUMENTATION  I had a face to face encounter with the patient, reviewed and interpreted patient data including clinical events, labs, images, vital signs, I/O's, and examined patient.  I have discussed the case and the plan and management of the patient's care with the consulting services, the bedside nurses and the respiratory therapist.      NOTE OF PERSONAL INVOLVEMENT IN CARE   This patient has a high probability of imminent, clinically significant deterioration, which requires the highest level of preparedness to intervene urgently. I participated in the decision-making and personally managed or directed the management of the following life and organ supporting interventions that required my frequent assessment to treat or prevent imminent deterioration.    I personally spent 35 minutes of critical care time.  This is time spent at this critically ill patient's bedside actively involved in patient care as well as the coordination of care.  This does not include any procedural time which has been billed separately.    Kd Correa M.D.  [facility] Critical Care  5/2/2025     
nidus for infection.   - follow culture data  - continue vancomycin  - continue zosyn     ICU DAILY CHECKLIST     Code Status: Full - needs GOC  DVT Prophylaxis: heparin   T/L/D: PIV, CVC, a line  SUP: N/A  Diet: advance as tolerated  ABCDEF Bundle/Checklist Completed:Yes  Disposition: Stay in ICU  Multidisciplinary Rounds Completed:  Yes  Patient/Family Updated: Yes    OBJECTIVE:     Blood pressure (!) 100/49, pulse 70, temperature 98 °F (36.7 °C), temperature source Axillary, resp. rate 23, height 1.651 m (5' 5\"), weight 114.2 kg (251 lb 12.3 oz), SpO2 98%.  Physical Exam  Gen: chronically ill appearing   HEENT: NC/AT, supple  Cardiac: RRR, no M/R/G  Pulm: CTA bilaterally  ABD: S/NT/ND, normal bowel sounds  Extremities: no edema  Skin: no rash  Neuro: follows commands    I have personally reviewed and interpreted all relevant imaging and laboratory data.     CRITICAL CARE DOCUMENTATION  I had a face to face encounter with the patient, reviewed and interpreted patient data including clinical events, labs, images, vital signs, I/O's, and examined patient.  I have discussed the case and the plan and management of the patient's care with the consulting services, the bedside nurses and the respiratory therapist.      NOTE OF PERSONAL INVOLVEMENT IN CARE   This patient has a high probability of imminent, clinically significant deterioration, which requires the highest level of preparedness to intervene urgently. I participated in the decision-making and personally managed or directed the management of the following life and organ supporting interventions that required my frequent assessment to treat or prevent imminent deterioration.    I personally spent 40 minutes of critical care time.  This is time spent at this critically ill patient's bedside actively involved in patient care as well as the coordination of care.  This does not include any procedural time which has been billed separately.    Kd Correa 
5-40 mL IntraVENous 2 times per day    sodium chloride flush 0.9 % injection 5-40 mL  5-40 mL IntraVENous PRN    0.9 % sodium chloride infusion   IntraVENous PRN    ondansetron (ZOFRAN-ODT) disintegrating tablet 4 mg  4 mg Oral Q8H PRN    Or    ondansetron (ZOFRAN) injection 4 mg  4 mg IntraVENous Q6H PRN    acetaminophen (TYLENOL) tablet 650 mg  650 mg Oral Q6H PRN    Or    acetaminophen (TYLENOL) suppository 650 mg  650 mg Rectal Q6H PRN    magnesium sulfate 2000 mg in 50 mL IVPB premix  2,000 mg IntraVENous PRN    senna (SENOKOT) tablet 8.6 mg  1 tablet Oral Nightly    albuterol (PROVENTIL) (2.5 MG/3ML) 0.083% nebulizer solution 2.5 mg  2.5 mg Nebulization Q6H PRN    atorvastatin (LIPITOR) tablet 10 mg  10 mg Oral Nightly    montelukast (SINGULAIR) tablet 10 mg  10 mg Oral Nightly     
alert and oriented to person, place, and time. Mental status is at baseline.      Cranial Nerves: No cranial nerve deficit.   Psychiatric:         Mood and Affect: Mood normal.         Behavior: Behavior normal.             Intake/Output:     Intake/Output Summary (Last 24 hours) at 5/7/2025 0847  Last data filed at 5/7/2025 0700  Gross per 24 hour   Intake 814.78 ml   Output --   Net 814.78 ml         Pertinent labs and imaging reviewed and interpreted as noted above        I personally spent 40 minutes of critical care time.  This is time spent at this critically ill patient's bedside actively involved in patient care as well as the coordination of care.  This does not include any procedural time which has been billed separately.    Patricio Tan MD  Internal/Critical Care/Neurocritical Care Medicine  Staff Intensivist- [facility]    
cranial nerve deficit.   Psychiatric:         Mood and Affect: Mood normal.         Behavior: Behavior normal.             Intake/Output:     Intake/Output Summary (Last 24 hours) at 5/9/2025 0739  Last data filed at 5/9/2025 0517  Gross per 24 hour   Intake 112.68 ml   Output --   Net 112.68 ml         Pertinent labs and imaging reviewed and interpreted as noted above        I personally spent 40 minutes of critical care time.  This is time spent at this critically ill patient's bedside actively involved in patient care as well as the coordination of care.  This does not include any procedural time which has been billed separately.    Patricio Tan MD  Internal/Critical Care/Neurocritical Care Medicine  Staff Intensivist- [facility]    
sodium chloride 0.9 % 250 mL infusion (premix)  1-100 mcg/min IntraVENous Continuous    VASOpressin (VASOSTRICT) 20 units in sodium chloride 0.9% 100 mL infusion  0.03 Units/min IntraVENous Continuous    ALTEplase (CATHFLO) 1 mg in sterile water 1 mL injection  1 mg IntraCATHeter PRN    sodium chloride flush 0.9 % injection 5-40 mL  5-40 mL IntraVENous 2 times per day    sodium chloride flush 0.9 % injection 5-40 mL  5-40 mL IntraVENous PRN    0.9 % sodium chloride infusion   IntraVENous PRN    ondansetron (ZOFRAN-ODT) disintegrating tablet 4 mg  4 mg Oral Q8H PRN    Or    ondansetron (ZOFRAN) injection 4 mg  4 mg IntraVENous Q6H PRN    acetaminophen (TYLENOL) tablet 650 mg  650 mg Oral Q6H PRN    Or    acetaminophen (TYLENOL) suppository 650 mg  650 mg Rectal Q6H PRN    magnesium sulfate 2000 mg in 50 mL IVPB premix  2,000 mg IntraVENous PRN    senna (SENOKOT) tablet 8.6 mg  1 tablet Oral Nightly    albuterol (PROVENTIL) (2.5 MG/3ML) 0.083% nebulizer solution 2.5 mg  2.5 mg Nebulization Q6H PRN    atorvastatin (LIPITOR) tablet 10 mg  10 mg Oral Nightly    montelukast (SINGULAIR) tablet 10 mg  10 mg Oral Nightly     
concerns:  [] Yes /  [x] No   If \"Yes\" to discuss with pastoral care during IDT     Does caregiver feel burdened by caring for their loved one:   [] Yes /  [x] No /  [] No Caregiver Present/Available [] No Caregiver [] Pt Lives at Facility  If \"Yes\" to discuss with social work during IDT    Anticipatory grief assessment:   [x] Normal  / [] Maladaptive     If \"Maladaptive\" to discuss with social work during IDT    ESAS Anxiety: Anxiety Score: Not anxious    ESAS Depression:          LAB AND IMAGING FINDINGS:   Objective data reviewed:  labs, images, records, medication use, vitals, and chart     FINAL COMMENTS   Thank you for allowing Palliative Medicine to participate in the care of Yuki Escobar.    Only check if applicable and billing time based rather than MDM  [x] The total encounter time on this service date was _25 __ minutes which was spent performing a face-to-face encounter and personally completing the provider-level activities documented in the note. This includes time spent prior to the visit and after the visit in direct care of the patient. This time does not include time spent in any separately reportable services.    Electronically signed by   Asiya Vallaadres MD  Palliative Care Team  on 5/8/2025 at 3:28 PM    
separately reportable services.    Electronically signed by   Asiya Valladares MD  Palliative Care Team  on 5/9/2025 at 12:25 PM    
[I.V.:515.3]  Out: -         Intake/Output Summary (Last 24 hours) at 5/4/2025 1500  Last data filed at 5/4/2025 0700  Gross per 24 hour   Intake 352.54 ml   Output --   Net 352.54 ml       Physical Exam:                                        Exam Findings Other   General: No resp distress noted, appears stated age    HEENT:  No ulcers, JVD not elevated, no cervical LAD    Chest: No pectus deformity, normal chest rise b/l    HEART:  RRR, no murmurs/rubs/gallops    Lungs:  CTA b/l, no rhonchi/crackles/wheeze, diminished BS at bases    ABD: Soft/NT, non rigid mildly distended    EXT: No cyanosis/clubbing/edema, normal peripheral pulses    Skin: No rashes or ulcers, no mottling    Neuro: Awake. Doesn't answer many questions         Medications:  Current Facility-Administered Medications   Medication Dose Route Frequency    fludrocortisone (FLORINEF) tablet 0.05 mg  0.05 mg Oral Daily    milrinone (PRIMACOR) 20 mg in dextrose 5 % 100 mL infusion  0.125 mcg/kg/min IntraVENous Continuous    ipratropium 0.5 mg-albuterol 2.5 mg (DUONEB) nebulizer solution 1 Dose  1 Dose Inhalation BID RT    epoetin cheli (EPOGEN;PROCRIT) injection 10,000 Units  10,000 Units SubCUTAneous Once per day on Monday Wednesday Friday    hydrocortisone sodium succinate PF (SOLU-CORTEF) injection 50 mg  50 mg IntraVENous Daily    polyethylene glycol (GLYCOLAX) packet 17 g  17 g Oral Daily PRN    apixaban (ELIQUIS) tablet 5 mg  5 mg Oral BID    midodrine (PROAMATINE) tablet 15 mg  15 mg Oral TID WC    albumin human 25% IV solution 25 g  25 g IntraVENous PRN    0.9 % sodium chloride infusion   IntraVENous Continuous    cyclobenzaprine (FLEXERIL) tablet 10 mg  10 mg Oral TID PRN    heparin (porcine) injection 1,100-1,900 Units  1,100-1,900 Units IntraCATHeter PRN    And    heparin (porcine) injection 1,100-1,900 Units  1,100-1,900 Units IntraCATHeter PRN    budesonide (PULMICORT) nebulizer suspension 500 mcg  0.5 mg Nebulization BID RT    folic acid 
and JF.  Recommend to start Prednisone 5 days prior to surgery.  Moderate risk. Understand the risk. No contraindication.  Cleared for surgery  ESRD (END STAGE RENAL DISEASE) (N18.6)    on HD. Planned for AV graft.  OBESITY HYPOVENTILATION SYNDROME (E66.2)    O2 supplementation.  SHORTNESS OF BREATH (R06.02)      DEPENDENCE ON SUPPLEMENTAL OXYGEN (Z99.81)      SEVERE SLEEP APNEA (G47.30)    Severe JF, AHI 95 events per hour, JF was adequately treated with BIPAP at IPAP 18 cm H2O pressure and EPAP 11 with 2LPM oxygen bled in  HYPOXIA (R09.02)      CHRONIC OBSTRUCTIVE PULMONARY DISEASE, UNSPECIFIED COPD TYPE (J44.9)    11/2023: FEV1 0.62 L or 29%, 18% improvement with BD  PULMONARY HYPERTENSION (I27.20)    PH - combined pre and post capillary WHO Group 2 PH with elevated wedge pressure > Group 3 PH (COPD, OHS)  SNORING (R06.83)      RENAL FAILURE (N19)      ASTHMA (J45.909)    Start Breo 200/25 one puff q daily.    Allergies (Cheryl Spicers; 6/11/2024 11:36 AM)   Iodine (Antiseptic) *ANTISEPTICS & DISINFECTANTS*      Metal     Sulfa Drugs      Shellfish       Immunization History (Cheryl Devi; 6/11/2024 11:36 AM)   Up to date        Family History (Cheryl Devi; 6/11/2024 11:36 AM)   Kidney Disease    Mother.   Hypertension   Mother, Father.   Diabetes   Mother.   Arthritis   Mother.     Social History (Cheryl Devi; 6/11/2024 11:36 AM)   NEVER SMOKED TOBACCO (Z78.9)      Exercise   3 x week.

## 2025-05-20 NOTE — ED NOTES
Patient states she would like to go home, Temitope PARKS updated. Temitope PARKS setting up transport for patient to go back to her home. She will call and update on time

## 2025-05-20 NOTE — PALLIATIVE CARE DISCHARGE
Goals of Care/Treatment Preferences    The Palliative Medicine team was consulted as part of your/your loved one's care in the hospital. Our team is a supportive service; we strive to relieve suffering and improve quality of life.    We reviewed advance care planning information, which includes the following:    Primary Decision Maker: Ariel Escobar - [place] - [phone]  Confirm Advance Directive: None  Patient Would Like to Complete Advance Directive: No/refused      We reviewed / discussed your code status as:   Code Status: DNR     “Full Code” means perform CPR in the event of cardiac arrest.      “DNR” means do NOT perform CPR in the event of cardiac arrest.      “Partial Code” means you have specific preferences, please discuss with your healthcare team.      “No Order” means this issue was not addressed / resolved during your stay    We met with you and your family while we were here in the hospital, goal is to focus on comfort and quality of life with hospice care.             Because of the importance of this information, we are providing you with a printed copy to share with other healthcare providers after this hospitalization is complete.

## 2025-05-20 NOTE — PLAN OF CARE
Problem: Occupational Therapy - Adult  Goal: By Discharge: Performs self-care activities at highest level of function for planned discharge setting.  See evaluation for individualized goals.  Description: FUNCTIONAL STATUS PRIOR TO ADMISSION:  Pt is a poor historian and will benefit from further clarification on PLOF. Per CM note, pt was recently at [facility]. Per pt, prior to SNF, she lived alone and was wheelchair bound  Receives Help From: Son with IADLs    HOME SUPPORT: Patient lived alone with son to provide assistance.    Occupational Therapy Goals:  Initiated 4/30/2025; goals reviewed and all continued/upgraded at weekly re-assessment 5/9/2025:  1.  Patient will perform self-feeding with Set-up within 7 day(s). - Continue  2.  Patient will perform grooming with Set-up within 7 day(s). - MET 5/9/2025, upgrade to complete full grooming routine with supervision   3.  Patient will perform upper body dressing with Minimal Assist within 7 day(s). - Continue  4.  Patient will perform toilet/BSC transfers with Maximal Assist  within 7 day(s). - Continue  5.  Patient will perform all aspects of toileting with Maximal Assist within 7 day(s). - Continue  6.  Patient will utilize energy conservation techniques during functional activities with min verbal cues within 7 day(s). - Continue  Outcome: Progressing   OCCUPATIONAL THERAPY TREATMENT  Patient: Yuki Escobar (65 y.o. female)  Date: 5/13/2025  Primary Diagnosis: Hypokalemia [E87.6]  Hypotension [I95.9]  ESRD (end stage renal disease) (HCC) [N18.6]  Elevated troponin [R79.89]  Hypotension, unspecified hypotension type [I95.9]  Procedure(s) (LRB):  Right heart cath (N/A)  Ultrasound guided vascular access (N/A) 11 Days Post-Op   Precautions: Fall Risk                Chart, occupational therapy assessment, plan of care, and goals were reviewed.    ASSESSMENT  Patient continues to benefit from skilled OT services and is progressing towards goals. Pt received 
  Problem: Occupational Therapy - Adult  Goal: By Discharge: Performs self-care activities at highest level of function for planned discharge setting.  See evaluation for individualized goals.  Description: FUNCTIONAL STATUS PRIOR TO ADMISSION:  Pt is a poor historian and will benefit from further clarification on PLOF. Per CM note, pt was recently at [facility]. Per pt, prior to SNF, she lived alone and was wheelchair bound  Receives Help From: Son with IADLs    HOME SUPPORT: Patient lived alone with son to provide assistance.    Occupational Therapy Goals:  Initiated 4/30/2025  1.  Patient will perform self-feeding with Set-up within 7 day(s).  2.  Patient will perform grooming with Set-up within 7 day(s).  3.  Patient will perform upper body dressing with Minimal Assist within 7 day(s).  4.  Patient will perform toilet transfers with Maximal Assist  within 7 day(s).  5.  Patient will perform all aspects of toileting with Maximal Assist within 7 day(s).  6.  Patient will utilize energy conservation techniques during functional activities with min verbal cues within 7 day(s).   Outcome: Progressing   OCCUPATIONAL THERAPY TREATMENT    Patient: Yuki Escobar (64 y.o. female)  Date: 5/1/2025  Primary Diagnosis: Hypokalemia [E87.6]  Hypotension [I95.9]  ESRD (end stage renal disease) (HCC) [N18.6]  Elevated troponin [R79.89]  Hypotension, unspecified hypotension type [I95.9]       Precautions: Fall Risk                Chart, occupational therapy assessment, plan of care, and goals were reviewed.    ASSESSMENT  Patient continues to benefit from skilled OT services and is slowly progressing towards goals. Pt's performance of ADL/IADL tasks continues to be limited by impaired balance, activity tolerance, generalized weakness, coordination, and cognition (memory, insight). Pt received semi-supine with notably brighter affect compared to yesterday's session. She verbalized motivation to work towards transferring OOB to 
  Problem: Occupational Therapy - Adult  Goal: By Discharge: Performs self-care activities at highest level of function for planned discharge setting.  See evaluation for individualized goals.  Description: FUNCTIONAL STATUS PRIOR TO ADMISSION:  Pt is a poor historian and will benefit from further clarification on PLOF. Per CM note, pt was recently at [facility]. Per pt, prior to SNF, she lived alone and was wheelchair bound  Receives Help From: Son with IADLs    HOME SUPPORT: Patient lived alone with son to provide assistance.    Occupational Therapy Goals:  Initiated 4/30/2025; goals reviewed and all continued/upgraded at weekly re-assessment 5/9/2025:  1.  Patient will perform self-feeding with Set-up within 7 day(s). - Continue  2.  Patient will perform grooming with Set-up within 7 day(s). - MET 5/9/2025, upgrade to complete full grooming routine with supervision   3.  Patient will perform upper body dressing with Minimal Assist within 7 day(s). - Continue  4.  Patient will perform toilet/BSC transfers with Maximal Assist  within 7 day(s). - Continue  5.  Patient will perform all aspects of toileting with Maximal Assist within 7 day(s). - Continue  6.  Patient will utilize energy conservation techniques during functional activities with min verbal cues within 7 day(s). - Continue  Outcome: Progressing   OCCUPATIONAL THERAPY TREATMENT  Patient: Yuki Escobar (65 y.o. female)  Date: 5/16/2025  Primary Diagnosis: Hypokalemia [E87.6]  Hypotension [I95.9]  ESRD (end stage renal disease) (HCC) [N18.6]  Elevated troponin [R79.89]  Hypotension, unspecified hypotension type [I95.9]  Procedure(s) (LRB):  Right heart cath (N/A)  Ultrasound guided vascular access (N/A) 14 Days Post-Op   Precautions: Fall Risk                Chart, occupational therapy assessment, plan of care, and goals were reviewed.      ASSESSMENT  Patient continues to benefit from skilled OT services and is progressing towards goals. Patient 
  Problem: Physical Therapy - Adult  Goal: By Discharge: Performs mobility at highest level of function for planned discharge setting.  See evaluation for individualized goals.  Description: FUNCTIONAL STATUS PRIOR TO ADMISSION: Patient recently residing at [facility] receiving PT and OT services.  Pt was an inconsistent historian due to cognitive deficits--unable to obtain PLOF.  At baseline per chart review, pt was living alone and performing all mobility at a wheelchair level.  Unclear level of activity while at SNF.     HOME SUPPORT PRIOR TO ADMISSION: Pt lived alone and reports her son assisted with IADLs prior to admission at OSH and then subsequent transfer to SNF for skilled therapy services.  Unclear level of s/a available.    Physical Therapy Goals  Initiated 4/30/2025  1.  Patient will move from supine to sit and sit to supine in bed with maximal assistance within 7 day(s).    2.  Patient will perform sit to stand with maximal assistance within 7 day(s).  3.  Patient will transfer from bed to chair and chair to bed with maximal assistance using the least restrictive device within 7 day(s).  4.  Patient will sit EOB x10 min with overall Alberto within 7 day(s).  Outcome: Progressing   PHYSICAL THERAPY EVALUATION    Patient: Yuki Escobar (64 y.o. female)  Date: 4/30/2025  Primary Diagnosis: Hypokalemia [E87.6]  Hypotension [I95.9]  ESRD (end stage renal disease) (HCC) [N18.6]  Elevated troponin [R79.89]  Hypotension, unspecified hypotension type [I95.9]       Precautions: Restrictions/Precautions  Restrictions/Precautions: Fall Risk            ASSESSMENT :   is a 64yoF with COPD (on home O2), JF/OHS on BiPAP, ESRD, chronic hypotension, complete AV block s/p pacemaker, Afib/flutter, DM, HFpEF, pulmonary HTN, who presented to ED 4/25 with hypotension  admitted to the ICU for undifferentiated shock.  Patient seen for PT evaluation for bed level only evaluation as patient with labile BP currently requiring 
  Problem: Safety - Adult  Goal: Free from fall injury  5/20/2025 0042 by Stephanie Palomino RN  Outcome: Progressing  5/19/2025 1536 by Xiomy Ruiz RD  Outcome: Adequate for Discharge     Problem: Chronic Conditions and Co-morbidities  Goal: Patient's chronic conditions and co-morbidity symptoms are monitored and maintained or improved  5/20/2025 0042 by Stephanie Palomino RN  Outcome: Progressing  5/19/2025 1536 by Xiomy Ruiz RD  Outcome: Adequate for Discharge     Problem: Discharge Planning  Goal: Discharge to home or other facility with appropriate resources  5/20/2025 0042 by Stephanie Palomino RN  Outcome: Progressing  5/19/2025 1536 by Xiomy Ruiz RD  Outcome: Adequate for Discharge     Problem: Pain  Goal: Verbalizes/displays adequate comfort level or baseline comfort level  5/20/2025 0042 by Stephanie Palomino RN  Outcome: Progressing  5/19/2025 1536 by Xiomy Ruiz RD  Outcome: Adequate for Discharge     Problem: Skin/Tissue Integrity  Goal: Skin integrity remains intact  Description: 1.  Monitor for areas of redness and/or skin breakdown2.  Assess vascular access sites hourly3.  Every 4-6 hours minimum:  Change oxygen saturation probe site4.  Every 4-6 hours:  If on nasal continuous positive airway pressure, respiratory therapy assess nares and determine need for appliance change or resting period  5/20/2025 0042 by Stephanie Palomino RN  Outcome: Progressing  5/19/2025 1536 by Xiomy Ruiz RD  Outcome: Adequate for Discharge     Problem: Nutrition Deficit:  Goal: Optimize nutritional status  5/20/2025 0042 by Stephanie Palomino RN  Outcome: Progressing  5/19/2025 1536 by Xiomy Ruiz RD  Outcome: Adequate for Discharge     
  Problem: Safety - Adult  Goal: Free from fall injury  5/5/2025 2237 by Stephanie Palomino RN  Outcome: Progressing  5/5/2025 1116 by Rianna Ny RN  Outcome: Progressing     Problem: Chronic Conditions and Co-morbidities  Goal: Patient's chronic conditions and co-morbidity symptoms are monitored and maintained or improved  5/5/2025 2237 by Stephanie Palomino RN  Outcome: Progressing  5/5/2025 1116 by Rianna Ny RN  Outcome: Progressing     Problem: Discharge Planning  Goal: Discharge to home or other facility with appropriate resources  5/5/2025 2237 by Stephanie Palomino RN  Outcome: Progressing  5/5/2025 1116 by Rianna Ny RN  Outcome: Progressing     Problem: Pain  Goal: Verbalizes/displays adequate comfort level or baseline comfort level  5/5/2025 2237 by Stephanie Palomino RN  Outcome: Progressing  5/5/2025 1116 by Rianna Ny RN  Outcome: Progressing     Problem: Skin/Tissue Integrity  Goal: Skin integrity remains intact  Description: 1.  Monitor for areas of redness and/or skin breakdown2.  Assess vascular access sites hourly3.  Every 4-6 hours minimum:  Change oxygen saturation probe site4.  Every 4-6 hours:  If on nasal continuous positive airway pressure, respiratory therapy assess nares and determine need for appliance change or resting period  5/5/2025 2237 by Stephanie Palomino RN  Outcome: Progressing  5/5/2025 1116 by Rianna Ny RN  Outcome: Progressing     Problem: Nutrition Deficit:  Goal: Optimize nutritional status  5/5/2025 2237 by Stephanie Palomino RN  Outcome: Progressing  5/5/2025 1116 by Rianna Ny RN  Outcome: Progressing     
  Problem: Safety - Adult  Goal: Free from fall injury  Outcome: Progressing     Problem: Chronic Conditions and Co-morbidities  Goal: Patient's chronic conditions and co-morbidity symptoms are monitored and maintained or improved  Outcome: Progressing     Problem: Discharge Planning  Goal: Discharge to home or other facility with appropriate resources  Outcome: Progressing     Problem: Pain  Goal: Verbalizes/displays adequate comfort level or baseline comfort level  Outcome: Progressing     Problem: Skin/Tissue Integrity  Goal: Skin integrity remains intact  Description: 1.  Monitor for areas of redness and/or skin breakdown2.  Assess vascular access sites hourly3.  Every 4-6 hours minimum:  Change oxygen saturation probe site4.  Every 4-6 hours:  If on nasal continuous positive airway pressure, respiratory therapy assess nares and determine need for appliance change or resting period  Outcome: Progressing     Problem: Nutrition Deficit:  Goal: Optimize nutritional status  Outcome: Progressing     
  Problem: Safety - Adult  Goal: Free from fall injury  Outcome: Progressing     Problem: Chronic Conditions and Co-morbidities  Goal: Patient's chronic conditions and co-morbidity symptoms are monitored and maintained or improved  Outcome: Progressing     Problem: Discharge Planning  Goal: Discharge to home or other facility with appropriate resources  Outcome: Progressing     Problem: Pain  Goal: Verbalizes/displays adequate comfort level or baseline comfort level  Outcome: Progressing     Problem: Skin/Tissue Integrity  Goal: Skin integrity remains intact  Description: 1.  Monitor for areas of redness and/or skin breakdown2.  Assess vascular access sites hourly3.  Every 4-6 hours minimum:  Change oxygen saturation probe site4.  Every 4-6 hours:  If on nasal continuous positive airway pressure, respiratory therapy assess nares and determine need for appliance change or resting period  Outcome: Progressing     Problem: Nutrition Deficit:  Goal: Optimize nutritional status  Outcome: Progressing     
  Problem: Safety - Adult  Goal: Free from fall injury  Outcome: Progressing     Problem: Chronic Conditions and Co-morbidities  Goal: Patient's chronic conditions and co-morbidity symptoms are monitored and maintained or improved  Outcome: Progressing     Problem: Discharge Planning  Goal: Discharge to home or other facility with appropriate resources  Outcome: Progressing     Problem: Pain  Goal: Verbalizes/displays adequate comfort level or baseline comfort level  Outcome: Progressing     Problem: Skin/Tissue Integrity  Goal: Skin integrity remains intact  Description: 1.  Monitor for areas of redness and/or skin breakdown2.  Assess vascular access sites hourly3.  Every 4-6 hours minimum:  Change oxygen saturation probe site4.  Every 4-6 hours:  If on nasal continuous positive airway pressure, respiratory therapy assess nares and determine need for appliance change or resting period  Outcome: Progressing     Problem: Physical Therapy - Adult  Goal: By Discharge: Performs mobility at highest level of function for planned discharge setting.  See evaluation for individualized goals.  Description: FUNCTIONAL STATUS PRIOR TO ADMISSION: Patient recently residing at [facility] receiving PT and OT services.  Pt was an inconsistent historian due to cognitive deficits--unable to obtain PLOF.  At baseline per chart review, pt was living alone and performing all mobility at a wheelchair level.  Unclear level of activity while at SNF.     HOME SUPPORT PRIOR TO ADMISSION: Pt lived alone and reports her son assisted with IADLs prior to admission at OSH and then subsequent transfer to SNF for skilled therapy services.  Unclear level of s/a available.    Physical Therapy Goals  Initiated 4/30/2025  1.  Patient will move from supine to sit and sit to supine in bed with maximal assistance within 7 day(s).    2.  Patient will perform sit to stand with maximal assistance within 7 day(s).  3.  Patient will transfer from bed to chair 
towards goals. Pt's performance of ADL/IADL tasks continues to be limited by impaired balance, activity tolerance, generalized weakness, coordination/FMC, and cognition (orientation, attention, safety awareness, problem solving, memory). Pt received semi-supine, A&Ox1-2, and agreeable to participation in therapy session. She tolerated ~20 minutes of bed in chair position while completing basic grooming and UB dressing ADLs with upmost of mod A. MAP briefly dropped to low 60s with HOB elevated, however with light BUE AROM/BLE therex VSS. She continues to require pressor support to tolerate activity, however is motivated and eager to participate in functional tasks. Pt returned to semi-supine at end of session and left with all needs met. Will continue to follow throughout admission pending ongoing GOC.        PLAN  Goals have been updated based on progression since last assessment.  Patient continues to benefit from skilled intervention to address the above impairments.    Recommendations and Planned Interventions:   self care training, therapeutic activities, functional mobility training, balance training, therapeutic exercise, neuromuscular re-education, visual/perceptual training, endurance activities, cognitive retraining, patient education, home safety training, and family training/education    Frequency/Duration: OT Plan of Care: 3 times/week    Recommendation for discharge: (in order for the patient to meet his/her long term goals):   Moderate intensity short-term skilled occupational therapy up to 5x/week    Other factors to consider for discharge: poor safety awareness, impaired cognition, high risk for falls, and concern for safely navigating or managing the home environment    IF patient discharges home will need the following DME: continuing to assess with progress     SUBJECTIVE:   Patient stated “I like to party. . . I used to hop around the clubs.”    OBJECTIVE DATA SUMMARY:   Cognitive/Behavioral 
Care;Mobility Training  Education Method: Verbal  Barriers to Learning: Cognition  Education Outcome: Continued education needed;Verbalized understanding      Lilly Salter, PT  Minutes: 41   
Training: No            Pain Rating:  No c/o pain     Activity Tolerance:   Poor, requires frequent rest breaks, and MAPs >65 on levo at 6 mcg/min with bed level activity    After treatment:   Patient left in no apparent distress in bed, Call bell within reach, and Heels elevated for pressure relief      COMMUNICATION/EDUCATION:   The patient's plan of care was discussed with: occupational therapist and registered nurse    Patient Education  Education Given To: Patient  Education Provided: Role of Therapy;Plan of Care;Mobility Training  Education Method: Verbal  Barriers to Learning: Cognition  Education Outcome: Continued education needed;Verbalized understanding      Lilly Salter, PT  Minutes: 25   
Appears intact  Attention Span: Difficulty dividing attention;Attends with cues to redirect  Memory: Impaired;Decreased short term memory;Decreased recall of recent events;Decreased long term memory;Decreased recall of precautions  Safety Judgement: Impaired;Decreased awareness of need for assistance;Decreased awareness of need for safety  Problem Solving: Impaired;Decreased awareness of errors  Insights: Decreased awareness of deficits  Initiation: Requires cues for some  Sequencing: Requires cues for some  Cognition Comment: alert, but some delayed response times with basic questions; pleasantly confused    Hearing:        Vision/Perceptual:                  Strength:    Strength: Grossly decreased, non-functional (BLE grossly 2-3/5 throughout--able to partially lift BLE against gravity in bed; BUE-able to lift RUE against gravity; LUE grossly 2-3/5)    Tone & Sensation:   Tone: Normal  Sensation: Impaired (baseline neuropathy B feet and hands; diminished to lt touch B feet/hands)    Range Of Motion:  AROM: Grossly decreased, non-functional (limited due to weakness--unable to complete full AROM at hip, knee or ankle; BUE WFL)  PROM: Grossly decreased, non-functional (limited hip flexion due to body habitus and femoral line placement (bilaterally); minimal movement at R ankle, ?if contracture present at baseline; limited tolerance to B knee flexion)    Functional Mobility:  Bed Mobility:     Bed Mobility Training  Bed Mobility Training: Yes (total A for repositioning, bed mechanics used)  Overall Level of Assistance: Substantial/Maximal assistance;2 Person assistance;Dependent/Total  Interventions: Verbal cues;Tactile cues  Rolling: Substantial/Maximal assistance;2 Person assistance (to R and L)  Scooting: Dependent/Total (for repositioning in bed)  Transfers:     Transfer Training  Transfer Training: No  Balance:               Balance  Sitting: Impaired  Sitting - Static: Fair (occasional) (long sitting in 
components, the patient evaluation is determined to be of the following complexity level: Medium

## 2025-05-20 NOTE — ED PROVIDER NOTES
[facility] EMERGENCY DEPARTMENT  EMERGENCY DEPARTMENT ENCOUNTER      Pt Name: Yuki Escobar  MRN: [id number]  Birthdate 1960  Date of evaluation: 5/20/2025  Provider: Lexi Cortez DO    CHIEF COMPLAINT       Chief Complaint   Patient presents with    here to have dialysis catheter removed          HISTORY OF PRESENT ILLNESS    HPI    Yuki Escobar is a 65 y.o. female with a history listed below who presents to the emergency department from hospice house for removal of temporary dialysis catheter.  On evaluation of patient, she has no complaints. Patient admitted 4/25 and discharged this morning to hospice with plan for discontinuing her dialysis. It is unclear why hospice wants the dialysis catheter removed. Patient denies any complaints or concerns.     Nursing Notes were reviewed.    REVIEW OF SYSTEMS       Review of Systems   Constitutional:  Negative for fever.           PAST MEDICAL HISTORY     Past Medical History:   Diagnosis Date    Advanced care planning/counseling discussion 4/7/16    Arthritis     back    Arthritis of ankle or foot, right     Asthma     Dr. 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 Venegas [facility]    Depression     DEPRESSION AND ANXIETY    GERD (gastroesophageal reflux disease)     Heart failure ([facility])     h/o chf - Normal EF, likely due to volume overload    History of abdominal hernia     History of MRSA infection 01/2018    h/o MRSA leg     Hypertension     Morbid obesity ([facility])     Neuropathy     Respiratory failure, chronic ([facility]) 10/07/14    Dr. Marcin Tsai    Stroke ([facility]) 2009    ? TIA @ age 49    Unspecified sleep apnea 10/07/2014    BIPAP with oxygen    Vitamin D deficiency

## 2025-05-20 NOTE — DISCHARGE SUMMARY
[facility]  DISCHARGE SUMMARY    Name: Yuki Escobar   : 1960   MRN: [id number]   Date: 2025      DATE OF ADMISSION:   DATE OF DISCHARGE:       ADMISSION DIAGNOSES:  Morbid Obesity  Septic Shock  RV Failure  Severe Pulmonary Hypertension  End Stage Renal Disease  Neurocognitive Disorder    HOSPITAL COURSE:  This is a 64 year old female with history of COPD (no PFTs), JF (BiPAP), AFIB, DM, HFpEF and MO who presented to the ED on  with complaint of low BP - admitted to the ICU for suspected septic shock. The patient was initially treated with vasopressors and empiric ABX. Cultures remained negative. She completed empiric course of ABX. Remained with persistent vasopressor requirement.     TTE obtained and consistent with severe PAH and moderate RV dysfunction. RHC showed mPAP > 60. PCW 18. CI depressed at 1.8-2.2. We discussed with PH team - etiology considered longstanding II/III. Possible undiagnosed PAH. She was considered not a candidate for systemic vasodilators. We attempted treatment with ionotropes - she did not tolerate either milrinone or dobutamine.     Her BP remained low and she was on vasopressors throughout her hospitalization. The etiology is likely multifactorial with contributions from RV failure, infection and altered hemodynamics due to bilateral AV fistulas. She was able to come off vasopressors after nearly 1 month with midodrine and steroids. She was temporarily on droxidopa. She continued on HD while admitted. The patient was pleasantly confused throughout - suspect underlying dementia.      GOC conversations continued throughout her hospitalization with family. Given her multiple recent and prolonged hospitalizations in addition to her many medical problems the decision was for home with hospice. She will stop dialysis at the time of discharge. All family in agreement.     DISCHARGE DIAGNOSES:   Morbid Obesity  Septic Shock  RV Failure  Severe Pulmonary

## 2025-05-20 NOTE — ED NOTES
Per Temitope RN with Hospice patient has option to stay overnight and have surgery or tomorrow or go home. Patient is still deciding. Once patient decides please call temitope PARKS back at [phone]

## 2025-05-20 NOTE — DISCHARGE INSTRUCTIONS
Recommend scheduling outpatient removal of her hemodialysis cathter if is clinically indicated, otherwise it can be left if patient is on hospice w/o plans for any further escalation of care/treatment.

## (undated) DEVICE — CLAMP,EDSLAB HANDLELESS 8MM DBLE SOFTJAW: Brand: FOGARTY SOFTJAW

## (undated) DEVICE — GARMENT,MEDLINE,DVT,INT,CALF,MED, GEN2: Brand: MEDLINE

## (undated) DEVICE — SOL INJ SOD CL 0.9% 500ML BG --

## (undated) DEVICE — SYR 3ML LL TIP 1/10ML GRAD --

## (undated) DEVICE — SUTURE PROL SZ 5-0 L24IN NONABSORBABLE BLU RB-2 L13IN 1/2 8554H

## (undated) DEVICE — SOLUTION IV 1000ML 0.9% SOD CHL

## (undated) DEVICE — STERILE POLYISOPRENE POWDER-FREE SURGICAL GLOVES WITH EMOLLIENT COATING: Brand: PROTEXIS

## (undated) DEVICE — KIT MFLD ISOLATN NACL CNTRST PRT TBNG SPIK W/ PRSS TRNSDUC

## (undated) DEVICE — BANDAGE COMPR 9 FTX4 IN SMOOTH COMFORTABLE SYNTH ESMRK LF

## (undated) DEVICE — SURGICAL PROCEDURE PACK BASIN MAJ SET CUST NO CAUT

## (undated) DEVICE — SOLUTION LACTATED RINGERS INJECTION USP

## (undated) DEVICE — Device: Brand: GRAND SLAM

## (undated) DEVICE — (D)PREP SKN CHLRAPRP APPL 26ML -- CONVERT TO ITEM 371833

## (undated) DEVICE — SPONGE HEMOSTAT CELLULS 4X8IN -- SURGICEL

## (undated) DEVICE — Device

## (undated) DEVICE — 3M™ TEGADERM™ CHG DRESSING 25/CARTON 4 CARTONS/CASE 1660: Brand: TEGADERM™

## (undated) DEVICE — SUTURE PERMAHAND SZ 3-0 L30IN NONABSORBABLE BLK SILK BRAID A304H

## (undated) DEVICE — SOLUTION IRRIG 1000ML STRL H2O USP PLAS POUR BTL

## (undated) DEVICE — HANDLE LT SNAP ON ULT DURABLE LENS FOR TRUMPF ALC DISPOSABLE

## (undated) DEVICE — ANGIOGRAPHY KIT

## (undated) DEVICE — CATHETER PRESSURE WEDGE BLLN 6FRX110CM

## (undated) DEVICE — GUIDEWIRE VASC L150CM DIA0.025IN TIP L7CM J RAD 3MM PTFE

## (undated) DEVICE — KIT HND CTRL 3 W STPCOCK ROT END 54IN PREM HI PRSS TBNG AT

## (undated) DEVICE — PRESSURE MONITORING SET: Brand: TRUWAVE

## (undated) DEVICE — ASTOUND STANDARD SURGICAL GOWN, XXL: Brand: CONVERTORS

## (undated) DEVICE — SUTURE VCRL SZ 3-0 L27IN ABSRB UD L26MM SH 1/2 CIR J416H

## (undated) DEVICE — CATHETER ART THERMODILUTION 6 FRX110 CM 4 LUMEN SWAN

## (undated) DEVICE — PROBE VASC 8MHZ WTRPRF

## (undated) DEVICE — DRAPE,EXTREMITY,89X128,STERILE: Brand: MEDLINE

## (undated) DEVICE — SPONGE GZ W4XL4IN COT 12 PLY TYP VII WVN C FLD DSGN

## (undated) DEVICE — STRAP,POSITIONING,KNEE/BODY,FOAM,4X60": Brand: MEDLINE

## (undated) DEVICE — STERILE POLYISOPRENE POWDER-FREE SURGICAL GLOVES: Brand: PROTEXIS

## (undated) DEVICE — SUTURE PERMAHAND SZ 2-0 L30IN NONABSORBABLE BLK SILK W/O A305H

## (undated) DEVICE — SUTURE ETHLN SZ 4-0 L18IN NONABSORBABLE BLK L16MM PC-3 3/8 1864G

## (undated) DEVICE — PACK,BASIC,SIRUS,V: Brand: MEDLINE

## (undated) DEVICE — SPECIAL PROCEDURE DRAPE 32" X 34": Brand: SPECIAL PROCEDURE DRAPE

## (undated) DEVICE — RUNTHROUGH NS EXTRA FLOPPY PTCA GUIDEWIRE: Brand: RUNTHROUGH

## (undated) DEVICE — STRETCH BANDAGE ROLL: Brand: DERMACEA

## (undated) DEVICE — GLIDESHEATH SLENDER ACCESS KIT: Brand: GLIDESHEATH SLENDER

## (undated) DEVICE — INTRODUCER SURG KT 0.018 IN 4 FR SFT TIP REG NIT VSI

## (undated) DEVICE — BANDAGE,GAUZE,BULKEE II,4.5"X4.1YD,STRL: Brand: MEDLINE

## (undated) DEVICE — DEVON™ KNEE AND BODY STRAP 60" X 3" (1.5 M X 7.6 CM): Brand: DEVON

## (undated) DEVICE — REM POLYHESIVE ADULT PATIENT RETURN ELECTRODE: Brand: VALLEYLAB

## (undated) DEVICE — CO-SET DELIVERY SYSTEM FOR 123 ROOM TEMPATURE INJECTATE: Brand: CO-SET+

## (undated) DEVICE — INFECTION CONTROL KIT SYS

## (undated) DEVICE — HI-TORQUE VERSACORE MODIFIED J GUIDE WIRE SYSTEM 145 CM: Brand: HI-TORQUE VERSACORE

## (undated) DEVICE — GLOVE ORANGE PI 7   MSG9070

## (undated) DEVICE — PENCIL ES L3M BTTN SWCH S STL HEX LOK BLDE ELECTRD HOLSTER

## (undated) DEVICE — DISPOSABLE TOURNIQUET CUFF SINGLE BLADDER, DUAL PORT AND QUICK CONNECT CONNECTOR: Brand: COLOR CUFF

## (undated) DEVICE — NEEDLE HYPO 25GA L1.5IN BLU POLYPR HUB S STL REG BVL STR

## (undated) DEVICE — SUTURE VCRL SZ 3-0 L27IN ABSRB UD FS-2 L19MM 1/2 CIR J423H

## (undated) DEVICE — SUT ETHLN 4-0 18IN PS2 BLK --

## (undated) DEVICE — SUT PROL 6-0 18IN BV1 DA BLU --

## (undated) DEVICE — TOWEL SURG W17XL27IN STD BLU COT NONFENESTRATED PREWASHED

## (undated) DEVICE — DISH PETRI W/LID STRL -- MIN CASE ORDER IS 2 CA

## (undated) DEVICE — NEEDLE HYPO 18GA L1.5IN PNK S STL HUB POLYPR SHLD REG BVL

## (undated) DEVICE — NEEDLE HYPO 25GA L1.5IN BVL ORIENTED ECLIPSE

## (undated) DEVICE — SCANLAN® VASCU-STATT® II PLUS S-USE BULLDOG CLAMP W/FIRM PRESS GENTLE-JAW™- MINI ANGLED 45°(GREEN), CLAMPING PRESSURE 20-25 G (2/STERILE PKG): Brand: SCANLAN® VASCU-STATT® II PLUS S-USE BULLDOG CLAMP W/FIRM PRESS GENTLE-JAW™

## (undated) DEVICE — HI-TORQUE VERSACORE MODIFIED J GUIDE WIRE SYSTEM 260 CM: Brand: HI-TORQUE VERSACORE

## (undated) DEVICE — SPONGE GZ W4XL4IN COT RADPQ HIGHLY ABSRB

## (undated) DEVICE — DRAPE,REIN 53X77,STERILE: Brand: MEDLINE

## (undated) DEVICE — PENCIL SMK EVAC 10 FT BLADE ELECTRD ROCKER FOR TELSCP

## (undated) DEVICE — PRECISION THIN, OFFSET (5.5 X 0.38 X 25.0MM)

## (undated) DEVICE — INTENDED FOR TISSUE SEPARATION, AND OTHER PROCEDURES THAT REQUIRE A SHARP SURGICAL BLADE TO PUNCTURE OR CUT.: Brand: BARD-PARKER ® CARBON RIB-BACK BLADES

## (undated) DEVICE — GUIDEWIRE VASC J 3 MM 0.035 INX210 CM FIX COR INQWIRE

## (undated) DEVICE — KIT MED IMAG CNTRST AGNT W/ IOPAMIDOL REUSE

## (undated) DEVICE — SYRINGE IRRIG 60ML SFT PLIABLE BLB EZ TO GRP 1 HND USE W/

## (undated) DEVICE — DECANTER BAG 9": Brand: MEDLINE INDUSTRIES, INC.

## (undated) DEVICE — SUT CHRMC 3-0 27IN SH BRN --

## (undated) DEVICE — CATHETER DIAG 5FR L100CM SPEC IMA CRV SZ DBL BRAID WIRE SFT

## (undated) DEVICE — SYR IRR BLB 2OZ DISP BLU STRL -- CONVERT TO ITEM 357637

## (undated) DEVICE — PADPRO DEFIBRILLATION/PACING/CARDIOVERSION/MONITORING ELECTRODES, ADULT/CHILD GREATER THAN 10 KG RADIOTRANSPARENT ELECTRODE, PHYSIO-CONTROL QUIK-COMBO (M) 60" (152 CM): Brand: PADPRO

## (undated) DEVICE — PROVE COVER: Brand: UNBRANDED

## (undated) DEVICE — LIGHT HANDLE: Brand: DEVON

## (undated) DEVICE — SUT SLK 2 60IN TIE MP BLK --

## (undated) DEVICE — SPONGE LAP 18X18IN STRL -- 5/PK

## (undated) DEVICE — SUTURE NONABSORBABLE MONOFILAMENT CV-6 TTC-9 24 IN GORTX 6K02B

## (undated) DEVICE — OCCLUSIVE GAUZE STRIP,3% BISMUTH TRIBROMOPHENATE IN PETROLATUM BLEND: Brand: XEROFORM

## (undated) DEVICE — SUTURE NONABSORBABLE MONOFILAMENT 4-0 CV-5 PT-13 24 IN GORTX 5K08B